# Patient Record
Sex: FEMALE | Race: WHITE
[De-identification: names, ages, dates, MRNs, and addresses within clinical notes are randomized per-mention and may not be internally consistent; named-entity substitution may affect disease eponyms.]

---

## 2017-11-08 NOTE — PATH
Surgical Pathology Report



Patient Name:  NATANAEL COE

Accession #:  [id number]

[facility]. Rec. #:  [id number]                                                        

   /Age/Gender:  1955 (Age: 61) / F

Account:  [id number]                                                          

             Location: 

Taken:  10/26/2017

Received:  10/26/2017

Reported:  2017

Physicians:  Orestes Edwards M.D.

  



Specimen(s) Received

 RENAL BIOPSY FOR SEND OUT 





Clinical History

None Provided





Intraoperative Consult Diagnosis

Kidney biopsy, gross examination: Few glomeruli present. Request additional

tissue.

BONG Keen M.D., 10/26/17









Final Diagnosis

Renal Biopsy ([facility] [id number])

(scant specimen; 1 glomerulus for IF and 2 glomeruli for EM) with:

1.          Fibroadipose tissue only for light microscopy; tissue inadequate for

light microscopy.

2.     One (1) glomerulus in frozen tissue with mesangial sclerosis and

glomerular basement membrane thickening, suggestive of diffuse diabetic

glomerulosclerosis (see comment).

3.     Tubular atrophy and interstitial fibrosis, focal, mild.



Electron microscopy addendum results:

Renal biopsy:

     Diffuse diabetic glomerulosclerosis with:

1.          Mesangial sclerosis, global, 2+.

2.     Glomerular basement membrane thickening, global 2-3+.

3.     Foot process effacement, 70%.

4.     Tubular atrophy and interstitial fibrosis, 2+.

5.     Arteriolar hyalinosis, 2+.

NOTE: Longstanding smoking may have contributed to the development of mesangial

sclerosis and glomerular basement membrane thickening.



COMMENT:

Interpretation of this biopsy is limited by the sampling of only fibroadipose

tissue for light microscopy and the need to reprocess frozen tissue for light

microscopy, leading to freezing artifact.  The single glomerulus sampled by

immunofluorescence has linear staining of glomerular and tubular basement

membranes with antisera to albumin and IgG, supporting a diagnosis of diffuse

diabetic glomerulosclerosis.  These findings provide evidence against immune

complex mediated and dysproteinemia related renal diseases.  The diagnosis of

diabetic glomerulosclerosis s supported by the finding of global mesangia l

sclerosis and glomerular basement membrane thickening in the 2 glomeruli sampled

in the toluidine blue survey sections for electron macroscopy.  Longstanding

smoking may have contributed to the development of mesangial sclerosis.  The

glomerular sampling (3 glomeruli total for IF and EM) is insufficient to

evaluate the severity of the diabetic nephropathy.  Electron microscopy is

pending and may be contributory.



See [facility] Pathologist's report [id number] for full report.







***Electronically Signed***

Easton Keen M.D.





Gross Description

Received fresh in saline, labeled "right renal biopsy," are 2 tan-red,

cylindrical portions of soft tissue averaging 1.0 cm in length and 0.1 cm in

diameter. The specimens are divided, placed into 10% buffered formalin, Rosendo

fixative and glutaraldehyde. The specimen is sent to [facility] for

further studies.

There is an additional 0.6 cm in length x 0.1 cm in diameter red, hemorrhagic,

cylindrical portion of soft tissue received in saline. The specimen is divided

and added to the previous specimens and sent to [facility].

/10/26/2017



saudi/10/26/2017

## 2017-12-18 NOTE — HP
Satellite [facility]





- Chief Complaint


Chief Complaint: right knee pain





- Past Medical History


Allergies/Adverse Reactions: 


 Allergies











Allergy/AdvReac Type Severity Reaction Status Date / Time


 


CT SCAN DYE Allergy Severe  Uncoded 12/05/17 12:37














- Current Medications


Current Medications: 


 Home Medications











 Medication  Instructions  Recorded


 


Alprazolam [Xanax] 0.25 mg PO DAILY PRN 12/05/17


 


Amlodipine Besylate 10 mg PO DAILY 12/05/17


 


Aspirin [ASA -] 81 mg PO DAILY 12/05/17


 


Bimatoprost [Lumigan] 1 drop OU DAILY 12/05/17


 


Brimonidine Tartrate/Timolol 5 5ml OU BID 12/05/17





[Combigan Eye Drops]  


 


Cholecalciferol (Vitamin D3) 2,000 unit PO DAILY 12/05/17





[Vitamin D3]  


 


Dulaglutide [Trulicity] 1.5 mg SQ WEEKLY 12/05/17


 


Escitalopram Oxalate [Lexapro -] 10 mg PO DAILY 12/05/17


 


Furosemide [Lasix] 40 mg PO BID 12/05/17


 


Insulin Glargine,Hum.rec.anlog 75 unit SQ DAILY 12/05/17





[Toujeo Solostar]  


 


Insulin Lispro [Humalog] 18 unit SQ BID 12/05/17


 


Isosorbide Mononitrate [Isosorbide 30 mg PO DAILY 12/05/17





Mononitrate ER]  


 


Losartan/Hydrochlorothiazide 1 each PO DAILY 12/05/17





[Losartan-Hctz 50-12.5 mg Tab]  


 


Nebivolol HCl [Bystolic] 10 mg PO DAILY 12/05/17


 


Rosuvastatin Calcium [Crestor] 10 mg PO DAILY 12/05/17














Satellite Physical Exam





- Physical Examination


General Appearance: Well Nourished, Well Developed, Alert & Oriented x3


ENT: Clear


Lung: Normal air movement


Heart: Regular rate & rhythm


Extremities: Other (right knee- + swelling, + ttp, decr rom, nvi xrays show 

grade 4 tricompartmental djd)


Neurological: Intact, Alert, Oriented





Satellite Impression/Plan





- Impression/Plan


Impression: right knee djd


Operative Procedure: right rj tkr


Date to be Performed: 12/19/17

## 2017-12-19 NOTE — OP
Operative Note





- Note:


Operative Date: 12/19/17 (angelita)


Pre-Operative Diagnosis: right knee djd


Operation: right rj tkr (press-fit)


Post-Operative Diagnosis: Same as Pre-op


Surgeon: Norman Soliman


Assistant: Chaparro Galloway


Anesthesiologist/CRNA: Markel Conley


Anesthesia: Spinal, Local


Specimens Removed: bone fragments


Estimated Blood Loss (mls): 100


Operative Report Dictated: Yes

## 2017-12-20 NOTE — PN
Progress Note (short form)





- Note


Progress Note: 





62F POD1 s/p R TKR under spinal anesthetic with peripheral nerve blocks for 

post operative pain control. Pt is doing well, reports no anesthetic 

complications. Pain is well controlled. Sensory and motor function intact in 

bilateral lower extremities.

## 2017-12-20 NOTE — PN
Progress Note (short form)





- Note


Progress Note: 





Ortho





Pt seen and examined s/p right rj tkr pod #2


 


 Selected Entries











  12/20/17





  05:56


 


Temperature 97.6 F


 


Pulse Rate 95 H


 


Respiratory 19





Rate 


 


Blood Pressure 141/53








 Laboratory Tests











  12/20/17





  07:30


 


WBC  15.2 H


 


Hgb  12.0


 


Hct  35.8


 


Plt Count  183











Dressing c/d/i, calf soft, nt


rom 0-50, nvi





a/p


PT


dvt ppx


pain control


d/c home tomorrow if stable

## 2017-12-20 NOTE — CONSULT
Consultation: 


REQUESTING PROVIDER:  Dr Soliman 





CONSULT REQUEST: We have been asked to medically evaluate this patient for 

hyperglycemia.





HISTORY OF PRESENT ILLNESS:  Patient is a 61y/o obese female with a past 

medical history of IDDM, COPD, CKD, osteoarthritis, HTN, diastolic congestive 

heart failure, and depression. Patient is a s/p right TKR (rj), Dr Soliman, 

spinal anesthesia, patient is post operative day 1.  patient reports feeling 

well, reports minimal pain to the right lower extremity.  








REVIEW OF SYSTEMS:


CONSTITUTIONAL: 


Absent:  fever, chills, diaphoresis, generalized weakness, malaise, loss of 

appetite, weight change


HEENT: 


Absent:  rhinorrhea, nasal congestion, throat pain, throat swelling, difficulty 

swallowing, mouth swelling, ear pain, eye pain, visual changes


CARDIOVASCULAR: 


Absent: chest pain, syncope, palpitations, irregular heart rate, lightheadedness

, peripheral edema


RESPIRATORY: 


Absent: cough, shortness of breath, dyspnea with exertion, orthopnea, wheezing, 

stridor, hemoptysis


GASTROINTESTINAL:


Absent: abdominal pain, abdominal distension, nausea, vomiting, diarrhea, 

constipation, melena, hematochezia


GENITOURINARY: 


Absent: dysuria, frequency, urgency, hesitancy, hematuria, flank pain, genital 

pain


MUSCULOSKELETAL: 


Absent: myalgia, arthralgia, joint swelling, back pain, neck pain


SKIN: 


Absent: rash, itching, pallor


HEMATOLOGIC/IMMUNOLOGIC: 


Absent: easy bleeding, easy bruising, lymphadenopathy, frequent infections


ENDOCRINE:


Absent: unexplained weight gain, unexplained weight loss, heat intolerance, 

cold intolerance


NEUROLOGIC: 


Absent: headache, focal weakness or paresthesias, dizziness, unsteady gait, 

seizure, mental status changes, bladder or bowel incontinence


PSYCHIATRIC: 


Absent: anxiety, depression, suicidal or homicidal ideation, hallucinations.





PHYSICAL EXAMINATION





 Vital Signs - 24 hr











  12/19/17 12/19/17 12/19/17





  08:29 11:31 11:35


 


Temperature 98.1 F 97.9 F 


 


Pulse Rate 70 63 64


 


Respiratory 16 16 18





Rate   


 


Blood Pressure 133/69 114/47 114/52


 


O2 Sat by Pulse  98 97





Oximetry (%)   














  12/19/17 12/19/17 12/19/17





  11:40 11:45 12:00


 


Temperature   


 


Pulse Rate 68 68 68


 


Respiratory 18 20 20





Rate   


 


Blood Pressure 123/41 123/41 113/45


 


O2 Sat by Pulse 99 98 98





Oximetry (%)   














  12/19/17 12/19/17 12/19/17





  12:15 12:30 13:05


 


Temperature   98 F


 


Pulse Rate 58 L 56 L 56 L


 


Respiratory 20 20 19





Rate   


 


Blood Pressure 106/47 126/53 122/50


 


O2 Sat by Pulse 96 96 96





Oximetry (%)   














  12/19/17 12/20/17





  22:36 05:56


 


Temperature 97.5 F L 97.6 F


 


Pulse Rate 62 95 H


 


Respiratory 19 19





Rate  


 


Blood Pressure 127/54 141/53


 


O2 Sat by Pulse 96 94 L





Oximetry (%)  














GENERAL: Awake, alert, and fully oriented, in no acute distress.


HEAD: Normal with no signs of trauma.


EYES: Pupils equal, round and reactive to light, extraocular movements intact, 

sclera anicteric, conjunctiva clear. No lid lag.


EARS, NOSE, THROAT: Ears normal, nares patent, oropharynx clear without 

exudates. Moist mucous membranes.


NECK: Normal range of motion, supple without lymphadenopathy, JVD, or masses.


LUNGS: Breath sounds equal, clear to auscultation bilaterally. No wheezes, and 

no crackles. No accessory muscle use.


HEART: Regular rate and rhythm, normal S1 and S2 without murmur, rub or gallop.


ABDOMEN: Soft, nontender, not distended, normoactive bowel sounds, no guarding, 

no rebound, no masses.  No hepatomegaly or splenomegaly. 


MUSCULOSKELETAL: Normal range of motion at all joints. No bony deformities or 

tenderness. No CVA tenderness.


UPPER EXTREMITIES: 2+ pulses, warm, well-perfused. No cyanosis. No clubbing. 

Cap refill <2 seconds. No peripheral edema.


LOWER EXTREMITIES: 2+ pulses, warm, well-perfused. No calf tenderness. No 

peripheral edema. 


RIGHT LOWER EXTREMITY: aguacel dressing intact,CDI, less than 3 second 

capillary refill, + 3 pedal pulse, SCD/SOURAV 


NEUROLOGICAL:  Cranial nerves II-XII intact. Normal speech. Normal gait.


PSYCHIATRIC: Cooperative. Good eye contact. Appropriate mood and affect.


SKIN: Warm, dry, normal turgor, no rashes or lesions noted. 











 Laboratory Results - last 24 hr











  12/19/17 12/19/17 12/19/17





  08:00 11:39 17:08


 


POC Glucometer  155  101  179














  12/19/17 12/20/17





  21:42 06:26


 


POC Glucometer  340  253








Active Medications











Generic Name Dose Route Start Last Admin





  Trade Name Freq  PRN Reason Stop Dose Admin


 


Acetaminophen  650 mg  12/19/17 12:00  12/20/17 06:33





  Tylenol -  PO  12/22/17 11:59  650 mg





  Q6H JAYCEE   Administration


 


Al Hydroxide/Mg Hydroxide  30 ml  12/19/17 11:20  





  Mylanta Oral Suspension -  PO   





  Q4H PRN   





  DYSPEPSIA   


 


Alprazolam  0.25 mg  12/19/17 11:16  





  Xanax -  PO   





  DAILY PRN   





  ANXIETY   


 


Amlodipine Besylate  10 mg  12/20/17 10:00  





  Norvasc -  PO   





  DAILY JAYCEE   


 


Aspirin  325 mg  12/20/17 08:00  





  Asa -  PO   





  DAILY@0800 JAYCEE   


 


Brimonidine Tartrate  1 drop  12/19/17 22:00  12/19/17 21:33





  Alphagan 0.2% -  OU   1 drop





  BID JAYCEE   Administration


 


Escitalopram Oxalate  10 mg  12/20/17 10:00  





  Lexapro -  PO   





  DAILY [facility]   


 


Fentanyl  50 mcg  12/19/17 11:52  





  Sublimaze Injection -  IVPUSH   





  [id number] PRN   





  PAIN   


 


Furosemide  40 mg  12/19/17 06:00  12/20/17 06:34





  Lasix -  PO   40 mg





  BIDLASIX JAYCEE   Administration


 


HCTZ/Losartan Potassium  1 tab  12/20/17 10:00  





  Hyzaar -  PO   





  DAILY [facility]   


 


Insulin Aspart  1 vial  12/19/17 16:30  12/20/17 06:35





  Novolog Vial Sliding Scale -  SQ   8 units





  ACHS JAYCEE   Administration





  Protocol   


 


Isosorbide Mononitrate  30 mg  12/20/17 10:00  





  Imdur -  PO   





  DAILY [facility]   


 


Latanoprost  1 drop  12/19/17 22:00  12/19/17 21:36





  Xalatan 0.005% Eye Drops -  OU   1 drop





  HS JAYCEE   Administration


 


Magnesium Hydroxide  30 ml  12/19/17 11:20  





  Milk Of Magnesia -  PO   





  PRN PRN   





  CONSTIPATION   


 


Multivitamins/Minerals/Vitamin C  1 tab  12/20/17 10:00  





  Tab-A-Vit -  PO   





  DAILY JAYCEE   


 


Nebivolol  10 mg  12/20/17 10:00  





  Bystolic -  PO   





  DAILY JAYCEE   


 


Non-Formulary Medication  75 unit  12/20/17 10:00  





  Insulin Glargine,Hum.Rec.Anlog [Toujeo Solostar]  SQ   





  DAILY JAYCEE   


 


Non-Formulary Medication  18 unit  12/19/17 22:00  





  Insulin Lispro Protamin/Lispro [Humalog Mix 50-50 Vial]  SQ   





  BID JAYCEE   


 


Ondansetron HCl  4 mg  12/19/17 13:18  





  Zofran Injection  IVPUSH   





  Q6H PRN   





  NAUSEA AND/OR VOMITING   


 


Oxycodone HCl  5 mg  12/19/17 11:52  12/20/17 06:33





  Roxicodone -  PO   5 mg





  Q3H PRN   Administration





  PAIN LEVEL 1-5   


 


Oxycodone HCl  10 mg  12/19/17 11:52  12/19/17 15:32





  Roxicodone -  PO   10 mg





  Q3H PRN   Administration





  PAIN LEVEL 6-10   


 


Oxycodone HCl  10 mg  12/19/17 22:00  12/19/17 21:34





  Oxycontin -  PO  12/22/17 11:53  10 mg





  BID JAYCEE   Administration


 


Pantoprazole Sodium  40 mg  12/20/17 10:00  





  Protonix -  PO   





  DAILY JAYCEE   


 


Promethazine HCl  12.5 mg  12/19/17 11:52  





  Phenergan Injection -  IVPUSH   





  Q6H PRN   





  NAUSEA-FOR RESCUE AFTER 15 MIN   


 


Rosuvastatin Calcium  10 mg  12/20/17 22:00  





  Crestor -  PO   





  HS JAYCEE   


 


Senna/Docusate Sodium  2 tablet  12/19/17 22:00  12/19/17 21:35





  Pericolace -  PO   2 tablet





  BID JAYCEE   Administration


 


Timolol Maleate  1 drop  12/19/17 22:00  12/19/17 21:35





  Timoptic 0.5%  OU   1 drop





  BID JAYCEE   Administration











ASSESSMENT/PLAN:





1) MS


s/p right TKR pod #1


- prn pain medication


-PT as per the orthopedist


- incentive spirometer


- monitor CBC





2) endo


IDDM


- start levermir (subsition for trudejo) and novolog 70/30 (subsition for 50/50)

,continue regular insulin sliding scale with fingersticks achs





3) cardiovascular


diastolic congestive heart failure


- pt is euvolemic on exam, continue lasix 


hypertension


- continue loosartan and bystolic





4) pulmonary


copd


- no acute exacerbation


- albuterol nebulizer prn 





f/e/n


- low sodium diet





ppx


- asa


- pt


- oob


- scd/sourav


- protonix 





Dispo: We will continue to follow the patient. Thank you for this consultative 

opportunity.











Visit type





- Emergency Visit


Emergency Visit: No





- New Patient


This patient is new to me today: Yes


Date on this admission: 12/20/17





- Critical Care


Critical Care patient: No

## 2017-12-21 NOTE — DS
Physical Examination


Vital Signs: 


 Vital Signs











Temperature  98.3 F   12/21/17 06:00


 


Pulse Rate  93 H  12/21/17 06:00


 


Respiratory Rate  20   12/21/17 06:00


 


Blood Pressure  143/52   12/21/17 06:00


 


O2 Sat by Pulse Oximetry (%)  93 L  12/21/17 06:00











Labs: 


 CBC, BMP





 12/21/17 07:30 











Discharge Summary


Reason For Visit: OSTEOARTHRITIS


Procedures: Principal: s/p right rj tkr


Hospital Course: 





admitted fro elective right rj tkr, uneventful post-op, stable for d/c


Condition: Good





- Instructions


Diet, Activity, Other Instructions: 


Post-op Instructions-Total Knee Replacement                                    

               





     Call the office for a follow-up  appointment in 1 week - [phone]


     Aspirin 325mg daily for 6 weeks. Pain medication was sent into your 

pharmacy.                      


     Apply  Graduated Compression Stockings (TEDs) to both lower extremities-

remove daily


      for hygiene  ONLY


     Apply Sequential Compression Device (SCDs)  to both Lower extremities 

remove for PT 


      and hygiene ONLY  


     Apply cold packs to affected area for 15 minutes every 2 hours.


     Physical Therapist will come to your home for the first 5 days. You will 

be set up with 


      outpatient PT at your first post-operative visit.


     Patient may ambulate as tolerated-encourage self care (at least every 2-3 

hours while awake) 


      with walker or cane


     Maintain Aquacel (waterproof) dressing to operative wound (will be 

removed by surgeon at 


      first office visit)


     Shower with Aquacel dressing in place-if Aquacel integrity compromised, 

remove and apply 


      dry sterile dressing and notify Orthopedist.  DO NOT SHOWER unless 

Orthopedists approves without Aquacel 


      dressing





     CONTACT THE OFFICE FOR ANY CHANGE IN YOUR CONDITION (for example-fever 


      greater than 102 degrees, excessive bleeding from operative site, 

purulent drainage, 


      severe swelling or pain)    GO TO THE EMERGENCY ROOM IF THERE IS A  

MEDICAL EMERGENCY





     Knee Precautions:  


      * Keep a rolled towel under affected heel while in bed or chair (to keep 

knee 


        in extension)


      * Keep affected leg elevated except during mealtimes


      * DO NOT PLACE PILLOW UNDER AFFECTED KNEE





  * If you have any questions, please do not hesitate to call the office - [phone].





Referrals: 


Norman Soliman MD [Staff Physician] - 


Disposition: VNS/HOME HEALTH CARE





- Home Medications


Comprehensive Discharge Medication List: 


Ambulatory Orders





Aspirin [ASA -] 81 mg PO DAILY 10/14/16 


Escitalopram Oxalate [Lexapro -] 10 mg PO DAILY 10/14/16 


Insulin Lispro Protamin/Lispro [Humalog Mix 50-50 Kwikpen] 18 unit SQ BID 10/14/

16 


Isosorbide Mononitrate [Imdur -] 30 mg PO DAILY 10/14/16 


Nebivolol HCl [Bystolic] 10 mg PO DAILY 10/14/16 


Rosuvastatin Calcium [Crestor] 10 mg PO HS 10/14/16 


Amlodipine Besylate [Norvasc -] 10 mg PO DAILY #30 tablet 10/18/16 


Furosemide [Lasix -] 40 mg PO BID 10/25/17 


Insulin Glargine,Hum.rec.anlog [Toujeo Solostar] 75 units SCJ DAILY 10/25/17 


Losartan 50Mg/Hctz 12.5MG 1 tab PO DAILY 10/25/17 


Oxycodone HCl/Acetaminophen [Percocet 5-325 mg Tablet] 1 tab PO BID 10/25/17 


Vitamin D3 - 2,000 units PO DAILY 10/25/17 


Alprazolam [Xanax] 0.25 mg PO DAILY PRN 12/05/17 


Amlodipine Besylate 10 mg PO DAILY 12/05/17 


Bimatoprost [Lumigan] 1 drop OU DAILY 12/05/17 


Brimonidine Tartrate/Timolol [Combigan 0.2%-0.5% Eye Drops] 5 5ml OU BID 12/05/ 17 


Cholecalciferol (Vitamin D3) [Vitamin D3] 2,000 unit PO DAILY 12/05/17 


Dulaglutide [Trulicity] 1.5 mg SQ WEEKLY 12/05/17 


Escitalopram Oxalate [Lexapro -] 10 mg PO DAILY 12/05/17 


Furosemide [Lasix] 40 mg PO BID 12/05/17 


Insulin Glargine,Hum.rec.anlog [Toujeo Solostar] 75 unit SQ DAILY 12/05/17 


Isosorbide Mononitrate [Isosorbide Mononitrate ER] 30 mg PO DAILY 12/05/17 


Losartan/Hydrochlorothiazide [Losartan-Hctz 50-12.5 mg Tab] 1 each PO DAILY 12/ 05/17 


Nebivolol HCl [Bystolic] 10 mg PO DAILY 12/05/17 


Rosuvastatin Calcium [Crestor] 10 mg PO DAILY 12/05/17 


Aspirin [ASA -] 325 mg PO DAILY@0800  tablet 12/19/17 


Insulin Lispro Protamin/Lispro [Humalog Mix 50-50 Vial] 18 unit SQ BID 12/19/17 


Oxycodone HCl/Acetaminophen [Percocet 5-325 mg Tablet] 1 tab PO Q6H PRN #50 

tablet MDD 6 12/19/17 


Oxycodone HCl/Acetaminophen [Percocet 5-325 mg Tablet] 1 - 2 tab PO Q6H #50 tab 

MDD 8 12/21/17

## 2017-12-21 NOTE — PN
Progress Note (short form)





- Note


Progress Note: 





Ortho





Pt seen and examined s/p right rj tkr pod #2


 


 


 Selected Entries











  12/21/17





  06:00


 


Temperature 98.3 F


 


Pulse Rate 93 H


 


Respiratory 20





Rate 


 


Blood Pressure 143/52








 Laboratory Tests











  12/21/17





  07:30


 


WBC  Pending


 


Hgb  Pending


 


Hct  Pending


 


Plt Count  Pending














Dressing c/d/i, calf soft, nt


rom 0-50, nvi





a/p


PT


dvt ppx


pain control


d/c home today


f/u in 1 week

## 2017-12-22 NOTE — PATH
Surgical Pathology Report



Patient Name:  NATANAEL COE

Accession #:  [id number]

Med. Rec. #:  [id number]                                                        

   /Age/Gender:  1955 (Age: 62) / M

Account:  [id number]                                                          

             Location: [facility] MED[facility]

Taken:  2017

Received:  2017

Reported:  2017

Physicians:  Normna Soliman M.D.

  



Specimen(s) Received

 RIGHT KNEE BONES 





Clinical History

Osteoarthritis







Final Diagnosis

B BONEs, RIGHT, TOTAL KNEE REPLACEMENT:

DEGENERATIVE JOINT DISEASE.





***Electronically Signed***

Lucia Fallon M.D.





Gross Description

Received in formalin labeled "right knee bones," is a 9.5 x 8.5 x 1.7 cm

aggregate of multiple tan, irregular portions of bone and soft tissue,

consistent with knee bones. There is a 1.7 cm in greatest dimension area of

eburnation present. The remaining articular surfaces are tan-yellow and focally

granular. The underlying trabecular bone is yellow and hard. Representative

sections are submitted in one cassette, following decalcification.





[facility]

## 2017-12-27 NOTE — SPEC
DATE OF OPERATION:  12/19/2017 

 

PREOPERATIVE DIAGNOSIS:  Degenerative joint disease, right knee.

 

POSTOPERATIVE DIAGNOSIS:  Degenerative joint disease, right knee.

 

PROCEDURE:  Right total knee replacement with robotic-assisted navigation

(MAKOplasty) with Press-Fit components. 

 

SURGICAL ATTENDING:  Norman Soliman MD

 

FIRST ASSISTANT:  PATRICIA Nam

 

ANESTHESIA:  Regional and spinal.

 

CLOSURE:  No. 1 Vicryl, fascia; 0 and 2-0 for subcutaneous; and 3-0 Monocryl

subcuticular with skin glue for skin; 4-0 undyed Vicryl for pin sites.

 

ESTIMATED BLOOD LOSS:  Less than 100 mL.

 

COMPLICATIONS:  None.

 

CONDITION:  To recovery room in stable condition.

 

DESCRIPTION OF OPERATIVE PROCEDURE:  Patient was taken to the operating room on

December 19, 2017.  Regional and spinal anesthesia was administered by the

anesthesiologist.  IV Kefzol was administered prophylactically prior to the case as

well as TXA.  The right lower extremity was prepped and draped in the usual sterile

fashion.

 

The midline 10- to 12-cm longitudinal incision was made.  Hemostasis was achieved

with Bovie cautery.  Sharp dissection was carried down to the extensor mechanism

which was _____ perform the procedure.  Medial parapatellar arthrotomy was then

performed, leaving a cuff of tissue for later closure.  The patella was inverted and

the knee was flexed up.  The fat pad was excised.  Subperiosteal dissection was done

on the anteromedial proximal tibia until the knee was able to be brought forward. 

This was facilitated by taking the ACL, PCL and medial and lateral menisci. 

Checkpoints were placed in both the femur and in the tibia.  Two parallel threaded

pins were drilled superior to the knee joint through the already made incision from

anterior to posterior just going through the anterior cortex but just engaging but

not going through the posterior cortex.  Two threaded pins were drilled through 2

small stab incisions in parallel fashion 1 handbreadth below the tibial tubercle

through the anterior cortex of the tibia and engaging but not going through the

posterior cortex.  Both sets of pins were attached to navigation arrays for the GIOVANA

system.

 

The knee was then registered with the navigation system with center of rotation of

the hip, medial and lateral malleoli and multiple sites both on the tibia and on the

femur.  Confirmation of excellent registration was confirmed by "popping the

bubbles."  At this time, the knee was thoroughly inspected to remove all osteophytes

around the knee.  The knee was then tensioned in varus/valgus at both full extension

and at 90 degrees of flexion to ascertain our gaps.  The virtual position of the

components was optimized to ensure equal gaps throughout the range of motion.  Once

this was performed, the robot was brought into the field, was registered.  The bone

was cut as per the specifications on both the tibia and on the femur.  The box cuts

were then made as well.  Excellent trial stability was obtained on the femur.  The

tibial baseplate was allowed to "find itself" and then was clipped into place. 

Confirmation of excellent external rotation of that component was confirmed by the

navigation device as well.The patella was calibered for thickness and cut at the

appropriate level.  The appropriate lollipop was used to drill 3 holes in the patella

and a trial asymmetric patellar button was applied.  The knee was taken through a

range of motion and found to have excellent stability from full extension to full

flexion with excellent tracking of the patella.  The trial components were then

removed.  The lug holes were drilled in the femur.  The cementless keel was punched

in the tibia.  The real Press-Fit components were malleted into place, first with the

tibia and then with the femur, and then the patella was crimped into place as well. 

The real polyethylene liner was then clipped into place.  Range of motion, stability

and tracking were as described earlier.

 

The knee was thoroughly irrigated with copious amounts of irrigation.  Vancomycin

powder was placed inside the joint.  The medial parapatellar arthrotomy was then

closed using No. 1 Vicryl interrupted suture.  Post closure of the arthrotomy, the

knee was taken through a range of motion and found to have no undue tension on the

repair.  The subcutaneous was then pulse antibiotic irrigated, closed with 0 and 2-0

Vicryl and 3-0 Monocryl subcuticular with skin glue for the skin.  Prior to closure,

the checkpoints were removed as were the threaded pins.  The tibial pin sites were

closed with 4-0 undyed Vicryl.  A sterile pressure Aquacel dressing was applied.  No

tourniquet was used during the case.  The total blood loss was approximately 100 mL. 

No complication.  Patient was transferred to recovery in stable condition.

 

 

ADITI [id number]

DD: 12/27/2017 13:20

DT: 12/27/2017 13:43

Job #:  [id number]

## 2018-05-03 NOTE — PDOC
History of Present Illness





- General


Chief Complaint: SIRS, Suspected/Possible


Stated Complaint: FEVER (PCP SENT)


Time Seen by Provider: 05/03/18 14:24


History Source: Patient


Exam Limitations: No Limitations





- History of Present Illness


Initial Comments: 





05/03/18 14:53





63 yo F with h/o copd, chf, htn DM CKD here with cough congestion, fever and 

fatigue x 5 days.  Seen by Dr Savage at his office 3 days, was started on 

tamiflu but patient say symptoms are not getting better.


Denies vomiting, only nausea, no abd pain. no rash. no cp. no leg swelling. 








Past History





- Past Medical History


Allergies/Adverse Reactions: 


 Allergies











Allergy/AdvReac Type Severity Reaction Status Date / Time


 


Iodinated Contrast- Oral and Allergy   Verified 05/03/18 13:32





IV Dye     











Home Medications: 


Ambulatory Orders





Nebivolol HCl [Bystolic] 10 mg PO DAILY 10/14/16 


Rosuvastatin Calcium [Crestor] 10 mg PO HS 10/14/16 


Amlodipine Besylate [Norvasc -] 10 mg PO DAILY #30 tablet 10/18/16 


Vitamin D3 - 2,000 units PO DAILY 10/25/17 


Alprazolam [Xanax] 0.25 mg PO DAILY PRN 12/05/17 


Bimatoprost [Lumigan] 1 drop OU DAILY 12/05/17 


Brimonidine Tartrate/Timolol [Combigan 0.2%-0.5% Eye Drops] 5 5ml OU BID 12/05/ 17 


Dulaglutide [Trulicity] 1.5 mg SQ WEEKLY 12/05/17 


Escitalopram Oxalate [Lexapro -] 10 mg PO DAILY 12/05/17 


Furosemide [Lasix] 40 mg PO BID 12/05/17 


Insulin Glargine,Hum.rec.anlog [Toujeo Solostar] 75 unit SQ DAILY 12/05/17 


Isosorbide Mononitrate [Isosorbide Mononitrate ER] 30 mg PO DAILY 12/05/17 


Losartan/Hydrochlorothiazide [Losartan-Hctz 50-12.5 mg Tab] 1 each PO DAILY 12/ 05/17 


Aspirin [ASA -] 325 mg PO DAILY@0800  tablet 12/19/17 


Insulin Lispro Protamin/Lispro [Humalog Mix 50-50 Vial] 18 unit SQ BID 12/19/17 


Oseltamivir Phosphate [Tamiflu] 75 mg PO DAILY 05/03/18 








Anemia: No


Asthma: No


Cancer: No


Cardiac Disorders: No


CVA: No


COPD: Yes


CHF: No


Dementia: No


Diabetes: Yes (OVER 20 YEARS)


Dialysis: No (CKD)


GI Disorders: No


 Disorders: No


HTN: Yes


Hypercholesterolemia: Yes


Liver Disease: No


Seizures: No


Thyroid Disease: No





- Surgical History


Abdominal Surgery: No


Appendectomy: No


Cardiac Surgery: No


Cholecystectomy: No


Lung Surgery: No


Neurologic Surgery: No


Orthopedic Surgery: Yes (RIGHT KNEE ARTHROSCOPY)





- Immunization History


Immunization Up to Date: Yes





- Suicide/Smoking/Psychosocial Hx


Smoking History: Current every day smoker


Have you smoked in the past 12 months: Yes


Number of Cigarettes Smoked Daily: 5


Information on smoking cessation initiated: Yes


'Breaking Loose' booklet given: 05/03/18


Hx Alcohol Use: No


Drug/Substance Use Hx: No


Substance Use Type: None


Hx Substance Use Treatment: No





**Review of Systems





- Review of Systems


Able to Perform ROS?: Yes


Is the patient limited English proficient: No


Constitutional: Yes: See HPI


HEENTM: No: Symptoms Reported


Respiratory: Yes: See HPI


Cardiac (ROS): No: Symptoms Reported


ABD/GI: No: Symptoms Reported


: No: Symptoms Reported


Musculoskeletal: No: Symptoms Reported


Integumentary: No: Symptoms Reported


All Other Systems: Reviewed and Negative





*Physical Exam





- Vital Signs


 Last Vital Signs











Temp Pulse Resp BP Pulse Ox


 


 102.1 F H  99 H  18   140/64   100 


 


 05/03/18 13:33  05/03/18 13:33  05/03/18 13:33  05/03/18 13:33  05/03/18 13:33














- Physical Exam


General Appearance: Yes: Appropriately Dressed, Obese.  No: Apparent Distress


HEENT: positive: EOMI, MI


Respiratory/Chest: positive: Normal Breath Sounds, Crackles, Wheezing (

bilaterally).  negative: Chest Tender, Respiratory Distress


Cardiovascular: positive: Regular Rhythm, Regular Rate, S1, S2


Gastrointestinal/Abdominal: positive: Normal Bowel Sounds, Flat, Soft, 

Protuberent.  negative: Tender


Extremity: positive: Normal Capillary Refill, Normal Inspection


Integumentary: positive: Normal Color, Dry, Warm.  negative: Cyanotic, 

Diaphoresis


Neurologic: positive: Fully Oriented, Alert, Normal Mood/Affect





ED Treatment Course





- LABORATORY


CBC & Chemistry Diagram: 


 05/03/18 17:36





 05/03/18 14:34





- Medications


Given in the ED: 


ED Medications














Discontinued Medications














Generic Name Dose Route Start Last Admin





  Trade Name Roberto  PRN Reason Stop Dose Admin


 


Acetaminophen  650 mg  05/03/18 13:37  05/03/18 13:38





  Tylenol -  PO  05/03/18 13:38  650 mg





  NOW ONE   Administration














Medical Decision Making





- Medical Decision Making





05/03/18 18:38


63 yo F with h/o copd, chf, htn DM CKD here with cough congestion, fever and 

fatigue x 5 days.


05/03/18 18:40


fever, tachycardia, septic workup ordered. 


elevated WBC at 15.8, hyponatrmic, prerenal status BUN/Cr = 61/1.9


 - Will treat prerenal status with fluids. 





Negative CXR however will start antibiotics based on clinic findings and 

suspicion. 





Admitted to Dr. Eloy centeno for [facility] for management and treatment of 

sepsis. 








*DC/Admit/Observation/Transfer


Diagnosis at time of Disposition: 


 Sepsis








- Discharge Dispostion


Admit: Yes





- Referrals





- Patient Instructions





- Post Discharge Activity

## 2018-05-03 NOTE — PDOC
Attending Attestation





- Resident


Resident Name: Cyrus Peguero





- ED Attending Attestation


I have performed the following: I have examined & evaluated the patient, The 

case was reviewed & discussed with the resident, I agree w/resident's findings 

& plan





- Medical Decision Making


61 yo F with h/o copd, chf, htn DM CKD here with cough congestion, fever and 

fatigue x 5 days.  was started in tamiflu 3 days ago. no vomiting, only nausea, 

no abd pain. no rash. no cp. no leg swelling. 


on exam wheezing bilaterally, crackles. right lung, heart rrr nomrg. abd soft 

nt nd. obsese. ext wwp no edema.





differential: pna, flu, copd, uti, dka, worsenin renal failure. viral uri, plan 

cxr labs ekg duoneb, ivf, antiemetics, will likley require admission. 








05/03/18 16:13








<Brianne Rodriguez - Last Filed: 05/03/18 16:13>





- HPI


HPI: 





05/03/18 14:40


The patient is a 62 year old female, with a significant past medical history of 

hypertension, diabetes, COPD(no intubations), CHF, and renal insufficiency, who 

presents to the emergency department with several days of weakness, cough, fever

, and decreased appetite for several days. The patient reports she has not been 

feeling well for approximately 5 days. She endorses Fever of 103.5, dry cough, 

decreased appetite, and chills, but denies any headache, dizziness, sore throat

, or ear pain. Patient reports visiting her PCP, Dr. Savage, who treated her 

prophylactically with Tamiflu, with minimal improvement of symptoms. She 

reports some nausea and vomiting(nonbloody/nonbilious) denies any recent 

abdominal pain, diarrhea, constipation, melena, or hematochezia.. She denies 

any dysuria, hematuria, frequency, or urgency. She denies any recent travel or 

sick contacts.





Allergies: Iodinated Contrast- Oral and IV Dye


PCP: Dr. Savage








- Physicial Exam


PE: 





05/03/18 14:40


GENERAL: Febrile. Awake, alert, and fully oriented, in no acute distress


HEAD: No signs of trauma


EYES: PERRLA, EOMI, sclera anicteric, conjunctiva clear


ENT: Auricles normal inspection, hearing grossly normal, nares patent. Moist 

mucosa


NECK: Normal ROM, supple, no lymphadenopathy, JVD, or masses


LUNGS: Ronchi bilaterally.  No wheezes, rales, or crackles.


HEART: Regular rate and rhythm, normal S1 and S2, no murmurs, rubs or gallops


ABDOMEN: Soft, nontender, normoactive bowel sounds.  No guarding, no rebound.  

No masses


EXTREMITIES: Normal range of motion, no edema.  No clubbing or cyanosis. No 

cords, erythema, or tenderness. DP/PT pulses 2+ and symmetric. 


NEUROLOGICAL: Moves all extremities.  Normal speech, normal gait


SKIN: Warm, Dry, normal turgor, no rashes or lesions noted.








- Medical Decision Making





05/03/18 14:40





Documentation prepared by Kain Christianson, acting as medical scribe for Brianne Rodriguez MD.








<Kain Christianson - Last Filed: 05/03/18 16:18>





ED Treatment Course





- LABORATORY


CBC & Chemistry Diagram: 


 05/03/18 14:34





 05/03/18 14:34





<Brianne Rodriguez - Last Filed: 05/03/18 16:13>





- LABORATORY


CBC & Chemistry Diagram: 


 05/03/18 14:34





 05/03/18 14:34





- ADDITIONAL ORDERS


Additional order review: 


 Laboratory  Results











  05/03/18 05/03/18 05/03/18





  14:55 14:34 14:34


 


PT with INR   


 


INR   


 


PTT (Actin FS)   


 


VBG pH   


 


POC VBG pCO2   


 


POC VBG pO2   


 


Mixed VBG HCO3   


 


Sodium    132 L


 


Potassium    4.3


 


Chloride    97 L


 


Carbon Dioxide    23


 


Anion Gap    12


 


BUN    61 H


 


Creatinine    1.9 H


 


Creat Clearance w eGFR    26.61


 


Random Glucose    187 H


 


Lactic Acid   1.9 


 


Calcium    8.7


 


Total Bilirubin    0.6  D


 


AST    82 H


 


ALT    66


 


Alkaline Phosphatase    134 H


 


Troponin I  Cancelled   < 0.02


 


Total Protein    6.3 L


 


Albumin    2.6 L


 


Urine Color   


 


Urine Appearance   


 


Urine pH   


 


Ur Specific Gravity   


 


Urine Protein   


 


Urine Glucose (UA)   


 


Urine Ketones   


 


Urine Blood   


 


Urine Nitrite   


 


Urine Bilirubin   


 


Urine Urobilinogen   


 


Ur Leukocyte Esterase   


 


Urine WBC (Auto)   


 


Urine RBC (Auto)   


 


Ur Epithelial Cells   


 


Urine Bacteria   


 


Hyaline Casts   


 


Urine Mucus   














  05/03/18 05/03/18 05/03/18





  14:34 14:34 14:34


 


PT with INR    11.80


 


INR    1.04


 


PTT (Actin FS)    35.0 H


 


VBG pH  7.41  


 


POC VBG pCO2  38.2  


 


POC VBG pO2  37.8  


 


Mixed VBG HCO3  23.6  


 


Sodium   


 


Potassium   


 


Chloride   


 


Carbon Dioxide   


 


Anion Gap   


 


BUN   


 


Creatinine   


 


Creat Clearance w eGFR   


 


Random Glucose   


 


Lactic Acid   


 


Calcium   


 


Total Bilirubin   


 


AST   


 


ALT   


 


Alkaline Phosphatase   


 


Troponin I   


 


Total Protein   


 


Albumin   


 


Urine Color   Yellow 


 


Urine Appearance   Cloudy 


 


Urine pH   5.0 


 


Ur Specific Gravity   1.013 


 


Urine Protein   2+ H 


 


Urine Glucose (UA)   Negative 


 


Urine Ketones   Negative 


 


Urine Blood   1+ H 


 


Urine Nitrite   Negative 


 


Urine Bilirubin   Negative 


 


Urine Urobilinogen   2.0 H 


 


Ur Leukocyte Esterase   Negative 


 


Urine WBC (Auto)   2 


 


Urine RBC (Auto)   3 


 


Ur Epithelial Cells   Few 


 


Urine Bacteria   Rare 


 


Hyaline Casts   1 


 


Urine Mucus   Rare 




















 05/03/18 14:55 Influenza Types A,B Antigen (AMY) - Final





 Nasopharyngeal Swab  - Final








 











  05/03/18





  14:34


 


RBC  3.79


 


MCV  93.6


 


MCHC  33.9


 


RDW  13.8


 


MPV  9.6


 


Neutrophils %  92.2 H D


 


Lymphocytes %  3.7 L D


 


Monocytes %  4.0


 


Eosinophils %  0.0  D


 


Basophils %  0.1














- RADIOLOGY


Radiograph Interpretation: 





05/03/18 16:02


EXAM: CXR


IMPRESSION: No acute disease. 





- Medications


Given in the ED: 


ED Medications














Discontinued Medications














Generic Name Dose Route Start Last Admin





  Trade Name Freq  PRN Reason Stop Dose Admin


 


Acetaminophen  650 mg  05/03/18 13:37  05/03/18 13:38





  Tylenol -  PO  05/03/18 13:38  650 mg





  NOW ONE   Administration














<Kain Christianson - Last Filed: 05/03/18 16:18>

## 2018-05-04 NOTE — CON.CARD
Consult


Consult Specialty:: Cardiology


Referred by:: Dr. Hartley


Reason for Consultation:: To evaluate volume status





- History of Present Illness


Chief Complaint: SOB and fever x 3 days


History of Present Illness: 





61F HTN, DM, CKD, COPD, MAGNOLIA, Chronic diastolic CHF admitted w/ 3 days fever, 

chills, YEH; CXR suspected RLL infiltrate.


Cultures taken, abx initiated.





Cardiac ROS: No CP, + YEH, no increase in mild LE edema. No palps or syncope.


Patient is known to me from prior admissions. 





- History Source


History Provided By: Patient, Medical Record





- Past Medical History


Cardio/Vascular: Yes: CAD (non-obstx CAD cath 2016 at [place]), CHF (chronic 

diastolic w mildly elevated EDP cath 2016), HTN


Pulmonary: Yes: COPD, Other (normal r. heart pressures cath [place] 2016)


Renal/: Yes: Renal Inusuff


...Pregnant: No


Psych: Yes: Anxiety


Endocrine: Yes: Diabetes Mellitus





- Alcohol/Substance Use


Hx Alcohol Use: No





- Smoking History


Smoking history: Current every day smoker


Have you smoked in the past 12 months: Yes


Aproximately how many cigarettes per day: 5





- Social History


Usual Living Arrangement: Alone


ADL: Independent


Occupation:  here at [employer]


History of Recent Travel: No





Home Medications





- Allergies


Allergies/Adverse Reactions: 


 Allergies











Allergy/AdvReac Type Severity Reaction Status Date / Time


 


Iodinated Contrast- Oral and Allergy   Verified 05/03/18 13:32





IV Dye     














- Home Medications


Home Medications: 


Ambulatory Orders





Nebivolol HCl [Bystolic] 10 mg PO DAILY 10/14/16 


Rosuvastatin Calcium [Crestor] 10 mg PO HS 10/14/16 


Amlodipine Besylate [Norvasc -] 10 mg PO DAILY #30 tablet 10/18/16 


Vitamin D3 - 2,000 units PO DAILY 10/25/17 


Alprazolam [Xanax] 0.25 mg PO DAILY PRN 12/05/17 


Bimatoprost [Lumigan] 1 drop OU DAILY 12/05/17 


Brimonidine Tartrate/Timolol [Combigan 0.2%-0.5% Eye Drops] 5 5ml OU BID 12/05/ 17 


Dulaglutide [Trulicity] 1.5 mg SQ WEEKLY 12/05/17 


Escitalopram Oxalate [Lexapro -] 10 mg PO DAILY 12/05/17 


Furosemide [Lasix] 40 mg PO BID 12/05/17 


Insulin Glargine,Hum.rec.anlog [Toujeo Solostar] 80 unit SQ DAILY 12/05/17 


Isosorbide Mononitrate [Isosorbide Mononitrate ER] 30 mg PO DAILY 12/05/17 


Losartan/Hydrochlorothiazide [Losartan-Hctz 50-12.5 mg Tab] 1 each PO DAILY 12/ 05/17 


Aspirin [ASA -] 325 mg PO DAILY@0800  tablet 12/19/17 


Insulin Lispro Protamin/Lispro [Humalog Mix 50-50 Vial] 18 unit SQ BID 12/19/17 


Aspirin [Ecotrin] 81 mg PO DAILY 05/03/18 


Oseltamivir Phosphate [Tamiflu] 75 mg PO DAILY 05/03/18 











Family Disease History





- Family Disease History


Family History: Unremarkable (not pertinent to this presentation)





Review of Systems


Findings/Remarks: 





see HPI





- Review of Systems


Constitutional: reports: Chills, Malaise


Eyes: denies: No Symptoms, Blind Spots, Blurred Vision, Double Vision, Eye Pain

, Floaters, Photophobia, Recent Change in Vision, Other


HENT: denies: No Symptoms, Difficult Swallowing, Ear Discharge, Ear Pain, 

Epistaxis, Gingival Bleeding, Hearing Loss, Mouth Swelling, Nasal Congestion, 

Ocular Prosthesis, Throat Pain, Toothache, Ringing in Ears, Other


Neck: denies: No Symptoms, Decreased ROM, Lumps, Pain on Movement, Stiffness, 

Swollen Glands, Tenderness, Other


Cardiovascular: denies: No Symptoms, Chest Pain, Edema, Palpitations, Shortness 

of Breath, Other


Respiratory: reports: Cough, SOB on Exertion


Gastrointestinal: denies: No Symptoms, Abdominal Pain, Bloating, Constipation, 

Diarrhea, Dysphagia, Indigestion, Melena, Nausea, Rectal Bleeding, Vomiting, 

Vomiting Blood, Other


Genitourinary: denies: No Symptoms, Burning, Discharge, Dysuria, Flank Pain, 

Frequency, Hematuria, Incontinence, Lesions, Menses, Pain, Testicular Mass, 

Testicular Pain, Testicular Swelling, Urgency, Vaginal Bleeding, Other


Breasts: denies: No Symptoms Reported, See HPI, Breast Implants, Discharge from 

Nipple, Lumps, Pain, Skin Changes, Other


Musculoskeletal: denies: No Symptoms, Back Pain, Crepitus, Decreased ROM, 

Extremity Pain, Joint Pain, Joint Swelling, Muscle Pain, Muscle Cramps, Muscle 

Weakness, Other


Integumentary: denies: No Symptoms, Blister, Bruising, Change in Color, Eczema, 

Erythema, Incision, Lesions, Lump, Pallor, Pruritis, Rash, Wound, Other


Neurological: denies: No Symptoms, Change in LOC, Change in Speech, Confusion, 

Dizziness, Headache, Incoordination, Numbness, Parasthesia, Pre-Existing Deficit

, Seizure, Syncope, Tremors, Unsteady Gait, Weakness, Other


Endocrine: denies: No Symptoms, Excessive Sweating, Flushing, Increased Hunger, 

Increased Thirst, Intolerance to Cold, Intolerance to Heat, Unexplained Weight 

Gain, Unexplained Weight Loss, Other


Hematology/Lymphatic: denies: No Symptoms, Easily Bruised, Excessive Bleeding, 

Swollen Glands, Other


Psychiatric: denies: No Symptoms, Altered Sleep Pattern, Anxiety, Depression, 

Hallucinations, Panic, Paranoia, Suicidal, Other





- Risk Factors


Known Risk Factors: Yes: Diabetes Mellitus, Hypercholesterolemia, Hypertension, 

Smoking


Vital Signs: 


 Vital Signs











Temperature  102.8 F H  05/04/18 13:34


 


Pulse Rate  92 H  05/04/18 13:34


 


Respiratory Rate  18   05/04/18 13:34


 


Blood Pressure  123/54   05/04/18 13:34


 


O2 Sat by Pulse Oximetry (%)  97   05/03/18 22:50











Constitutional: Yes: No Distress, Calm


Eyes: Yes: Conjunctiva Clear, EOM Intact


HENT: Yes: Normocephalic


Neck: Yes: Supple, Trachea Midline


Respiratory: Yes: Wheezes (mild anterior expiratory wheezing; no rales)


Gastrointestinal: Yes: Soft, Abdomen, Obese


Cardiovascular: Yes: Regular Rate and Rhythm


JVD: No


Carotid Bruit: No


PMI: Non-Displaced


Heart Sounds: Yes: S1, S2 (RRR, No S3)


Musculoskeletal: Yes: Other (Old R TKR incision C/DI)


Edema: Yes


Edema: LLE: Trace, RLE: Trace


Peripheral Pulses WNL: Yes


Neurological: Yes: Alert, Oriented


...Motor Strength: WNL


Psychiatric: Yes: WNL





- Other Data


Labs, Other Data: 


 CBC, BMP





 05/03/18 17:36 





 05/03/18 17:36 





 INR, PTT











INR  1.04  (0.82-1.09)   05/03/18  14:34    








 Troponin, BNP











  05/03/18 05/03/18





  14:34 14:55


 


Troponin I  < 0.02  Cancelled








 Troponin, BNP











  05/03/18 05/03/18





  14:34 14:55


 


Troponin I  < 0.02  Cancelled














NSR Poor R wave progression


Echo: Report Reviewed


Prior Cardiac Procedures: Cardiac Catheterization


Ejection Fraction %: LVEF > or = 40 %





Imaging





- Results


Chest X-ray: Image Reviewed (Suspected RLL infiltrate)


EKG: Image Reviewed





Problem List





- Problems


(1) Sepsis


Code(s): A41.9 - SEPSIS, UNSPECIFIED ORGANISM   


Qualifiers: 


   Sepsis type: sepsis due to unspecified organism   Qualified Code(s): A41.9 - 

Sepsis, unspecified organism   





(2) Pneumonia


Code(s): J18.9 - PNEUMONIA, UNSPECIFIED ORGANISM   


Qualifiers: 


   Laterality: right   Lung location: lower lobe of lung 





(3) ASHD (arteriosclerotic heart disease)


Assessment/Plan: 


Non-obstx on cath 2016, 50% focal OM 


Code(s): I25.10 - ATHSCL HEART DISEASE OF NATIVE CORONARY ARTERY W/O ANG PCTRS 

  





(4) Chronic diastolic CHF (congestive heart failure)


Code(s): I50.32 - CHRONIC DIASTOLIC (CONGESTIVE) HEART FAILURE   





(5) COPD (chronic obstructive pulmonary disease)


Code(s): J44.9 - CHRONIC OBSTRUCTIVE PULMONARY DISEASE, UNSPECIFIED   


Qualifiers: 


   COPD type: chronic bronchitis 





(6) IDDM (insulin dependent diabetes mellitus)


Code(s): E11.9 - TYPE 2 DIABETES MELLITUS WITHOUT COMPLICATIONS; Z79.4 - LONG 

TERM (CURRENT) USE OF INSULIN   





(7) Renal insufficiency


Code(s): N28.9 - DISORDER OF KIDNEY AND URETER, UNSPECIFIED   





Assessment/Plan





IMP:


1. Suspected RLL PNA w/ early sepsis


2. DM w/ CKD, probable acute on chronic ARF in setting of early sepsis


3. Chronic diastolic CHF secondary to hypertensive heart disease and DM


4. Non-obstructive CAD


5. Chronic COPD, active smoker, w/ no evidence of PHTN on RHC 2016





REC:


1. F/u cultures; abx per ID; supplimental O2; DVT prophylaxis


2. Currently appears euvolemic, possibly mildly dehydrated. Hold Lasix for now.


Will need close f/u of volume status as she receives the added daily volume (IV 

abx, IV fluids) as she is prone to diastolic CHF and volume overload.


3. Continue other home meds for BP and chronic diastolic dysfx: Imdur 30, 

Bystolic 10, Amlodipine 10 w/ hold parameters.


4. Smoking cessation counselling.








Thank you.

## 2018-05-04 NOTE — EKG
Test Reason : 

Blood Pressure : ***/*** mmHG

Vent. Rate : 093 BPM     Atrial Rate : 093 BPM

   P-R Int : 140 ms          QRS Dur : 076 ms

    QT Int : 346 ms       P-R-T Axes : 068 066 052 degrees

   QTc Int : 430 ms

 

*** POOR DATA QUALITY, INTERPRETATION MAY BE ADVERSELY AFFECTED

NORMAL SINUS RHYTHM

NORMAL ECG

WHEN COMPARED WITH ECG OF 20-NOV-2017 17:28,

NO SIGNIFICANT CHANGE WAS FOUND

Confirmed by KIKA AMBROSIO MD ([id number]) on 5/4/2018 9:42:41 AM

 

Referred By:             Confirmed By:KIKA AMBROSIO MD

## 2018-05-04 NOTE — HP
Admitting History and Physical





- Primary Care Physician


PCP: Yelitza Savage





- Admission


Chief Complaint: fever and weakness


History of Present Illness: 


patient 61 yo F with h/o copd, chf, htn DM , CKD, arthritis, obesity status 

post right knee replacement -was sent to hospital for further management--- IV 

by antibiotics/fluids


Patient was seen by me  in the office--- 3 days ago


Patient had upper respiratory symptoms---she had cough/ body aches/ fever-103


Clinically  ---viral infection--- possibly flu


I gave her Tamiflu/ instructed to drink fluids


Patient not getting better--- I advised her to go to ER--which she did


chart reviewed


Patient given IV antibiotics and fluids


Admitted to the floor


Patient seen by me on the floor today


Sitting in chair--but looks weak


Continue to have fever


Denies chest pain


cough presents


Denies urinary burning


Denies abdominal pain


Denies headache or dizziness


Overall weakness.


Blood work showed--- elevated WBCs--- signs of dehydration----elevated BUN/

creatinine.


 chest x-ray is negative








History Source: Patient


Limitations to Obtaining History: No Limitations





- Past Medical History


Cardiovascular: Yes: CHF, HTN


Pulmonary: Yes: COPD


Renal/: Yes: Renal Inusuff


...Pregnant: No


Psych: Yes: Anxiety


Endocrine: Yes: Diabetes Mellitus





- Smoking History


Smoking history: Current every day smoker


Have you smoked in the past 12 months: Yes


Aproximately how many cigarettes per day: 5





- Alcohol/Substance Use


Hx Alcohol Use: No





- Social History


ADL: Independent


Occupation:  here at [employer]


History of Recent Travel: No





Home Medications





- Allergies


Allergies/Adverse Reactions: 


 Allergies











Allergy/AdvReac Type Severity Reaction Status Date / Time


 


Iodinated Contrast- Oral and Allergy   Verified 05/03/18 13:32





IV Dye     














- Home Medications


Home Medications: 


Ambulatory Orders





Nebivolol HCl [Bystolic] 10 mg PO DAILY 10/14/16 


Rosuvastatin Calcium [Crestor] 10 mg PO HS 10/14/16 


Amlodipine Besylate [Norvasc -] 10 mg PO DAILY #30 tablet 10/18/16 


Vitamin D3 - 2,000 units PO DAILY 10/25/17 


Alprazolam [Xanax] 0.25 mg PO DAILY PRN 12/05/17 


Bimatoprost [Lumigan] 1 drop OU DAILY 12/05/17 


Brimonidine Tartrate/Timolol [Combigan 0.2%-0.5% Eye Drops] 5 5ml OU BID 12/05/ 17 


Dulaglutide [Trulicity] 1.5 mg SQ WEEKLY 12/05/17 


Escitalopram Oxalate [Lexapro -] 10 mg PO DAILY 12/05/17 


Furosemide [Lasix] 40 mg PO BID 12/05/17 


Insulin Glargine,Hum.rec.anlog [Toujeo Solostar] 80 unit SQ DAILY 12/05/17 


Isosorbide Mononitrate [Isosorbide Mononitrate ER] 30 mg PO DAILY 12/05/17 


Losartan/Hydrochlorothiazide [Losartan-Hctz 50-12.5 mg Tab] 1 each PO DAILY 12/ 05/17 


Aspirin [ASA -] 325 mg PO DAILY@0800  tablet 12/19/17 


Insulin Lispro Protamin/Lispro [Humalog Mix 50-50 Vial] 18 unit SQ BID 12/19/17 


Aspirin [Ecotrin] 81 mg PO DAILY 05/03/18 


Oseltamivir Phosphate [Tamiflu] 75 mg PO DAILY 05/03/18 











Review of Systems


Findings/Remarks: 





see [place] 





Physical Examination


Vital Signs: 


 Vital Signs











Temperature  100.3 F H  05/04/18 09:34


 


Pulse Rate  95 H  05/04/18 09:34


 


Respiratory Rate  18   05/04/18 09:34


 


Blood Pressure  131/55   05/04/18 09:34


 


O2 Sat by Pulse Oximetry (%)  97   05/03/18 22:50











Constitutional: Yes: No Distress, Other (Looks weak)


Eyes: Yes: Conjunctiva Clear


HENT: Yes: Other (mucosa dry)


Neck: Yes: Supple


Cardiovascular: Yes: Regular Rate and Rhythm


Respiratory: Yes: Diminished (but clear)


Gastrointestinal: Yes: Soft, Abdomen, Obese


Edema: No


Neurological: Yes: Alert


Labs: 


 CBC, BMP





 05/03/18 17:36 





 05/03/18 17:36 











Imaging





- Results


Chest X-ray: Report Reviewed


EKG: Report Reviewed





Problem List





- Problems


(1) Fever


Code(s): R50.9 - FEVER, UNSPECIFIED   





(2) Dehydration


Code(s): E86.0 - DEHYDRATION   





(3) COPD (chronic obstructive pulmonary disease)


Code(s): J44.9 - CHRONIC OBSTRUCTIVE PULMONARY DISEASE, UNSPECIFIED   


Qualifiers: 


   COPD type: chronic bronchitis 





(4) HTN (hypertension)


Code(s): I10 - ESSENTIAL (PRIMARY) HYPERTENSION   


Qualifiers: 


   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   





(5) IDDM (insulin dependent diabetes mellitus)


Code(s): E11.9 - TYPE 2 DIABETES MELLITUS WITHOUT COMPLICATIONS; Z79.4 - LONG 

TERM (CURRENT) USE OF INSULIN   





(6) Obesity (BMI 30-39.9)


Code(s): E66.9 - OBESITY, UNSPECIFIED   





(7) Renal insufficiency


Code(s): N28.9 - DISORDER OF KIDNEY AND URETER, UNSPECIFIED   





Assessment/Plan


continue antibiotics


ID consultation


 continue fluids


Discontinue Lasix and ACE inhibitor


Monitor CBC and renal function


 follow-up blood cultures


DVT prophylaxis


Tylenol when necessary


Will follow


Discussed with nursing staff also

## 2018-05-04 NOTE — CONS
DATE OF CONSULTATION:

 

DATE OF DICTATION:  05/04/2018

 

INFECTIOUS DISEASE CONSULTATIONS 

 

HISTORY OF PRESENT ILLNESS:  This is a 62-year-old female with a past medical history

of hypertension, diabetes, COPD, chronic kidney disease, and congestive heart failure

who is admitted through the emergency room with a 5-day history of nonproductive

cough, malaise, fever, headache, loss of appetite, and worsening shortness of breath.

 This started about 5 day ago and subsequently on her daily hospital rounds, one of

the nurses took her temperature at the nurse's station and found it to be 102.  She

was having chills at that time and was referred to Dr. Savage, who saw her and

diagnosed possible influenza, for which she was given Tamiflu.  She took

approximately 3 days of this throughout the week but had no improvement and endorsed

fever today to 103.5, which brought her back to the hospital.  She notes worsening

shortness of breath with dry cough, some headaches, and denies any myalgias, sore

throat, coryza, rash.  She has no history of recent travel or exposure to pets or

unusual hobbies.  Her HIV status is unknown.  

 

PAST MEDICAL HISTORY:  As noted above.  

 

CURRENT MEDICATIONS:  Include aspirin, Imdur, Novolog, Crestor, Norvasc, Bystolic,

Xanax.

 

ALLERGIES:  IODINATED CONTRAST, ORAL AND IV DYE.

 

SOCIAL HISTORY:  Daily smoker.  No history of substance abuse.  Works as a discharge

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  

 

FAMILY HISTORY:  Reviewed, noncontributory.  

 

REVIEW OF SYSTEMS:  

Respiratory:  Shortness of breath, nonproductive cough, no hemoptysis.  

Cardiac:  No chest pain, palpitations, syncope.  History documents CHF history.  

Gastrointestinal:  No abdominal pain, nausea, vomiting, diarrhea.  

Skin:  No rash.  

Neurologic:  Headaches.  No joint pains, no visual complaints.  

Orthopedic:  Includes Makoplasty knee, December 2017.  

 

PHYSICAL EXAMINATION:

General:  Revealed a heavy set, alert woman who appeared ill and in moderate

respiratory distress.  

Vital signs:  Temperature max was 102.8, pulse 92, blood pressure 123/54,

respirations 18.  

HEENT:  Pharynx benign.  No exudate.  

Neck:  Supple.  No adenopathy.  

Lungs:  With diminished breath sounds bilaterally.  No rales, rhonchi.  

Heart:  S1, S2.  Distant heart sounds.  No audible murmur.  

Abdomen:  Obese.  Soft.  Nontender.  No hepatosplenomegaly.  No guarding, rebound.

Extremities:  1 to 2+ edema.  

  

LABORATORY:  White count was 15.8 with hemoglobin of 12.6, platelets of 175 with 92%

polycytes.  BUN 61, creatinine 2.1, creatinine clearance 23, AST 73, ALT 64, alkaline

phosphatase 136, total protein 6.2, albumin 2.5.  Urinalysis with 2 white cells, 3

red cells.  Chest x-ray was reviewed, so poor film, no obvious infiltrate seen.  

ASSESSMENT:  

1.  Sepsis syndrome,  most likely pulmonary source.  Clinically suspect pneumonia. 

Regarding the diagnosis of influenza, her flu screen here is negative.  She has been

on Tamiflu without benefit, and this is late in the season for an influenza case. 

2.  Insulin dependent diabetes mellitus.  

3.  Hypertension.  

4.  Exacerbation of chronic obstructive pulmonary disease.  

5.  History of congestive heart failure.  

 

PLAN:  Blood and urine cultures.  Will empirically treat her with a combination of

vancomycin 1 dose along with ceftriaxone and azithromycin.  A legionella urinary

antigen will be obtained along with a CRP, an HIV routine testing.  Echocardiogram

has been ordered along with cardiology consultation.  CT imaging of the chest without

contrast ordered as a stat.  Will discontinue levofloxacin.  Will add steroids with

careful monitoring of her blood sugars.  

 

 

BLANCA SIMPSON M.D.

 

NAEEM/[id number]

DD: 05/04/2018 14:48

DT: 05/04/2018 15:38

Job #:  [id number]

## 2018-05-04 NOTE — PN
Progress Note, Physician


Chief Complaint: 





ID








SInce 5 days ago SOB dry couph fevers chills headaches. Given Tamiflu earlier 

in the week but no improvement


Still 103 abd her BP low 100/49





- Current Medication List


Current Medications: 


Active Medications





Acetaminophen (Tylenol -)  650 mg PO Q6H PRN


   PRN Reason: FEVER


   Last Admin: 05/04/18 13:27 Dose:  650 mg


Albuterol/Ipratropium (Duoneb -)  1 amp NEB Q6H PRN


   PRN Reason: SHORTNESS OF BREATH


   Last Admin: 05/03/18 16:52 Dose:  1 amp


Alprazolam (Xanax -)  0.25 mg PO Q24H PRN


   PRN Reason: ANXIETY


Amlodipine Besylate (Norvasc -)  10 mg PO DAILY [facility]


   Last Admin: 05/04/18 09:19 Dose:  10 mg


Aspirin (Asa -)  325 mg PO DAILY@0800 [facility]


   Last Admin: 05/04/18 09:33 Dose:  Not Given


Brimonidine Tartrate (Alphagan 0.2% -)  1 drop OU BID [facility]


   Last Admin: 05/04/18 09:20 Dose:  1 applic


Enoxaparin Sodium (Lovenox -)  40 mg SQ DAILY [facility]


   Last Admin: 05/04/18 12:17 Dose:  40 mg


Escitalopram Oxalate (Lexapro -)  10 mg PO DAILY [facility]


   Last Admin: 05/04/18 09:22 Dose:  10 mg


Levofloxacin (Levaquin 500 Mg Premixed Ivpb -)  500 mg in 100 mls @ 100 mls/hr 

IVPB DAILY [facility]


   PRN Reason: Protocol


   Last Admin: 05/04/18 09:18 Dose:  100 mls/hr


Potassium Chloride/Dextrose/Sod Cl (D5-1/2ns+10 Meq Kcl -)  10 meq in 1,000 mls 

@ 100 mls/hr IV ASDIR [facility]


   Last Admin: 05/04/18 12:16 Dose:  100 mls/hr


Insulin Aspart (Novolog Vial Sliding Scale -)  1 vial SQ ACHS [facility]


   PRN Reason: Protocol


   Last Admin: 05/04/18 12:26 Dose:  2 units


Isosorbide Mononitrate (Imdur -)  30 mg PO DAILY [facility]


   Last Admin: 05/04/18 09:19 Dose:  30 mg


Latanoprost (Xalatan 0.005% Eye Drops -)  1 drop OU HS [facility]


   Last Admin: 05/03/18 22:29 Dose:  1 applic


Nebivolol (Bystolic -)  10 mg PO DAILY [facility]


   Last Admin: 05/04/18 09:33 Dose:  Not Given


Rosuvastatin Calcium (Crestor -)  10 mg PO HS [facility]


   Last Admin: 05/03/18 21:20 Dose:  Not Given


Timolol Maleate (Timoptic 0.5%)  1 drop OU BID [facility]


   Last Admin: 05/04/18 09:25 Dose:  1 applic











- Objective


Vital Signs: 


 Vital Signs











Temperature  102.8 F H  05/04/18 13:34


 


Pulse Rate  92 H  05/04/18 13:34


 


Respiratory Rate  18   05/04/18 13:34


 


Blood Pressure  123/54   05/04/18 13:34


 


O2 Sat by Pulse Oximetry (%)  97   05/03/18 22:50











Constitutional: Yes: No Distress, Moderate Distress


HENT: Yes: WNL, Atraumatic.  No: Tonsillar Exudate


Neck: Yes: WNL, Supple.  No: Lymphadenopathy


Cardiovascular: Yes: WNL, Regular Rate and Rhythm, S1, S2.  No: Murmur


Respiratory: Yes: WNL, Regular, CTA Bilaterally, Diminished


Gastrointestinal: Yes: WNL, Normal Bowel Sounds, Soft.  No: Tenderness, 

Tenderness, Epigastrium


Edema: Yes


Labs: 


 CBC, BMP





 05/03/18 17:36 





 05/03/18 17:36 





 INR, PTT











INR  1.04  (0.82-1.09)   05/03/18  14:34    














Problem List





- Problems


(1) Pneumonia


Code(s): J18.9 - PNEUMONIA, UNSPECIFIED ORGANISM   





(2) Sepsis


Code(s): A41.9 - SEPSIS, UNSPECIFIED ORGANISM   





(3) COPD exacerbation


Code(s): J44.1 - CHRONIC OBSTRUCTIVE PULMONARY DISEASE W (ACUTE) EXACERBATION   





(4) IDDM (insulin dependent diabetes mellitus)


Code(s): E11.9 - TYPE 2 DIABETES MELLITUS WITHOUT COMPLICATIONS; Z79.4 - LONG 

TERM (CURRENT) USE OF INSULIN   





Assessment/Plan





Microbiology





10/14/16 13:34   Urine - Urine Clean Catch   Urine Culture - Final


                              NO GROWTH OBTAINED


05/03/18 14:55   Nasopharyngeal Swab   Influenza Types A,B Antigen (AMY) - Final


05/03/18 14:55   Nasopharyngeal Swab    - Final





 Laboratory Tests











  05/03/18 05/03/18 05/03/18





  14:34 14:34 14:34


 


WBC  15.8 H D  


 


Hgb  12.0  


 


Plt Count  175  D  


 


INR   1.04 


 


BUN   


 


Creatinine   


 


Creat Clearance w eGFR   


 


AST   


 


ALT   


 


Alkaline Phosphatase   


 


Total Protein   


 


Albumin   


 


Ur Leukocyte Esterase    Negative


 


Urine WBC (Auto)    2


 


Urine RBC (Auto)    3














  05/03/18





  17:36


 


WBC 


 


Hgb 


 


Plt Count 


 


INR 


 


BUN  61 H


 


Creatinine  2.1 H


 


Creat Clearance w eGFR  23.71


 


AST  73 H


 


ALT  64


 


Alkaline Phosphatase  136 H


 


Total Protein  6.2 L


 


Albumin  2.5 L


 


Ur Leukocyte Esterase 


 


Urine WBC (Auto) 


 


Urine RBC (Auto) 








Assessment





Sepsis suspect PNA


COPD exacerbation


IDDM


? CHF


Hypertension


Prosthetic knee





Plan


Blood cultures x 2


Vancomycin Ceftriaxone Azithromycin


LGA


ECHO


CT chest


Steroids with monitering of blood sugars


Cardiology evaluation


CRP HIV (completeness)


Odilia MCKEON

## 2018-05-05 NOTE — PN
Progress Note, Physician


History of Present Illness: 





Seated in bed


No c/o chest pain/ dyspnea/ cough/purulent sputum production


No c/o fever/ chills


Temps down


CT shows large LLL infiltrate





- Current Medication List


Current Medications: 


Active Medications





Acetaminophen (Tylenol -)  650 mg PO Q6H PRN


   PRN Reason: FEVER


   Last Admin: 05/04/18 23:35 Dose:  650 mg


Albuterol/Ipratropium (Duoneb -)  1 amp NEB Q6H PRN


   PRN Reason: SHORTNESS OF BREATH


   Last Admin: 05/03/18 16:52 Dose:  1 amp


Alprazolam (Xanax -)  0.25 mg PO Q24H PRN


   PRN Reason: ANXIETY


Amlodipine Besylate (Norvasc -)  10 mg PO DAILY [facility]


   Last Admin: 05/05/18 09:46 Dose:  10 mg


Aspirin (Asa -)  325 mg PO DAILY@0800 [facility]


   Last Admin: 05/05/18 09:45 Dose:  325 mg


Brimonidine Tartrate (Alphagan 0.2% -)  1 drop OU BID [facility]


   Last Admin: 05/05/18 09:45 Dose:  1 applic


Enoxaparin Sodium (Lovenox -)  40 mg SQ DAILY [facility]


   Last Admin: 05/05/18 09:54 Dose:  40 mg


Escitalopram Oxalate (Lexapro -)  10 mg PO DAILY [facility]


   Last Admin: 05/05/18 09:54 Dose:  10 mg


Azithromycin 500 mg/ Dextrose  250 mls @ 250 mls/hr IVPB DAILY [facility]


Ceftriaxone Sodium 2 gm/ (Dextrose)  100 mls @ 200 mls/hr IVPB DAILY [facility]


   PRN Reason: Protocol


   Last Admin: 05/05/18 09:47 Dose:  200 mls/hr


Insulin Aspart (Novolog Vial Sliding Scale -)  1 vial SQ ACHS [facility]


   PRN Reason: Protocol


   Last Admin: 05/05/18 12:43 Dose:  10 units


Isosorbide Mononitrate (Imdur -)  30 mg PO DAILY [facility]


   Last Admin: 05/05/18 09:54 Dose:  30 mg


Latanoprost (Xalatan 0.005% Eye Drops -)  1 drop OU HS [facility]


   Last Admin: 05/04/18 23:27 Dose:  1 applic


Methylprednisolone Sodium Succinate (Solu-Medrol -)  40 mg IVPUSH BID [facility]


Nebivolol (Bystolic -)  10 mg PO DAILY [facility]


   Last Admin: 05/05/18 09:45 Dose:  10 mg


Non-Formulary Medication (Insulin Glargine,Hum.Rec.Anlog [Toujeo Solostar])  80 

unit SQ DAILY [facility]


Non-Formulary Medication (Insulin Lispro Protamin/Lispro [Humalog Mix 50-50 Vial

])  18 unit SQ BID [facility]


Rosuvastatin Calcium (Crestor -)  10 mg PO HS [facility]


   Last Admin: 05/04/18 23:24 Dose:  10 mg


Timolol Maleate (Timoptic 0.5%)  1 drop OU BID [facility]


   Last Admin: 05/05/18 09:48 Dose:  1 applic











- Objective


Vital Signs: 


 Vital Signs











Temperature  97.5 F L  05/05/18 06:16


 


Pulse Rate  73   05/05/18 06:16


 


Respiratory Rate  20   05/05/18 06:16


 


Blood Pressure  121/49   05/05/18 06:16


 


O2 Sat by Pulse Oximetry (%)  93 L  05/04/18 21:00











Cardiovascular: Yes: Regular Rate and Rhythm, S1, S2


Respiratory: Yes: CTA Bilaterally, Other (decreased BS L base)


Edema: No


Labs: 


 CBC, BMP





 05/05/18 06:00 





 05/05/18 06:00 





 INR, PTT











INR  1.04  (0.82-1.09)   05/03/18  14:34    














Assessment/Plan





LLL pneumonia


R/O sepsis secondary to pneumonia


Await c/s


Continue empiric ceftriaxone/ zithromax

## 2018-05-05 NOTE — PN
Progress Note, Physician


Chief Complaint: 





clinically improved


WBC slightly decreased





- Current Medication List


Current Medications: 


Active Medications





Acetaminophen (Tylenol -)  650 mg PO Q6H PRN


   PRN Reason: FEVER


   Last Admin: 05/04/18 23:35 Dose:  650 mg


Albuterol/Ipratropium (Duoneb -)  1 amp NEB Q6H PRN


   PRN Reason: SHORTNESS OF BREATH


   Last Admin: 05/03/18 16:52 Dose:  1 amp


Alprazolam (Xanax -)  0.25 mg PO Q24H PRN


   PRN Reason: ANXIETY


Amlodipine Besylate (Norvasc -)  10 mg PO DAILY [facility]


   Last Admin: 05/04/18 09:19 Dose:  10 mg


Aspirin (Asa -)  325 mg PO DAILY@0800 [facility]


   Last Admin: 05/04/18 09:33 Dose:  Not Given


Brimonidine Tartrate (Alphagan 0.2% -)  1 drop OU BID [facility]


   Last Admin: 05/04/18 23:27 Dose:  1 applic


Enoxaparin Sodium (Lovenox -)  40 mg SQ DAILY [facility]


   Last Admin: 05/04/18 12:17 Dose:  40 mg


Escitalopram Oxalate (Lexapro -)  10 mg PO DAILY [facility]


   Last Admin: 05/04/18 09:22 Dose:  10 mg


Potassium Chloride/Dextrose/Sod Cl (D5-1/2ns+10 Meq Kcl -)  10 meq in 1,000 mls 

@ 100 mls/hr IV ASDIR [facility]


   Last Admin: 05/05/18 01:49 Dose:  100 mls/hr


Azithromycin 500 mg/ Dextrose  250 mls @ 250 mls/hr IVPB DAILY [facility]


Ceftriaxone Sodium 2 gm/ (Dextrose)  100 mls @ 200 mls/hr IVPB DAILY [facility]


   PRN Reason: Protocol


   Last Admin: 05/04/18 15:59 Dose:  200 mls/hr


Insulin Aspart (Novolog Vial Sliding Scale -)  1 vial SQ ACHS [facility]


   PRN Reason: Protocol


   Last Admin: 05/05/18 06:12 Dose:  8 units


Isosorbide Mononitrate (Imdur -)  30 mg PO DAILY [facility]


   Last Admin: 05/04/18 09:19 Dose:  30 mg


Latanoprost (Xalatan 0.005% Eye Drops -)  1 drop OU HS [facility]


   Last Admin: 05/04/18 23:27 Dose:  1 applic


Methylprednisolone Sodium Succinate (Solu-Medrol -)  40 mg IVPUSH Q8H-IV [facility]


   Last Admin: 05/05/18 01:49 Dose:  40 mg


Nebivolol (Bystolic -)  10 mg PO DAILY [facility]


   Last Admin: 05/04/18 09:33 Dose:  Not Given


Rosuvastatin Calcium (Crestor -)  10 mg PO HS [facility]


   Last Admin: 05/04/18 23:24 Dose:  10 mg


Timolol Maleate (Timoptic 0.5%)  1 drop OU BID [facility]


   Last Admin: 05/04/18 23:26 Dose:  1 applic











- Objective


Vital Signs: 


 Vital Signs











Temperature  97.5 F L  05/05/18 06:16


 


Pulse Rate  73   05/05/18 06:16


 


Respiratory Rate  20   05/05/18 06:16


 


Blood Pressure  121/49   05/05/18 06:16


 


O2 Sat by Pulse Oximetry (%)  93 L  05/04/18 21:00











Constitutional: Yes: Calm


Cardiovascular: Yes: Regular Rate and Rhythm


Respiratory: Yes: CTA Bilaterally


Gastrointestinal: Yes: Soft, Abdomen, Obese


Edema: No


Neurological: Yes: Alert, Oriented


...Motor Strength: WNL


Labs: 


 CBC, BMP





 05/05/18 06:00 





 05/05/18 06:00 





 INR, PTT











INR  1.04  (0.82-1.09)   05/03/18  14:34    














- ....Imaging


Cat Scan: Report Reviewed





Problem List





- Problems


(1) Sepsis


Code(s): A41.9 - SEPSIS, UNSPECIFIED ORGANISM   


Qualifiers: 


   Sepsis type: sepsis due to unspecified organism   Qualified Code(s): A41.9 - 

Sepsis, unspecified organism   





(2) Pneumonia


Code(s): J18.9 - PNEUMONIA, UNSPECIFIED ORGANISM   


Qualifiers: 


   Laterality: right   Lung location: lower lobe of lung 





(3) ASHD (arteriosclerotic heart disease)


Code(s): I25.10 - ATHSCL HEART DISEASE OF NATIVE CORONARY ARTERY W/O ANG PCTRS 

  





(4) Chronic diastolic CHF (congestive heart failure)


Code(s): I50.32 - CHRONIC DIASTOLIC (CONGESTIVE) HEART FAILURE   





(5) COPD (chronic obstructive pulmonary disease)


Code(s): J44.9 - CHRONIC OBSTRUCTIVE PULMONARY DISEASE, UNSPECIFIED   


Qualifiers: 


   COPD type: chronic bronchitis 





(6) IDDM (insulin dependent diabetes mellitus)


Code(s): E11.9 - TYPE 2 DIABETES MELLITUS WITHOUT COMPLICATIONS; Z79.4 - LONG 

TERM (CURRENT) USE OF INSULIN   





(7) Renal insufficiency


Code(s): N28.9 - DISORDER OF KIDNEY AND URETER, UNSPECIFIED   





Assessment/Plan








IMP:


1. Bilateral PNA with early sepsis, clinically improving. 


2. DM w/ CKD, probable acute on chronic ARF in setting of early sepsis


3. Chronic diastolic CHF secondary to hypertensive heart disease and DM


4. Non-obstructive CAD


5. Chronic COPD, active smoker, w/ no evidence of PHTN on RHC 2016





REC:


1. F/u cultures; abx per ID; supplimental O2; DVT prophylaxis


2. Remains euvolemic, possibly mildly dehydrated. Hold Lasix for now.


Will need close f/u of volume status as she receives the added daily volume (IV 

abx, IV fluids) as she is prone to diastolic CHF and volume overload.


3. Continue other home meds for BP and chronic diastolic dysfx: Imdur 30, 

Bystolic 10, Amlodipine 10 w/ hold parameters.


4. Smoking cessation counselling.

## 2018-05-05 NOTE — PN
Progress Note, Physician


Chief Complaint: 





decreased SOB


has cough 


feels tired 





- Current Medication List


Current Medications: 


Active Medications





Acetaminophen (Tylenol -)  650 mg PO Q6H PRN


   PRN Reason: FEVER


   Last Admin: 05/04/18 23:35 Dose:  650 mg


Albuterol/Ipratropium (Duoneb -)  1 amp NEB Q6H PRN


   PRN Reason: SHORTNESS OF BREATH


   Last Admin: 05/03/18 16:52 Dose:  1 amp


Alprazolam (Xanax -)  0.25 mg PO Q24H PRN


   PRN Reason: ANXIETY


Amlodipine Besylate (Norvasc -)  10 mg PO DAILY [facility]


   Last Admin: 05/05/18 09:46 Dose:  10 mg


Aspirin (Asa -)  325 mg PO DAILY@0800 [facility]


   Last Admin: 05/05/18 09:45 Dose:  325 mg


Brimonidine Tartrate (Alphagan 0.2% -)  1 drop OU BID [facility]


   Last Admin: 05/05/18 09:45 Dose:  1 applic


Enoxaparin Sodium (Lovenox -)  40 mg SQ DAILY [facility]


   Last Admin: 05/05/18 09:54 Dose:  40 mg


Escitalopram Oxalate (Lexapro -)  10 mg PO DAILY [facility]


   Last Admin: 05/05/18 09:54 Dose:  10 mg


Azithromycin 500 mg/ Dextrose  250 mls @ 250 mls/hr IVPB DAILY [facility]


   Last Admin: 05/05/18 16:14 Dose:  250 mls/hr


Ceftriaxone Sodium 2 gm/ (Dextrose)  100 mls @ 200 mls/hr IVPB DAILY [facility]


   PRN Reason: Protocol


   Last Admin: 05/05/18 09:47 Dose:  200 mls/hr


Insulin Aspart (Novolog Vial Sliding Scale -)  1 vial SQ ACHS [facility]


   PRN Reason: Protocol


   Last Admin: 05/05/18 17:35 Dose:  8 units


Isosorbide Mononitrate (Imdur -)  30 mg PO DAILY [facility]


   Last Admin: 05/05/18 09:54 Dose:  30 mg


Latanoprost (Xalatan 0.005% Eye Drops -)  1 drop OU HS [facility]


   Last Admin: 05/04/18 23:27 Dose:  1 applic


Methylprednisolone Sodium Succinate (Solu-Medrol -)  40 mg IVPUSH BID [facility]


   Last Admin: 05/05/18 12:45 Dose:  Not Given


Nebivolol (Bystolic -)  10 mg PO DAILY [facility]


   Last Admin: 05/05/18 09:45 Dose:  10 mg


Ptnt's Own Med( Insulin Glargine,Hum .Rec.Anlog [Toujeo Solostar]  80 unit SQ 

DAILY@0700 [facility]


Pt's Own Med ( Insulin Lispro Protamine/Lispro ( Humalog Mix 50/50 Vial)  18 

each SQ BID@0700,1630 [facility]


Rosuvastatin Calcium (Crestor -)  10 mg PO [facility]


   Last Admin: 05/04/18 23:24 Dose:  10 mg


Timolol Maleate (Timoptic 0.5%)  1 drop OU BID [facility]


   Last Admin: 05/05/18 09:48 Dose:  1 applic











- Objective


Vital Signs: 


 Vital Signs











Temperature  97.6 F   05/05/18 17:31


 


Pulse Rate  67   05/05/18 17:31


 


Respiratory Rate  20   05/05/18 17:31


 


Blood Pressure  115/56   05/05/18 17:31


 


O2 Sat by Pulse Oximetry (%)  93 L  05/05/18 09:00











Constitutional: Yes: No Distress


Cardiovascular: Yes: Regular Rate and Rhythm


Respiratory: Yes: Diminished, Rhonchi


Gastrointestinal: Yes: Normal Bowel Sounds, Soft, Abdomen, Obese.  No: 

Tenderness


Edema: No


Labs: 


 CBC, BMP





 05/05/18 06:00 





 05/05/18 06:00 





 INR, PTT











INR  1.04  (0.82-1.09)   05/03/18  14:34    














Problem List





- Problems


(1) ASHD (arteriosclerotic heart disease)


Code(s): I25.10 - ATHSCL HEART DISEASE OF NATIVE CORONARY ARTERY W/O ANG PCTRS 

  





(2) COPD (chronic obstructive pulmonary disease)


Code(s): J44.9 - CHRONIC OBSTRUCTIVE PULMONARY DISEASE, UNSPECIFIED   


Qualifiers: 


   COPD type: chronic bronchitis 





(3) Chronic diastolic CHF (congestive heart failure)


Code(s): I50.32 - CHRONIC DIASTOLIC (CONGESTIVE) HEART FAILURE   





(4) HTN (hypertension)


Code(s): I10 - ESSENTIAL (PRIMARY) HYPERTENSION   


Qualifiers: 


   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   





(5) Renal insufficiency


Code(s): N28.9 - DISORDER OF KIDNEY AND URETER, UNSPECIFIED   





Assessment/Plan





plan


decrease Solumedrol


continue iv antibiotics


nebs


glucose control

## 2018-05-06 NOTE — PN
Progress Note, Physician


History of Present Illness: 





OOBH in chair


c/o  dry cough    No c/o dyspnea/ chest pain


No c/o fever/ chills


Temps down  WBC improved


Urine legionella 





- Current Medication List


Current Medications: 


Active Medications





Acetaminophen (Tylenol -)  650 mg PO Q6H PRN


   PRN Reason: FEVER


   Last Admin: 05/04/18 23:35 Dose:  650 mg


Albuterol/Ipratropium (Duoneb -)  1 amp NEB Q6H PRN


   PRN Reason: SHORTNESS OF BREATH


   Last Admin: 05/06/18 11:31 Dose:  1 amp


Alprazolam (Xanax -)  0.25 mg PO Q24H PRN


   PRN Reason: ANXIETY


Amlodipine Besylate (Norvasc -)  10 mg PO DAILY [facility]


   Last Admin: 05/06/18 09:32 Dose:  10 mg


Aspirin (Asa -)  325 mg PO DAILY@0800 [facility]


   Last Admin: 05/06/18 09:31 Dose:  325 mg


Brimonidine Tartrate (Alphagan 0.2% -)  1 drop OU BID [facility]


   Last Admin: 05/06/18 09:31 Dose:  1 drop


Enoxaparin Sodium (Lovenox -)  40 mg SQ DAILY [facility]


   Last Admin: 05/06/18 09:33 Dose:  40 mg


Escitalopram Oxalate (Lexapro -)  10 mg PO DAILY [facility]


   Last Admin: 05/06/18 09:32 Dose:  10 mg


Furosemide (Lasix -)  40 mg PO BID@0600,1400 [facility]


Azithromycin 500 mg/ Dextrose  250 mls @ 250 mls/hr IVPB DAILY [facility]


   Last Admin: 05/06/18 09:29 Dose:  250 mls/hr


Ceftriaxone Sodium 2 gm/ (Dextrose)  100 mls @ 200 mls/hr IVPB DAILY [facility]


   PRN Reason: Protocol


   Last Admin: 05/06/18 09:29 Dose:  200 mls/hr


Insulin Aspart (Novolog Vial Sliding Scale -)  1 vial SQ ACHS [facility]


   PRN Reason: Protocol


   Last Admin: 05/06/18 06:29 Dose:  Not Given


Isosorbide Mononitrate (Imdur -)  30 mg PO DAILY [facility]


   Last Admin: 05/06/18 09:32 Dose:  30 mg


Latanoprost (Xalatan 0.005% Eye Drops -)  1 drop OU HS [facility]


   Last Admin: 05/05/18 21:37 Dose:  1 drop


Methylprednisolone Sodium Succinate (Solu-Medrol -)  40 mg IVPUSH Q8H-IV [facility]


Nebivolol (Bystolic -)  10 mg PO DAILY [facility]


   Last Admin: 05/06/18 09:33 Dose:  10 mg


Ptnt's Own Med( Insulin Glargine,Hum .Rec.Anlog [Toujeo Solostar]  80 unit SQ 

DAILY@0700 [facility]


   Last Admin: 05/06/18 06:25 Dose:  80 unit


Pt's Own Med ( Insulin Lispro Protamine/Lispro ( Humalog Mix 50/50 Vial)  18 

each SQ BID@0700,1630 [facility]


   Last Admin: 05/06/18 06:25 Dose:  18 each


Rosuvastatin Calcium (Crestor -)  10 mg PO HS [facility]


   Last Admin: 05/05/18 21:37 Dose:  10 mg


Timolol Maleate (Timoptic 0.5%)  1 drop OU BID [facility]


   Last Admin: 05/06/18 09:33 Dose:  1 drop











- Objective


Vital Signs: 


 Vital Signs











Temperature  97.5 F L  05/06/18 05:37


 


Pulse Rate  75   05/06/18 05:37


 


Respiratory Rate  18   05/06/18 05:37


 


Blood Pressure  116/55   05/06/18 05:37


 


O2 Sat by Pulse Oximetry (%)  92 L  05/05/18 21:00











Constitutional: Yes: No Distress


Cardiovascular: Yes: Regular Rate and Rhythm, S1, S2


Respiratory: Yes: Other (crepitations R base)


Gastrointestinal: Yes: Normal Bowel Sounds, Soft.  No: Tenderness


Labs: 


 CBC, BMP





 05/05/18 06:00 





 05/05/18 06:00 





 INR, PTT











INR  1.04  (0.82-1.09)   05/03/18  14:34    














Assessment/Plan





LLL pneumonia   + legionella AG


R/O sepsis secondary to pneumonia


Azotemia


 


Substitute levaquin 500mg qd


Continue ceftriaxone pending final c/s

## 2018-05-06 NOTE — PN
Progress Note, Physician


Chief Complaint: 





feels SOB


has cough 


feels tired 





- Current Medication List


Current Medications: 


Active Medications





Acetaminophen (Tylenol -)  650 mg PO Q6H PRN


   PRN Reason: FEVER


   Last Admin: 05/04/18 23:35 Dose:  650 mg


Albuterol/Ipratropium (Duoneb -)  1 amp NEB Q6H PRN


   PRN Reason: SHORTNESS OF BREATH


   Last Admin: 05/06/18 07:15 Dose:  1 amp


Alprazolam (Xanax -)  0.25 mg PO Q24H PRN


   PRN Reason: ANXIETY


Amlodipine Besylate (Norvasc -)  10 mg PO DAILY [facility]


   Last Admin: 05/06/18 09:32 Dose:  10 mg


Aspirin (Asa -)  325 mg PO DAILY@0800 [facility]


   Last Admin: 05/06/18 09:31 Dose:  325 mg


Brimonidine Tartrate (Alphagan 0.2% -)  1 drop OU BID [facility]


   Last Admin: 05/06/18 09:31 Dose:  1 drop


Enoxaparin Sodium (Lovenox -)  40 mg SQ DAILY [facility]


   Last Admin: 05/06/18 09:33 Dose:  40 mg


Escitalopram Oxalate (Lexapro -)  10 mg PO DAILY [facility]


   Last Admin: 05/06/18 09:32 Dose:  10 mg


Furosemide (Lasix -)  40 mg PO BID@0600,1400 [facility]


Azithromycin 500 mg/ Dextrose  250 mls @ 250 mls/hr IVPB DAILY [facility]


   Last Admin: 05/06/18 09:29 Dose:  250 mls/hr


Ceftriaxone Sodium 2 gm/ (Dextrose)  100 mls @ 200 mls/hr IVPB DAILY [facility]


   PRN Reason: Protocol


   Last Admin: 05/06/18 09:29 Dose:  200 mls/hr


Insulin Aspart (Novolog Vial Sliding Scale -)  1 vial SQ ACHS [facility]


   PRN Reason: Protocol


   Last Admin: 05/06/18 06:29 Dose:  Not Given


Isosorbide Mononitrate (Imdur -)  30 mg PO DAILY [facility]


   Last Admin: 05/06/18 09:32 Dose:  30 mg


Latanoprost (Xalatan 0.005% Eye Drops -)  1 drop OU HS [facility]


   Last Admin: 05/05/18 21:37 Dose:  1 drop


Methylprednisolone Sodium Succinate (Solu-Medrol -)  40 mg IVPUSH BID [facility]


   Last Admin: 05/06/18 09:34 Dose:  40 mg


Nebivolol (Bystolic -)  10 mg PO DAILY [facility]


   Last Admin: 05/06/18 09:33 Dose:  10 mg


Ptnt's Own Med( Insulin Glargine,Hum .Rec.Anlog [Toujeo Solostar]  80 unit SQ 

DAILY@0700 [facility]


   Last Admin: 05/06/18 06:25 Dose:  80 unit


Pt's Own Med ( Insulin Lispro Protamine/Lispro ( Humalog Mix 50/50 Vial)  18 

each SQ BID@0700,1630 [facility]


   Last Admin: 05/06/18 06:25 Dose:  18 each


Rosuvastatin Calcium (Crestor -)  10 mg PO HS [facility]


   Last Admin: 05/05/18 21:37 Dose:  10 mg


Timolol Maleate (Timoptic 0.5%)  1 drop OU BID [facility]


   Last Admin: 05/06/18 09:33 Dose:  1 drop











- Objective


Vital Signs: 


 Vital Signs











Temperature  97.5 F L  05/06/18 05:37


 


Pulse Rate  75   05/06/18 05:37


 


Respiratory Rate  18   05/06/18 05:37


 


Blood Pressure  116/55   05/06/18 05:37


 


O2 Sat by Pulse Oximetry (%)  92 L  05/05/18 21:00











Constitutional: Yes: Anxious


Cardiovascular: Yes: Regular Rate and Rhythm


Respiratory: Yes: Rhonchi


Gastrointestinal: Yes: Normal Bowel Sounds, Soft, Abdomen, Obese.  No: 

Tenderness


Edema: No


Labs: 


 CBC, BMP





 05/05/18 06:00 





 05/05/18 06:00 





 INR, PTT











INR  1.04  (0.82-1.09)   05/03/18  14:34    














Problem List





- Problems


(1) ASHD (arteriosclerotic heart disease)


Code(s): I25.10 - ATHSCL HEART DISEASE OF NATIVE CORONARY ARTERY W/O ANG PCTRS 

  





(2) COPD (chronic obstructive pulmonary disease)


Code(s): J44.9 - CHRONIC OBSTRUCTIVE PULMONARY DISEASE, UNSPECIFIED   


Qualifiers: 


   COPD type: chronic bronchitis 





(3) Chronic diastolic CHF (congestive heart failure)


Code(s): I50.32 - CHRONIC DIASTOLIC (CONGESTIVE) HEART FAILURE   





(4) HTN (hypertension)


Code(s): I10 - ESSENTIAL (PRIMARY) HYPERTENSION   


Qualifiers: 


   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   





(5) Renal insufficiency


Code(s): N28.9 - DISORDER OF KIDNEY AND URETER, UNSPECIFIED   





Assessment/Plan





plan


urine legionella positive


ID aware


Increase Solumedrol


 on iv antibiotics


nebs


O2

## 2018-05-06 NOTE — HOSP
Subjective





- Review of Symptoms


Events since last encounter: 


ox sat in low 80s


Subjective: 


pt reports increased SOB today, especially when getting up out of bed. She has 

been ambulating to the bathroom without her oxygen on. 


Pulmonary: Yes: Dyspnea, Cough





Physical Examination


Vital Signs: 


 Vital Signs











Temperature  97.5 F L  05/06/18 17:26


 


Pulse Rate  67   05/06/18 17:26


 


Respiratory Rate  20   05/06/18 17:26


 


Blood Pressure  117/50   05/06/18 17:26


 


O2 Sat by Pulse Oximetry (%)  93 L  05/06/18 09:00











Constitutional: Yes: Mild Distress


Cardiovascular: Yes: Regular Rate and Rhythm, S1, S2


Respiratory: Yes: Other (diminished bilat, scattered wheeze, cleared with cough)


Gastrointestinal: Yes: Normal Bowel Sounds, Abdomen, Obese


Labs: 


 CBC, BMP





 05/05/18 06:00 





 05/05/18 06:00 











Hospitalist Encounter


Assessment: 


Legionella pneumonia with hypoxic respiratory failure


   - cont antibiotics


   - oxygen 4L NC, maintain Ox sat <90, may need venti mask


   - duonebs changed to standing with albuterol prn


   - ABG now, BiPAP if retaining CO2

## 2018-05-07 NOTE — PN
Progress Note, Physician


History of Present Illness: 


Events noted. Now on NRB mask after desaturation on nasal cannula


OOB in chair


Slightly tachypneic    Able to speak in complete sentences


C/O dry cough   No c/o chest pain


No c/o fever/ chills


Temps down , WBC increased on steroids


 





- Current Medication List


Current Medications: 


Active Medications





Acetaminophen (Tylenol -)  650 mg PO Q6H PRN


   PRN Reason: FEVER


   Last Admin: 05/04/18 23:35 Dose:  650 mg


Albuterol Sulfate (Ventolin 0.083% Nebulizer Soln -)  1 amp NEB Q4H PRN


   PRN Reason: SHORT OF BREATH/WHEEZING


Albuterol/Ipratropium (Duoneb -)  1 amp NEB RQID [facility]


   Last Admin: 05/07/18 15:32 Dose:  1 amp


Alprazolam (Xanax -)  0.25 mg PO Q24H PRN


   PRN Reason: ANXIETY


Amlodipine Besylate (Norvasc -)  10 mg PO DAILY [facility]


   Last Admin: 05/07/18 10:10 Dose:  Not Given


Aspirin (Asa -)  325 mg PO DAILY@0800 [facility]


   Last Admin: 05/07/18 08:59 Dose:  325 mg


Brimonidine Tartrate (Alphagan 0.2% -)  1 drop OU BID [facility]


   Last Admin: 05/07/18 10:21 Dose:  1 drop


Enoxaparin Sodium (Lovenox -)  40 mg SQ DAILY [facility]


   Last Admin: 05/07/18 10:20 Dose:  40 mg


Escitalopram Oxalate (Lexapro -)  10 mg PO DAILY [facility]


   Last Admin: 05/07/18 10:20 Dose:  10 mg


Furosemide (Lasix -)  40 mg PO BID@0600,1400 [facility]


   Last Admin: 05/07/18 14:32 Dose:  40 mg


Guaifenesin (Diabetic Tussin Dm -)  10 ml PO Q6H PRN


   PRN Reason: COUGH


   Last Admin: 05/06/18 22:44 Dose:  10 ml


Levofloxacin (Levaquin 500 Mg Premixed Ivpb -)  500 mg in 100 mls @ 100 mls/hr 

IVPB DAILY [facility]


   PRN Reason: Protocol


   Last Admin: 05/07/18 10:20 Dose:  100 mls/hr


Insulin Aspart (Novolog Vial Sliding Scale -)  1 vial SQ ACHS [facility]


   PRN Reason: Protocol


   Last Admin: 05/07/18 12:02 Dose:  10 units


Isosorbide Mononitrate (Imdur -)  30 mg PO DAILY [facility]


   Last Admin: 05/07/18 10:20 Dose:  30 mg


Latanoprost (Xalatan 0.005% Eye Drops -)  1 drop OU [facility]


   Last Admin: 05/06/18 22:01 Dose:  Not Given


Methylprednisolone Sodium Succinate (Solu-Medrol -)  40 mg IVPUSH Q8H-IV [facility]


   Last Admin: 05/07/18 10:20 Dose:  40 mg


Nebivolol (Bystolic -)  10 mg PO DAILY [facility]


   Last Admin: 05/07/18 10:10 Dose:  Not Given


Ptnt's Own Med( Insulin Glargine,Hum .Rec.Anlog [Toujeo Solostar]  80 unit SQ 

DAILY@0700 [facility]


   Last Admin: 05/07/18 06:23 Dose:  80 unit


Pt's Own Med ( Insulin Lispro Protamine/Lispro ( Humalog Mix 50/50 Vial)  18 

each SQ BID@0700,1630 [facility]


   Last Admin: 05/07/18 06:23 Dose:  18 each


Pantoprazole Sodium (Protonix -)  20 mg PO DAILY [facility]


   Last Admin: 05/07/18 14:32 Dose:  20 mg


Rosuvastatin Calcium (Crestor -)  10 mg PO [facility]


   Last Admin: 05/06/18 22:00 Dose:  Not Given


Timolol Maleate (Timoptic 0.5%)  1 drop OU BID [facility]


   Last Admin: 05/07/18 10:21 Dose:  1 drop











- Objective


Vital Signs: 


 Vital Signs











Temperature  97.8 F   05/07/18 13:47


 


Pulse Rate  72   05/07/18 13:47


 


Respiratory Rate  18   05/07/18 13:47


 


Blood Pressure  122/56   05/07/18 13:47


 


O2 Sat by Pulse Oximetry (%)  97   05/07/18 09:00











Cardiovascular: Yes: Regular Rate and Rhythm, S1, S2


Respiratory: Yes: Other (decreased BS L base)


Gastrointestinal: Yes: Normal Bowel Sounds, Soft, Abdomen, Obese.  No: 

Tenderness


Edema: Yes


Labs: 


 CBC, BMP





 05/07/18 07:50 





 05/07/18 07:50 





 INR, PTT











INR  1.04  (0.82-1.09)   05/03/18  14:34    














Assessment/Plan





LLL pneumonia   + legionella AG


R/O sepsis secondary to pneumonia


Azotemia  improved


 


Continue  levaquin    Increase to 750mg in light of improved renal function


Add rifampin bid


Steroids, bronchodilators

## 2018-05-07 NOTE — PN
Progress Note (short form)





- Note


Progress Note: 





PULMONARY





CONSULTATION DICTATED 5/7/18





IMP ACUTE HYPOXEMIC RESPIRATORY FAILURE


     BILATERAL PNEUMONIA


     LEGIONELLA PNEUMONIA


     COPD


     CHF


     CKD


     DM


     ARTHRITIS


     TOBACCO ABUSE





PLAN IV ABX AS PER ID


       INHALED BRONCHODILATORS


       IVF


       STEROIDS


       O2


       MONITOR LYTES,RENAL FUNCTION


      





DR REED





Problem List





- Problems


(1) Acute hypoxemic respiratory failure


Code(s): J96.01 - ACUTE RESPIRATORY FAILURE WITH HYPOXIA   





(2) ASHD (arteriosclerotic heart disease)


Code(s): I25.10 - ATHSCL HEART DISEASE OF NATIVE CORONARY ARTERY W/O ANG PCTRS 

  





(3) COPD (chronic obstructive pulmonary disease)


Code(s): J44.9 - CHRONIC OBSTRUCTIVE PULMONARY DISEASE, UNSPECIFIED   


Qualifiers: 


   COPD type: chronic bronchitis 





(4) Chronic diastolic CHF (congestive heart failure)


Code(s): I50.32 - CHRONIC DIASTOLIC (CONGESTIVE) HEART FAILURE   





(5) Dehydration


Code(s): E86.0 - DEHYDRATION   





(6) Fever


Code(s): R50.9 - FEVER, UNSPECIFIED   





(7) Pneumonia


Code(s): J18.9 - PNEUMONIA, UNSPECIFIED ORGANISM   


Qualifiers: 


   Laterality: right   Lung location: lower lobe of lung 





(8) COPD exacerbation


Code(s): J44.1 - CHRONIC OBSTRUCTIVE PULMONARY DISEASE W (ACUTE) EXACERBATION   





(9) HTN (hypertension)


Code(s): I10 - ESSENTIAL (PRIMARY) HYPERTENSION   


Qualifiers: 


   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   





(10) CKD (chronic kidney disease) stage 3, GFR 30-59 ml/min


Code(s): N18.3 - CHRONIC KIDNEY DISEASE, STAGE 3 (MODERATE)   





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


Code(s): A48.1 - LEGIONNAIRES' DISEASE

## 2018-05-07 NOTE — PN
Progress Note, Physician


Chief Complaint: 





+ Legionella PNA





- Current Medication List


Current Medications: 


Active Medications





Acetaminophen (Tylenol -)  650 mg PO Q6H PRN


   PRN Reason: FEVER


   Last Admin: 05/04/18 23:35 Dose:  650 mg


Albuterol Sulfate (Ventolin 0.083% Nebulizer Soln -)  1 amp NEB Q4H PRN


   PRN Reason: SHORT OF BREATH/WHEEZING


Albuterol/Ipratropium (Duoneb -)  1 amp [facility]B [facility]


   Last Admin: 05/07/18 07:20 Dose:  1 amp


Alprazolam (Xanax -)  0.25 mg PO Q24H PRN


   PRN Reason: ANXIETY


Amlodipine Besylate (Norvasc -)  10 mg PO DAILY [facility]


   Last Admin: 05/06/18 09:32 Dose:  10 mg


Aspirin (Asa -)  325 mg PO DAILY@0800 [facility]


   Last Admin: 05/07/18 08:59 Dose:  325 mg


Brimonidine Tartrate (Alphagan 0.2% -)  1 drop OU BID [facility]


   Last Admin: 05/06/18 21:59 Dose:  Not Given


Enoxaparin Sodium (Lovenox -)  40 mg SQ DAILY [facility]


   Last Admin: 05/06/18 09:33 Dose:  40 mg


Escitalopram Oxalate (Lexapro -)  10 mg PO DAILY [facility]


   Last Admin: 05/06/18 09:32 Dose:  10 mg


Furosemide (Lasix -)  40 mg PO BID@0600,1400 [facility]


   Last Admin: 05/07/18 06:22 Dose:  40 mg


Guaifenesin (Diabetic Tussin Dm -)  10 ml PO Q6H PRN


   PRN Reason: COUGH


   Last Admin: 05/06/18 22:44 Dose:  10 ml


Levofloxacin (Levaquin 500 Mg Premixed Ivpb -)  500 mg in 100 mls @ 100 mls/hr 

IVPB DAILY [facility]


   PRN Reason: Protocol


   Last Admin: 05/06/18 15:27 Dose:  100 mls/hr


Insulin Aspart (Novolog Vial Sliding Scale -)  1 vial SQ ACHS [facility]


   PRN Reason: Protocol


   Last Admin: 05/07/18 06:27 Dose:  8 units


Isosorbide Mononitrate (Imdur -)  30 mg PO DAILY [facility]


   Last Admin: 05/06/18 09:32 Dose:  30 mg


Latanoprost (Xalatan 0.005% Eye Drops -)  1 drop OU [facility]


   Last Admin: 05/06/18 22:01 Dose:  Not Given


Methylprednisolone Sodium Succinate (Solu-Medrol -)  40 mg IVPUSH Q8H-IV [facility]


   Last Admin: 05/07/18 01:21 Dose:  40 mg


Nebivolol (Bystolic -)  10 mg PO DAILY [facility]


   Last Admin: 05/06/18 09:33 Dose:  10 mg


Ptnt's Own Med( Insulin Glargine,Hum .Rec.Anlog [Toujeo Solostar]  80 unit SQ 

DAILY@0700 [facility]


   Last Admin: 05/07/18 06:23 Dose:  80 unit


Pt's Own Med ( Insulin Lispro Protamine/Lispro ( Humalog Mix 50/50 Vial)  18 

each SQ BID@0700,1630 [facility]


   Last Admin: 05/07/18 06:23 Dose:  18 each


Rosuvastatin Calcium (Crestor -)  10 mg PO [facility]


   Last Admin: 05/06/18 22:00 Dose:  Not Given


Timolol Maleate (Timoptic 0.5%)  1 drop OU BID [facility]


   Last Admin: 05/06/18 22:01 Dose:  Not Given











- Objective


Vital Signs: 


 Vital Signs











Temperature  97.7 F   05/07/18 05:00


 


Pulse Rate  75   05/07/18 05:00


 


Respiratory Rate  20   05/07/18 05:00


 


Blood Pressure  107/86   05/07/18 05:00


 


O2 Sat by Pulse Oximetry (%)  96   05/07/18 02:10











Constitutional: Yes: No Distress


Cardiovascular: Yes: Regular Rate and Rhythm


Respiratory: Yes: Rhonchi


Gastrointestinal: Yes: Soft, Abdomen, Obese


Edema: Yes


Edema: LLE: 1+, RLE: 1+


Neurological: Yes: Alert, Oriented


...Motor Strength: WNL


Labs: 


 CBC, BMP





 05/07/18 07:50 





 05/07/18 07:50 





 INR, PTT











INR  1.04  (0.82-1.09)   05/03/18  14:34    














Problem List





- Problems


(1) Sepsis


Code(s): A41.9 - SEPSIS, UNSPECIFIED ORGANISM   


Qualifiers: 


   Sepsis type: sepsis due to unspecified organism   Qualified Code(s): A41.9 - 

Sepsis, unspecified organism   





(2) Pneumonia


Code(s): J18.9 - PNEUMONIA, UNSPECIFIED ORGANISM   


Qualifiers: 


   Laterality: right   Lung location: lower lobe of lung 





(3) ASHD (arteriosclerotic heart disease)


Code(s): I25.10 - ATHSCL HEART DISEASE OF NATIVE CORONARY ARTERY W/O ANG PCTRS 

  





(4) Chronic diastolic CHF (congestive heart failure)


Code(s): I50.32 - CHRONIC DIASTOLIC (CONGESTIVE) HEART FAILURE   





(5) COPD (chronic obstructive pulmonary disease)


Code(s): J44.9 - CHRONIC OBSTRUCTIVE PULMONARY DISEASE, UNSPECIFIED   


Qualifiers: 


   COPD type: chronic bronchitis 





(6) IDDM (insulin dependent diabetes mellitus)


Code(s): E11.9 - TYPE 2 DIABETES MELLITUS WITHOUT COMPLICATIONS; Z79.4 - LONG 

TERM (CURRENT) USE OF INSULIN   





(7) Renal insufficiency


Code(s): N28.9 - DISORDER OF KIDNEY AND URETER, UNSPECIFIED   





Assessment/Plan





IMP:


1. Bilateral  Legionella PNA with early sepsis


2. DM w/ CKD, probable acute on chronic ARF in setting of early sepsis


3. Chronic diastolic CHF secondary to hypertensive heart disease and DM


4. Non-obstructive CAD


5. Chronic COPD, active smoker, w/ no evidence of PHTN on RHC 2016





REC:


1. F/u cultures; abx per ID; supplimental O2; DVT prophylaxis


2. Resume PO Lasix. 


3. Continue other home meds for BP and chronic diastolic dysfx: Imdur 30, 

Bystolic 10, Amlodipine 10 w/ hold parameters.


4. Smoking cessation counselling.

## 2018-05-07 NOTE — CONS
PULMONARY CONSULTATION

 

DATE OF CONSULTATION:  05/07/2018

 

REFERRING PHYSICIAN:  Yelitza Savage MD

 

The patient is a 62-year-old white female with a past medical history of CHF;

hypertension; chronic kidney disease, stage 3; diabetes; COPD; arthritis; a 
history

of recent knee replacement in December; a longstanding history of tobacco use,

approximately 1/2 pack per day for many years, currently still smoking, 
admitted to

[facility] with a complaint of 3-day history of fevers, 
chills,

cough, and generalized body aches.  Patient states that 3 days prior to 
admission

started developing URI symptoms associated with fevers, cough, and sputum 
production.

 At the time, she was placed on Tamiflu for possible influenza.  Symptoms 
continued

to worsen.  At which time, she presented to the emergency room.  In the ER, she 
was

noted to have pneumonia.  She underwent a CT scan of the chest which revealed 
bilateral pneumonia, large left basilar consolidation.  She was started on broad
-spectrum

antibiotics as per Infectious Disease.  Also, Legionella urinary   Antigen 
returned

positive for legionella.  Patient, last evening, developed increasing shortness 
of

breath with O2 desaturation.  She had a blood gas performed which revealed 
hypoxemia

on 4 L nasal O2 with a pO2 of 58.  She was subsequently placed on a 100%

nonrebreather.  Patient denies any chest pains, palpitations.  She does have a 
cough

productive of yellowish sputum.  Denies any fever.  Denies any hemoptysis at 
this

time.  She denies any chest pains.

 

PAST MEDICAL HISTORY:  Again includes chronic kidney disease, diabetes, COPD, 
CHF,

hypertension, and arthritis.

 

REVIEW OF SYSTEMS:  Positive dyspnea.  Positive cough.  No chest pain.  No

palpitations.  No abdominal pain.  No lower extremity edema.

 

CURRENT MEDICATIONS:  Include Solu-Medrol 40 mg q.8, Alphagan eye drops, Tylenol
,

Levaquin, Lexapro, Diabetic Tussin, Xanax, albuterol, DuoNeb, Bystolic, Timoptic
,

Norvasc, Crestor, NovoLog, Lasix, Imdur, aspirin, Xalatan, pantoprazole.

 

PHYSICAL EXAMINATION:

General:  The patient is a well-developed, well-nourished female, awake, alert,

mildly dyspneic but in no acute distress.

Vital Signs:  She is currently afebrile.  Blood pressure is 122/56.  
Respiratory rate

is 18.  O2 saturation is 97% on 100% rebreather.

HEENT:  Normocephalic, atraumatic.

Neck:  Supple.

Heart:  Regular.  S1, S2.

Chest:  A few scattered bilateral wheezes.

Abdomen:  Soft.  Bowel sounds are positive.

Extremities:  No cyanosis or edema.

 

LABORATORY DATA:  Blood gas shows pH of 7.41, pCO2 of 32, a pO2 of 58, a 
bicarbonate

of 20, and a saturation of 90; that is on 4 L.  WBC is 22.9, hemoglobin 12,

hematocrit 35.0, with a platelet count of 253,000.  INR is 1.04.  Chemistries:  
BUN

77, creatinine 1.4.

 

Chest CT:  Dense consolidation of the left lower lobe, trace pleural effusion.  
There

are small patchy infiltrates in the medial and lateral segments of the right 
middle

lobe and the anterior and posterior segments of the right upper lobe.

 

IMPRESSION:

1.  Acute hypoxemic respiratory failure secondary to extensive pneumonia, 
legionella.

2.  Chronic obstructive pulmonary disease.

3.  Diabetes mellitus.

4.  Chronic kidney disease.

5.  Congestive heart failure.

6.  Arthritis.

7.  Tobacco abuse.

 

PLAN:  IV antibiotics,steroids inhaled bronchodilators, supplemental O2.  
Monitor blood

gases, pulse oximetry.  Obtain followup chest x-ray. NIPPV  or intubate if 
patient

develops increasing respiratory distress.  We will also transfer him to a 
monitored

setting.

 

 

RICHARD REED M.D.

 

[id number]

DD: 05/07/2018 14:58

DT: 05/07/2018 19:06

Job #:  [id number]

MTDD

## 2018-05-07 NOTE — PN
Progress Note (short form)





- Note


Progress Note: 


patient seen and examined


All follow-ups and consults noted


On Ventimask


Alert and awake


No distress but weak


Mild cough present


 afebrile


 Vital Signs











Temp  96.8 F L  05/07/18 09:00


 


Pulse  75   05/07/18 09:00


 


Resp  20   05/07/18 09:00


 


BP  109/45   05/07/18 09:00


 


Pulse Ox  96   05/07/18 02:10








 Intake & Output











 05/06/18 05/06/18 05/07/18





 11:59 23:59 11:59


 


Intake Total 250 620 50


 


Balance 250 620 50


 


Intake:   


 


  IVPB  450 


 


  Oral 250 170 50


 


Other:   


 


  Voiding Method Toilet Bedside Commode Bedside Commode


 


  Bowel Movement No No 








Active Medications





Acetaminophen (Tylenol -)  650 mg PO Q6H PRN


   PRN Reason: FEVER


   Last Admin: 05/04/18 23:35 Dose:  650 mg


Albuterol Sulfate (Ventolin 0.083% Nebulizer Soln -)  1 amp NEB Q4H PRN


   PRN Reason: SHORT OF BREATH/WHEEZING


Albuterol/Ipratropium (Duoneb -)  1 amp NEB RQID [facility]


   Last Admin: 05/07/18 07:20 Dose:  1 amp


Alprazolam (Xanax -)  0.25 mg PO Q24H PRN


   PRN Reason: ANXIETY


Amlodipine Besylate (Norvasc -)  10 mg PO DAILY [facility]


   Last Admin: 05/07/18 10:10 Dose:  Not Given


Aspirin (Asa -)  325 mg PO DAILY@0800 [facility]


   Last Admin: 05/07/18 08:59 Dose:  325 mg


Brimonidine Tartrate (Alphagan 0.2% -)  1 drop OU BID [facility]


   Last Admin: 05/07/18 10:21 Dose:  1 drop


Enoxaparin Sodium (Lovenox -)  40 mg SQ DAILY [facility]


   Last Admin: 05/07/18 10:20 Dose:  40 mg


Escitalopram Oxalate (Lexapro -)  10 mg PO DAILY [facility]


   Last Admin: 05/07/18 10:20 Dose:  10 mg


Furosemide (Lasix -)  40 mg PO BID@0600,1400 [facility]


   Last Admin: 05/07/18 06:22 Dose:  40 mg


Guaifenesin (Diabetic Tussin Dm -)  10 ml PO Q6H PRN


   PRN Reason: COUGH


   Last Admin: 05/06/18 22:44 Dose:  10 ml


Levofloxacin (Levaquin 500 Mg Premixed Ivpb -)  500 mg in 100 mls @ 100 mls/hr 

IVPB DAILY [facility]


   PRN Reason: Protocol


   Last Admin: 05/07/18 10:20 Dose:  100 mls/hr


Insulin Aspart (Novolog Vial Sliding Scale -)  1 vial SQ ACHS [facility]


   PRN Reason: Protocol


   Last Admin: 05/07/18 06:27 Dose:  8 units


Isosorbide Mononitrate (Imdur -)  30 mg PO DAILY [facility]


   Last Admin: 05/07/18 10:20 Dose:  30 mg


Latanoprost (Xalatan 0.005% Eye Drops -)  1 drop OU [facility]


   Last Admin: 05/06/18 22:01 Dose:  Not Given


Methylprednisolone Sodium Succinate (Solu-Medrol -)  40 mg IVPUSH Q8H-IV [facility]


   Last Admin: 05/07/18 10:20 Dose:  40 mg


Nebivolol (Bystolic -)  10 mg PO DAILY [facility]


   Last Admin: 05/07/18 10:10 Dose:  Not Given


Ptnt's Own Med( Insulin Glargine,Hum .Rec.Anlog [Toujeo Solostar]  80 unit SQ 

DAILY@0700 [facility]


   Last Admin: 05/07/18 06:23 Dose:  80 unit


Pt's Own Med ( Insulin Lispro Protamine/Lispro ( Humalog Mix 50/50 Vial)  18 

each SQ BID@0700,1630 [facility]


   Last Admin: 05/07/18 06:23 Dose:  18 each


Rosuvastatin Calcium (Crestor -)  10 mg PO [facility]


   Last Admin: 05/06/18 22:00 Dose:  Not Given


Timolol Maleate (Timoptic 0.5%)  1 drop OU BID [facility]


   Last Admin: 05/07/18 10:21 Dose:  1 drop





 CBC, BMP





 05/07/18 07:50 





 05/07/18 07:50 





 Microbiology











 05/03/18 14:34 Blood Culture - Preliminary





 Blood - Peripheral Venous    NO GROWTH OBTAINED AFTER 72 HOURS, INCUBATION TO 

CONTINUE





    FOR 2 DAYS.


 


 05/03/18 14:34 Blood Culture - Preliminary





 Blood - Peripheral Venous    NO GROWTH OBTAINED AFTER 72 HOURS, INCUBATION TO 

CONTINUE





    FOR 2 DAYS.








Physical Examination





Constitutional: Yes: No Distress, weak


Eyes: Yes: Conjunctiva Clear


HENT: Yes: Other (mucosa moist)


Neck: Yes: Supple, no JVD


Cardiovascular: Yes: Regular Rate and Rhythm


Respiratory: Yes: Diminished/ rhonchi


Gastrointestinal: Yes: Soft, Abdomen, Obese


Edema: No


Neurological: Yes: Alert





Assessment and plan


Pneumonia


 Legionella positive


COPD exac


Uncontrolled diabetes--- due to steroids


acute renal insufficiency--- resolving





Continue present care


Antibiotics


Steroids


Monitor blood sugar


pulmonary consult----discussed with Dr. Bartholomew--- will see patient today


Will follow.





Problem List





- Problems


(1) Fever


Code(s): R50.9 - FEVER, UNSPECIFIED   





(2) Dehydration


Code(s): E86.0 - DEHYDRATION   





(3) COPD (chronic obstructive pulmonary disease)


Code(s): J44.9 - CHRONIC OBSTRUCTIVE PULMONARY DISEASE, UNSPECIFIED   


Qualifiers: 


   COPD type: chronic bronchitis 





(4) HTN (hypertension)


Code(s): I10 - ESSENTIAL (PRIMARY) HYPERTENSION   


Qualifiers: 


   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   





(5) IDDM (insulin dependent diabetes mellitus)


Code(s): E11.9 - TYPE 2 DIABETES MELLITUS WITHOUT COMPLICATIONS; Z79.4 - LONG 

TERM (CURRENT) USE OF INSULIN   





(6) Obesity (BMI 30-39.9)


Code(s): E66.9 - OBESITY, UNSPECIFIED   





(7) Renal insufficiency


Code(s): N28.9 - DISORDER OF KIDNEY AND URETER, UNSPECIFIED

## 2018-05-08 NOTE — PN
Progress Note, Physician


History of Present Illness: 





pulmonary





alert,feeling better,less dyspneic,less congested,remains hypoxic on ra sat77%





- Current Medication List


Current Medications: 


Active Medications





Acetaminophen (Tylenol -)  650 mg PO Q6H PRN


   PRN Reason: FEVER


   Last Admin: 05/04/18 23:35 Dose:  650 mg


Albuterol Sulfate (Ventolin 0.083% Nebulizer Soln -)  1 amp NEB Q4H PRN


   PRN Reason: SHORT OF BREATH/WHEEZING


Albuterol/Ipratropium (Duoneb -)  1 amp NEB RQID Count includes the [facility]


   Last Admin: 05/08/18 11:23 Dose:  1 amp


Alprazolam (Xanax -)  0.25 mg PO Q24H PRN


   PRN Reason: ANXIETY


Amlodipine Besylate (Norvasc -)  10 mg PO DAILY Count includes the [facility]


   Last Admin: 05/08/18 10:57 Dose:  Not Given


Aspirin (Asa -)  81 mg PO DAILY Count includes the [facility]


   Last Admin: 05/08/18 09:16 Dose:  Not Given


Brimonidine Tartrate (Alphagan 0.2% -)  1 drop OU BID Count includes the [facility]


   Last Admin: 05/08/18 09:16 Dose:  1 drop


Enoxaparin Sodium (Lovenox -)  40 mg SQ DAILY Count includes the [facility]


   Last Admin: 05/08/18 09:15 Dose:  40 mg


Escitalopram Oxalate (Lexapro -)  10 mg PO DAILY Count includes the [facility]


   Last Admin: 05/08/18 09:15 Dose:  10 mg


Furosemide (Lasix -)  40 mg PO BID@0600,1400 Count includes the [facility]


   Last Admin: 05/08/18 06:28 Dose:  40 mg


Guaifenesin (Diabetic Tussin Dm -)  10 ml PO Q6H PRN


   PRN Reason: COUGH


   Last Admin: 05/08/18 09:17 Dose:  10 ml


Levofloxacin (Levaquin 750 Mg Premixed Ivpb -)  750 mg in 150 mls @ 150 mls/hr 

IVPB DAILY Count includes the [facility]


   PRN Reason: Protocol


   Last Admin: 05/08/18 09:15 Dose:  150 mls/hr


Insulin Aspart (Novolog Vial Sliding Scale -)  1 vial SQ ACHS Count includes the [facility]


   PRN Reason: Protocol


   Last Admin: 05/08/18 06:31 Dose:  6 units


Isosorbide Mononitrate (Imdur -)  30 mg PO DAILY Count includes the [facility]


   Last Admin: 05/08/18 09:15 Dose:  30 mg


Latanoprost (Xalatan 0.005% Eye Drops -)  1 drop OU [facility]


   Last Admin: 05/07/18 22:17 Dose:  1 drop


Magnesium Hydroxide (Milk Of Magnesia -)  30 ml PO Q8H PRN


   PRN Reason: INDIGESTION


Methylprednisolone Sodium Succinate (Solu-Medrol -)  40 mg IVPUSH Q6H Count includes the [facility]


Nebivolol (Bystolic -)  10 mg PO DAILY Count includes the [facility]


   Last Admin: 05/08/18 10:56 Dose:  Not Given


Ptnt's Own Med( Insulin Glargine,Hum .Rec.Anlog [Toujeo Solostar]  80 unit SQ 

DAILY@0700 Count includes the [facility]


   Last Admin: 05/08/18 06:32 Dose:  80 unit


Pt's Own Med ( Insulin Lispro Protamine/Lispro ( Humalog Mix 50/50 Vial)  18 

each SQ BID@0700,1630 Count includes the [facility]


   Last Admin: 05/08/18 06:32 Dose:  18 each


Pantoprazole Sodium (Protonix -)  20 mg PO DAILY Count includes the [facility]


   Last Admin: 05/08/18 09:15 Dose:  20 mg


Rifampin (Rifadin -)  300 mg PO BID@0600,1800 Count includes the [facility]


   Last Admin: 05/08/18 06:27 Dose:  300 mg


Rosuvastatin Calcium (Crestor -)  10 mg PO [facility]


   Last Admin: 05/07/18 22:17 Dose:  10 mg


Timolol Maleate (Timoptic 0.5%)  1 drop OU BID Count includes the [facility]


   Last Admin: 05/08/18 09:17 Dose:  1 drop











- Objective


Vital Signs: 


 Vital Signs











Temperature  98.0 F   05/08/18 10:00


 


Pulse Rate  76   05/08/18 10:00


 


Respiratory Rate  20   05/08/18 10:00


 


Blood Pressure  106/53   05/08/18 10:00


 


O2 Sat by Pulse Oximetry (%)  98   05/08/18 09:00











Constitutional: Yes: Well Nourished, Calm


Eyes: Yes: WNL


HENT: Yes: Nasal Congestion


Neck: Yes: WNL


Cardiovascular: Yes: Regular Rate and Rhythm, S1, S2


Respiratory: Yes: Rales, Rhonchi (bilateral crackles and rhonchi)


Gastrointestinal: Yes: Normal Bowel Sounds, Soft


Extremities: Yes: WNL


Edema: No


Labs: 


 CBC, BMP





 05/07/18 07:50 





 05/07/18 07:50 





 INR, PTT











INR  1.04  (0.82-1.09)   05/03/18  14:34    














Problem List





- Problems


(1) Acute hypoxemic respiratory failure


Code(s): J96.01 - ACUTE RESPIRATORY FAILURE WITH HYPOXIA   





(2) ASHD (arteriosclerotic heart disease)


Code(s): I25.10 - ATHSCL HEART DISEASE OF NATIVE CORONARY ARTERY W/O ANG PCTRS 

  





(3) COPD (chronic obstructive pulmonary disease)


Code(s): J44.9 - CHRONIC OBSTRUCTIVE PULMONARY DISEASE, UNSPECIFIED   


Qualifiers: 


   COPD type: chronic bronchitis 





(4) Chronic diastolic CHF (congestive heart failure)


Code(s): I50.32 - CHRONIC DIASTOLIC (CONGESTIVE) HEART FAILURE   





(5) Dehydration


Code(s): E86.0 - DEHYDRATION   





(6) Fever


Code(s): R50.9 - FEVER, UNSPECIFIED   





(7) Pneumonia


Code(s): J18.9 - PNEUMONIA, UNSPECIFIED ORGANISM   


Qualifiers: 


   Laterality: right   Lung location: lower lobe of lung 





(8) COPD exacerbation


Code(s): J44.1 - CHRONIC OBSTRUCTIVE PULMONARY DISEASE W (ACUTE) EXACERBATION   





(9) HTN (hypertension)


Code(s): I10 - ESSENTIAL (PRIMARY) HYPERTENSION   


Qualifiers: 


   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   





(10) CKD (chronic kidney disease) stage 3, GFR 30-59 ml/min


Code(s): N18.3 - CHRONIC KIDNEY DISEASE, STAGE 3 (MODERATE)   





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


Code(s): A48.1 - LEGIONNAIRES' DISEASE   





Assessment/Plan





IMP ACUTE HYPOXEMIC RESPIRATORY FAILURE


     BILATERAL PNEUMONIA


     LEGIONELLA PNEUMONIA


     COPD


     CHF


     CKD


     DM


     ARTHRITIS


     TOBACCO ABUSE





PLAN IV ABX AS PER ID


       INHALED BRONCHODILATORS


       IVF


       STEROIDS


       O2


       MONITOR LYTES,RENAL FUNCTION


      





DR REED





 Problem List 





- Problems


(1) Acute hypoxemic respiratory failure


Code(s): J96.01 - ACUTE RESPIRATORY FAILURE WITH HYPOXIA   





(2) ASHD (arteriosclerotic heart disease)


Code(s): I25.10 - ATHSCL HEART DISEASE OF NATIVE CORONARY ARTERY W/O ANG PCTRS 

  





(3) COPD (chronic obstructive pulmonary disease)


Code(s): J44.9 - CHRONIC OBSTRUCTIVE PULMONARY DISEASE, UNSPECIFIED   


Qualifiers: 


   COPD type: chronic bronchitis 





(4) Chronic diastolic CHF (congestive heart failure)


Code(s): I50.32 - CHRONIC DIASTOLIC (CONGESTIVE) HEART FAILURE   





(5) Dehydration


Code(s): E86.0 - DEHYDRATION   





(6) Fever


Code(s): R50.9 - FEVER, UNSPECIFIED   





(7) Pneumonia


Code(s): J18.9 - PNEUMONIA, UNSPECIFIED ORGANISM   


Qualifiers: 


   Laterality: right   Lung location: lower lobe of lung 





(8) COPD exacerbation


Code(s): J44.1 - CHRONIC OBSTRUCTIVE PULMONARY DISEASE W (ACUTE) EXACERBATION   





(9) HTN (hypertension)


Code(s): I10 - ESSENTIAL (PRIMARY) HYPERTENSION   


Qualifiers: 


   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   





(10) CKD (chronic kidney disease) stage 3, GFR 30-59 ml/min


Code(s): N18.3 - CHRONIC KIDNEY DISEASE, STAGE 3 (MODERATE)   





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


Code(s): A48.1 - LEGIONNAIRES' DISEASE

## 2018-05-08 NOTE — PN
Progress Note, Physician


Chief Complaint: 





feels SOB


has cough 


feels tired


on venti -- sat 90 's 





- Current Medication List


Current Medications: 


Active Medications





Acetaminophen (Tylenol -)  650 mg PO Q6H PRN


   PRN Reason: FEVER


   Last Admin: 05/04/18 23:35 Dose:  650 mg


Albuterol Sulfate (Ventolin 0.083% Nebulizer Soln -)  1 amp NEB Q4H PRN


   PRN Reason: SHORT OF BREATH/WHEEZING


Albuterol/Ipratropium (Duoneb -)  1 amp NEB RQID Counts include 234 beds at the [facility]


   Last Admin: 05/08/18 08:35 Dose:  1 amp


Alprazolam (Xanax -)  0.25 mg PO Q24H PRN


   PRN Reason: ANXIETY


Amlodipine Besylate (Norvasc -)  10 mg PO DAILY Counts include 234 beds at the [facility]


   Last Admin: 05/08/18 10:57 Dose:  Not Given


Aspirin (Asa -)  81 mg PO DAILY Counts include 234 beds at the [facility]


   Last Admin: 05/08/18 09:16 Dose:  Not Given


Brimonidine Tartrate (Alphagan 0.2% -)  1 drop OU BID Counts include 234 beds at the [facility]


   Last Admin: 05/08/18 09:16 Dose:  1 drop


Enoxaparin Sodium (Lovenox -)  40 mg SQ DAILY Counts include 234 beds at the [facility]


   Last Admin: 05/08/18 09:15 Dose:  40 mg


Escitalopram Oxalate (Lexapro -)  10 mg PO DAILY Counts include 234 beds at the [facility]


   Last Admin: 05/08/18 09:15 Dose:  10 mg


Furosemide (Lasix -)  40 mg PO BID@0600,1400 Counts include 234 beds at the [facility]


   Last Admin: 05/08/18 06:28 Dose:  40 mg


Guaifenesin (Diabetic Tussin Dm -)  10 ml PO Q6H PRN


   PRN Reason: COUGH


   Last Admin: 05/08/18 09:17 Dose:  10 ml


Levofloxacin (Levaquin 750 Mg Premixed Ivpb -)  750 mg in 150 mls @ 150 mls/hr 

IVPB DAILY Counts include 234 beds at the [facility]


   PRN Reason: Protocol


   Last Admin: 05/08/18 09:15 Dose:  150 mls/hr


Insulin Aspart (Novolog Vial Sliding Scale -)  1 vial SQ ACHS Counts include 234 beds at the [facility]


   PRN Reason: Protocol


   Last Admin: 05/08/18 06:31 Dose:  6 units


Isosorbide Mononitrate (Imdur -)  30 mg PO DAILY Counts include 234 beds at the [facility]


   Last Admin: 05/08/18 09:15 Dose:  30 mg


Latanoprost (Xalatan 0.005% Eye Drops -)  1 drop OU HS Counts include 234 beds at the [facility]


   Last Admin: 05/07/18 22:17 Dose:  1 drop


Methylprednisolone Sodium Succinate (Solu-Medrol -)  40 mg IVPUSH Q8H-IV Counts include 234 beds at the [facility]


   Last Admin: 05/08/18 09:14 Dose:  40 mg


Nebivolol (Bystolic -)  10 mg PO DAILY Counts include 234 beds at the [facility]


   Last Admin: 05/08/18 10:56 Dose:  Not Given


Ptnt's Own Med( Insulin Glargine,Hum .Rec.Anlog [Toujeo Solostar]  80 unit SQ 

DAILY@0700 Counts include 234 beds at the [facility]


   Last Admin: 05/08/18 06:32 Dose:  80 unit


Pt's Own Med ( Insulin Lispro Protamine/Lispro ( Humalog Mix 50/50 Vial)  18 

each SQ BID@0700,1630 Counts include 234 beds at the [facility]


   Last Admin: 05/08/18 06:32 Dose:  18 each


Pantoprazole Sodium (Protonix -)  20 mg PO DAILY Counts include 234 beds at the [facility]


   Last Admin: 05/08/18 09:15 Dose:  20 mg


Rifampin (Rifadin -)  300 mg PO BID@0600,1800 Counts include 234 beds at the [facility]


   Last Admin: 05/08/18 06:27 Dose:  300 mg


Rosuvastatin Calcium (Crestor -)  10 mg PO [facility]


   Last Admin: 05/07/18 22:17 Dose:  10 mg


Timolol Maleate (Timoptic 0.5%)  1 drop OU BID Counts include 234 beds at the [facility]


   Last Admin: 05/08/18 09:17 Dose:  1 drop











- Objective


Vital Signs: 


 Vital Signs











Temperature  98.0 F   05/08/18 10:00


 


Pulse Rate  76   05/08/18 10:00


 


Respiratory Rate  20   05/08/18 10:00


 


Blood Pressure  106/53   05/08/18 10:00


 


O2 Sat by Pulse Oximetry (%)  98   05/08/18 09:00











Constitutional: Yes: No Distress


Cardiovascular: Yes: Regular Rate and Rhythm


Respiratory: Yes: Diminished, Rhonchi


Gastrointestinal: Yes: Normal Bowel Sounds, Soft, Abdomen, Obese.  No: 

Tenderness


Edema: No


Labs: 


 CBC, BMP





 05/07/18 07:50 





 05/07/18 07:50 





 INR, PTT











INR  1.04  (0.82-1.09)   05/03/18  14:34    














Problem List





- Problems


(1) ASHD (arteriosclerotic heart disease)


Code(s): I25.10 - ATHSCL HEART DISEASE OF NATIVE CORONARY ARTERY W/O ANG PCTRS 

  





(2) COPD (chronic obstructive pulmonary disease)


Code(s): J44.9 - CHRONIC OBSTRUCTIVE PULMONARY DISEASE, UNSPECIFIED   


Qualifiers: 


   COPD type: chronic bronchitis 





(3) Chronic diastolic CHF (congestive heart failure)


Code(s): I50.32 - CHRONIC DIASTOLIC (CONGESTIVE) HEART FAILURE   





(4) HTN (hypertension)


Code(s): I10 - ESSENTIAL (PRIMARY) HYPERTENSION   


Qualifiers: 


   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   





(5) Renal insufficiency


Code(s): N28.9 - DISORDER OF KIDNEY AND URETER, UNSPECIFIED   





Assessment/Plan





plan


urine legionella positive


Increase Solumedrol-- d/w pulmonary


 on iv antibiotics


nebs


O2 


afebrile


WBC elevated due to steroids

## 2018-05-08 NOTE — PN
Progress Note, Physician


Chief Complaint: 





TELE: NSR


Txd to tele for hypoxia





- Current Medication List


Current Medications: 


Active Medications





Acetaminophen (Tylenol -)  650 mg PO Q6H PRN


   PRN Reason: FEVER


   Last Admin: 05/04/18 23:35 Dose:  650 mg


Albuterol Sulfate (Ventolin 0.083% Nebulizer Soln -)  1 amp NEB Q4H PRN


   PRN Reason: SHORT OF BREATH/WHEEZING


Albuterol/Ipratropium (Duoneb -)  1 amp NEB RQID [facility]


   Last Admin: 05/07/18 21:43 Dose:  1 amp


Alprazolam (Xanax -)  0.25 mg PO Q24H PRN


   PRN Reason: ANXIETY


Amlodipine Besylate (Norvasc -)  10 mg PO DAILY [facility]


   Last Admin: 05/07/18 10:10 Dose:  Not Given


Aspirin (Asa -)  325 mg PO DAILY@0800 [facility]


   Last Admin: 05/08/18 08:20 Dose:  325 mg


Brimonidine Tartrate (Alphagan 0.2% -)  1 drop OU BID [facility]


   Last Admin: 05/07/18 22:21 Dose:  1 drop


Enoxaparin Sodium (Lovenox -)  40 mg SQ DAILY [facility]


   Last Admin: 05/07/18 10:20 Dose:  40 mg


Escitalopram Oxalate (Lexapro -)  10 mg PO DAILY [facility]


   Last Admin: 05/07/18 10:20 Dose:  10 mg


Furosemide (Lasix -)  40 mg PO BID@0600,1400 [facility]


   Last Admin: 05/08/18 06:28 Dose:  40 mg


Guaifenesin (Diabetic Tussin Dm -)  10 ml PO Q6H PRN


   PRN Reason: COUGH


   Last Admin: 05/06/18 22:44 Dose:  10 ml


Levofloxacin (Levaquin 750 Mg Premixed Ivpb -)  750 mg in 150 mls @ 150 mls/hr 

IVPB DAILY [facility]


   PRN Reason: Protocol


Insulin Aspart (Novolog Vial Sliding Scale -)  1 vial SQ ACHS [facility]


   PRN Reason: Protocol


   Last Admin: 05/08/18 06:31 Dose:  6 units


Isosorbide Mononitrate (Imdur -)  30 mg PO DAILY [facility]


   Last Admin: 05/07/18 10:20 Dose:  30 mg


Latanoprost (Xalatan 0.005% Eye Drops -)  1 drop OU HS [facility]


   Last Admin: 05/07/18 22:17 Dose:  1 drop


Methylprednisolone Sodium Succinate (Solu-Medrol -)  40 mg IVPUSH Q8H-IV [facility]


   Last Admin: 05/08/18 01:46 Dose:  40 mg


Nebivolol (Bystolic -)  10 mg PO DAILY [facility]


   Last Admin: 05/07/18 10:10 Dose:  Not Given


Ptnt's Own Med( Insulin Glargine,Hum .Rec.Anlog [Toujeo Solostar]  80 unit SQ 

DAILY@0700 [facility]


   Last Admin: 05/08/18 06:32 Dose:  80 unit


Pt's Own Med ( Insulin Lispro Protamine/Lispro ( Humalog Mix 50/50 Vial)  18 

each SQ BID@0700,1630 [facility]


   Last Admin: 05/08/18 06:32 Dose:  18 each


Pantoprazole Sodium (Protonix -)  20 mg PO DAILY [facility]


   Last Admin: 05/07/18 14:32 Dose:  20 mg


Rifampin (Rifadin -)  300 mg PO BID@0600,1800 [facility]


   Last Admin: 05/08/18 06:27 Dose:  300 mg


Rosuvastatin Calcium (Crestor -)  10 mg PO [facility]


   Last Admin: 05/07/18 22:17 Dose:  10 mg


Timolol Maleate (Timoptic 0.5%)  1 drop OU BID [facility]


   Last Admin: 05/07/18 22:18 Dose:  1 drop











- Objective


Vital Signs: 


 Vital Signs











Temperature  97 F L  05/08/18 06:24


 


Pulse Rate  86   05/08/18 06:24


 


Respiratory Rate  20   05/08/18 06:24


 


Blood Pressure  134/69   05/08/18 06:24


 


O2 Sat by Pulse Oximetry (%)  100   05/07/18 21:00











Constitutional: Yes: Calm


Cardiovascular: Yes: Regular Rate and Rhythm


Respiratory: Yes: Rales (left base)


Gastrointestinal: Yes: Soft, Abdomen, Obese


Edema: Yes


Edema: LLE: 1+, RLE: 1+


Neurological: Yes: Alert


...Motor Strength: WNL


Labs: 


 CBC, BMP





 05/07/18 07:50 





 05/07/18 07:50 





 INR, PTT











INR  1.04  (0.82-1.09)   05/03/18  14:34    














- ....Imaging


EKG: Image Reviewed





Problem List





- Problems


(1) Sepsis


Code(s): A41.9 - SEPSIS, UNSPECIFIED ORGANISM   


Qualifiers: 


   Sepsis type: sepsis due to unspecified organism   Qualified Code(s): A41.9 - 

Sepsis, unspecified organism   





(2) Pneumonia


Code(s): J18.9 - PNEUMONIA, UNSPECIFIED ORGANISM   


Qualifiers: 


   Laterality: right   Lung location: lower lobe of lung 





(3) ASHD (arteriosclerotic heart disease)


Code(s): I25.10 - ATHSCL HEART DISEASE OF NATIVE CORONARY ARTERY W/O ANG PCTRS 

  





(4) Chronic diastolic CHF (congestive heart failure)


Code(s): I50.32 - CHRONIC DIASTOLIC (CONGESTIVE) HEART FAILURE   





(5) COPD (chronic obstructive pulmonary disease)


Code(s): J44.9 - CHRONIC OBSTRUCTIVE PULMONARY DISEASE, UNSPECIFIED   


Qualifiers: 


   COPD type: chronic bronchitis 





(6) IDDM (insulin dependent diabetes mellitus)


Code(s): E11.9 - TYPE 2 DIABETES MELLITUS WITHOUT COMPLICATIONS; Z79.4 - LONG 

TERM (CURRENT) USE OF INSULIN   





(7) Renal insufficiency


Code(s): N28.9 - DISORDER OF KIDNEY AND URETER, UNSPECIFIED   





Assessment/Plan








IMP:


1. Bilateral  Legionella PNA with early sepsis


2. DM w/ CKD, probable acute on chronic ARF in setting of early sepsis


3. Chronic diastolic CHF secondary to hypertensive heart disease and DM


4. Non-obstructive CAD


5. Chronic COPD, active smoker, w/ no evidence of PHTN on [facility] 2016





REC:


1. F/u cultures; abx per ID; supplimental O2; DVT prophylaxis


2. PO Lasix. 


3. Continue other home meds for BP and chronic diastolic dysfx: Imdur 30, 

Bystolic 10, Amlodipine 10 w/ hold parameters.


4. Smoking cessation counselling.


5. Decrease ASA to 81mg

## 2018-05-08 NOTE — PN
Progress Note, Physician


History of Present Illness: 





OOB in chair.  Less dyspneic on ventimask.


Reports occasional dry cough


No c/o chest pain


No c/o fever/ chills


Temps down , WBC increased on steroids


 





- Current Medication List


Current Medications: 


Active Medications





Acetaminophen (Tylenol -)  650 mg PO Q6H PRN


   PRN Reason: FEVER


   Last Admin: 05/04/18 23:35 Dose:  650 mg


Albuterol Sulfate (Ventolin 0.083% Nebulizer Soln -)  1 amp NEB Q4H PRN


   PRN Reason: SHORT OF BREATH/WHEEZING


Albuterol/Ipratropium (Duoneb -)  1 amp NEB RQID [facility]


   Last Admin: 05/08/18 08:35 Dose:  1 amp


Alprazolam (Xanax -)  0.25 mg PO Q24H PRN


   PRN Reason: ANXIETY


Amlodipine Besylate (Norvasc -)  10 mg PO DAILY [facility]


   Last Admin: 05/08/18 10:57 Dose:  Not Given


Aspirin (Asa -)  81 mg PO DAILY [facility]


   Last Admin: 05/08/18 09:16 Dose:  Not Given


Brimonidine Tartrate (Alphagan 0.2% -)  1 drop OU BID [facility]


   Last Admin: 05/08/18 09:16 Dose:  1 drop


Enoxaparin Sodium (Lovenox -)  40 mg SQ DAILY [facility]


   Last Admin: 05/08/18 09:15 Dose:  40 mg


Escitalopram Oxalate (Lexapro -)  10 mg PO DAILY [facility]


   Last Admin: 05/08/18 09:15 Dose:  10 mg


Furosemide (Lasix -)  40 mg PO BID@0600,1400 [facility]


   Last Admin: 05/08/18 06:28 Dose:  40 mg


Guaifenesin (Diabetic Tussin Dm -)  10 ml PO Q6H PRN


   PRN Reason: COUGH


   Last Admin: 05/08/18 09:17 Dose:  10 ml


Levofloxacin (Levaquin 750 Mg Premixed Ivpb -)  750 mg in 150 mls @ 150 mls/hr 

IVPB DAILY [facility]


   PRN Reason: Protocol


   Last Admin: 05/08/18 09:15 Dose:  150 mls/hr


Insulin Aspart (Novolog Vial Sliding Scale -)  1 vial SQ ACHS [facility]


   PRN Reason: Protocol


   Last Admin: 05/08/18 06:31 Dose:  6 units


Isosorbide Mononitrate (Imdur -)  30 mg PO DAILY [facility]


   Last Admin: 05/08/18 09:15 Dose:  30 mg


Latanoprost (Xalatan 0.005% Eye Drops -)  1 drop OU [facility]


   Last Admin: 05/07/18 22:17 Dose:  1 drop


Methylprednisolone Sodium Succinate (Solu-Medrol -)  40 mg IVPUSH Q8H-IV [facility]


   Last Admin: 05/08/18 09:14 Dose:  40 mg


Nebivolol (Bystolic -)  10 mg PO DAILY [facility]


   Last Admin: 05/08/18 10:56 Dose:  Not Given


Ptnt's Own Med( Insulin Glargine,Hum .Rec.Anlog [Toujeo Solostar]  80 unit SQ 

DAILY@0700 [facility]


   Last Admin: 05/08/18 06:32 Dose:  80 unit


Pt's Own Med ( Insulin Lispro Protamine/Lispro ( Humalog Mix 50/50 Vial)  18 

each SQ BID@0700,1630 [facility]


   Last Admin: 05/08/18 06:32 Dose:  18 each


Pantoprazole Sodium (Protonix -)  20 mg PO DAILY [facility]


   Last Admin: 05/08/18 09:15 Dose:  20 mg


Rifampin (Rifadin -)  300 mg PO BID@0600,1800 [facility]


   Last Admin: 05/08/18 06:27 Dose:  300 mg


Rosuvastatin Calcium (Crestor -)  10 mg PO [facility]


   Last Admin: 05/07/18 22:17 Dose:  10 mg


Timolol Maleate (Timoptic 0.5%)  1 drop OU BID [facility]


   Last Admin: 05/08/18 09:17 Dose:  1 drop











- Objective


Vital Signs: 


 Vital Signs











Temperature  98.0 F   05/08/18 10:00


 


Pulse Rate  76   05/08/18 10:00


 


Respiratory Rate  20   05/08/18 10:00


 


Blood Pressure  106/53   05/08/18 10:00


 


O2 Sat by Pulse Oximetry (%)  98   05/08/18 09:00











Constitutional: Yes: No Distress


Cardiovascular: Yes: Regular Rate and Rhythm, S1, S2


Respiratory: Yes: CTA Bilaterally, Other (decreased BS L base)


Gastrointestinal: Yes: Normal Bowel Sounds, Soft.  No: Tenderness


Labs: 


 CBC, BMP





 05/07/18 07:50 





 05/07/18 07:50 





 INR, PTT











INR  1.04  (0.82-1.09)   05/03/18  14:34    














Assessment/Plan





LLL pneumonia   + legionella AG


R/O sepsis secondary to pneumonia


Azotemia  improved


 


Continue  levaquin 750mg  / rifampin bid


Steroids, bronchodilators

## 2018-05-09 NOTE — PN
Progress Note, Physician


History of Present Illness: 





PULMONARY





ALERT,OOB-CHAIR,LESS CONGESTED,STILL DYSPNEIC,ON NASAL CANNULA 5L,HYPOXIC ON RA





- Current Medication List


Current Medications: 


Active Medications





Acetaminophen (Tylenol -)  650 mg PO Q6H PRN


   PRN Reason: FEVER


   Last Admin: 05/04/18 23:35 Dose:  650 mg


Albuterol Sulfate (Ventolin 0.083% Nebulizer Soln -)  1 amp NEB Q4H PRN


   PRN Reason: SHORT OF BREATH/WHEEZING


Albuterol/Ipratropium (Duoneb -)  1 amp NEB RQID [facility]


   Last Admin: 05/09/18 12:15 Dose:  1 amp


Alprazolam (Xanax -)  0.25 mg PO Q24H PRN


   PRN Reason: ANXIETY


   Last Admin: 05/08/18 22:12 Dose:  0.25 mg


Amlodipine Besylate (Norvasc -)  10 mg PO DAILY [facility]


   Last Admin: 05/09/18 09:26 Dose:  10 mg


Aspirin (Asa -)  81 mg PO DAILY [facility]


   Last Admin: 05/09/18 09:25 Dose:  81 mg


Brimonidine Tartrate (Alphagan 0.2% -)  1 drop OU BID [facility]


   Last Admin: 05/09/18 09:25 Dose:  1 drop


Enoxaparin Sodium (Lovenox -)  40 mg SQ DAILY [facility]


   Last Admin: 05/09/18 09:26 Dose:  40 mg


Escitalopram Oxalate (Lexapro -)  10 mg PO DAILY [facility]


   Last Admin: 05/09/18 09:26 Dose:  10 mg


Furosemide (Lasix -)  40 mg PO BID@0600,1400 [facility]


   Last Admin: 05/09/18 06:38 Dose:  40 mg


Guaifenesin (Diabetic Tussin Dm -)  10 ml PO Q6H PRN


   PRN Reason: COUGH


   Last Admin: 05/08/18 09:17 Dose:  10 ml


Levofloxacin (Levaquin 750 Mg Premixed Ivpb -)  750 mg in 150 mls @ 150 mls/hr 

IVPB DAILY [facility]


   PRN Reason: Protocol


   Last Admin: 05/09/18 09:26 Dose:  150 mls/hr


Insulin Aspart (Novolog Vial Sliding Scale -)  1 vial SQ ACHS [facility]


   PRN Reason: Protocol


   Last Admin: 05/09/18 11:42 Dose:  8 units


Isosorbide Mononitrate (Imdur -)  30 mg PO DAILY [facility]


   Last Admin: 05/09/18 09:26 Dose:  30 mg


Latanoprost (Xalatan 0.005% Eye Drops -)  1 drop OU [facility]


   Last Admin: 05/08/18 21:29 Dose:  1 drop


Magnesium Hydroxide (Milk Of Magnesia -)  30 ml PO Q8H PRN


   PRN Reason: INDIGESTION


   Last Admin: 05/08/18 14:17 Dose:  30 ml


Methylprednisolone Sodium Succinate (Solu-Medrol -)  40 mg IVPUSH Q6H-IV [facility]


   Last Admin: 05/09/18 09:24 Dose:  40 mg


Nebivolol (Bystolic -)  10 mg PO DAILY [facility]


   Last Admin: 05/09/18 09:26 Dose:  10 mg


Ptnt's Own Med( Insulin Glargine,Hum .Rec.Anlog [Toujeo Solostar]  80 unit SQ 

DAILY@0700 [facility]


   Last Admin: 05/09/18 07:00 Dose:  80 unit


Pt's Own Med ( Insulin Lispro Protamine/Lispro ( Humalog Mix 50/50 Vial)  18 

each SQ BID@0700,1630 [facility]


   Last Admin: 05/09/18 07:00 Dose:  18 each


Pantoprazole Sodium (Protonix -)  20 mg PO DAILY [facility]


   Last Admin: 05/09/18 09:26 Dose:  20 mg


Rifampin (Rifadin -)  300 mg PO BID@0600,1800 [facility]


   Last Admin: 05/09/18 06:38 Dose:  300 mg


Rosuvastatin Calcium (Crestor -)  10 mg PO [facility]


   Last Admin: 05/08/18 21:29 Dose:  10 mg


Timolol Maleate (Timoptic 0.5%)  1 drop OU BID [facility]


   Last Admin: 05/09/18 09:27 Dose:  1 drop











- Objective


Vital Signs: 


 Vital Signs











Temperature  96.5 F L  05/09/18 09:21


 


Pulse Rate  76   05/09/18 09:21


 


Respiratory Rate  20   05/09/18 09:21


 


Blood Pressure  115/70   05/09/18 09:21


 


O2 Sat by Pulse Oximetry (%)  96   05/08/18 21:00











Constitutional: Yes: Well Nourished, Calm, Other (MILDY DYSPNEIC)


HENT: Yes: WNL


Neck: Yes: WNL


Cardiovascular: Yes: Regular Rate and Rhythm, S1, S2


Respiratory: Yes: Rhonchi (SCATTERED TYRESE RHONCHI)


Gastrointestinal: Yes: Normal Bowel Sounds, Soft


Extremities: Yes: WNL


Edema: No


Labs: 


 CBC, BMP





 05/09/18 06:20 





 05/09/18 06:20 





 INR, PTT











INR  1.04  (0.82-1.09)   05/03/18  14:34    














Problem List





- Problems


(1) Acute hypoxemic respiratory failure


Code(s): J96.01 - ACUTE RESPIRATORY FAILURE WITH HYPOXIA   





(2) ASHD (arteriosclerotic heart disease)


Code(s): I25.10 - ATHSCL HEART DISEASE OF NATIVE CORONARY ARTERY W/O ANG PCTRS 

  





(3) COPD (chronic obstructive pulmonary disease)


Code(s): J44.9 - CHRONIC OBSTRUCTIVE PULMONARY DISEASE, UNSPECIFIED   


Qualifiers: 


   COPD type: chronic bronchitis 





(4) Chronic diastolic CHF (congestive heart failure)


Code(s): I50.32 - CHRONIC DIASTOLIC (CONGESTIVE) HEART FAILURE   





(5) Dehydration


Code(s): E86.0 - DEHYDRATION   





(6) Fever


Code(s): R50.9 - FEVER, UNSPECIFIED   





(7) Pneumonia


Code(s): J18.9 - PNEUMONIA, UNSPECIFIED ORGANISM   


Qualifiers: 


   Laterality: right   Lung location: lower lobe of lung 





(8) COPD exacerbation


Code(s): J44.1 - CHRONIC OBSTRUCTIVE PULMONARY DISEASE W (ACUTE) EXACERBATION   





(9) HTN (hypertension)


Code(s): I10 - ESSENTIAL (PRIMARY) HYPERTENSION   


Qualifiers: 


   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   





(10) CKD (chronic kidney disease) stage 3, GFR 30-59 ml/min


Code(s): N18.3 - CHRONIC KIDNEY DISEASE, STAGE 3 (MODERATE)   





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


Code(s): A48.1 - LEGIONNAIRES' DISEASE   





Assessment/Plan





IMP ACUTE HYPOXEMIC RESPIRATORY FAILURE


     BILATERAL PNEUMONIA


     LEGIONELLA PNEUMONIA


     COPD


     CHF


     CKD


     DM


     ARTHRITIS


     TOBACCO ABUSE





PLAN IV ABX


       INHALED BRONCHODILATORS


       IVF


       STEROIDS


       O2


       MONITOR LYTES,RENAL FUNCTION


      





DR REED





 Problem List 





- Problems


(1) Acute hypoxemic respiratory failure


Code(s): J96.01 - ACUTE RESPIRATORY FAILURE WITH HYPOXIA   





(2) ASHD (arteriosclerotic heart disease)


Code(s): I25.10 - ATHSCL HEART DISEASE OF NATIVE CORONARY ARTERY W/O ANG PCTRS 

  





(3) COPD (chronic obstructive pulmonary disease)


Code(s): J44.9 - CHRONIC OBSTRUCTIVE PULMONARY DISEASE, UNSPECIFIED   


Qualifiers: 


   COPD type: chronic bronchitis 





(4) Chronic diastolic CHF (congestive heart failure)


Code(s): I50.32 - CHRONIC DIASTOLIC (CONGESTIVE) HEART FAILURE   





(5) Dehydration


Code(s): E86.0 - DEHYDRATION   





(6) Fever


Code(s): R50.9 - FEVER, UNSPECIFIED   





(7) Pneumonia


Code(s): J18.9 - PNEUMONIA, UNSPECIFIED ORGANISM   


Qualifiers: 


   Laterality: right   Lung location: lower lobe of lung 





(8) COPD exacerbation


Code(s): J44.1 - CHRONIC OBSTRUCTIVE PULMONARY DISEASE W (ACUTE) EXACERBATION   





(9) HTN (hypertension)


Code(s): I10 - ESSENTIAL (PRIMARY) HYPERTENSION   


Qualifiers: 


   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   





(10) CKD (chronic kidney disease) stage 3, GFR 30-59 ml/min


Code(s): N18.3 - CHRONIC KIDNEY DISEASE, STAGE 3 (MODERATE)   





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


Code(s): A48.1 - LEGIONNAIRES' DISEASE

## 2018-05-09 NOTE — PN
Progress Note, Physician


History of Present Illness: 





OOB in chair.  


Breathing non-labored on 4L NC


Reports  dry cough


No c/o chest pain


No c/o fever/ chills


Temps down , WBC increased on steroids


 





- Current Medication List


Current Medications: 


Active Medications





Acetaminophen (Tylenol -)  650 mg PO Q6H PRN


   PRN Reason: FEVER


   Last Admin: 05/04/18 23:35 Dose:  650 mg


Albuterol Sulfate (Ventolin 0.083% Nebulizer Soln -)  1 amp NEB Q4H PRN


   PRN Reason: SHORT OF BREATH/WHEEZING


Albuterol/Ipratropium (Duoneb -)  1 amp NEB RQID [facility]


   Last Admin: 05/09/18 12:15 Dose:  1 amp


Alprazolam (Xanax -)  0.25 mg PO Q24H PRN


   PRN Reason: ANXIETY


   Last Admin: 05/08/18 22:12 Dose:  0.25 mg


Amlodipine Besylate (Norvasc -)  10 mg PO DAILY [facility]


   Last Admin: 05/09/18 09:26 Dose:  10 mg


Aspirin (Asa -)  81 mg PO DAILY [facility]


   Last Admin: 05/09/18 09:25 Dose:  81 mg


Brimonidine Tartrate (Alphagan 0.2% -)  1 drop OU BID [facility]


   Last Admin: 05/09/18 09:25 Dose:  1 drop


Enoxaparin Sodium (Lovenox -)  40 mg SQ DAILY [facility]


   Last Admin: 05/09/18 09:26 Dose:  40 mg


Escitalopram Oxalate (Lexapro -)  10 mg PO DAILY [facility]


   Last Admin: 05/09/18 09:26 Dose:  10 mg


Furosemide (Lasix -)  40 mg PO BID@0600,1400 [facility]


   Last Admin: 05/09/18 06:38 Dose:  40 mg


Guaifenesin (Diabetic Tussin Dm -)  10 ml PO Q6H PRN


   PRN Reason: COUGH


   Last Admin: 05/08/18 09:17 Dose:  10 ml


Levofloxacin (Levaquin 750 Mg Premixed Ivpb -)  750 mg in 150 mls @ 150 mls/hr 

IVPB DAILY [facility]


   PRN Reason: Protocol


   Last Admin: 05/09/18 09:26 Dose:  150 mls/hr


Insulin Aspart (Novolog Vial Sliding Scale -)  1 vial SQ ACHS [facility]


   PRN Reason: Protocol


   Last Admin: 05/09/18 11:42 Dose:  8 units


Isosorbide Mononitrate (Imdur -)  30 mg PO DAILY [facility]


   Last Admin: 05/09/18 09:26 Dose:  30 mg


Latanoprost (Xalatan 0.005% Eye Drops -)  1 drop OU [facility]


   Last Admin: 05/08/18 21:29 Dose:  1 drop


Magnesium Hydroxide (Milk Of Magnesia -)  30 ml PO Q8H PRN


   PRN Reason: INDIGESTION


   Last Admin: 05/08/18 14:17 Dose:  30 ml


Methylprednisolone Sodium Succinate (Solu-Medrol -)  40 mg IVPUSH Q6H-IV [facility]


   Stop: 05/09/18 21:00


   Last Admin: 05/09/18 09:24 Dose:  40 mg


Methylprednisolone Sodium Succinate (Solu-Medrol -)  40 mg IVPUSH Q8H-IV [facility]


Nebivolol (Bystolic -)  10 mg PO DAILY [facility]


   Last Admin: 05/09/18 09:26 Dose:  10 mg


Ptnt's Own Med( Insulin Glargine,Hum .Rec.Anlog [Toujeo Solostar]  80 unit SQ 

DAILY@0700 [facility]


   Last Admin: 05/09/18 07:00 Dose:  80 unit


Pt's Own Med ( Insulin Lispro Protamine/Lispro ( Humalog Mix 50/50 Vial)  18 

each SQ BID@0700,1630 [facility]


   Last Admin: 05/09/18 07:00 Dose:  18 each


Pantoprazole Sodium (Protonix -)  20 mg PO DAILY [facility]


   Last Admin: 05/09/18 09:26 Dose:  20 mg


Rifampin (Rifadin -)  300 mg PO BID@0600,1800 [facility]


   Last Admin: 05/09/18 06:38 Dose:  300 mg


Rosuvastatin Calcium (Crestor -)  10 mg PO [facility]


   Last Admin: 05/08/18 21:29 Dose:  10 mg


Timolol Maleate (Timoptic 0.5%)  1 drop OU BID [facility]


   Last Admin: 05/09/18 09:27 Dose:  1 drop











- Objective


Vital Signs: 


 Vital Signs











Temperature  96.5 F L  05/09/18 09:21


 


Pulse Rate  76   05/09/18 09:21


 


Respiratory Rate  20   05/09/18 09:21


 


Blood Pressure  115/70   05/09/18 09:21


 


O2 Sat by Pulse Oximetry (%)  96   05/08/18 21:00











Constitutional: Yes: No Distress


Cardiovascular: Yes: Regular Rate and Rhythm, S1, S2


Respiratory: Yes: Diminished


Gastrointestinal: Yes: Normal Bowel Sounds, Soft.  No: Tenderness


Edema: Yes


Labs: 


 CBC, BMP





 05/09/18 06:20 





 05/09/18 06:20 





 INR, PTT











INR  1.04  (0.82-1.09)   05/03/18  14:34    














Assessment/Plan





LLL pneumonia   + legionella AG


R/O sepsis secondary to pneumonia


Azotemia  improved


 


Continue  levaquin 750mg  / rifampin bid


Steroids, bronchodilators

## 2018-05-09 NOTE — PN
Progress Note, Physician


Chief Complaint: 





decreased cough


still SOB 


sitting up in chair 





- Current Medication List


Current Medications: 


Active Medications





Acetaminophen (Tylenol -)  650 mg PO Q6H PRN


   PRN Reason: FEVER


   Last Admin: 05/04/18 23:35 Dose:  650 mg


Albuterol Sulfate (Ventolin 0.083% Nebulizer Soln -)  1 amp NEB Q4H PRN


   PRN Reason: SHORT OF BREATH/WHEEZING


Albuterol/Ipratropium (Duoneb -)  1 amp NEB RQID [facility]


   Last Admin: 05/09/18 08:58 Dose:  1 amp


Alprazolam (Xanax -)  0.25 mg PO Q24H PRN


   PRN Reason: ANXIETY


   Last Admin: 05/08/18 22:12 Dose:  0.25 mg


Amlodipine Besylate (Norvasc -)  10 mg PO DAILY [facility]


   Last Admin: 05/08/18 10:57 Dose:  Not Given


Aspirin (Asa -)  81 mg PO DAILY [facility]


   Last Admin: 05/08/18 09:16 Dose:  Not Given


Brimonidine Tartrate (Alphagan 0.2% -)  1 drop OU BID [facility]


   Last Admin: 05/08/18 21:31 Dose:  1 drop


Enoxaparin Sodium (Lovenox -)  40 mg SQ DAILY [facility]


   Last Admin: 05/08/18 09:15 Dose:  40 mg


Escitalopram Oxalate (Lexapro -)  10 mg PO DAILY [facility]


   Last Admin: 05/08/18 09:15 Dose:  10 mg


Furosemide (Lasix -)  40 mg PO BID@0600,1400 [facility]


   Last Admin: 05/09/18 06:38 Dose:  40 mg


Guaifenesin (Diabetic Tussin Dm -)  10 ml PO Q6H PRN


   PRN Reason: COUGH


   Last Admin: 05/08/18 09:17 Dose:  10 ml


Levofloxacin (Levaquin 750 Mg Premixed Ivpb -)  750 mg in 150 mls @ 150 mls/hr 

IVPB DAILY [facility]


   PRN Reason: Protocol


   Last Admin: 05/08/18 09:15 Dose:  150 mls/hr


Insulin Aspart (Novolog Vial Sliding Scale -)  1 vial SQ ACHS [facility]


   PRN Reason: Protocol


   Last Admin: 05/09/18 06:38 Dose:  6 units


Isosorbide Mononitrate (Imdur -)  30 mg PO DAILY [facility]


   Last Admin: 05/08/18 09:15 Dose:  30 mg


Latanoprost (Xalatan 0.005% Eye Drops -)  1 drop OU [facility]


   Last Admin: 05/08/18 21:29 Dose:  1 drop


Magnesium Hydroxide (Milk Of Magnesia -)  30 ml PO Q8H PRN


   PRN Reason: INDIGESTION


   Last Admin: 05/08/18 14:17 Dose:  30 ml


Methylprednisolone Sodium Succinate (Solu-Medrol -)  40 mg IVPUSH Q6H-IV [facility]


   Last Admin: 05/09/18 02:03 Dose:  40 mg


Nebivolol (Bystolic -)  10 mg PO DAILY [facility]


   Last Admin: 05/08/18 10:56 Dose:  Not Given


Ptnt's Own Med( Insulin Glargine,Hum .Rec.Anlog [Toujeo Solostar]  80 unit SQ 

DAILY@0700 [facility]


   Last Admin: 05/08/18 06:32 Dose:  80 unit


Pt's Own Med ( Insulin Lispro Protamine/Lispro ( Humalog Mix 50/50 Vial)  18 

each SQ BID@0700,1630 [facility]


   Last Admin: 05/08/18 17:13 Dose:  18 each


Pantoprazole Sodium (Protonix -)  20 mg PO DAILY [facility]


   Last Admin: 05/08/18 09:15 Dose:  20 mg


Rifampin (Rifadin -)  300 mg PO BID@0600,1800 [facility]


   Last Admin: 05/09/18 06:38 Dose:  300 mg


Rosuvastatin Calcium (Crestor -)  10 mg PO [facility]


   Last Admin: 05/08/18 21:29 Dose:  10 mg


Timolol Maleate (Timoptic 0.5%)  1 drop OU BID [facility]


   Last Admin: 05/08/18 21:29 Dose:  1 drop











- Objective


Vital Signs: 


 Vital Signs











Temperature  96.5 F L  05/09/18 09:21


 


Pulse Rate  76   05/09/18 09:21


 


Respiratory Rate  20   05/09/18 09:21


 


Blood Pressure  115/70   05/09/18 09:21


 


O2 Sat by Pulse Oximetry (%)  96   05/08/18 21:00











Constitutional: Yes: Calm


Cardiovascular: Yes: Regular Rate and Rhythm


Respiratory: Yes: Diminished, Rhonchi


Gastrointestinal: Yes: Normal Bowel Sounds, Soft, Abdomen, Obese.  No: 

Tenderness


Edema: No


Labs: 


 CBC, BMP





 05/09/18 06:20 





 INR, PTT











INR  1.04  (0.82-1.09)   05/03/18  14:34    














Problem List





- Problems


(1) ASHD (arteriosclerotic heart disease)


Code(s): I25.10 - ATHSCL HEART DISEASE OF NATIVE CORONARY ARTERY W/O ANG PCTRS 

  





(2) COPD (chronic obstructive pulmonary disease)


Code(s): J44.9 - CHRONIC OBSTRUCTIVE PULMONARY DISEASE, UNSPECIFIED   


Qualifiers: 


   COPD type: chronic bronchitis 





(3) Chronic diastolic CHF (congestive heart failure)


Code(s): I50.32 - CHRONIC DIASTOLIC (CONGESTIVE) HEART FAILURE   





(4) HTN (hypertension)


Code(s): I10 - ESSENTIAL (PRIMARY) HYPERTENSION   


Qualifiers: 


   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   





(5) Renal insufficiency


Code(s): N28.9 - DISORDER OF KIDNEY AND URETER, UNSPECIFIED   





Assessment/Plan





plan


urine legionella positive


continue current dose Solumedrol


 on iv antibiotics


nebs


O2 


afebrile


WBC elevated due to steroids

## 2018-05-09 NOTE — PN
Progress Note, Physician


Chief Complaint: 





still coughing, SOB


Hypoxia intermittently








TELE:NSR





- Current Medication List


Current Medications: 


Active Medications





Acetaminophen (Tylenol -)  650 mg PO Q6H PRN


   PRN Reason: FEVER


   Last Admin: 05/04/18 23:35 Dose:  650 mg


Albuterol Sulfate (Ventolin 0.083% Nebulizer Soln -)  1 amp NEB Q4H PRN


   PRN Reason: SHORT OF BREATH/WHEEZING


Albuterol/Ipratropium (Duoneb -)  1 amp NEB RQID [facility]


   Last Admin: 05/09/18 08:58 Dose:  1 amp


Alprazolam (Xanax -)  0.25 mg PO Q24H PRN


   PRN Reason: ANXIETY


   Last Admin: 05/08/18 22:12 Dose:  0.25 mg


Amlodipine Besylate (Norvasc -)  10 mg PO DAILY [facility]


   Last Admin: 05/08/18 10:57 Dose:  Not Given


Aspirin (Asa -)  81 mg PO DAILY [facility]


   Last Admin: 05/08/18 09:16 Dose:  Not Given


Brimonidine Tartrate (Alphagan 0.2% -)  1 drop OU BID [facility]


   Last Admin: 05/08/18 21:31 Dose:  1 drop


Enoxaparin Sodium (Lovenox -)  40 mg SQ DAILY [facility]


   Last Admin: 05/08/18 09:15 Dose:  40 mg


Escitalopram Oxalate (Lexapro -)  10 mg PO DAILY [facility]


   Last Admin: 05/08/18 09:15 Dose:  10 mg


Furosemide (Lasix -)  40 mg PO BID@0600,1400 [facility]


   Last Admin: 05/09/18 06:38 Dose:  40 mg


Guaifenesin (Diabetic Tussin Dm -)  10 ml PO Q6H PRN


   PRN Reason: COUGH


   Last Admin: 05/08/18 09:17 Dose:  10 ml


Levofloxacin (Levaquin 750 Mg Premixed Ivpb -)  750 mg in 150 mls @ 150 mls/hr 

IVPB DAILY [facility]


   PRN Reason: Protocol


   Last Admin: 05/08/18 09:15 Dose:  150 mls/hr


Insulin Aspart (Novolog Vial Sliding Scale -)  1 vial SQ ACHS [facility]


   PRN Reason: Protocol


   Last Admin: 05/09/18 06:38 Dose:  6 units


Isosorbide Mononitrate (Imdur -)  30 mg PO DAILY [facility]


   Last Admin: 05/08/18 09:15 Dose:  30 mg


Latanoprost (Xalatan 0.005% Eye Drops -)  1 drop OU [facility]


   Last Admin: 05/08/18 21:29 Dose:  1 drop


Magnesium Hydroxide (Milk Of Magnesia -)  30 ml PO Q8H PRN


   PRN Reason: INDIGESTION


   Last Admin: 05/08/18 14:17 Dose:  30 ml


Methylprednisolone Sodium Succinate (Solu-Medrol -)  40 mg IVPUSH Q6H-IV [facility]


   Last Admin: 05/09/18 02:03 Dose:  40 mg


Nebivolol (Bystolic -)  10 mg PO DAILY [facility]


   Last Admin: 05/08/18 10:56 Dose:  Not Given


Ptnt's Own Med( Insulin Glargine,Hum .Rec.Anlog [Toujeo Solostar]  80 unit SQ 

DAILY@0700 [facility]


   Last Admin: 05/08/18 06:32 Dose:  80 unit


Pt's Own Med ( Insulin Lispro Protamine/Lispro ( Humalog Mix 50/50 Vial)  18 

each SQ BID@0700,1630 [facility]


   Last Admin: 05/08/18 17:13 Dose:  18 each


Pantoprazole Sodium (Protonix -)  20 mg PO DAILY [facility]


   Last Admin: 05/08/18 09:15 Dose:  20 mg


Rifampin (Rifadin -)  300 mg PO BID@0600,1800 [facility]


   Last Admin: 05/09/18 06:38 Dose:  300 mg


Rosuvastatin Calcium (Crestor -)  10 mg PO [facility]


   Last Admin: 05/08/18 21:29 Dose:  10 mg


Timolol Maleate (Timoptic 0.5%)  1 drop OU BID [facility]


   Last Admin: 05/08/18 21:29 Dose:  1 drop











- Objective


Vital Signs: 


 Vital Signs











Temperature  97.1 F L  05/09/18 06:57


 


Pulse Rate  75   05/09/18 06:57


 


Respiratory Rate  20   05/09/18 06:57


 


Blood Pressure  131/58   05/09/18 06:57


 


O2 Sat by Pulse Oximetry (%)  96   05/08/18 21:00











Constitutional: Yes: Calm


Cardiovascular: Yes: Regular Rate and Rhythm


Respiratory: Yes: Rhonchi


Gastrointestinal: Yes: Soft, Abdomen, Obese


Edema: Yes


Edema: LLE: 1+, RLE: 1+


Neurological: Yes: Alert


...Motor Strength: WNL


Labs: 


 CBC, BMP





 05/09/18 06:20 





 INR, PTT











INR  1.04  (0.82-1.09)   05/03/18  14:34    














- ....Imaging


EKG: Image Reviewed





Problem List





- Problems


(1) Sepsis


Code(s): A41.9 - SEPSIS, UNSPECIFIED ORGANISM   


Qualifiers: 


   Sepsis type: sepsis due to unspecified organism   Qualified Code(s): A41.9 - 

Sepsis, unspecified organism   





(2) Pneumonia


Code(s): J18.9 - PNEUMONIA, UNSPECIFIED ORGANISM   


Qualifiers: 


   Laterality: right   Lung location: lower lobe of lung 





(3) ASHD (arteriosclerotic heart disease)


Code(s): I25.10 - ATHSCL HEART DISEASE OF NATIVE CORONARY ARTERY W/O ANG PCTRS 

  





(4) Chronic diastolic CHF (congestive heart failure)


Code(s): I50.32 - CHRONIC DIASTOLIC (CONGESTIVE) HEART FAILURE   





(5) COPD (chronic obstructive pulmonary disease)


Code(s): J44.9 - CHRONIC OBSTRUCTIVE PULMONARY DISEASE, UNSPECIFIED   


Qualifiers: 


   COPD type: chronic bronchitis 





(6) IDDM (insulin dependent diabetes mellitus)


Code(s): E11.9 - TYPE 2 DIABETES MELLITUS WITHOUT COMPLICATIONS; Z79.4 - LONG 

TERM (CURRENT) USE OF INSULIN   





(7) Renal insufficiency


Code(s): N28.9 - DISORDER OF KIDNEY AND URETER, UNSPECIFIED   





Assessment/Plan





IMP:


1. Bilateral  [place] PNA with early sepsis


2. DM w/ CKD, probable acute on chronic ARF in setting of early sepsis


3. Chronic diastolic CHF secondary to hypertensive heart disease and DM


4. Non-obstructive CAD


5. Chronic COPD, active smoker, w/ no evidence of PHTN on [facility] 2016





REC:


1. F/u cultures; abx per ID; supplimental O2; DVT prophylaxis


2. PO Lasix. 


3. Continue other home meds for BP and chronic diastolic dysfx: Imdur 30, 

Bystolic 10, Amlodipine 10 w/ hold parameters.


4. Smoking cessation counselling.


5. Decrease ASA to 81mg


6. Leukocytosis is secondary to steroids

## 2018-05-10 NOTE — PN
Progress Note, Physician


Chief Complaint: 





productive cough


still SOB 


sitting up in chair 


unable to lie down due to SOB





- Current Medication List


Current Medications: 


Active Medications





Acetaminophen (Tylenol -)  650 mg PO Q6H PRN


   PRN Reason: FEVER


   Last Admin: 05/04/18 23:35 Dose:  650 mg


Albuterol Sulfate (Ventolin 0.083% Nebulizer Soln -)  1 amp NEB Q4H PRN


   PRN Reason: SHORT OF BREATH/WHEEZING


   Last Admin: 05/10/18 04:11 Dose:  1 amp


Albuterol/Ipratropium (Duoneb -)  1 amp NEB RQID [facility]


   Last Admin: 05/10/18 07:30 Dose:  1 amp


Alprazolam (Xanax -)  0.25 mg PO Q24H PRN


   PRN Reason: ANXIETY


   Last Admin: 05/08/18 22:12 Dose:  0.25 mg


Amlodipine Besylate (Norvasc -)  10 mg PO DAILY [facility]


   Last Admin: 05/10/18 11:02 Dose:  10 mg


Aspirin (Asa -)  81 mg PO DAILY [facility]


   Last Admin: 05/10/18 11:02 Dose:  81 mg


Brimonidine Tartrate (Alphagan 0.2% -)  1 drop OU BID [facility]


   Last Admin: 05/10/18 11:02 Dose:  1 drop


Enoxaparin Sodium (Lovenox -)  40 mg SQ DAILY [facility]


   Last Admin: 05/10/18 11:01 Dose:  40 mg


Escitalopram Oxalate (Lexapro -)  10 mg PO DAILY [facility]


   Last Admin: 05/10/18 11:02 Dose:  10 mg


Furosemide (Lasix -)  40 mg PO BID@0600,1400 [facility]


   Last Admin: 05/10/18 06:43 Dose:  40 mg


Guaifenesin (Diabetic Tussin Dm -)  10 ml PO Q6H PRN


   PRN Reason: COUGH


   Last Admin: 05/08/18 09:17 Dose:  10 ml


Levofloxacin (Levaquin 750 Mg Premixed Ivpb -)  750 mg in 150 mls @ 150 mls/hr 

IVPB DAILY [facility]


   PRN Reason: Protocol


   Last Admin: 05/10/18 11:01 Dose:  150 mls/hr


Insulin Aspart (Novolog Vial Sliding Scale -)  1 vial SQ ACHS [facility]


   PRN Reason: Protocol


   Last Admin: 05/10/18 06:44 Dose:  Not Given


Isosorbide Mononitrate (Imdur -)  30 mg PO DAILY [facility]


   Last Admin: 05/10/18 11:02 Dose:  30 mg


Latanoprost (Xalatan 0.005% Eye Drops -)  1 drop OU [facility]


   Last Admin: 05/09/18 22:05 Dose:  1 drop


Magnesium Hydroxide (Milk Of Magnesia -)  30 ml PO Q8H PRN


   PRN Reason: INDIGESTION


   Last Admin: 05/08/18 14:17 Dose:  30 ml


Methylprednisolone Sodium Succinate (Solu-Medrol -)  40 mg IVPUSH Q8H-IV [facility]


   Last Admin: 05/10/18 11:02 Dose:  40 mg


Nebivolol (Bystolic -)  10 mg PO DAILY [facility]


   Last Admin: 05/10/18 11:02 Dose:  10 mg


Ptnt's Own Med( Insulin Glargine,Hum .Rec.Anlog [Toujeo Solostar]  80 unit SQ 

DAILY@0700 [facility]


   Last Admin: 05/10/18 06:43 Dose:  80 unit


Pt's Own Med ( Insulin Lispro Protamine/Lispro ( Humalog Mix 50/50 Vial)  18 

each SQ BID@0700,1630 [facility]


   Last Admin: 05/10/18 06:43 Dose:  18 each


Pantoprazole Sodium (Protonix -)  20 mg PO DAILY [facility]


   Last Admin: 05/10/18 11:02 Dose:  20 mg


Rifampin (Rifadin -)  300 mg PO BID@0600,1800 [facility]


   Last Admin: 05/10/18 06:43 Dose:  300 mg


Rosuvastatin Calcium (Crestor -)  10 mg PO [facility]


   Last Admin: 05/09/18 22:04 Dose:  10 mg


Timolol Maleate (Timoptic 0.5%)  1 drop OU BID [facility]


   Last Admin: 05/10/18 11:02 Dose:  1 drop











- Objective


Vital Signs: 


 Vital Signs











Temperature  97.7 F   05/10/18 06:00


 


Pulse Rate  71   05/10/18 06:00


 


Respiratory Rate  22   05/10/18 06:00


 


Blood Pressure  128/60   05/10/18 06:00


 


O2 Sat by Pulse Oximetry (%)  96   05/09/18 21:00











Constitutional: Yes: Mild Distress


Cardiovascular: Yes: Regular Rate and Rhythm


Respiratory: Yes: Diminished, Rhonchi


Gastrointestinal: Yes: Normal Bowel Sounds, Soft, Abdomen, Obese.  No: 

Tenderness


Edema: Yes


Edema: LLE: Trace, RLE: Trace


Labs: 


 CBC, BMP





 05/10/18 07:10 





 05/09/18 06:20 





 INR, PTT











INR  1.04  (0.82-1.09)   05/03/18  14:34    














Problem List





- Problems


(1) ASHD (arteriosclerotic heart disease)


Code(s): I25.10 - ATHSCL HEART DISEASE OF NATIVE CORONARY ARTERY W/O ANG PCTRS 

  





(2) COPD (chronic obstructive pulmonary disease)


Code(s): J44.9 - CHRONIC OBSTRUCTIVE PULMONARY DISEASE, UNSPECIFIED   


Qualifiers: 


   COPD type: chronic bronchitis 





(3) Chronic diastolic CHF (congestive heart failure)


Code(s): I50.32 - CHRONIC DIASTOLIC (CONGESTIVE) HEART FAILURE   





(4) HTN (hypertension)


Code(s): I10 - ESSENTIAL (PRIMARY) HYPERTENSION   


Qualifiers: 


   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   





(5) Renal insufficiency


Code(s): N28.9 - DISORDER OF KIDNEY AND URETER, UNSPECIFIED   





Assessment/Plan





plan


urine legionella positive


continue current dose Solumedrol


 on iv antibiotics


nebs


O2 


afebrile


WBC elevated due to steroids

## 2018-05-10 NOTE — PN
Progress Note (short form)





- Note


Progress Note: 





PULMONARY





Still dyspneic with minimal exertion. Saturating low 90s on 4L nasal cannula. 

No fevers or chills.





 Last Vital Signs











Temp Pulse Resp BP Pulse Ox


 


 97.7 F   71   22   128/60   96 


 


 05/10/18 06:00  05/10/18 06:00  05/10/18 06:00  05/10/18 06:00  05/09/18 21:00








Gen:  mildly tachypneic with speaking


Heart: RRR


Lung: bilateral rhonchi, wheezes


Abd: soft, nontender


Ext: no edema





 CBC, BMP





 05/10/18 07:10 





 05/09/18 06:20 





Active Medications





Acetaminophen (Tylenol -)  650 mg PO Q6H PRN


   PRN Reason: FEVER


   Last Admin: 05/04/18 23:35 Dose:  650 mg


Albuterol Sulfate (Ventolin 0.083% Nebulizer Soln -)  1 amp NEB Q4H PRN


   PRN Reason: SHORT OF BREATH/WHEEZING


   Last Admin: 05/10/18 04:11 Dose:  1 amp


Albuterol/Ipratropium (Duoneb -)  1 amp NEB RQID [facility]


   Last Admin: 05/10/18 07:30 Dose:  1 amp


Alprazolam (Xanax -)  0.25 mg PO Q24H PRN


   PRN Reason: ANXIETY


   Last Admin: 05/08/18 22:12 Dose:  0.25 mg


Amlodipine Besylate (Norvasc -)  10 mg PO DAILY [facility]


   Last Admin: 05/10/18 11:02 Dose:  10 mg


Aspirin (Asa -)  81 mg PO DAILY [facility]


   Last Admin: 05/10/18 11:02 Dose:  81 mg


Brimonidine Tartrate (Alphagan 0.2% -)  1 drop OU BID [facility]


   Last Admin: 05/09/18 22:05 Dose:  1 drop


Enoxaparin Sodium (Lovenox -)  40 mg SQ DAILY [facility]


   Last Admin: 05/10/18 11:01 Dose:  40 mg


Escitalopram Oxalate (Lexapro -)  10 mg PO DAILY [facility]


   Last Admin: 05/10/18 11:02 Dose:  10 mg


Furosemide (Lasix -)  40 mg PO BID@0600,1400 [facility]


   Last Admin: 05/10/18 06:43 Dose:  40 mg


Guaifenesin (Diabetic Tussin Dm -)  10 ml PO Q6H PRN


   PRN Reason: COUGH


   Last Admin: 05/08/18 09:17 Dose:  10 ml


Levofloxacin (Levaquin 750 Mg Premixed Ivpb -)  750 mg in 150 mls @ 150 mls/hr 

IVPB DAILY [facility]


   PRN Reason: Protocol


   Last Admin: 05/10/18 11:01 Dose:  150 mls/hr


Insulin Aspart (Novolog Vial Sliding Scale -)  1 vial SQ ACHS [facility]


   PRN Reason: Protocol


   Last Admin: 05/10/18 11:52 Dose:  4 units


Isosorbide Mononitrate (Imdur -)  30 mg PO DAILY [facility]


   Last Admin: 05/10/18 11:02 Dose:  30 mg


Latanoprost (Xalatan 0.005% Eye Drops -)  1 drop OU [facility]


   Last Admin: 05/09/18 22:05 Dose:  1 drop


Magnesium Hydroxide (Milk Of Magnesia -)  30 ml PO Q8H PRN


   PRN Reason: INDIGESTION


   Last Admin: 05/08/18 14:17 Dose:  30 ml


Methylprednisolone Sodium Succinate (Solu-Medrol -)  40 mg IVPUSH Q8H-IV [facility]


   Last Admin: 05/10/18 11:02 Dose:  40 mg


Nebivolol (Bystolic -)  10 mg PO DAILY [facility]


   Last Admin: 05/10/18 11:02 Dose:  10 mg


Ptnt's Own Med( Insulin Glargine,Hum .Rec.Anlog [Toujeo Solostar]  80 unit SQ 

DAILY@0700 [facility]


   Last Admin: 05/10/18 06:43 Dose:  80 unit


Pt's Own Med ( Insulin Lispro Protamine/Lispro ( Humalog Mix 50/50 Vial)  18 

each SQ BID@0700,1630 [facility]


   Last Admin: 05/10/18 06:43 Dose:  18 each


Pantoprazole Sodium (Protonix -)  20 mg PO DAILY [facility]


   Last Admin: 05/10/18 11:02 Dose:  20 mg


Rifampin (Rifadin -)  300 mg PO BID@0600,1800 [facility]


   Last Admin: 05/10/18 06:43 Dose:  300 mg


Rosuvastatin Calcium (Crestor -)  10 mg PO HS [facility]


   Last Admin: 05/09/18 22:04 Dose:  10 mg


Timolol Maleate (Timoptic 0.5%)  1 drop OU BID [facility]


   Last Admin: 05/09/18 22:05 Dose:  1 drop





A/P


Acute Hypoxic Respiratory Failure


Pneumonia


+Legionella


Acute COPD Exacerbation


LV Diastolic Dysfunction


DM


Acute on CKD


Smoker


      


-  continue antibiotics


-  continue medrol at current dose


-  inhaled bronchodilators standing and PRN


-  O2 to keep SpO2 >90%


-  smoking cessation


-  DVT prophylaxis

## 2018-05-10 NOTE — PN
Progress Note, Physician


Chief Complaint: 





no CP, still coughing





TELE: NSR





- Current Medication List


Current Medications: 


Active Medications





Acetaminophen (Tylenol -)  650 mg PO Q6H PRN


   PRN Reason: FEVER


   Last Admin: 05/04/18 23:35 Dose:  650 mg


Albuterol Sulfate (Ventolin 0.083% Nebulizer Soln -)  1 amp NEB Q4H PRN


   PRN Reason: SHORT OF BREATH/WHEEZING


   Last Admin: 05/10/18 04:11 Dose:  1 amp


Albuterol/Ipratropium (Duoneb -)  1 amp NEB RQID [facility]


   Last Admin: 05/09/18 20:32 Dose:  1 amp


Alprazolam (Xanax -)  0.25 mg PO Q24H PRN


   PRN Reason: ANXIETY


   Last Admin: 05/08/18 22:12 Dose:  0.25 mg


Amlodipine Besylate (Norvasc -)  10 mg PO DAILY [facility]


   Last Admin: 05/09/18 09:26 Dose:  10 mg


Aspirin (Asa -)  81 mg PO DAILY [facility]


   Last Admin: 05/09/18 09:25 Dose:  81 mg


Brimonidine Tartrate (Alphagan 0.2% -)  1 drop OU BID [facility]


   Last Admin: 05/09/18 22:05 Dose:  1 drop


Enoxaparin Sodium (Lovenox -)  40 mg SQ DAILY [facility]


   Last Admin: 05/09/18 09:26 Dose:  40 mg


Escitalopram Oxalate (Lexapro -)  10 mg PO DAILY [facility]


   Last Admin: 05/09/18 09:26 Dose:  10 mg


Furosemide (Lasix -)  40 mg PO BID@0600,1400 [facility]


   Last Admin: 05/10/18 06:43 Dose:  40 mg


Guaifenesin (Diabetic Tussin Dm -)  10 ml PO Q6H PRN


   PRN Reason: COUGH


   Last Admin: 05/08/18 09:17 Dose:  10 ml


Levofloxacin (Levaquin 750 Mg Premixed Ivpb -)  750 mg in 150 mls @ 150 mls/hr 

IVPB DAILY [facility]


   PRN Reason: Protocol


   Last Admin: 05/09/18 09:26 Dose:  150 mls/hr


Insulin Aspart (Novolog Vial Sliding Scale -)  1 vial SQ ACHS [facility]


   PRN Reason: Protocol


   Last Admin: 05/10/18 06:44 Dose:  Not Given


Isosorbide Mononitrate (Imdur -)  30 mg PO DAILY [facility]


   Last Admin: 05/09/18 09:26 Dose:  30 mg


Latanoprost (Xalatan 0.005% Eye Drops -)  1 drop OU [facility]


   Last Admin: 05/09/18 22:05 Dose:  1 drop


Magnesium Hydroxide (Milk Of Magnesia -)  30 ml PO Q8H PRN


   PRN Reason: INDIGESTION


   Last Admin: 05/08/18 14:17 Dose:  30 ml


Methylprednisolone Sodium Succinate (Solu-Medrol -)  40 mg IVPUSH Q8H-IV [facility]


   Last Admin: 05/10/18 01:31 Dose:  40 mg


Nebivolol (Bystolic -)  10 mg PO DAILY [facility]


   Last Admin: 05/09/18 09:26 Dose:  10 mg


Ptnt's Own Med( Insulin Glargine,Hum .Rec.Anlog [Toujeo Solostar]  80 unit SQ 

DAILY@0700 [facility]


   Last Admin: 05/10/18 06:43 Dose:  80 unit


Pt's Own Med ( Insulin Lispro Protamine/Lispro ( Humalog Mix 50/50 Vial)  18 

each SQ BID@0700,1630 [facility]


   Last Admin: 05/10/18 06:43 Dose:  18 each


Pantoprazole Sodium (Protonix -)  20 mg PO DAILY [facility]


   Last Admin: 05/09/18 09:26 Dose:  20 mg


Rifampin (Rifadin -)  300 mg PO BID@0600,1800 [facility]


   Last Admin: 05/10/18 06:43 Dose:  300 mg


Rosuvastatin Calcium (Crestor -)  10 mg PO [facility]


   Last Admin: 05/09/18 22:04 Dose:  10 mg


Timolol Maleate (Timoptic 0.5%)  1 drop OU BID [facility]


   Last Admin: 05/09/18 22:05 Dose:  1 drop











- Objective


Vital Signs: 


 Vital Signs











Temperature  97.7 F   05/10/18 06:00


 


Pulse Rate  71   05/10/18 06:00


 


Respiratory Rate  22   05/10/18 06:00


 


Blood Pressure  128/60   05/10/18 06:00


 


O2 Sat by Pulse Oximetry (%)  96   05/09/18 21:00











Constitutional: Yes: No Distress


Cardiovascular: Yes: Regular Rate and Rhythm


Respiratory: Yes: Rhonchi


Gastrointestinal: Yes: Soft, Abdomen, Obese


Edema: Yes


Edema: LLE: 1+, RLE: 1+


Neurological: Yes: Alert, Oriented


Labs: 


 CBC, BMP





 05/10/18 07:10 





 05/09/18 06:20 





 INR, PTT











INR  1.04  (0.82-1.09)   05/03/18  14:34    








Microbiology





05/03/18 14:34   Blood - Peripheral Venous   Blood Culture - Preliminary


                              NO GROWTH OBTAINED AFTER 24 HOURS, INCUBATION TO 

CONTINUE


                              FOR 4 DAYS.


05/03/18 14:34   Blood - Peripheral Venous   Blood Culture - Preliminary


                              NO GROWTH OBTAINED AFTER 24 HOURS, INCUBATION TO 

CONTINUE


                              FOR 4 DAYS.





 Laboratory Tests











  05/03/18 05/03/18 05/05/18





  17:36 17:36 06:00


 


WBC  14.3 H   13.7 H


 


Hgb  11.8   10.9


 


Hct  34.2  


 


Plt Count  170   178


 


Neutrophils % (Manual)  89.0 H  


 


ABG pH   


 


ABG pCO2 at Pt Temp   


 


ABG pO2 at Pt Temp   


 


Oxygen Flow Rate   


 


Sodium   132 L 


 


Potassium   4.1 


 


BUN   61 H 


 


Creatinine   2.1 H 


 


AST   73 H 


 


ALT   64 














  05/05/18 05/06/18 05/07/18





  06:00 21:56 07:50


 


WBC    22.9 H D


 


Hgb   


 


Hct    35.0


 


Plt Count    253  D


 


Neutrophils % (Manual)   


 


ABG pH   7.41 


 


ABG pCO2 at Pt Temp   32.6 L 


 


ABG pO2 at Pt Temp   58.4 L 


 


Oxygen Flow Rate   4 


 


Sodium  131 L  


 


Potassium  4.3  


 


BUN   


 


Creatinine  1.9 H  


 


AST   


 


ALT   














  05/07/18 05/09/18 05/09/18





  07:50 06:20 06:20


 


WBC   23.5 H 


 


Hgb   11.1 


 


Hct   


 


Plt Count   301 


 


Neutrophils % (Manual)   


 


ABG pH   


 


ABG pCO2 at Pt Temp   


 


ABG pO2 at Pt Temp   


 


Oxygen Flow Rate   


 


Sodium  131 L   Pending


 


Potassium    Pending


 


BUN  77 H  


 


Creatinine  1.7 H   Pending


 


AST   


 


ALT   














  05/10/18





  07:10


 


WBC  23.1 H


 


Hgb  10.7


 


Hct 


 


Plt Count  314


 


Neutrophils % (Manual) 


 


ABG pH 


 


ABG pCO2 at Pt Temp 


 


ABG pO2 at Pt Temp 


 


Oxygen Flow Rate 


 


Sodium 


 


Potassium 


 


BUN 


 


Creatinine 


 


AST 


 


ALT 














- ....Imaging


EKG: Image Reviewed





Problem List





- Problems


(1) Sepsis


Code(s): A41.9 - SEPSIS, UNSPECIFIED ORGANISM   


Qualifiers: 


   Sepsis type: sepsis due to unspecified organism   Qualified Code(s): A41.9 - 

Sepsis, unspecified organism   





(2) Pneumonia


Code(s): J18.9 - PNEUMONIA, UNSPECIFIED ORGANISM   


Qualifiers: 


   Laterality: right   Lung location: lower lobe of lung 





(3) ASHD (arteriosclerotic heart disease)


Code(s): I25.10 - ATHSCL HEART DISEASE OF NATIVE CORONARY ARTERY W/O ANG PCTRS 

  





(4) Chronic diastolic CHF (congestive heart failure)


Code(s): I50.32 - CHRONIC DIASTOLIC (CONGESTIVE) HEART FAILURE   





(5) COPD (chronic obstructive pulmonary disease)


Code(s): J44.9 - CHRONIC OBSTRUCTIVE PULMONARY DISEASE, UNSPECIFIED   


Qualifiers: 


   COPD type: chronic bronchitis 





(6) IDDM (insulin dependent diabetes mellitus)


Code(s): E11.9 - TYPE 2 DIABETES MELLITUS WITHOUT COMPLICATIONS; Z79.4 - LONG 

TERM (CURRENT) USE OF INSULIN   





(7) Renal insufficiency


Code(s): N28.9 - DISORDER OF KIDNEY AND URETER, UNSPECIFIED   





Assessment/Plan





IMP:


1. Bilateral  Legionella PNA with early sepsis


2. DM w/ CKD, probable acute on chronic ARF in setting of early sepsis


3. Chronic diastolic CHF secondary to hypertensive heart disease and DM


4. Non-obstructive CAD


5. Chronic COPD, active smoker, w/ no evidence of PHTN on RHC 2016





REC:


1. F/u cultures; abx per ID; supplimental O2; DVT prophylaxis


2. PO Lasix. 


3. Continue other home meds for BP and chronic diastolic dysfx: Imdur 30, 

Bystolic 10, Amlodipine 10 w/ hold parameters.


4. Smoking cessation counselling.


5. Decrease ASA to 81mg


6. Leukocytosis is secondary to steroids





Can d/c tele from CV point of view; asked RN to confer w/ pulmonary.

## 2018-05-11 NOTE — PN
Progress Note, Physician


Chief Complaint: 





no cp or sob


tele : nsr, artifact





- Current Medication List


Current Medications: 


Active Medications





Acetaminophen (Tylenol -)  650 mg PO Q6H PRN


   PRN Reason: FEVER


   Last Admin: 05/04/18 23:35 Dose:  650 mg


Albuterol Sulfate (Ventolin 0.083% Nebulizer Soln -)  1 amp NEB Q4H PRN


   PRN Reason: SHORT OF BREATH/WHEEZING


   Last Admin: 05/10/18 04:11 Dose:  1 amp


Albuterol/Ipratropium (Duoneb -)  1 amp NEB RQID [facility]


   Last Admin: 05/10/18 21:02 Dose:  1 amp


Alprazolam (Xanax -)  0.25 mg PO Q24H PRN


   PRN Reason: ANXIETY


   Last Admin: 05/08/18 22:12 Dose:  0.25 mg


Amlodipine Besylate (Norvasc -)  10 mg PO DAILY [facility]


   Last Admin: 05/10/18 11:02 Dose:  10 mg


Aspirin (Asa -)  81 mg PO DAILY [facility]


   Last Admin: 05/10/18 11:02 Dose:  81 mg


Brimonidine Tartrate (Alphagan 0.2% -)  1 drop OU BID [facility]


   Last Admin: 05/10/18 21:56 Dose:  1 drop


Enoxaparin Sodium (Lovenox -)  40 mg SQ DAILY [facility]


   Last Admin: 05/10/18 11:01 Dose:  40 mg


Escitalopram Oxalate (Lexapro -)  10 mg PO DAILY [facility]


   Last Admin: 05/10/18 11:02 Dose:  10 mg


Furosemide (Lasix -)  40 mg PO BID@0600,1400 [facility]


   Last Admin: 05/11/18 06:41 Dose:  40 mg


Guaifenesin (Diabetic Tussin Dm -)  10 ml PO Q6H PRN


   PRN Reason: COUGH


   Last Admin: 05/08/18 09:17 Dose:  10 ml


Levofloxacin (Levaquin 750 Mg Premixed Ivpb -)  750 mg in 150 mls @ 150 mls/hr 

IVPB DAILY [facility]


   PRN Reason: Protocol


   Last Admin: 05/10/18 11:01 Dose:  150 mls/hr


Insulin Aspart (Novolog Vial Sliding Scale -)  1 vial SQ ACHS [facility]


   PRN Reason: Protocol


   Last Admin: 05/11/18 06:47 Dose:  4 units


Isosorbide Mononitrate (Imdur -)  30 mg PO DAILY [facility]


   Last Admin: 05/10/18 11:02 Dose:  30 mg


Latanoprost (Xalatan 0.005% Eye Drops -)  1 drop OU [facility]


   Last Admin: 05/10/18 21:56 Dose:  1 drop


Magnesium Hydroxide (Milk Of Magnesia -)  30 ml PO Q8H PRN


   PRN Reason: INDIGESTION


   Last Admin: 05/08/18 14:17 Dose:  30 ml


Methylprednisolone Sodium Succinate (Solu-Medrol -)  40 mg IVPUSH Q8H-IV [facility]


   Last Admin: 05/11/18 01:00 Dose:  40 mg


Nebivolol (Bystolic -)  10 mg PO DAILY [facility]


   Last Admin: 05/10/18 11:02 Dose:  10 mg


Ptnt's Own Med( Insulin Glargine,Hum .Rec.Anlog [Toujeo Solostar]  80 unit SQ 

DAILY@0700 [facility]


   Last Admin: 05/11/18 06:41 Dose:  80 unit


Pt's Own Med ( Insulin Lispro Protamine/Lispro ( Humalog Mix 50/50 Vial)  18 

each SQ BID@0700,1630 [facility]


   Last Admin: 05/11/18 06:41 Dose:  18 each


Pantoprazole Sodium (Protonix -)  20 mg PO DAILY [facility]


   Last Admin: 05/10/18 11:02 Dose:  20 mg


Rifampin (Rifadin -)  300 mg PO BID@0600,1800 [facility]


   Last Admin: 05/11/18 06:41 Dose:  300 mg


Rosuvastatin Calcium (Crestor -)  10 mg PO [facility]


   Last Admin: 05/10/18 21:55 Dose:  10 mg


Timolol Maleate (Timoptic 0.5%)  1 drop OU BID [facility]


   Last Admin: 05/10/18 21:55 Dose:  1 drop











- Objective


Vital Signs: 


 Vital Signs











Temperature  97.0 F L  05/11/18 06:00


 


Pulse Rate  66   05/11/18 06:00


 


Respiratory Rate  18   05/11/18 06:00


 


Blood Pressure  139/59   05/11/18 06:00


 


O2 Sat by Pulse Oximetry (%)  100   05/10/18 21:00











Constitutional: Yes: Calm


Cardiovascular: Yes: Regular Rate and Rhythm


Respiratory: Yes: Other (decreased breath sounds at bases)


Gastrointestinal: Yes: Soft, Abdomen, Obese


Edema: Yes


Edema: LLE: 1+, RLE: 1+


Neurological: Yes: Alert, Oriented


...Motor Strength: WNL


Labs: 


 INR, PTT











INR  1.04  (0.82-1.09)   05/03/18  14:34    








 Laboratory Tests











  05/11/18 05/11/18





  08:10 08:10


 


WBC  Pending 


 


Hgb  Pending 


 


Plt Count  Pending 


 


Sodium   Pending


 


Potassium   Pending


 


Chloride   Pending


 


Creatinine   Pending














- ....Imaging


EKG: Image Reviewed





Problem List





- Problems


(1) Sepsis


Code(s): A41.9 - SEPSIS, UNSPECIFIED ORGANISM   


Qualifiers: 


   Sepsis type: sepsis due to unspecified organism   Qualified Code(s): A41.9 - 

Sepsis, unspecified organism   





(2) Pneumonia


Code(s): J18.9 - PNEUMONIA, UNSPECIFIED ORGANISM   


Qualifiers: 


   Laterality: right   Lung location: lower lobe of lung 





(3) ASHD (arteriosclerotic heart disease)


Code(s): I25.10 - ATHSCL HEART DISEASE OF NATIVE CORONARY ARTERY W/O ANG PCTRS 

  





(4) Chronic diastolic CHF (congestive heart failure)


Code(s): I50.32 - CHRONIC DIASTOLIC (CONGESTIVE) HEART FAILURE   





(5) COPD (chronic obstructive pulmonary disease)


Code(s): J44.9 - CHRONIC OBSTRUCTIVE PULMONARY DISEASE, UNSPECIFIED   


Qualifiers: 


   COPD type: chronic bronchitis 





(6) IDDM (insulin dependent diabetes mellitus)


Code(s): E11.9 - TYPE 2 DIABETES MELLITUS WITHOUT COMPLICATIONS; Z79.4 - LONG 

TERM (CURRENT) USE OF INSULIN   





(7) Renal insufficiency


Code(s): N28.9 - DISORDER OF KIDNEY AND URETER, UNSPECIFIED   





Assessment/Plan





IMP:


1. Bilateral  [place] PNA with early sepsis


2. DM w/ CKD, probable acute on chronic ARF in setting of early sepsis


3. Chronic diastolic CHF secondary to hypertensive heart disease and DM


4. Non-obstructive CAD


5. Chronic COPD, active smoker, w/ no evidence of PHTN on [facility] 2016





REC:


1. F/u cultures; abx per ID; supplimental O2; DVT prophylaxis


2. PO Lasix. 


3. Continue other home meds for BP and chronic diastolic dysfx: Imdur 30, 

Bystolic 10, Amlodipine 10 w/ hold parameters.


4. Smoking cessation counselling.


5. Decrease ASA to 81mg


6. Leukocytosis is secondary to steroids, tapering as per pulmonary

## 2018-05-11 NOTE — PN
Progress Note (short form)





- Note


Progress Note: 


Pt seen/ examined.


Chart reviewed


sitting in chair


weak.


all f/u noted


denies cp. 


breathing stable


looks dry


 Vital Signs











Temp  97.0 F L  05/11/18 06:00


 


Pulse  66   05/11/18 06:00


 


Resp  18   05/11/18 06:00


 


BP  139/59   05/11/18 06:00


 


Pulse Ox  100   05/10/18 21:00








 Intake & Output











 05/10/18 05/10/18 05/11/18





 11:59 23:59 11:59


 


Intake Total 520 900 


 


Balance 520 900 


 


Intake:   


 


  IV 20  


 


    sl 20  


 


  IVPB  150 


 


  Oral 500 750 


 


Other:   


 


  Voiding Method Toilet Toilet 


 


  # Unmeasured Voids   


 


    Void 1 1 1


 


  Bowel Movement smol  


 


  # Bowel Movements 1  








Active Medications





Acetaminophen (Tylenol -)  650 mg PO Q6H PRN


   PRN Reason: FEVER


   Last Admin: 05/04/18 23:35 Dose:  650 mg


Albuterol Sulfate (Ventolin 0.083% Nebulizer Soln -)  1 amp NEB Q4H PRN


   PRN Reason: SHORT OF BREATH/WHEEZING


   Last Admin: 05/10/18 04:11 Dose:  1 amp


Albuterol/Ipratropium (Duoneb -)  1 amp NEB RQID [facility]


   Last Admin: 05/11/18 11:12 Dose:  1 amp


Alprazolam (Xanax -)  0.25 mg PO Q24H PRN


   PRN Reason: ANXIETY


   Last Admin: 05/08/18 22:12 Dose:  0.25 mg


Amlodipine Besylate (Norvasc -)  10 mg PO DAILY [facility]


   Last Admin: 05/11/18 09:20 Dose:  10 mg


Aspirin (Asa -)  81 mg PO DAILY [facility]


   Last Admin: 05/11/18 09:20 Dose:  81 mg


Brimonidine Tartrate (Alphagan 0.2% -)  1 drop OU BID [facility]


   Last Admin: 05/11/18 09:23 Dose:  1 drop


Enoxaparin Sodium (Lovenox -)  40 mg SQ DAILY [facility]


   Last Admin: 05/11/18 09:20 Dose:  40 mg


Escitalopram Oxalate (Lexapro -)  10 mg PO DAILY [facility]


   Last Admin: 05/11/18 09:20 Dose:  10 mg


Furosemide (Lasix -)  40 mg PO BID@0600,1400 [facility]


   Last Admin: 05/11/18 06:41 Dose:  40 mg


Guaifenesin (Diabetic Tussin Dm -)  10 ml PO Q6H PRN


   PRN Reason: COUGH


   Last Admin: 05/08/18 09:17 Dose:  10 ml


Levofloxacin (Levaquin 750 Mg Premixed Ivpb -)  750 mg in 150 mls @ 150 mls/hr 

IVPB DAILY [facility]


   PRN Reason: Protocol


   Last Admin: 05/11/18 09:19 Dose:  150 mls/hr


Insulin Aspart (Novolog Vial Sliding Scale -)  1 vial SQ ACHS [facility]


   PRN Reason: Protocol


   Last Admin: 05/11/18 11:01 Dose:  4 units


Isosorbide Mononitrate (Imdur -)  30 mg PO DAILY [facility]


   Last Admin: 05/11/18 09:20 Dose:  30 mg


Latanoprost (Xalatan 0.005% Eye Drops -)  1 drop OU HS [facility]


   Last Admin: 05/10/18 21:56 Dose:  1 drop


Magnesium Hydroxide (Milk Of Magnesia -)  30 ml PO Q8H PRN


   PRN Reason: INDIGESTION


   Last Admin: 05/08/18 14:17 Dose:  30 ml


Methylprednisolone Sodium Succinate (Solu-Medrol -)  40 mg IVPUSH Q8H-IV [facility]


   Last Admin: 05/11/18 09:20 Dose:  40 mg


Nebivolol (Bystolic -)  10 mg PO DAILY [facility]


   Last Admin: 05/11/18 09:20 Dose:  10 mg


Ptnt's Own Med( Insulin Glargine,Hum .Rec.Anlog [Toujeo Solostar]  80 unit SQ 

DAILY@0700 [facility]


   Last Admin: 05/11/18 06:41 Dose:  80 unit


Pt's Own Med ( Insulin Lispro Protamine/Lispro ( Humalog Mix 50/50 Vial)  18 

each SQ BID@0700,1630 [facility]


   Last Admin: 05/11/18 06:41 Dose:  18 each


Pantoprazole Sodium (Protonix -)  20 mg PO DAILY [facility]


   Last Admin: 05/11/18 09:20 Dose:  20 mg


Rifampin (Rifadin -)  300 mg PO BID@0600,1800 [facility]


   Last Admin: 05/11/18 06:41 Dose:  300 mg


Rosuvastatin Calcium (Crestor -)  10 mg PO HS [facility]


   Last Admin: 05/10/18 21:55 Dose:  10 mg


Timolol Maleate (Timoptic 0.5%)  1 drop OU BID [facility]


   Last Admin: 05/11/18 09:22 Dose:  1 drop





 CBC, BMP





 05/11/18 08:10 





 05/11/18 08:10 





 Microbiology











 05/08/18 19:00 Urine Culture - Final





 Urine - Urine Clean Catch    NO GROWTH OBTAINED











Physical 


Constitutional: Yes: No Distress but weak. mucosa dry


Cardiovascular: Yes: Regular Rate and Rhythm


Respiratory: Yes: Diminished at bases 


Gastrointestinal: Yes: Normal Bowel Sounds, Soft, Abdomen, Obese.  No: 

Tenderness


Edema: LLE: Trace, RLE: Trace








Assessment/Plan





Slowly improving


urine legionella positive


continue current dose Solumedrol


 on iv antibiotics


nebs


O2 


afebrile


WBC elevated due to steroids


decrease lasix to once daily oand observe


f/u labs 


physical therapy


monitor bgm.


will follow














Problem List





- Problems


(1) Fever


Code(s): R50.9 - FEVER, UNSPECIFIED   





(2) Dehydration


Code(s): E86.0 - DEHYDRATION   





(3) COPD (chronic obstructive pulmonary disease)


Code(s): J44.9 - CHRONIC OBSTRUCTIVE PULMONARY DISEASE, UNSPECIFIED   


Qualifiers: 


   COPD type: chronic bronchitis 





(4) HTN (hypertension)


Code(s): I10 - ESSENTIAL (PRIMARY) HYPERTENSION   


Qualifiers: 


   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   





(5) IDDM (insulin dependent diabetes mellitus)


Code(s): E11.9 - TYPE 2 DIABETES MELLITUS WITHOUT COMPLICATIONS; Z79.4 - LONG 

TERM (CURRENT) USE OF INSULIN   





(6) Obesity (BMI 30-39.9)


Code(s): E66.9 - OBESITY, UNSPECIFIED   





(7) Renal insufficiency


Code(s): N28.9 - DISORDER OF KIDNEY AND URETER, UNSPECIFIED

## 2018-05-11 NOTE — PN
Progress Note, Physician


History of Present Illness: 





OOB in chair.  


Breathing non-labored at rest but


  becomes dyspneic with minimal exertion


  + dry cough


No c/o chest pain


No c/o fever/ chills


Temps down , WBC increased on steroids


 





- Current Medication List


Current Medications: 


Active Medications





Acetaminophen (Tylenol -)  650 mg PO Q6H PRN


   PRN Reason: FEVER


   Last Admin: 05/04/18 23:35 Dose:  650 mg


Albuterol Sulfate (Ventolin 0.083% Nebulizer Soln -)  1 amp NEB Q4H PRN


   PRN Reason: SHORT OF BREATH/WHEEZING


   Last Admin: 05/10/18 04:11 Dose:  1 amp


Albuterol/Ipratropium (Duoneb -)  1 amp NEB RQID [facility]


   Last Admin: 05/11/18 15:50 Dose:  1 amp


Alprazolam (Xanax -)  0.25 mg PO Q24H PRN


   PRN Reason: ANXIETY


   Last Admin: 05/08/18 22:12 Dose:  0.25 mg


Amlodipine Besylate (Norvasc -)  10 mg PO DAILY [facility]


   Last Admin: 05/11/18 09:20 Dose:  10 mg


Aspirin (Asa -)  81 mg PO DAILY [facility]


   Last Admin: 05/11/18 09:20 Dose:  81 mg


Brimonidine Tartrate (Alphagan 0.2% -)  1 drop OU BID [facility]


   Last Admin: 05/11/18 09:23 Dose:  1 drop


Escitalopram Oxalate (Lexapro -)  10 mg PO DAILY [facility]


   Last Admin: 05/11/18 09:20 Dose:  10 mg


Furosemide (Lasix -)  40 mg PO DAILY [facility]


Guaifenesin (Diabetic Tussin Dm -)  10 ml PO Q6H PRN


   PRN Reason: COUGH


   Last Admin: 05/08/18 09:17 Dose:  10 ml


Levofloxacin (Levaquin 750 Mg Premixed Ivpb -)  750 mg in 150 mls @ 150 mls/hr 

IVPB DAILY [facility]


   PRN Reason: Protocol


   Last Admin: 05/11/18 09:19 Dose:  150 mls/hr


Insulin Aspart (Novolog Vial Sliding Scale -)  1 vial SQ ACHS [facility]


   PRN Reason: Protocol


   Last Admin: 05/11/18 16:36 Dose:  4 units


Isosorbide Mononitrate (Imdur -)  30 mg PO DAILY [facility]


   Last Admin: 05/11/18 09:20 Dose:  30 mg


Latanoprost (Xalatan 0.005% Eye Drops -)  1 drop OU HS [facility]


   Last Admin: 05/10/18 21:56 Dose:  1 drop


Magnesium Hydroxide (Milk Of Magnesia -)  30 ml PO Q8H PRN


   PRN Reason: INDIGESTION


   Last Admin: 05/08/18 14:17 Dose:  30 ml


Methylprednisolone Sodium Succinate (Solu-Medrol -)  40 mg IVPUSH Q8H-IV [facility]


   Last Admin: 05/11/18 09:20 Dose:  40 mg


Nebivolol (Bystolic -)  10 mg PO DAILY [facility]


   Last Admin: 05/11/18 09:20 Dose:  10 mg


Ptnt's Own Med( Insulin Glargine,Hum .Rec.Anlog [Toujeo Solostar]  80 unit SQ 

DAILY@0700 [facility]


   Last Admin: 05/11/18 06:41 Dose:  80 unit


Pt's Own Med ( Insulin Lispro Protamine/Lispro ( Humalog Mix 50/50 Vial)  18 

each SQ BID@0700,1630 [facility]


   Last Admin: 05/11/18 16:35 Dose:  18 each


Pantoprazole Sodium (Protonix -)  20 mg PO DAILY [facility]


   Last Admin: 05/11/18 09:20 Dose:  20 mg


Rifampin (Rifadin -)  300 mg PO BID@0600,1800 [facility]


   Last Admin: 05/11/18 06:41 Dose:  300 mg


Rosuvastatin Calcium (Crestor -)  10 mg PO [facility]


   Last Admin: 05/10/18 21:55 Dose:  10 mg


Timolol Maleate (Timoptic 0.5%)  1 drop OU BID [facility]


   Last Admin: 05/11/18 09:22 Dose:  1 drop











- Objective


Vital Signs: 


 Vital Signs











Temperature  98.4 F   05/11/18 14:00


 


Pulse Rate  63   05/11/18 14:00


 


Respiratory Rate  18   05/11/18 14:00


 


Blood Pressure  128/51   05/11/18 14:00


 


O2 Sat by Pulse Oximetry (%)  99   05/11/18 09:00











Constitutional: Yes: No Distress


Cardiovascular: Yes: Regular Rate and Rhythm, S1, S2


Respiratory: Yes: Diminished


Gastrointestinal: Yes: Normal Bowel Sounds, Soft.  No: Tenderness


Edema: Yes


Labs: 


 CBC, BMP





 05/11/18 08:10 





 05/11/18 08:10 





 INR, PTT











INR  1.04  (0.82-1.09)   05/03/18  14:34    














Assessment/Plan





LLL pneumonia   + legionella AG


R/O sepsis secondary to pneumonia


Azotemia  improved


 


Continue  levaquin 750mg  / rifampin bid


Steroids, bronchodilators

## 2018-05-11 NOTE — PN
Progress Note, Physician


History of Present Illness: 





pulmonary





alert,feeling  better,oob-chair,-resp distress ,on nasal cannula





- Current Medication List


Current Medications: 


Active Medications





Acetaminophen (Tylenol -)  650 mg PO Q6H PRN


   PRN Reason: FEVER


   Last Admin: 05/04/18 23:35 Dose:  650 mg


Albuterol Sulfate (Ventolin 0.083% Nebulizer Soln -)  1 amp NEB Q4H PRN


   PRN Reason: SHORT OF BREATH/WHEEZING


   Last Admin: 05/10/18 04:11 Dose:  1 amp


Albuterol/Ipratropium (Duoneb -)  1 amp NEB RQID [facility]


   Last Admin: 05/11/18 11:12 Dose:  1 amp


Alprazolam (Xanax -)  0.25 mg PO Q24H PRN


   PRN Reason: ANXIETY


   Last Admin: 05/08/18 22:12 Dose:  0.25 mg


Amlodipine Besylate (Norvasc -)  10 mg PO DAILY [facility]


   Last Admin: 05/11/18 09:20 Dose:  10 mg


Aspirin (Asa -)  81 mg PO DAILY [facility]


   Last Admin: 05/11/18 09:20 Dose:  81 mg


Brimonidine Tartrate (Alphagan 0.2% -)  1 drop OU BID [facility]


   Last Admin: 05/11/18 09:23 Dose:  1 drop


Escitalopram Oxalate (Lexapro -)  10 mg PO DAILY [facility]


   Last Admin: 05/11/18 09:20 Dose:  10 mg


Furosemide (Lasix -)  40 mg PO DAILY [facility]


Guaifenesin (Diabetic Tussin Dm -)  10 ml PO Q6H PRN


   PRN Reason: COUGH


   Last Admin: 05/08/18 09:17 Dose:  10 ml


Levofloxacin (Levaquin 750 Mg Premixed Ivpb -)  750 mg in 150 mls @ 150 mls/hr 

IVPB DAILY [facility]


   PRN Reason: Protocol


   Last Admin: 05/11/18 09:19 Dose:  150 mls/hr


Insulin Aspart (Novolog Vial Sliding Scale -)  1 vial SQ ACHS [facility]


   PRN Reason: Protocol


   Last Admin: 05/11/18 11:01 Dose:  4 units


Isosorbide Mononitrate (Imdur -)  30 mg PO DAILY [facility]


   Last Admin: 05/11/18 09:20 Dose:  30 mg


Latanoprost (Xalatan 0.005% Eye Drops -)  1 drop OU HS [facility]


   Last Admin: 05/10/18 21:56 Dose:  1 drop


Magnesium Hydroxide (Milk Of Magnesia -)  30 ml PO Q8H PRN


   PRN Reason: INDIGESTION


   Last Admin: 05/08/18 14:17 Dose:  30 ml


Methylprednisolone Sodium Succinate (Solu-Medrol -)  40 mg IVPUSH Q8H-IV [facility]


   Last Admin: 05/11/18 09:20 Dose:  40 mg


Nebivolol (Bystolic -)  10 mg PO DAILY [facility]


   Last Admin: 05/11/18 09:20 Dose:  10 mg


Ptnt's Own Med( Insulin Glargine,Hum .Rec.Anlog [Toujeo Solostar]  80 unit SQ 

DAILY@0700 [facility]


   Last Admin: 05/11/18 06:41 Dose:  80 unit


Pt's Own Med ( Insulin Lispro Protamine/Lispro ( Humalog Mix 50/50 Vial)  18 

each SQ BID@0700,1630 [facility]


   Last Admin: 05/11/18 06:41 Dose:  18 each


Pantoprazole Sodium (Protonix -)  20 mg PO DAILY [facility]


   Last Admin: 05/11/18 09:20 Dose:  20 mg


Rifampin (Rifadin -)  300 mg PO BID@0600,1800 [facility]


   Last Admin: 05/11/18 06:41 Dose:  300 mg


Rosuvastatin Calcium (Crestor -)  10 mg PO [facility]


   Last Admin: 05/10/18 21:55 Dose:  10 mg


Timolol Maleate (Timoptic 0.5%)  1 drop OU BID [facility]


   Last Admin: 05/11/18 09:22 Dose:  1 drop











- Objective


Vital Signs: 


 Vital Signs











Temperature  97.5 F L  05/11/18 10:00


 


Pulse Rate  67   05/11/18 10:00


 


Respiratory Rate  18   05/11/18 10:00


 


Blood Pressure  126/56   05/11/18 10:00


 


O2 Sat by Pulse Oximetry (%)  99   05/11/18 09:00











Constitutional: Yes: Well Nourished, Calm


Eyes: Yes: WNL


HENT: Yes: WNL


Neck: Yes: WNL


Cardiovascular: Yes: Regular Rate and Rhythm, S1, S2


Respiratory: Yes: Rhonchi (scattered rhonchi)


Gastrointestinal: Yes: Normal Bowel Sounds, Soft


Extremities: Yes: WNL


Edema: No


Labs: 


 CBC, BMP





 05/11/18 08:10 





 05/11/18 08:10 





 INR, PTT











INR  1.04  (0.82-1.09)   05/03/18  14:34    














Problem List





- Problems


(1) Acute hypoxemic respiratory failure


Code(s): J96.01 - ACUTE RESPIRATORY FAILURE WITH HYPOXIA   





(2) ASHD (arteriosclerotic heart disease)


Code(s): I25.10 - ATHSCL HEART DISEASE OF NATIVE CORONARY ARTERY W/O ANG PCTRS 

  





(3) COPD (chronic obstructive pulmonary disease)


Code(s): J44.9 - CHRONIC OBSTRUCTIVE PULMONARY DISEASE, UNSPECIFIED   


Qualifiers: 


   COPD type: chronic bronchitis 





(4) Chronic diastolic CHF (congestive heart failure)


Code(s): I50.32 - CHRONIC DIASTOLIC (CONGESTIVE) HEART FAILURE   





(5) Dehydration


Code(s): E86.0 - DEHYDRATION   





(6) Fever


Code(s): R50.9 - FEVER, UNSPECIFIED   





(7) Pneumonia


Code(s): J18.9 - PNEUMONIA, UNSPECIFIED ORGANISM   


Qualifiers: 


   Laterality: right   Lung location: lower lobe of lung 





(8) COPD exacerbation


Code(s): J44.1 - CHRONIC OBSTRUCTIVE PULMONARY DISEASE W (ACUTE) EXACERBATION   





(9) HTN (hypertension)


Code(s): I10 - ESSENTIAL (PRIMARY) HYPERTENSION   


Qualifiers: 


   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   





(10) CKD (chronic kidney disease) stage 3, GFR 30-59 ml/min


Code(s): N18.3 - CHRONIC KIDNEY DISEASE, STAGE 3 (MODERATE)   





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


Code(s): A48.1 - LEGIONNAIRES' DISEASE   





Assessment/Plan





IMP ACUTE HYPOXEMIC RESPIRATORY FAILURE IMPROVING


     BILATERAL PNEUMONIA


     LEGIONELLA PNEUMONIA


     COPD


     CHF


     CKD


     DM


     ARTHRITIS


     TOBACCO ABUSE





PLAN IV ABX


       INHALED BRONCHODILATORS


       IVF


       STEROID TAPER


       O2


       MONITOR LYTES,RENAL FUNCTION


      





DR REED





 Problem List 





- Problems


(1) Acute hypoxemic respiratory failure


Code(s): J96.01 - ACUTE RESPIRATORY FAILURE WITH HYPOXIA   





(2) ASHD (arteriosclerotic heart disease)


Code(s): I25.10 - ATHSCL HEART DISEASE OF NATIVE CORONARY ARTERY W/O ANG PCTRS 

  





(3) COPD (chronic obstructive pulmonary disease)


Code(s): J44.9 - CHRONIC OBSTRUCTIVE PULMONARY DISEASE, UNSPECIFIED   


Qualifiers: 


   COPD type: chronic bronchitis 





(4) Chronic diastolic CHF (congestive heart failure)


Code(s): I50.32 - CHRONIC DIASTOLIC (CONGESTIVE) HEART FAILURE   





(5) Dehydration


Code(s): E86.0 - DEHYDRATION   





(6) Fever


Code(s): R50.9 - FEVER, UNSPECIFIED   





(7) Pneumonia


Code(s): J18.9 - PNEUMONIA, UNSPECIFIED ORGANISM   


Qualifiers: 


   Laterality: right   Lung location: lower lobe of lung 





(8) COPD exacerbation


Code(s): J44.1 - CHRONIC OBSTRUCTIVE PULMONARY DISEASE W (ACUTE) EXACERBATION   





(9) HTN (hypertension)


Code(s): I10 - ESSENTIAL (PRIMARY) HYPERTENSION   


Qualifiers: 


   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   





(10) CKD (chronic kidney disease) stage 3, GFR 30-59 ml/min


Code(s): N18.3 - CHRONIC KIDNEY DISEASE, STAGE 3 (MODERATE)   





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


Code(s): A48.1 - LEGIONNAIRES' DISEASE

## 2018-05-12 NOTE — PN
Progress Note, Physician





- Current Medication List


Current Medications: 


Active Medications





Acetaminophen (Tylenol -)  650 mg PO Q6H PRN


   PRN Reason: FEVER


   Last Admin: 05/04/18 23:35 Dose:  650 mg


Alprazolam (Xanax -)  0.25 mg PO Q24H PRN


   PRN Reason: ANXIETY


   Last Admin: 05/08/18 22:12 Dose:  0.25 mg


Amlodipine Besylate (Norvasc -)  10 mg PO DAILY [facility]


   Last Admin: 05/11/18 09:20 Dose:  10 mg


Aspirin (Asa -)  81 mg PO DAILY [facility]


   Last Admin: 05/11/18 09:20 Dose:  81 mg


Brimonidine Tartrate (Alphagan 0.2% -)  1 drop OU BID [facility]


   Last Admin: 05/11/18 22:11 Dose:  1 drop


Escitalopram Oxalate (Lexapro -)  10 mg PO DAILY [facility]


   Last Admin: 05/11/18 09:20 Dose:  10 mg


Furosemide (Lasix -)  40 mg PO DAILY [facility]


Guaifenesin (Diabetic Tussin Dm -)  10 ml PO Q6H PRN


   PRN Reason: COUGH


   Last Admin: 05/08/18 09:17 Dose:  10 ml


Levofloxacin (Levaquin 750 Mg Premixed Ivpb -)  750 mg in 150 mls @ 150 mls/hr 

IVPB DAILY [facility]


   PRN Reason: Protocol


   Last Admin: 05/11/18 09:19 Dose:  150 mls/hr


Insulin Aspart (Novolog Vial Sliding Scale -)  1 vial SQ ACHS [facility]


   PRN Reason: Protocol


   Last Admin: 05/12/18 06:28 Dose:  Not Given


Isosorbide Mononitrate (Imdur -)  30 mg PO DAILY [facility]


   Last Admin: 05/11/18 09:20 Dose:  30 mg


Latanoprost (Xalatan 0.005% Eye Drops -)  1 drop OU HS [facility]


   Last Admin: 05/11/18 22:11 Dose:  1 drop


Magnesium Hydroxide (Milk Of Magnesia -)  30 ml PO Q8H PRN


   PRN Reason: INDIGESTION


   Last Admin: 05/08/18 14:17 Dose:  30 ml


Methylprednisolone Sodium Succinate (Solu-Medrol -)  40 mg IVPUSH Q8H-IV [facility]


   Last Admin: 05/12/18 02:28 Dose:  40 mg


Nebivolol (Bystolic -)  10 mg PO DAILY [facility]


   Last Admin: 05/11/18 09:20 Dose:  10 mg


Ptnt's Own Med( Insulin Glargine,Hum .Rec.Anlog [Toujeo Solostar]  80 unit SQ 

DAILY@0700 [facility]


   Last Admin: 05/12/18 06:28 Dose:  80 unit


Pt's Own Med ( Insulin Lispro Protamine/Lispro ( Humalog Mix 50/50 Vial)  18 

each SQ BID@0700,1630 [facility]


   Last Admin: 05/12/18 06:28 Dose:  18 each


Pantoprazole Sodium (Protonix -)  20 mg PO DAILY [facility]


   Last Admin: 05/11/18 09:20 Dose:  20 mg


Rifampin (Rifadin -)  300 mg PO BID@0600,1800 [facility]


   Last Admin: 05/12/18 06:27 Dose:  300 mg


Rosuvastatin Calcium (Crestor -)  10 mg PO HS [facility]


   Last Admin: 05/11/18 22:10 Dose:  10 mg


Timolol Maleate (Timoptic 0.5%)  1 drop OU BID [facility]


   Last Admin: 05/11/18 22:11 Dose:  1 drop











- Objective


Vital Signs: 


 Vital Signs











Temperature  98.3 F   05/12/18 06:00


 


Pulse Rate  67   05/12/18 06:00


 


Respiratory Rate  20   05/12/18 06:00


 


Blood Pressure  122/64   05/12/18 06:00


 


O2 Sat by Pulse Oximetry (%)  97   05/11/18 21:00











Eyes: Yes: WNL, Conjunctiva Clear, EOM Intact


HENT: Yes: WNL, Atraumatic, Normocephalic


Neck: Yes: WNL, Supple, Trachea Midline


Cardiovascular: Yes: WNL, Regular Rate and Rhythm


Respiratory: Yes: Diminished, Wheezes


Gastrointestinal: Yes: WNL, Normal Bowel Sounds


Genitourinary: Yes: WNL


Musculoskeletal: Yes: WNL


Extremities: Yes: WNL


Edema: Yes


Integumentary: Yes: WNL


Neurological: Yes: WNL, Alert, Oriented


...Motor Strength: WNL


Psychiatric: Yes: WNL


Labs: 


 CBC, BMP





 05/12/18 07:51 





 INR, PTT











INR  1.04  (0.82-1.09)   05/03/18  14:34    














Assessment/Plan





IMP:


1. Bilateral  Legionella PNA with early sepsis


2. DM w/ CKD, probable acute on chronic ARF in setting of early sepsis


3. Chronic diastolic CHF secondary to hypertensive heart disease and DM


4. Non-obstructive CAD


5. Chronic COPD, active smoker, w/ no evidence of PHTN on R[facility] 2016


Morbid obesity





REC:


1. F/u cultures; abx per ID; supplimental O2; DVT prophylaxis


2. PO Lasix. 


3. Continue other home meds for BP and chronic diastolic dysfx: Imdur 30, 

Bystolic 10, Amlodipine 10 w/ hold parameters.


4. Smoking cessation counselling.


5. Decrease ASA to 81mg


6. Leukocytosis is secondary to steroids, tapering as per pulmonary








coverage for dr. Espinoza

## 2018-05-12 NOTE — PN
Progress Note (short form)





- Note


Progress Note: 





doing well


finally expectorating green sputum


no fevers


no cough


no N/V


still sob when she walks to the bathroom





 Vital Signs











 Period  Temp  Pulse  Resp  BP Sys/Thrasher  Pulse Ox


 


 Last 24 Hr  97.7 F-98.4 F  63-72  18-22  100-142/51-73  97








cor-rrr


lungs clear, decreased bs at bases


abd soft,nt


ext no edema





 CBC, BMP





 05/12/18 07:51 





 05/12/18 07:51 





 Microbiology





05/08/18 19:00   Urine - Urine Clean Catch   Urine Culture - Final


                            NO GROWTH OBTAINED


05/03/18 14:34   Blood - Peripheral Venous   Blood Culture - Final


                            NO GROWTH AFTER 5 DAYS INCUBATION


05/03/18 14:34   Blood - Peripheral Venous   Blood Culture - Final


                            NO GROWTH AFTER 5 DAYS INCUBATION


05/07/18 07:50   Serum   Legionella Serology - Preliminary


05/05/18 00:55   Urine For Antigen Detection   Legionella Antigen - Final


05/05/18 00:55   Urine For Antigen Detection   Streptococcus pneumoniae Antigen 

(M - Final


05/03/18 14:34   Urine - Urine Clean Catch   Urine Culture - Final


                            Contaminated: Please Repeat


05/03/18 14:55   Nasopharyngeal Swab   Influenza Types A,B Antigen (AMY) - Final


05/03/18 14:55   Nasopharyngeal Swab    - Final





a/p


legionella pneumonia


hypoxia


leukocytosis- probably multifactorial


continue levaquin/rifampin


renal function improving

## 2018-05-12 NOTE — PN
Progress Note (short form)





- Note


Progress Note: 


Pt seen/ examined.


sitting in chair


looks better


son at bedside


all f/u noted


denies cp. 


breathing better





 Vital Signs











Temp  97.8 F   05/12/18 09:38


 


Pulse  70   05/12/18 09:38


 


Resp  20   05/12/18 09:38


 


BP  131/51   05/12/18 09:38


 


Pulse Ox  97   05/11/18 21:00








 Intake & Output











 05/11/18 05/12/18 05/12/18





 23:59 11:59 23:59


 


Intake Total 150 11 


 


Balance 150 11 


 


Intake:   


 


  IV 0 11 


 


    sl 0 11 


 


  IVPB 150  


 


Other:   


 


  Voiding Method Toilet Toilet 


 


  # Unmeasured Voids   


 


    Void 3  


 


  Bowel Movement No  











Active Medications





Acetaminophen (Tylenol -)  650 mg PO Q6H PRN


   PRN Reason: FEVER


   Last Admin: 05/04/18 23:35 Dose:  650 mg


Alprazolam (Xanax -)  0.25 mg PO Q24H PRN


   PRN Reason: ANXIETY


   Last Admin: 05/08/18 22:12 Dose:  0.25 mg


Amlodipine Besylate (Norvasc -)  10 mg PO DAILY [facility]


   Last Admin: 05/12/18 09:19 Dose:  10 mg


Arformoterol Tartrate (Brovana (Restricted To Pulmonology/Resp) -)  1 amp NEB 

RBID [facility]


Aspirin (Asa -)  81 mg PO DAILY [facility]


   Last Admin: 05/12/18 09:19 Dose:  81 mg


Brimonidine Tartrate (Alphagan 0.2% -)  1 drop OU BID [facility]


   Last Admin: 05/12/18 09:19 Dose:  1 drop


Escitalopram Oxalate (Lexapro -)  10 mg PO DAILY [facility]


   Last Admin: 05/12/18 09:20 Dose:  10 mg


Furosemide (Lasix -)  40 mg PO DAILY [facility]


   Last Admin: 05/12/18 09:20 Dose:  40 mg


Guaifenesin (Diabetic Tussin Dm -)  10 ml PO Q6H PRN


   PRN Reason: COUGH


   Last Admin: 05/08/18 09:17 Dose:  10 ml


Levofloxacin (Levaquin 750 Mg Premixed Ivpb -)  750 mg in 150 mls @ 150 mls/hr 

IVPB DAILY [facility]


   PRN Reason: Protocol


   Last Admin: 05/12/18 09:20 Dose:  150 mls/hr


Insulin Aspart (Novolog Vial Sliding Scale -)  1 vial SQ ACHS [facility]


   PRN Reason: Protocol


   Last Admin: 05/12/18 12:04 Dose:  6 units


Isosorbide Mononitrate (Imdur -)  30 mg PO DAILY [facility]


   Last Admin: 05/12/18 09:20 Dose:  30 mg


Latanoprost (Xalatan 0.005% Eye Drops -)  1 drop OU [facility]


   Last Admin: 05/11/18 22:11 Dose:  1 drop


Magnesium Hydroxide (Milk Of Magnesia -)  30 ml PO Q8H PRN


   PRN Reason: INDIGESTION


   Last Admin: 05/08/18 14:17 Dose:  30 ml


Methylprednisolone Sodium Succinate (Solu-Medrol -)  30 mg IVPUSH BID [facility]


Nebivolol (Bystolic -)  10 mg PO DAILY [facility]


   Last Admin: 05/12/18 09:20 Dose:  10 mg


Ptnt's Own Med( Insulin Glargine,Hum .Rec.Anlog [Toujeo Solostar]  80 unit SQ 

DAILY@0700 [facility]


   Last Admin: 05/12/18 06:28 Dose:  80 unit


Pt's Own Med ( Insulin Lispro Protamine/Lispro ( Humalog Mix 50/50 Vial)  18 

each SQ BID@0700,1630 [facility]


   Last Admin: 05/12/18 06:28 Dose:  18 each


Pantoprazole Sodium (Protonix -)  20 mg PO DAILY [facility]


   Last Admin: 05/12/18 09:20 Dose:  20 mg


Rifampin (Rifadin -)  300 mg PO BID@0600,1800 [facility]


   Last Admin: 05/12/18 06:27 Dose:  300 mg


Rosuvastatin Calcium (Crestor -)  10 mg PO [facility]


   Last Admin: 05/11/18 22:10 Dose:  10 mg


Timolol Maleate (Timoptic 0.5%)  1 drop OU BID [facility]


   Last Admin: 05/12/18 09:18 Dose:  1 drop








 


 CBC, BMP





 05/12/18 07:51 





 05/12/18 07:51 





cxr-- left atelectasis / infiltrate





Physical 


Constitutional: Yes: No Distress -- better


Cardiovascular: Yes: Regular Rate and Rhythm


Respiratory: Yes: Diminished at bases .


Gastrointestinal: Yes: Normal Bowel Sounds, Soft, Abdomen, Obese.  No: 

Tenderness


Edema: LLE: Trace, RLE: Trace








Assessment/Plan


better today


Slowly improving


urine legionella positive


continue  Solumedrol


 iv antibiotics


nebs


O2 


afebrile


bun/ cr better


physical therapy


monitor bgm.


will follow


discussed with pts son


will follow

















Problem List





- Problems


(1) Fever


Code(s): R50.9 - FEVER, UNSPECIFIED   





(2) Dehydration


Code(s): E86.0 - DEHYDRATION   





(3) COPD (chronic obstructive pulmonary disease)


Code(s): J44.9 - CHRONIC OBSTRUCTIVE PULMONARY DISEASE, UNSPECIFIED   


Qualifiers: 


   COPD type: chronic bronchitis 





(4) HTN (hypertension)


Code(s): I10 - ESSENTIAL (PRIMARY) HYPERTENSION   


Qualifiers: 


   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   





(5) IDDM (insulin dependent diabetes mellitus)


Code(s): E11.9 - TYPE 2 DIABETES MELLITUS WITHOUT COMPLICATIONS; Z79.4 - LONG 

TERM (CURRENT) USE OF INSULIN   





(6) Obesity (BMI 30-39.9)


Code(s): E66.9 - OBESITY, UNSPECIFIED   





(7) Renal insufficiency


Code(s): N28.9 - DISORDER OF KIDNEY AND URETER, UNSPECIFIED

## 2018-05-13 NOTE — PN
Progress Note (short form)





- Note


Progress Note: 


Better


sitting in chair


decreased sob


afebrile





 Vital Signs











Temp  98.3 F   05/13/18 09:57


 


Pulse  72   05/13/18 09:57


 


Resp  20   05/13/18 09:57


 


BP  144/53   05/13/18 09:57


 


Pulse Ox  96   05/12/18 20:29








 Intake & Output











 05/12/18 05/13/18 05/13/18





 23:59 11:59 23:59


 


Intake Total 300 380 


 


Balance 300 380 


 


Intake:   


 


  Oral 300 380 


 


Other:   


 


  Voiding Method Toilet Toilet 


 


  # Unmeasured Voids   


 


    Void 1 1 








Active Medications





Acetaminophen (Tylenol -)  650 mg PO Q6H PRN


   PRN Reason: FEVER


   Last Admin: 05/04/18 23:35 Dose:  650 mg


Albuterol Sulfate (Ventolin 0.083% Nebulizer Soln -)  1 amp NEB Q4H PRN


   PRN Reason: SHORT OF BREATH/WHEEZING


   Last Admin: 05/12/18 17:58 Dose:  1 amp


Alprazolam (Xanax -)  0.25 mg PO Q24H PRN


   PRN Reason: ANXIETY


   Last Admin: 05/12/18 21:12 Dose:  0.25 mg


Amlodipine Besylate (Norvasc -)  10 mg PO DAILY [facility]


   Last Admin: 05/13/18 09:35 Dose:  10 mg


Arformoterol Tartrate (Brovana (Restricted To Pulmonology/Resp) -)  1 amp NEB 

RBID [facility]


   Last Admin: 05/13/18 07:45 Dose:  1 amp


Aspirin (Asa -)  81 mg PO DAILY [facility]


   Last Admin: 05/13/18 09:35 Dose:  81 mg


Brimonidine Tartrate (Alphagan 0.2% -)  1 drop OU BID [facility]


   Last Admin: 05/13/18 09:35 Dose:  1 drop


Escitalopram Oxalate (Lexapro -)  10 mg PO DAILY [facility]


   Last Admin: 05/13/18 09:35 Dose:  10 mg


Furosemide (Lasix -)  40 mg PO DAILY [facility]


   Last Admin: 05/13/18 09:36 Dose:  40 mg


Guaifenesin (Diabetic Tussin Dm -)  10 ml PO Q6H PRN


   PRN Reason: COUGH


   Last Admin: 05/08/18 09:17 Dose:  10 ml


Levofloxacin (Levaquin 750 Mg Premixed Ivpb -)  750 mg in 150 mls @ 150 mls/hr 

IVPB DAILY [facility]


   PRN Reason: Protocol


   Last Admin: 05/13/18 09:34 Dose:  150 mls/hr


Insulin Aspart (Novolog Vial Sliding Scale -)  1 vial SQ ACHS [facility]


   PRN Reason: Protocol


   Last Admin: 05/13/18 11:38 Dose:  4 units


Isosorbide Mononitrate (Imdur -)  30 mg PO DAILY [facility]


   Last Admin: 05/13/18 09:35 Dose:  30 mg


Latanoprost (Xalatan 0.005% Eye Drops -)  1 drop OU [facility]


   Last Admin: 05/12/18 21:22 Dose:  1 drop


Magnesium Hydroxide (Milk Of Magnesia -)  30 ml PO Q8H PRN


   PRN Reason: INDIGESTION


   Last Admin: 05/08/18 14:17 Dose:  30 ml


Methylprednisolone Sodium Succinate (Solu-Medrol -)  30 mg IVPUSH BID [facility]


   Last Admin: 05/13/18 09:34 Dose:  30 mg


Nebivolol (Bystolic -)  10 mg PO DAILY [facility]


   Last Admin: 05/13/18 09:35 Dose:  10 mg


Ptnt's Own Med( Insulin Glargine,Hum .Rec.Anlog [Toujeo Solostar]  80 unit SQ 

DAILY@0700 [facility]


   Last Admin: 05/13/18 06:40 Dose:  Not Given


Pt's Own Med ( Insulin Lispro Protamine/Lispro ( Humalog Mix 50/50 Vial)  18 

each SQ BID@0700,1630 [facility]


   Last Admin: 05/13/18 06:40 Dose:  Not Given


Nystatin (Nystatin Oral Suspension -)  500,000 units PO QID [facility]


   Last Admin: 05/13/18 10:01 Dose:  500,000 units


Pantoprazole Sodium (Protonix -)  20 mg PO DAILY [facility]


   Last Admin: 05/13/18 09:35 Dose:  20 mg


Rifampin (Rifadin -)  300 mg PO BID@0600,1800 [facility]


   Last Admin: 05/13/18 06:38 Dose:  300 mg


Rosuvastatin Calcium (Crestor -)  10 mg PO HS [facility]


   Last Admin: 05/12/18 21:12 Dose:  10 mg


Timolol Maleate (Timoptic 0.5%)  1 drop OU BID [facility]


   Last Admin: 05/13/18 09:36 Dose:  1 drop





 CBC, BMP





 05/12/18 07:51 





 05/12/18 07:51 





Physical 


Constitutional: Yes: No Distress -- better


Cardiovascular: Yes: Regular Rate and Rhythm


Respiratory: Yes: Diminished at bases .


Gastrointestinal: Yes: Normal Bowel Sounds, Soft, Abdomen, Obese.  No: 

Tenderness


Edema: LLE: Trace, RLE: Trace








Assessment/Plan


better 


Slowly improving


urine legionella positive


continue  Solumedrol-- taper


 iv antibiotics


nebs


O2 


afebrile


physical therapy


monitor bgm.


will follow


discussed with pts daughter 


Also discussed with Dr. felipe


Pt admits - continue to smoke !! Did not quit-- about pack /day


Should benefit from str


Smoking cessation counselling


will follow





Problem List





- Problems


(1) Fever


Code(s): R50.9 - FEVER, UNSPECIFIED   





(2) Dehydration


Code(s): E86.0 - DEHYDRATION   





(3) COPD (chronic obstructive pulmonary disease)


Code(s): J44.9 - CHRONIC OBSTRUCTIVE PULMONARY DISEASE, UNSPECIFIED   


Qualifiers: 


   COPD type: chronic bronchitis 





(4) HTN (hypertension)


Code(s): I10 - ESSENTIAL (PRIMARY) HYPERTENSION   


Qualifiers: 


   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   





(5) IDDM (insulin dependent diabetes mellitus)


Code(s): E11.9 - TYPE 2 DIABETES MELLITUS WITHOUT COMPLICATIONS; Z79.4 - LONG 

TERM (CURRENT) USE OF INSULIN   





(6) Obesity (BMI 30-39.9)


Code(s): E66.9 - OBESITY, UNSPECIFIED   





(7) Renal insufficiency


Code(s): N28.9 - DISORDER OF KIDNEY AND URETER, UNSPECIFIED

## 2018-05-13 NOTE — PN
Progress Note, Physician





- Current Medication List


Current Medications: 


Active Medications





Acetaminophen (Tylenol -)  650 mg PO Q6H PRN


   PRN Reason: FEVER


   Last Admin: 05/04/18 23:35 Dose:  650 mg


Albuterol Sulfate (Ventolin 0.083% Nebulizer Soln -)  1 amp NEB Q4H PRN


   PRN Reason: SHORT OF BREATH/WHEEZING


   Last Admin: 05/12/18 17:58 Dose:  1 amp


Alprazolam (Xanax -)  0.25 mg PO Q24H PRN


   PRN Reason: ANXIETY


   Last Admin: 05/12/18 21:12 Dose:  0.25 mg


Amlodipine Besylate (Norvasc -)  10 mg PO DAILY [facility]


   Last Admin: 05/12/18 09:19 Dose:  10 mg


Arformoterol Tartrate (Brovana (Restricted To Pulmonology/Resp) -)  1 amp NEB 

RBID [facility]


   Last Admin: 05/12/18 20:32 Dose:  1 amp


Aspirin (Asa -)  81 mg PO DAILY [facility]


   Last Admin: 05/12/18 09:19 Dose:  81 mg


Brimonidine Tartrate (Alphagan 0.2% -)  1 drop OU BID [facility]


   Last Admin: 05/12/18 21:22 Dose:  1 drop


Escitalopram Oxalate (Lexapro -)  10 mg PO DAILY [facility]


   Last Admin: 05/12/18 09:20 Dose:  10 mg


Furosemide (Lasix -)  40 mg PO DAILY [facility]


   Last Admin: 05/12/18 09:20 Dose:  40 mg


Guaifenesin (Diabetic Tussin Dm -)  10 ml PO Q6H PRN


   PRN Reason: COUGH


   Last Admin: 05/08/18 09:17 Dose:  10 ml


Levofloxacin (Levaquin 750 Mg Premixed Ivpb -)  750 mg in 150 mls @ 150 mls/hr 

IVPB DAILY [facility]


   PRN Reason: Protocol


   Last Admin: 05/12/18 09:20 Dose:  150 mls/hr


Insulin Aspart (Novolog Vial Sliding Scale -)  1 vial SQ ACHS [facility]


   PRN Reason: Protocol


   Last Admin: 05/13/18 06:40 Dose:  Not Given


Isosorbide Mononitrate (Imdur -)  30 mg PO DAILY [facility]


   Last Admin: 05/12/18 09:20 Dose:  30 mg


Latanoprost (Xalatan 0.005% Eye Drops -)  1 drop OU HS [facility]


   Last Admin: 05/12/18 21:22 Dose:  1 drop


Magnesium Hydroxide (Milk Of Magnesia -)  30 ml PO Q8H PRN


   PRN Reason: INDIGESTION


   Last Admin: 05/08/18 14:17 Dose:  30 ml


Methylprednisolone Sodium Succinate (Solu-Medrol -)  30 mg IVPUSH BID [facility]


   Last Admin: 05/12/18 21:17 Dose:  30 mg


Nebivolol (Bystolic -)  10 mg PO DAILY [facility]


   Last Admin: 05/12/18 09:20 Dose:  10 mg


Ptnt's Own Med( Insulin Glargine,Hum .Rec.Anlog [Toujeo Solostar]  80 unit SQ 

DAILY@0700 [facility]


   Last Admin: 05/13/18 06:40 Dose:  Not Given


Pt's Own Med ( Insulin Lispro Protamine/Lispro ( Humalog Mix 50/50 Vial)  18 

each SQ BID@0700,1630 [facility]


   Last Admin: 05/13/18 06:40 Dose:  Not Given


Pantoprazole Sodium (Protonix -)  20 mg PO DAILY [facility]


   Last Admin: 05/12/18 09:20 Dose:  20 mg


Rifampin (Rifadin -)  300 mg PO BID@0600,1800 [facility]


   Last Admin: 05/13/18 06:38 Dose:  300 mg


Rosuvastatin Calcium (Crestor -)  10 mg PO [facility]


   Last Admin: 05/12/18 21:12 Dose:  10 mg


Timolol Maleate (Timoptic 0.5%)  1 drop OU BID [facility]


   Last Admin: 05/12/18 21:23 Dose:  1 drop











- Objective


Vital Signs: 


 Vital Signs











Temperature  98.0 F   05/13/18 06:00


 


Pulse Rate  65   05/13/18 06:00


 


Respiratory Rate  20   05/13/18 06:00


 


Blood Pressure  143/58   05/13/18 06:00


 


O2 Sat by Pulse Oximetry (%)  96   05/12/18 20:29











Eyes: Yes: WNL, Conjunctiva Clear, EOM Intact


HENT: Yes: WNL, Atraumatic, Normocephalic


Neck: Yes: WNL, Supple, Trachea Midline


Cardiovascular: Yes: WNL, Regular Rate and Rhythm


Respiratory: Yes: Diminished


Gastrointestinal: Yes: WNL, Normal Bowel Sounds


Genitourinary: Yes: WNL


Musculoskeletal: Yes: WNL


Extremities: Yes: WNL


Edema: No


Integumentary: Yes: WNL


Neurological: Yes: WNL, Alert, Oriented


...Motor Strength: WNL


Psychiatric: Yes: WNL


Labs: 


 CBC, BMP





 05/12/18 07:51 





 05/12/18 07:51 





 INR, PTT











INR  1.04  (0.82-1.09)   05/03/18  14:34    














Assessment/Plan





IMP:


1. Bilateral  Legionella PNA with early sepsis


2. DM w/ CKD, probable acute on chronic ARF in setting of early sepsis


3. Chronic diastolic CHF secondary to hypertensive heart disease and DM


4. Non-obstructive CAD


5. Chronic COPD, active smoker, w/ no evidence of PHTN on [facility] 2016


Morbid obesity





REC:


1. F/u cultures; abx per ID; supplimental O2; DVT prophylaxis


2. PO Lasix. 


3. Continue other home meds for BP and chronic diastolic dysfx: Imdur 30, 

Bystolic 10, Amlodipine 10 w/ hold parameters.


4. Smoking cessation counselling.


5. Decrease ASA to 81mg


6. Leukocytosis is secondary to steroids, tapering as per pulmonary








coverage for dr. Espinoza

## 2018-05-13 NOTE — PN
Progress Note (short form)





- Note


Progress Note: 


OOB to chair.  Breathing feels better today. 


Cough with less sputum. 


No Hemoptysis. 





 Intake & Output











 05/10/18 05/11/18 05/12/18 05/13/18





 23:59 23:59 23:59 23:59


 


Intake Total 1420 150 311 380


 


Balance 1420 150 311 380








 Last Vital Signs











Temp Pulse Resp BP Pulse Ox


 


 98.3 F   72   20   144/53   96 


 


 05/13/18 09:57  05/13/18 09:57  05/13/18 09:57  05/13/18 09:57  05/12/18 20:29








Active Medications





Acetaminophen (Tylenol -)  650 mg PO Q6H PRN


   PRN Reason: FEVER


   Last Admin: 05/04/18 23:35 Dose:  650 mg


Albuterol Sulfate (Ventolin 0.083% Nebulizer Soln -)  1 amp NEB Q4H PRN


   PRN Reason: SHORT OF BREATH/WHEEZING


   Last Admin: 05/12/18 17:58 Dose:  1 amp


Alprazolam (Xanax -)  0.25 mg PO Q24H PRN


   PRN Reason: ANXIETY


   Last Admin: 05/12/18 21:12 Dose:  0.25 mg


Amlodipine Besylate (Norvasc -)  10 mg PO DAILY [facility]


   Last Admin: 05/13/18 09:35 Dose:  10 mg


Arformoterol Tartrate (Brovana (Restricted To Pulmonology/Resp) -)  1 amp NEB 

RBID [facility]


   Last Admin: 05/13/18 07:45 Dose:  1 amp


Aspirin (Asa -)  81 mg PO DAILY [facility]


   Last Admin: 05/13/18 09:35 Dose:  81 mg


Brimonidine Tartrate (Alphagan 0.2% -)  1 drop OU BID [facility]


   Last Admin: 05/13/18 09:35 Dose:  1 drop


Escitalopram Oxalate (Lexapro -)  10 mg PO DAILY [facility]


   Last Admin: 05/13/18 09:35 Dose:  10 mg


Furosemide (Lasix -)  40 mg PO DAILY [facility]


   Last Admin: 05/13/18 09:36 Dose:  40 mg


Guaifenesin (Diabetic Tussin Dm -)  10 ml PO Q6H PRN


   PRN Reason: COUGH


   Last Admin: 05/08/18 09:17 Dose:  10 ml


Levofloxacin (Levaquin 750 Mg Premixed Ivpb -)  750 mg in 150 mls @ 150 mls/hr 

IVPB DAILY [facility]


   PRN Reason: Protocol


   Last Admin: 05/13/18 09:34 Dose:  150 mls/hr


Insulin Aspart (Novolog Vial Sliding Scale -)  1 vial SQ ACHS [facility]


   PRN Reason: Protocol


   Last Admin: 05/13/18 11:38 Dose:  4 units


Isosorbide Mononitrate (Imdur -)  30 mg PO DAILY [facility]


   Last Admin: 05/13/18 09:35 Dose:  30 mg


Latanoprost (Xalatan 0.005% Eye Drops -)  1 drop OU [facility]


   Last Admin: 05/12/18 21:22 Dose:  1 drop


Magnesium Hydroxide (Milk Of Magnesia -)  30 ml PO Q8H PRN


   PRN Reason: INDIGESTION


   Last Admin: 05/08/18 14:17 Dose:  30 ml


Methylprednisolone Sodium Succinate (Solu-Medrol -)  30 mg IVPUSH BID [facility]


   Last Admin: 05/13/18 09:34 Dose:  30 mg


Nebivolol (Bystolic -)  10 mg PO DAILY [facility]


   Last Admin: 05/13/18 09:35 Dose:  10 mg


Ptnt's Own Med( Insulin Glargine,Hum .Rec.Anlog [Toujeo Solostar]  80 unit SQ 

DAILY@0700 [facility]


   Last Admin: 05/13/18 06:40 Dose:  Not Given


Pt's Own Med ( Insulin Lispro Protamine/Lispro ( Humalog Mix 50/50 Vial)  18 

each SQ BID@0700,1630 [facility]


   Last Admin: 05/13/18 06:40 Dose:  Not Given


Nystatin (Nystatin Oral Suspension -)  500,000 units PO QID [facility]


   Last Admin: 05/13/18 10:01 Dose:  500,000 units


Pantoprazole Sodium (Protonix -)  20 mg PO DAILY [facility]


   Last Admin: 05/13/18 09:35 Dose:  20 mg


Rifampin (Rifadin -)  300 mg PO BID@0600,1800 [facility]


   Last Admin: 05/13/18 06:38 Dose:  300 mg


Rosuvastatin Calcium (Crestor -)  10 mg PO HS [facility]


   Last Admin: 05/12/18 21:12 Dose:  10 mg


Timolol Maleate (Timoptic 0.5%)  1 drop OU BID [facility]


   Last Admin: 05/13/18 09:36 Dose:  1 drop








 


Constitutional: Yes: NAD 


Eyes: Yes: WNL


HENT: Yes: WNL


Neck: Yes: WNL


Cardiovascular: Yes: Regular Rate and Rhythm, S1, S2


Respiratory: Yes: Few Rhonchi: left > right, No wheeze 


Gastrointestinal: Yes: Normal Bowel Sounds, Soft


Extremities: Yes: WNL


Edema: No


Labs: 


 


 Laboratory Results - last 24 hr











  05/12/18 05/12/18 05/12/18





  07:51 17:01 21:14


 


Total Counted  99  


 


Neutrophils % (Manual)  81.8  


 


Band Neutrophils %  0.0  


 


Lymphocytes % (Manual)  8.1  


 


Monocytes % (Manual)  4  


 


Eosinophils % (Manual)  2.0  D  


 


Basophils % (Manual)  0.0  


 


Myelocytes % (Man)  1  D  


 


Promyelocytes % (Man)  0  


 


Blast Cells % (Manual)  0  


 


Nucleated RBC %  0  


 


Metamyelocytes  1  D  


 


Platelet Estimate  Normal  


 


POC Glucometer   197  190














  05/13/18 05/13/18





  06:36 11:37


 


Total Counted  


 


Neutrophils % (Manual)  


 


Band Neutrophils %  


 


Lymphocytes % (Manual)  


 


Monocytes % (Manual)  


 


Eosinophils % (Manual)  


 


Basophils % (Manual)  


 


Myelocytes % (Man)  


 


Promyelocytes % (Man)  


 


Blast Cells % (Manual)  


 


Nucleated RBC %  


 


Metamyelocytes  


 


Platelet Estimate  


 


POC Glucometer  80  210














Problem List





- Problems


(1) Acute hypoxemic respiratory failure


Code(s): J96.01 - ACUTE RESPIRATORY FAILURE WITH HYPOXIA   





(2) ASHD (arteriosclerotic heart disease)


Code(s): I25.10 - ATHSCL HEART DISEASE OF NATIVE CORONARY ARTERY W/O ANG PCTRS 

  





(3) COPD (chronic obstructive pulmonary disease)


Code(s): J44.9 - CHRONIC OBSTRUCTIVE PULMONARY DISEASE, UNSPECIFIED   


Qualifiers: 


   COPD type: chronic bronchitis 





(4) Chronic diastolic CHF (congestive heart failure)


Code(s): I50.32 - CHRONIC DIASTOLIC (CONGESTIVE) HEART FAILURE   





(5) Dehydration


Code(s): E86.0 - DEHYDRATION   





(6) Fever


Code(s): R50.9 - FEVER, UNSPECIFIED   





(7) Pneumonia


Code(s): J18.9 - PNEUMONIA, UNSPECIFIED ORGANISM   


Qualifiers: 


   Laterality: right   Lung location: lower lobe of lung 





(8) COPD exacerbation


Code(s): J44.1 - CHRONIC OBSTRUCTIVE PULMONARY DISEASE W (ACUTE) EXACERBATION   





(9) HTN (hypertension)


Code(s): I10 - ESSENTIAL (PRIMARY) HYPERTENSION   


Qualifiers: 


   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   





(10) CKD (chronic kidney disease) stage 3, GFR 30-59 ml/min


Code(s): N18.3 - CHRONIC KIDNEY DISEASE, STAGE 3 (MODERATE)   





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


Code(s): A48.1 - LEGIONNAIRES' DISEASE   





Assessment/Plan


IMP ACUTE HYPOXEMIC RESPIRATORY FAILURE IMPROVING


     BILATERAL PNEUMONIA


     LEGIONELLA PNEUMONIA


     COPD


     CHF


     CKD


     DM


     ARTHRITIS


     TOBACCO ABUSE





PLAN ABX


       INHALED BRONCHODILATORS


       IVF


       STEROIDS BEING TAPERED 


       O2 AS NEEDED 


       MONITOR LYTES,RENAL FUNCTION


       NO SMOKING 


      


DR DE GUZMAN

## 2018-05-13 NOTE — PN
Progress Note (short form)





- Note


Progress Note: 





doing well


finally expectorating green sputum


ambulated in the [place]


less sob





 Vital Signs











 Period  Temp  Pulse  Resp  BP Sys/Thrasher  Pulse Ox


 


 Last 24 Hr  97.9 F-98.8 F  65-76  15-20  143-157/53-62  96








 CBC, BMP





 05/12/18 07:51 





 05/12/18 07:51 











a/p


legionella pneumonia


hypoxia


leukocytosis- probably multifactorial


continue levaquin/rifampin-day #9 


renal function improving


repeat labs in am


clinically improving

## 2018-05-14 NOTE — PN
Progress Note, Physician


History of Present Illness: 





pulmonary





alert,feeling better,dyspnea improving,+ cough





- Current Medication List


Current Medications: 


Active Medications





Acetaminophen (Tylenol -)  650 mg PO Q6H PRN


   PRN Reason: FEVER


   Last Admin: 05/04/18 23:35 Dose:  650 mg


Albuterol Sulfate (Ventolin 0.083% Nebulizer Soln -)  1 amp NEB Q4H PRN


   PRN Reason: SHORT OF BREATH/WHEEZING


   Last Admin: 05/14/18 04:51 Dose:  1 amp


Alprazolam (Xanax -)  0.25 mg PO Q24H PRN


   PRN Reason: ANXIETY


   Last Admin: 05/13/18 21:35 Dose:  0.25 mg


Amlodipine Besylate (Norvasc -)  10 mg PO DAILY [facility]


   Last Admin: 05/14/18 09:36 Dose:  10 mg


Arformoterol Tartrate (Brovana (Restricted To Pulmonology/Resp) -)  1 amp NEB 

RBID [facility]


   Last Admin: 05/14/18 08:09 Dose:  1 amp


Aspirin (Asa -)  81 mg PO DAILY [facility]


   Last Admin: 05/14/18 09:36 Dose:  81 mg


Brimonidine Tartrate (Alphagan 0.2% -)  1 drop OU BID [facility]


   Last Admin: 05/14/18 09:29 Dose:  1 drop


Escitalopram Oxalate (Lexapro -)  10 mg PO DAILY [facility]


   Last Admin: 05/14/18 09:36 Dose:  10 mg


Fluconazole (Diflucan -)  100 mg PO DAILY [facility]


Furosemide (Lasix -)  40 mg PO DAILY [facility]


   Last Admin: 05/14/18 09:36 Dose:  40 mg


Guaifenesin (Diabetic Tussin Dm -)  10 ml PO Q6H PRN


   PRN Reason: COUGH


   Last Admin: 05/08/18 09:17 Dose:  10 ml


Insulin Aspart (Novolog Vial Sliding Scale -)  1 vial SQ ACHS [facility]


   PRN Reason: Protocol


   Last Admin: 05/14/18 06:35 Dose:  Not Given


Isosorbide Mononitrate (Imdur -)  30 mg PO DAILY [facility]


   Last Admin: 05/14/18 09:36 Dose:  30 mg


Latanoprost (Xalatan 0.005% Eye Drops -)  1 drop OU HS [facility]


   Last Admin: 05/13/18 21:49 Dose:  1 drop


Levofloxacin (Levaquin -)  500 mg PO DAILY [facility]


   Last Admin: 05/14/18 09:36 Dose:  500 mg


Magnesium Hydroxide (Milk Of Magnesia -)  30 ml PO Q8H PRN


   PRN Reason: INDIGESTION


   Last Admin: 05/08/18 14:17 Dose:  30 ml


Nebivolol (Bystolic -)  10 mg PO DAILY [facility]


   Last Admin: 05/14/18 09:35 Dose:  10 mg


Ptnt's Own Med( Insulin Glargine,Hum .Rec.Anlog [Toujeo Solostar]  80 unit SQ 

DAILY@0700 [facility]


   Last Admin: 05/14/18 06:35 Dose:  Not Given


Pt's Own Med ( Insulin Lispro Protamine/Lispro ( Humalog Mix 50/50 Vial)  18 

each SQ BID@0700,1630 [facility]


   Last Admin: 05/14/18 06:35 Dose:  Not Given


Nystatin (Nystatin Oral Suspension -)  500,000 units PO QID [facility]


   Last Admin: 05/14/18 09:35 Dose:  500,000 units


Pantoprazole Sodium (Protonix -)  20 mg PO DAILY [facility]


   Last Admin: 05/14/18 09:36 Dose:  20 mg


Prednisone (Deltasone -)  20 mg PO DAILY [facility]


   Last Admin: 05/14/18 09:36 Dose:  20 mg


Rifampin (Rifadin -)  300 mg PO BID@0600,1800 [facility]


   Last Admin: 05/14/18 05:58 Dose:  300 mg


Rosuvastatin Calcium (Crestor -)  10 mg PO HS [facility]


   Last Admin: 05/13/18 21:35 Dose:  10 mg


Timolol Maleate (Timoptic 0.5%)  1 drop OU BID [facility]


   Last Admin: 05/14/18 09:37 Dose:  1 drop











- Objective


Vital Signs: 


 Vital Signs











Temperature  98.1 F   05/14/18 09:40


 


Pulse Rate  78   05/14/18 09:40


 


Respiratory Rate  19   05/14/18 09:40


 


Blood Pressure  132/47   05/14/18 09:40


 


O2 Sat by Pulse Oximetry (%)  96   05/13/18 21:00











Constitutional: Yes: Well Nourished, Calm


Eyes: Yes: WNL


HENT: Yes: WNL


Neck: Yes: WNL


Cardiovascular: Yes: Regular Rate and Rhythm, S1, S2


Respiratory: Yes: Rhonchi (scattered bryan rhonchi)


Gastrointestinal: Yes: Normal Bowel Sounds, Soft


Extremities: Yes: WNL


Edema: No


Labs: 


 CBC, BMP





 05/14/18 06:29 





 05/14/18 06:29 





 INR, PTT











INR  1.04  (0.82-1.09)   05/03/18  14:34    














Problem List





- Problems


(1) Acute hypoxemic respiratory failure


Code(s): J96.01 - ACUTE RESPIRATORY FAILURE WITH HYPOXIA   





(2) ASHD (arteriosclerotic heart disease)


Code(s): I25.10 - ATHSCL HEART DISEASE OF NATIVE CORONARY ARTERY W/O ANG PCTRS 

  





(3) COPD (chronic obstructive pulmonary disease)


Code(s): J44.9 - CHRONIC OBSTRUCTIVE PULMONARY DISEASE, UNSPECIFIED   


Qualifiers: 


   COPD type: chronic bronchitis 





(4) Chronic diastolic CHF (congestive heart failure)


Code(s): I50.32 - CHRONIC DIASTOLIC (CONGESTIVE) HEART FAILURE   





(5) Dehydration


Code(s): E86.0 - DEHYDRATION   





(6) Fever


Code(s): R50.9 - FEVER, UNSPECIFIED   





(7) Pneumonia


Code(s): J18.9 - PNEUMONIA, UNSPECIFIED ORGANISM   


Qualifiers: 


   Laterality: right   Lung location: lower lobe of lung 





(8) COPD exacerbation


Code(s): J44.1 - CHRONIC OBSTRUCTIVE PULMONARY DISEASE W (ACUTE) EXACERBATION   





(9) HTN (hypertension)


Code(s): I10 - ESSENTIAL (PRIMARY) HYPERTENSION   


Qualifiers: 


   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   





(10) CKD (chronic kidney disease) stage 3, GFR 30-59 ml/min


Code(s): N18.3 - CHRONIC KIDNEY DISEASE, STAGE 3 (MODERATE)   





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


Code(s): A48.1 - LEGIONNAIRES' DISEASE   





Assessment/Plan





IMP ACUTE HYPOXEMIC RESPIRATORY FAILURE IMPROVING


     BILATERAL PNEUMONIA


     LEGIONELLA PNEUMONIA


     COPD


     CHF


     CKD


     DM


     ARTHRITIS


     TOBACCO ABUSE


 





       ABX AS PER ID


       INHALED BRONCHODILATORS


       IVF


       PREDNISONE


       O2


       MONITOR LYTES,RENAL FUNCTION


      





DR REED





 Problem List 





- Problems


(1) Acute hypoxemic respiratory failure


Code(s): J96.01 - ACUTE RESPIRATORY FAILURE WITH HYPOXIA   





(2) ASHD (arteriosclerotic heart disease)


Code(s): I25.10 - ATHSCL HEART DISEASE OF NATIVE CORONARY ARTERY W/O ANG PCTRS 

  





(3) COPD (chronic obstructive pulmonary disease)


Code(s): J44.9 - CHRONIC OBSTRUCTIVE PULMONARY DISEASE, UNSPECIFIED   


Qualifiers: 


   COPD type: chronic bronchitis 





(4) Chronic diastolic CHF (congestive heart failure)


Code(s): I50.32 - CHRONIC DIASTOLIC (CONGESTIVE) HEART FAILURE   





(5) Dehydration


Code(s): E86.0 - DEHYDRATION   





(6) Fever


Code(s): R50.9 - FEVER, UNSPECIFIED   





(7) Pneumonia


Code(s): J18.9 - PNEUMONIA, UNSPECIFIED ORGANISM   


Qualifiers: 


   Laterality: right   Lung location: lower lobe of lung 





(8) COPD exacerbation


Code(s): J44.1 - CHRONIC OBSTRUCTIVE PULMONARY DISEASE W (ACUTE) EXACERBATION   





(9) HTN (hypertension)


Code(s): I10 - ESSENTIAL (PRIMARY) HYPERTENSION   


Qualifiers: 


   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   





(10) CKD (chronic kidney disease) stage 3, GFR 30-59 ml/min


Code(s): N18.3 - CHRONIC KIDNEY DISEASE, STAGE 3 (MODERATE)   





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


Code(s): A48.1 - LEGIONNAIRES' DISEASE

## 2018-05-14 NOTE — PN
Progress Note, Physician


Chief Complaint: 





ID











Dramatic improvement since I last saw her on admission with sepsis and PNA








Levofloxacin and Rifampin day 10





- Current Medication List


Current Medications: 


Active Medications





Acetaminophen (Tylenol -)  650 mg PO Q6H PRN


   PRN Reason: FEVER


   Last Admin: 05/04/18 23:35 Dose:  650 mg


Albuterol Sulfate (Ventolin 0.083% Nebulizer Soln -)  1 amp NEB Q4H PRN


   PRN Reason: SHORT OF BREATH/WHEEZING


   Last Admin: 05/14/18 04:51 Dose:  1 amp


Alprazolam (Xanax -)  0.25 mg PO Q24H PRN


   PRN Reason: ANXIETY


   Last Admin: 05/13/18 21:35 Dose:  0.25 mg


Amlodipine Besylate (Norvasc -)  10 mg PO DAILY [facility]


   Last Admin: 05/13/18 09:35 Dose:  10 mg


Arformoterol Tartrate (Brovana (Restricted To Pulmonology/Resp) -)  1 amp NEB 

RBID [facility]


   Last Admin: 05/14/18 08:09 Dose:  1 amp


Aspirin (Asa -)  81 mg PO DAILY [facility]


   Last Admin: 05/13/18 09:35 Dose:  81 mg


Brimonidine Tartrate (Alphagan 0.2% -)  1 drop OU BID [facility]


   Last Admin: 05/13/18 21:48 Dose:  1 drop


Escitalopram Oxalate (Lexapro -)  10 mg PO DAILY [facility]


   Last Admin: 05/13/18 09:35 Dose:  10 mg


Furosemide (Lasix -)  40 mg PO DAILY [facility]


   Last Admin: 05/13/18 09:36 Dose:  40 mg


Guaifenesin (Diabetic Tussin Dm -)  10 ml PO Q6H PRN


   PRN Reason: COUGH


   Last Admin: 05/08/18 09:17 Dose:  10 ml


Levofloxacin (Levaquin 750 Mg Premixed Ivpb -)  750 mg in 150 mls @ 150 mls/hr 

IVPB DAILY [facility]


   PRN Reason: Protocol


   Last Admin: 05/13/18 09:34 Dose:  150 mls/hr


Insulin Aspart (Novolog Vial Sliding Scale -)  1 vial SQ ACHS [facility]


   PRN Reason: Protocol


   Last Admin: 05/14/18 06:35 Dose:  Not Given


Isosorbide Mononitrate (Imdur -)  30 mg PO DAILY [facility]


   Last Admin: 05/13/18 09:35 Dose:  30 mg


Latanoprost (Xalatan 0.005% Eye Drops -)  1 drop OU [facility]


   Last Admin: 05/13/18 21:49 Dose:  1 drop


Magnesium Hydroxide (Milk Of Magnesia -)  30 ml PO Q8H PRN


   PRN Reason: INDIGESTION


   Last Admin: 05/08/18 14:17 Dose:  30 ml


Methylprednisolone Sodium Succinate (Solu-Medrol -)  30 mg IVPUSH BID [facility]


   Last Admin: 05/13/18 21:38 Dose:  30 mg


Nebivolol (Bystolic -)  10 mg PO DAILY [facility]


   Last Admin: 05/13/18 09:35 Dose:  10 mg


Ptnt's Own Med( Insulin Glargine,Hum .Rec.Anlog [Toujeo Solostar]  80 unit SQ 

DAILY@0700 [facility]


   Last Admin: 05/14/18 06:35 Dose:  Not Given


Pt's Own Med ( Insulin Lispro Protamine/Lispro ( Humalog Mix 50/50 Vial)  18 

each SQ BID@0700,1630 [facility]


   Last Admin: 05/14/18 06:35 Dose:  Not Given


Nystatin (Nystatin Oral Suspension -)  500,000 units PO QID [facility]


   Last Admin: 05/13/18 21:38 Dose:  500,000 units


Pantoprazole Sodium (Protonix -)  20 mg PO DAILY [facility]


   Last Admin: 05/13/18 09:35 Dose:  20 mg


Rifampin (Rifadin -)  300 mg PO BID@0600,1800 [facility]


   Last Admin: 05/14/18 05:58 Dose:  300 mg


Rosuvastatin Calcium (Crestor -)  10 mg PO [facility]


   Last Admin: 05/13/18 21:35 Dose:  10 mg


Timolol Maleate (Timoptic 0.5%)  1 drop OU BID [facility]


   Last Admin: 05/13/18 21:49 Dose:  1 drop











- Objective


Vital Signs: 


 Vital Signs











Temperature  98.7 F   05/14/18 06:00


 


Pulse Rate  73   05/14/18 06:00


 


Respiratory Rate  20   05/14/18 06:00


 


Blood Pressure  148/69   05/14/18 06:00


 


O2 Sat by Pulse Oximetry (%)  96   05/13/18 21:00











Constitutional: Yes: No Distress


HENT: Yes: WNL, Atraumatic


Neck: Yes: WNL, Supple


Cardiovascular: Yes: WNL, S1, S2


Respiratory: Yes: WNL, Regular, CTA Bilaterally.  No: Rales, Rhonchi


Gastrointestinal: Yes: WNL, Normal Bowel Sounds, Soft.  No: Tenderness, 

Tenderness, Epigastrium


Extremities: No: Cold, Cool, Cyanosis


Edema: No


Labs: 


 CBC, BMP





 05/14/18 06:29 





 05/14/18 06:29 





 INR, PTT











INR  1.04  (0.82-1.09)   05/03/18  14:34    














Problem List





- Problems


(1) Pneumonia


Code(s): J18.9 - PNEUMONIA, UNSPECIFIED ORGANISM   


Qualifiers: 


   Laterality: right   Lung location: lower lobe of lung 





(2) Sepsis


Code(s): A41.9 - SEPSIS, UNSPECIFIED ORGANISM   


Qualifiers: 


   Sepsis type: sepsis due to unspecified organism   Qualified Code(s): A41.9 - 

Sepsis, unspecified organism   





(3) COPD exacerbation


Code(s): J44.1 - CHRONIC OBSTRUCTIVE PULMONARY DISEASE W (ACUTE) EXACERBATION   





(4) IDDM (insulin dependent diabetes mellitus)


Code(s): E11.9 - TYPE 2 DIABETES MELLITUS WITHOUT COMPLICATIONS; Z79.4 - LONG 

TERM (CURRENT) USE OF INSULIN   





(5) Legionella infection


Code(s): A48.1 - LEGIONNAIRES' DISEASE   





Assessment/Plan





Microbiology





05/08/18 19:00   Urine - Urine Clean Catch   Urine Culture - Final


                              NO GROWTH OBTAINED


05/05/18 00:55   Urine For Antigen Detection   Legionella Antigen - Final


05/05/18 00:55   Urine For Antigen Detection   Streptococcus pneumoniae Antigen 

(M - Final


05/03/18 14:34   Blood - Peripheral Venous   Blood Culture - Final


                              NO GROWTH AFTER 5 DAYS INCUBATION


05/03/18 14:34   Blood - Peripheral Venous   Blood Culture - Final


                              NO GROWTH AFTER 5 DAYS INCUBATION


05/07/18 07:50   Serum   Legionella Serology - Preliminary





 Laboratory Tests











  05/04/18 05/12/18 05/14/18





  14:45 07:51 06:29


 


WBC   19.7 H 


 


Hgb   10.7 


 


Hct   31.8 L 


 


Plt Count   317 


 


BUN    68 H


 


Creatinine    1.4 H


 


Creat Clearance w eGFR    37.86


 


HIV 1&2 Antibody Screen  Negative  


 


HIV P24 Antigen  Negative  








Assessment





Sepsis syndrome resolved now secondary PNA





Legionellosis





CKD





Diabetes





Thrush secondary steroids





Plan


Change to oral Levofloxacin 500 daily x 3 days more


Rifampin x 3 days


Taper steroids


Add diflucan 7 days





Odilia MCKEON

## 2018-05-14 NOTE — PN
Progress Note (short form)





- Note


Progress Note: 


Continue to improve


sitting in chair


coughing greenish expectorant


afebrile


all f/u noted





 Vital Signs











Temp  98.1 F   05/14/18 09:40


 


Pulse  78   05/14/18 09:40


 


Resp  19   05/14/18 09:40


 


BP  132/47   05/14/18 09:40


 


Pulse Ox  96   05/14/18 09:00








 Intake & Output











 05/13/18 05/14/18 05/14/18





 23:59 11:59 23:59


 


Intake Total 660 210 


 


Balance 660 210 


 


Intake:   


 


  IV 20 10 


 


    sl 20 10 


 


  IVPB 150  


 


  Oral 490 200 


 


Other:   


 


  Voiding Method Toilet Toilet 


 


  # Unmeasured Voids   


 


    Void  3 


 


  Bowel Movement Yes  








Active Medications





Acetaminophen (Tylenol -)  650 mg PO Q6H PRN


   PRN Reason: FEVER


   Last Admin: 05/04/18 23:35 Dose:  650 mg


Albuterol Sulfate (Ventolin 0.083% Nebulizer Soln -)  1 amp NEB Q4H PRN


   PRN Reason: SHORT OF BREATH/WHEEZING


   Last Admin: 05/14/18 04:51 Dose:  1 amp


Alprazolam (Xanax -)  0.25 mg PO Q24H PRN


   PRN Reason: ANXIETY


   Last Admin: 05/13/18 21:35 Dose:  0.25 mg


Amlodipine Besylate (Norvasc -)  10 mg PO DAILY [facility]


   Last Admin: 05/14/18 09:36 Dose:  10 mg


Arformoterol Tartrate (Brovana (Restricted To Pulmonology/Resp) -)  1 amp NEB 

RBID [facility]


   Last Admin: 05/14/18 08:09 Dose:  1 amp


Aspirin (Asa -)  81 mg PO DAILY [facility]


   Last Admin: 05/14/18 09:36 Dose:  81 mg


Brimonidine Tartrate (Alphagan 0.2% -)  1 drop OU BID [facility]


   Last Admin: 05/14/18 09:29 Dose:  1 drop


Escitalopram Oxalate (Lexapro -)  10 mg PO DAILY [facility]


   Last Admin: 05/14/18 09:36 Dose:  10 mg


Fluconazole (Diflucan -)  100 mg PO DAILY [facility]


Furosemide (Lasix -)  40 mg PO DAILY [facility]


   Last Admin: 05/14/18 09:36 Dose:  40 mg


Guaifenesin (Diabetic Tussin Dm -)  10 ml PO Q6H PRN


   PRN Reason: COUGH


   Last Admin: 05/08/18 09:17 Dose:  10 ml


Insulin Aspart (Novolog Vial Sliding Scale -)  1 vial SQ ACHS [facility]


   PRN Reason: Protocol


   Last Admin: 05/14/18 12:36 Dose:  6 units


Isosorbide Mononitrate (Imdur -)  30 mg PO DAILY [facility]


   Last Admin: 05/14/18 09:36 Dose:  30 mg


Latanoprost (Xalatan 0.005% Eye Drops -)  1 drop OU [facility]


   Last Admin: 05/13/18 21:49 Dose:  1 drop


Levofloxacin (Levaquin -)  500 mg PO DAILY [facility]


   Last Admin: 05/14/18 09:36 Dose:  500 mg


Magnesium Hydroxide (Milk Of Magnesia -)  30 ml PO Q8H PRN


   PRN Reason: INDIGESTION


   Last Admin: 05/08/18 14:17 Dose:  30 ml


Nebivolol (Bystolic -)  10 mg PO DAILY [facility]


   Last Admin: 05/14/18 09:35 Dose:  10 mg


Ptnt's Own Med( Insulin Glargine,Hum .Rec.Anlog [Toujeo Solostar]  80 unit SQ 

DAILY@0700 [facility]


   Last Admin: 05/14/18 06:35 Dose:  Not Given


Pt's Own Med ( Insulin Lispro Protamine/Lispro ( Humalog Mix 50/50 Vial)  18 

each SQ BID@0700,1630 [facility]


   Last Admin: 05/14/18 06:35 Dose:  Not Given


Nystatin (Nystatin Oral Suspension -)  500,000 units PO QID [facility]


   Last Admin: 05/14/18 09:35 Dose:  500,000 units


Pantoprazole Sodium (Protonix -)  20 mg PO DAILY [facility]


   Last Admin: 05/14/18 09:36 Dose:  20 mg


Prednisone (Deltasone -)  20 mg PO DAILY [facility]


   Last Admin: 05/14/18 09:36 Dose:  20 mg


Rifampin (Rifadin -)  300 mg PO BID@0600,1800 [facility]


   Last Admin: 05/14/18 05:58 Dose:  300 mg


Rosuvastatin Calcium (Crestor -)  10 mg PO HS [facility]


   Last Admin: 05/13/18 21:35 Dose:  10 mg


Timolol Maleate (Timoptic 0.5%)  1 drop OU BID [facility]


   Last Admin: 05/14/18 09:37 Dose:  1 drop





 CBC, BMP





 05/14/18 06:29 





 05/14/18 06:29 





Physical 


Constitutional: Yes: No Distress -- better


Cardiovascular: Yes: Regular Rate and Rhythm


Respiratory: Yes: Diminished at bases .


Gastrointestinal: Yes: Normal Bowel Sounds, Soft, Abdomen, Obese.  No: 

Tenderness


Edema: LLE: Trace, RLE: Trace








Assessment/Plan


better 


Slowly improving


urine legionella positive


on prednisone


 iv antibiotics-- chenged to po


nebs


O2 


afebrile


physical therapy


monitor bgm.


check pulse ox on room air and after exercise


Smoking cessation counselling


discharge planning


Likely tomorrow -- str


will follow








Problem List





- Problems


(1) Fever


Code(s): R50.9 - FEVER, UNSPECIFIED   





(2) Dehydration


Code(s): E86.0 - DEHYDRATION   





(3) COPD (chronic obstructive pulmonary disease)


Code(s): J44.9 - CHRONIC OBSTRUCTIVE PULMONARY DISEASE, UNSPECIFIED   


Qualifiers: 


   COPD type: chronic bronchitis 





(4) HTN (hypertension)


Code(s): I10 - ESSENTIAL (PRIMARY) HYPERTENSION   


Qualifiers: 


   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   





(5) IDDM (insulin dependent diabetes mellitus)


Code(s): E11.9 - TYPE 2 DIABETES MELLITUS WITHOUT COMPLICATIONS; Z79.4 - LONG 

TERM (CURRENT) USE OF INSULIN   





(6) Obesity (BMI 30-39.9)


Code(s): E66.9 - OBESITY, UNSPECIFIED   





(7) Renal insufficiency


Code(s): N28.9 - DISORDER OF KIDNEY AND URETER, UNSPECIFIED

## 2018-05-15 NOTE — PN
Progress Note, Physician


History of Present Illness: 





PULMONARY





ALERT,OOB-CHAIR,-SOB. O2 SAT 94% ON RA





- Current Medication List


Current Medications: 


Active Medications





Acetaminophen (Tylenol -)  650 mg PO Q6H PRN


   PRN Reason: FEVER


   Last Admin: 05/04/18 23:35 Dose:  650 mg


Albuterol Sulfate (Ventolin 0.083% Nebulizer Soln -)  1 amp NEB Q4H PRN


   PRN Reason: SHORT OF BREATH/WHEEZING


   Last Admin: 05/15/18 07:34 Dose:  1 amp


Alprazolam (Xanax -)  0.25 mg PO Q24H PRN


   PRN Reason: ANXIETY


   Last Admin: 05/13/18 21:35 Dose:  0.25 mg


Amlodipine Besylate (Norvasc -)  10 mg PO DAILY [facility]


   Last Admin: 05/15/18 09:58 Dose:  10 mg


Arformoterol Tartrate (Brovana (Restricted To Pulmonology/Resp) -)  1 amp NEB 

RBID [facility]


   Last Admin: 05/14/18 20:20 Dose:  1 amp


Aspirin (Asa -)  81 mg PO DAILY [facility]


   Last Admin: 05/15/18 09:58 Dose:  81 mg


Brimonidine Tartrate (Alphagan 0.2% -)  1 drop OU BID [facility]


   Last Admin: 05/15/18 09:58 Dose:  1 drop


Escitalopram Oxalate (Lexapro -)  10 mg PO DAILY [facility]


   Last Admin: 05/15/18 09:58 Dose:  10 mg


Fluconazole (Diflucan -)  100 mg PO DAILY [facility]


   Last Admin: 05/15/18 09:58 Dose:  100 mg


Furosemide (Lasix -)  40 mg PO DAILY [facility]


   Last Admin: 05/15/18 09:58 Dose:  40 mg


Guaifenesin (Diabetic Tussin Dm -)  10 ml PO Q6H PRN


   PRN Reason: COUGH


   Last Admin: 05/08/18 09:17 Dose:  10 ml


Insulin Aspart (Novolog Vial Sliding Scale -)  1 vial SQ ACHS [facility]


   PRN Reason: Protocol


   Last Admin: 05/15/18 06:00 Dose:  Not Given


Isosorbide Mononitrate (Imdur -)  30 mg PO DAILY [facility]


   Last Admin: 05/15/18 09:58 Dose:  30 mg


Latanoprost (Xalatan 0.005% Eye Drops -)  1 drop OU HS [facility]


   Last Admin: 05/14/18 21:39 Dose:  1 drop


Levofloxacin (Levaquin -)  500 mg PO DAILY [facility]


   Last Admin: 05/15/18 09:58 Dose:  500 mg


Magnesium Hydroxide (Milk Of Magnesia -)  30 ml PO Q8H PRN


   PRN Reason: INDIGESTION


   Last Admin: 05/08/18 14:17 Dose:  30 ml


Nebivolol (Bystolic -)  10 mg PO DAILY [facility]


   Last Admin: 05/15/18 09:58 Dose:  10 mg


Ptnt's Own Med( Insulin Glargine,Hum .Rec.Anlog [Toujeo Solostar]  80 unit SQ 

DAILY@0700 [facility]


   Last Admin: 05/15/18 06:00 Dose:  Not Given


Pt's Own Med ( Insulin Lispro Protamine/Lispro ( Humalog Mix 50/50 Vial)  18 

each SQ BID@0700,1630 [facility]


   Last Admin: 05/15/18 06:00 Dose:  Not Given


Nystatin (Nystatin Oral Suspension -)  500,000 units PO QID [facility]


   Last Admin: 05/15/18 09:58 Dose:  500,000 units


Pantoprazole Sodium (Protonix -)  20 mg PO DAILY [facility]


   Last Admin: 05/15/18 09:58 Dose:  20 mg


Prednisone (Deltasone -)  20 mg PO DAILY [facility]


   Last Admin: 05/15/18 09:58 Dose:  20 mg


Rifampin (Rifadin -)  300 mg PO BID@0600,1800 [facility]


   Last Admin: 05/15/18 05:36 Dose:  300 mg


Rosuvastatin Calcium (Crestor -)  10 mg PO [facility]


   Last Admin: 05/14/18 21:36 Dose:  10 mg


Timolol Maleate (Timoptic 0.5%)  1 drop OU BID [facility]


   Last Admin: 05/15/18 09:58 Dose:  1 drop











- Objective


Vital Signs: 


 Vital Signs











Temperature  98.6 F   05/15/18 06:00


 


Pulse Rate  73   05/15/18 06:00


 


Respiratory Rate  20   05/15/18 06:00


 


Blood Pressure  101/58   05/15/18 06:00


 


O2 Sat by Pulse Oximetry (%)  94 L  05/14/18 21:00











Constitutional: Yes: Well Nourished, Calm


Eyes: Yes: WNL


HENT: Yes: WNL


Neck: Yes: WNL


Cardiovascular: Yes: Regular Rate and Rhythm, S1, S2


Respiratory: Yes: Diminished


Gastrointestinal: Yes: Normal Bowel Sounds, Soft


Extremities: Yes: WNL


Edema: No


Labs: 


 CBC, BMP








Problem List





- Problems


(1) Acute hypoxemic respiratory failure


Code(s): J96.01 - ACUTE RESPIRATORY FAILURE WITH HYPOXIA   





(2) ASHD (arteriosclerotic heart disease)


Code(s): I25.10 - ATHSCL HEART DISEASE OF NATIVE CORONARY ARTERY W/O ANG PCTRS 

  





(3) COPD (chronic obstructive pulmonary disease)


Code(s): J44.9 - CHRONIC OBSTRUCTIVE PULMONARY DISEASE, UNSPECIFIED   


Qualifiers: 


   COPD type: chronic bronchitis 





(4) Chronic diastolic CHF (congestive heart failure)


Code(s): I50.32 - CHRONIC DIASTOLIC (CONGESTIVE) HEART FAILURE   





(5) Dehydration


Code(s): E86.0 - DEHYDRATION   





(6) Fever


Code(s): R50.9 - FEVER, UNSPECIFIED   





(7) Pneumonia


Code(s): J18.9 - PNEUMONIA, UNSPECIFIED ORGANISM   


Qualifiers: 


   Laterality: right   Lung location: lower lobe of lung 





(8) COPD exacerbation


Code(s): J44.1 - CHRONIC OBSTRUCTIVE PULMONARY DISEASE W (ACUTE) EXACERBATION   





(9) HTN (hypertension)


Code(s): I10 - ESSENTIAL (PRIMARY) HYPERTENSION   


Qualifiers: 


   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   





(10) CKD (chronic kidney disease) stage 3, GFR 30-59 ml/min


Code(s): N18.3 - CHRONIC KIDNEY DISEASE, STAGE 3 (MODERATE)   





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


Code(s): A48.1 - LEGIONNAIRES' DISEASE   





Assessment/Plan





IMP ACUTE HYPOXEMIC RESPIRATORY FAILURE IMPROVED


     BILATERAL PNEUMONIA CLINICALLY IMPROVED


     LEGIONELLA PNEUMONIA IMPROVED


     COPD


     CHF


     CKD


     DM


     ARTHRITIS


     TOBACCO ABUSE


 





       ABX AS PER ID


       INHALED BRONCHODILATORS


       IVF


       PREDNISONE TAPER


       O2


        F/U CHEST CT 6-8WKS AS OUTPATIENT


       PFTS OUTPATIENT


      





DR REED





 Problem List 





- Problems


(1) Acute hypoxemic respiratory failure


Code(s): J96.01 - ACUTE RESPIRATORY FAILURE WITH HYPOXIA   





(2) ASHD (arteriosclerotic heart disease)


Code(s): I25.10 - ATHSCL HEART DISEASE OF NATIVE CORONARY ARTERY W/O ANG PCTRS 

  





(3) COPD (chronic obstructive pulmonary disease)


Code(s): J44.9 - CHRONIC OBSTRUCTIVE PULMONARY DISEASE, UNSPECIFIED   


Qualifiers: 


   COPD type: chronic bronchitis 





(4) Chronic diastolic CHF (congestive heart failure)


Code(s): I50.32 - CHRONIC DIASTOLIC (CONGESTIVE) HEART FAILURE   





(5) Dehydration


Code(s): E86.0 - DEHYDRATION   





(6) Fever


Code(s): R50.9 - FEVER, UNSPECIFIED   





(7) Pneumonia


Code(s): J18.9 - PNEUMONIA, UNSPECIFIED ORGANISM   


Qualifiers: 


   Laterality: right   Lung location: lower lobe of lung 





(8) COPD exacerbation


Code(s): J44.1 - CHRONIC OBSTRUCTIVE PULMONARY DISEASE W (ACUTE) EXACERBATION   





(9) HTN (hypertension)


Code(s): I10 - ESSENTIAL (PRIMARY) HYPERTENSION   


Qualifiers: 


   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   





(10) CKD (chronic kidney disease) stage 3, GFR 30-59 ml/min


Code(s): N18.3 - CHRONIC KIDNEY DISEASE, STAGE 3 (MODERATE)   





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


Code(s): A48.1 - LEGIONNAIRES' DISEASE

## 2018-05-15 NOTE — DS
Physical Examination


Vital Signs: 


 Vital Signs











Temperature  98.6 F   05/15/18 06:00


 


Pulse Rate  73   05/15/18 06:00


 


Respiratory Rate  20   05/15/18 06:00


 


Blood Pressure  101/58   05/15/18 06:00


 


O2 Sat by Pulse Oximetry (%)  94 L  05/14/18 21:00











Constitutional: Yes: No Distress, Calm


Cardiovascular: Yes: Regular Rate and Rhythm


Respiratory: Yes: Diminished.  No: Wheezes


Gastrointestinal: Yes: Normal Bowel Sounds, Soft, Abdomen, Obese.  No: 

Tenderness


Edema: No


Labs: 


 CBC, BMP





 05/14/18 06:29 





 05/14/18 06:29 











Discharge Summary


Reason For Visit: SEPSIS


Current Active Problems





ASHD (arteriosclerotic heart disease) (Acute)


Acute hypoxemic respiratory failure (Acute)


CKD (chronic kidney disease) stage 3, GFR 30-59 ml/min (Acute)


COPD (chronic obstructive pulmonary disease) (Acute)


Chronic diastolic CHF (congestive heart failure) (Acute)


Dehydration (Acute)


Fever (Acute)


Legionella infection (Acute)


Legionella pneumonia (Acute)


Pneumonia (Acute)


Sepsis (Acute)








Hospital Course: 


Admission history 


- Admission


Chief Complaint: fever and weakness


History of Present Illness: 


patient 63 yo F with h/o copd, chf, htn DM , CKD, arthritis, obesity status 

post right knee replacement -was sent to hospital for further management--- IV 

by antibiotics/fluids


Patient was seen by me  in the office--- 3 days ago


Patient had upper respiratory symptoms---she had cough/ body aches/ fever-103


Clinically  ---viral infection--- possibly flu


I gave her Tamiflu/ instructed to drink fluids


Patient not getting better--- I advised her to go to ER--which she did


chart reviewed


Patient given IV antibiotics and fluids


Admitted to the floor


Patient seen by me on the floor today


Sitting in chair--but looks weak


Continue to have fever


Denies chest pain


cough presents


Denies urinary burning


Denies abdominal pain


Denies headache or dizziness


Overall weakness.


Blood work showed--- elevated WBCs--- signs of dehydration----elevated BUN/

creatinine.


 chest x-ray is negative








hOSPITALIZATION COURSE


Seen by ID, Cardiology, Pulmonary. Diagnosed to have Legionella pneumonia , 

sepsis, acute hypoxic respiratory distress. On antibiotics, solumedrol, nebs


Had acute on CKD-- now better , at baseline- creatinine 1.4. She improved with 

above treatment, incentive spirometry. She will be discharged on tapering dose 

of Prednisone, Rifampin BID x 2 days and Levaquin x 2 days. Diflucan x 6 days 

and Nystatin swish and swallow as she has thrush due to steroids. She will be 

using her own medication for diabetes. It is important that she has good 

diabetic control. She will need pulomonary and physical rehab. After she is 

discharged from rehab, she will need to follow up with Dr Savage/Dr Lyons- PCP 

and Pulmonary - DR Bartholomew/Dr Tucker/Dr fontanez


Stable for Discharge 


Condition: Improved





- Instructions


Referrals: 


Yelitza Savage MD [Primary Care Provider] - 


Disposition: SKILLED NURSING FACILITY





- Home Medications


Comprehensive Discharge Medication List: 


Ambulatory Orders





Nebivolol HCl [Bystolic] 10 mg PO DAILY 10/14/16 


Rosuvastatin Calcium [Crestor] 10 mg PO HS 10/14/16 


Amlodipine Besylate [Norvasc -] 10 mg PO DAILY #30 tablet 10/18/16 


Vitamin D3 - 2,000 units PO DAILY 10/25/17 


Alprazolam [Xanax] 0.25 mg PO DAILY PRN 12/05/17 


Bimatoprost [Lumigan] 1 drop OU DAILY 12/05/17 


Brimonidine Tartrate/Timolol [Combigan 0.2%-0.5% Eye Drops] 5 5ml OU BID 12/05/ 17 


Dulaglutide [Trulicity] 1.5 mg SQ WEEKLY 12/05/17 


Escitalopram Oxalate [Lexapro -] 10 mg PO DAILY 12/05/17 


Insulin Glargine,Hum.rec.anlog [Toujeo Solostar] 80 unit SQ DAILY 12/05/17 


Isosorbide Mononitrate [Isosorbide Mononitrate ER] 30 mg PO DAILY 12/05/17 


Insulin Lispro Protamin/Lispro [Humalog Mix 50-50 Vial] 18 unit SQ BID 12/19/17 


Aspirin [Ecotrin] 81 mg PO DAILY 05/03/18 


Arformoterol Tartrate [Brovana -] 1 amp NEB RBID #30 amp 05/15/18 


Fluconazole [Diflucan -] 100 mg PO DAILY #6 tablet 05/15/18 


Furosemide [Lasix -] 40 mg PO DAILY #30 tablet 05/15/18 


Guaifenesin AC [Robitussin AC] 10 ml PO Q4H #10 ud MDD 4 05/15/18 


Guaifenesin/D-Methorphan Hb [Diabetic Tussin Dm -] 10 ml PO Q6H PRN #30 ml 05/15

/18 


Insulin Sliding Scale [Novolog Vial Sliding Scale -] 1 vial SQ ACHS #14 units 05

/15/18 


Magnesium Hydrox 2400MG/30Ml [Milk of Magnesia -] 30 ml PO Q8H PRN #14 cup 05/15

/18 


Nystatin Oral Suspension - [Nystatin Oral Susp 360713 Units/5 ML -] 500,000 

units PO QID #14 cup 05/15/18 


Pantoprazole Sodium [Protonix -] 20 mg PO DAILY #30 tablet.ec 05/15/18 


Prednisone See Taper PO DAILY #30 tab.ds.pk 05/15/18 


Rifampin [Rifadin -] 300 mg PO BID@0600,1800 #4 capsule 05/15/18 


levoFLOXacin [Levaquin -] 500 mg PO DAILY #2 tablet 05/15/18

## 2018-05-15 NOTE — PN
Progress Note, Physician


Chief Complaint: 





feels much improved





- Current Medication List


Current Medications: 


Active Medications





Acetaminophen (Tylenol -)  650 mg PO Q6H PRN


   PRN Reason: FEVER


   Last Admin: 05/04/18 23:35 Dose:  650 mg


Albuterol Sulfate (Ventolin 0.083% Nebulizer Soln -)  1 amp NEB Q4H PRN


   PRN Reason: SHORT OF BREATH/WHEEZING


   Last Admin: 05/15/18 07:34 Dose:  1 amp


Alprazolam (Xanax -)  0.25 mg PO Q24H PRN


   PRN Reason: ANXIETY


   Last Admin: 05/13/18 21:35 Dose:  0.25 mg


Amlodipine Besylate (Norvasc -)  10 mg PO DAILY [facility]


   Last Admin: 05/14/18 09:36 Dose:  10 mg


Arformoterol Tartrate (Brovana (Restricted To Pulmonology/Resp) -)  1 amp NEB 

RBID [facility]


   Last Admin: 05/14/18 20:20 Dose:  1 amp


Aspirin (Asa -)  81 mg PO DAILY [facility]


   Last Admin: 05/14/18 09:36 Dose:  81 mg


Brimonidine Tartrate (Alphagan 0.2% -)  1 drop OU BID [facility]


   Last Admin: 05/14/18 21:38 Dose:  1 drop


Escitalopram Oxalate (Lexapro -)  10 mg PO DAILY [facility]


   Last Admin: 05/14/18 09:36 Dose:  10 mg


Fluconazole (Diflucan -)  100 mg PO DAILY [facility]


Furosemide (Lasix -)  40 mg PO DAILY [facility]


   Last Admin: 05/14/18 09:36 Dose:  40 mg


Guaifenesin (Diabetic Tussin Dm -)  10 ml PO Q6H PRN


   PRN Reason: COUGH


   Last Admin: 05/08/18 09:17 Dose:  10 ml


Insulin Aspart (Novolog Vial Sliding Scale -)  1 vial SQ ACHS [facility]


   PRN Reason: Protocol


   Last Admin: 05/15/18 06:00 Dose:  Not Given


Isosorbide Mononitrate (Imdur -)  30 mg PO DAILY [facility]


   Last Admin: 05/14/18 09:36 Dose:  30 mg


Latanoprost (Xalatan 0.005% Eye Drops -)  1 drop OU HS [facility]


   Last Admin: 05/14/18 21:39 Dose:  1 drop


Levofloxacin (Levaquin -)  500 mg PO DAILY [facility]


   Last Admin: 05/14/18 09:36 Dose:  500 mg


Magnesium Hydroxide (Milk Of Magnesia -)  30 ml PO Q8H PRN


   PRN Reason: INDIGESTION


   Last Admin: 05/08/18 14:17 Dose:  30 ml


Nebivolol (Bystolic -)  10 mg PO DAILY [facility]


   Last Admin: 05/14/18 09:35 Dose:  10 mg


Ptnt's Own Med( Insulin Glargine,Hum .Rec.Anlog [Toujeo Solostar]  80 unit SQ 

DAILY@0700 [facility]


   Last Admin: 05/15/18 06:00 Dose:  Not Given


Pt's Own Med ( Insulin Lispro Protamine/Lispro ( Humalog Mix 50/50 Vial)  18 

each SQ BID@0700,1630 [facility]


   Last Admin: 05/15/18 06:00 Dose:  Not Given


Nystatin (Nystatin Oral Suspension -)  500,000 units PO QID [facility]


   Last Admin: 05/14/18 21:36 Dose:  500,000 units


Pantoprazole Sodium (Protonix -)  20 mg PO DAILY [facility]


   Last Admin: 05/14/18 09:36 Dose:  20 mg


Prednisone (Deltasone -)  20 mg PO DAILY [facility]


   Last Admin: 05/14/18 09:36 Dose:  20 mg


Rifampin (Rifadin -)  300 mg PO BID@0600,1800 [facility]


   Last Admin: 05/15/18 05:36 Dose:  300 mg


Rosuvastatin Calcium (Crestor -)  10 mg PO HS [facility]


   Last Admin: 05/14/18 21:36 Dose:  10 mg


Timolol Maleate (Timoptic 0.5%)  1 drop OU BID [facility]


   Last Admin: 05/14/18 21:38 Dose:  1 drop











- Objective


Vital Signs: 


 Vital Signs











Temperature  98.6 F   05/15/18 06:00


 


Pulse Rate  73   05/15/18 06:00


 


Respiratory Rate  20   05/15/18 06:00


 


Blood Pressure  101/58   05/15/18 06:00


 


O2 Sat by Pulse Oximetry (%)  94 L  05/14/18 21:00











Constitutional: Yes: Calm


Cardiovascular: Yes: Regular Rate and Rhythm


Respiratory: Yes: CTA Bilaterally


Gastrointestinal: Yes: Soft, Abdomen, Obese


Edema: Yes


Edema: LLE: Trace, RLE: Trace


Neurological: Yes: Alert, Oriented


Labs: 


 CBC, BMP





 05/14/18 06:29 





 05/14/18 06:29 





 INR, PTT











INR  1.04  (0.82-1.09)   05/03/18  14:34    














- ....Imaging


EKG: Image Reviewed (rate controlled AF, artifact)





Problem List





- Problems


(1) Sepsis


Code(s): A41.9 - SEPSIS, UNSPECIFIED ORGANISM   


Qualifiers: 


   Sepsis type: sepsis due to unspecified organism   Qualified Code(s): A41.9 - 

Sepsis, unspecified organism   





(2) Pneumonia


Code(s): J18.9 - PNEUMONIA, UNSPECIFIED ORGANISM   


Qualifiers: 


   Laterality: right   Lung location: lower lobe of lung 





(3) ASHD (arteriosclerotic heart disease)


Code(s): I25.10 - ATHSCL HEART DISEASE OF NATIVE CORONARY ARTERY W/O ANG PCTRS 

  





(4) Chronic diastolic CHF (congestive heart failure)


Code(s): I50.32 - CHRONIC DIASTOLIC (CONGESTIVE) HEART FAILURE   





(5) COPD (chronic obstructive pulmonary disease)


Code(s): J44.9 - CHRONIC OBSTRUCTIVE PULMONARY DISEASE, UNSPECIFIED   


Qualifiers: 


   COPD type: chronic bronchitis 





(6) IDDM (insulin dependent diabetes mellitus)


Code(s): E11.9 - TYPE 2 DIABETES MELLITUS WITHOUT COMPLICATIONS; Z79.4 - LONG 

TERM (CURRENT) USE OF INSULIN   





(7) Renal insufficiency


Code(s): N28.9 - DISORDER OF KIDNEY AND URETER, UNSPECIFIED   





Assessment/Plan





IMP:


1. Bilateral  Legionella PNA with early sepsis- resolved


2. DM w/ CKD, probable acute on chronic ARF in setting of early sepsis


3. Chronic diastolic CHF secondary to hypertensive heart disease and DM


4. Non-obstructive CAD


5. Chronic COPD, active smoker, w/ no evidence of PHTN on [facility] 2016








REC:


1. F/u cultures; abx per ID; supplimental O2; DVT prophylaxis


2. PO Lasix. 


3. Continue other home meds for BP and chronic diastolic dysfx: Imdur 30, 

Bystolic 10, Amlodipine 10 w/ hold parameters.


4. Smoking cessation counselling.


5. Decrease ASA to 81mg


6. Leukocytosis is secondary to steroids, tapering as per pulmonary

## 2018-05-15 NOTE — PN
Progress Note, Physician


History of Present Illness: 





OOB in chair.  


No c/o chest pain/ dyspnea/ cough


Breathing non-labored at rest  


No c/o fever/ chills


Temps down , WBC  improved


Azotemia improved





- Current Medication List


Current Medications: 


Active Medications





Acetaminophen (Tylenol -)  650 mg PO Q6H PRN


   PRN Reason: FEVER


   Last Admin: 05/04/18 23:35 Dose:  650 mg


Albuterol Sulfate (Ventolin 0.083% Nebulizer Soln -)  1 amp NEB Q4H PRN


   PRN Reason: SHORT OF BREATH/WHEEZING


   Last Admin: 05/15/18 07:34 Dose:  1 amp


Alprazolam (Xanax -)  0.25 mg PO Q24H PRN


   PRN Reason: ANXIETY


   Last Admin: 05/13/18 21:35 Dose:  0.25 mg


Amlodipine Besylate (Norvasc -)  10 mg PO DAILY [facility]


   Last Admin: 05/15/18 09:58 Dose:  10 mg


Arformoterol Tartrate (Brovana (Restricted To Pulmonology/Resp) -)  1 amp NEB 

RBID [facility]


   Last Admin: 05/14/18 20:20 Dose:  1 amp


Aspirin (Asa -)  81 mg PO DAILY [facility]


   Last Admin: 05/15/18 09:58 Dose:  81 mg


Brimonidine Tartrate (Alphagan 0.2% -)  1 drop OU BID [facility]


   Last Admin: 05/15/18 09:58 Dose:  1 drop


Escitalopram Oxalate (Lexapro -)  10 mg PO DAILY [facility]


   Last Admin: 05/15/18 09:58 Dose:  10 mg


Fluconazole (Diflucan -)  100 mg PO DAILY [facility]


   Last Admin: 05/15/18 09:58 Dose:  100 mg


Furosemide (Lasix -)  40 mg PO DAILY [facility]


   Last Admin: 05/15/18 09:58 Dose:  40 mg


Guaifenesin (Diabetic Tussin Dm -)  10 ml PO Q6H PRN


   PRN Reason: COUGH


   Last Admin: 05/08/18 09:17 Dose:  10 ml


Insulin Aspart (Novolog Vial Sliding Scale -)  1 vial SQ ACHS [facility]


   PRN Reason: Protocol


   Last Admin: 05/15/18 11:57 Dose:  8 units


Isosorbide Mononitrate (Imdur -)  30 mg PO DAILY [facility]


   Last Admin: 05/15/18 09:58 Dose:  30 mg


Latanoprost (Xalatan 0.005% Eye Drops -)  1 drop OU [facility]


   Last Admin: 05/14/18 21:39 Dose:  1 drop


Levofloxacin (Levaquin -)  500 mg PO DAILY [facility]


   Last Admin: 05/15/18 09:58 Dose:  500 mg


Magnesium Hydroxide (Milk Of Magnesia -)  30 ml PO Q8H PRN


   PRN Reason: INDIGESTION


   Last Admin: 05/08/18 14:17 Dose:  30 ml


Nebivolol (Bystolic -)  10 mg PO DAILY [facility]


   Last Admin: 05/15/18 09:58 Dose:  10 mg


Ptnt's Own Med( Insulin Glargine,Hum .Rec.Anlog [Toujeo Solostar]  80 unit SQ 

DAILY@0700 [facility]


   Last Admin: 05/15/18 06:00 Dose:  Not Given


Pt's Own Med ( Insulin Lispro Protamine/Lispro ( Humalog Mix 50/50 Vial)  18 

each SQ BID@0700,1630 [facility]


   Last Admin: 05/15/18 06:00 Dose:  Not Given


Nystatin (Nystatin Oral Suspension -)  500,000 units PO QID [facility]


   Last Admin: 05/15/18 13:21 Dose:  500,000 units


Pantoprazole Sodium (Protonix -)  20 mg PO DAILY [facility]


   Last Admin: 05/15/18 09:58 Dose:  20 mg


Prednisone (Deltasone -)  20 mg PO DAILY [facility]


   Last Admin: 05/15/18 09:58 Dose:  20 mg


Rifampin (Rifadin -)  300 mg PO BID@0600,1800 [facility]


   Last Admin: 05/15/18 05:36 Dose:  300 mg


Rosuvastatin Calcium (Crestor -)  10 mg PO [facility]


   Last Admin: 05/14/18 21:36 Dose:  10 mg


Timolol Maleate (Timoptic 0.5%)  1 drop OU BID [facility]


   Last Admin: 05/15/18 09:58 Dose:  1 drop











- Objective


Vital Signs: 


 Vital Signs











Temperature  98.4 F   05/15/18 10:00


 


Pulse Rate  74   05/15/18 10:00


 


Respiratory Rate  20   05/15/18 10:00


 


Blood Pressure  134/51   05/15/18 10:00


 


O2 Sat by Pulse Oximetry (%)  92 L  05/15/18 10:00











Constitutional: Yes: No Distress


Cardiovascular: Yes: Regular Rate and Rhythm, S1, S2


Respiratory: Yes: Diminished


Gastrointestinal: Yes: Normal Bowel Sounds, Soft.  No: Tenderness


Labs: 


 CBC, BMP





 05/14/18 06:29 





 05/14/18 06:29 





 INR, PTT











INR  1.04  (0.82-1.09)   05/03/18  14:34    














Assessment/Plan





LLL pneumonia   + legionella AG


R/O sepsis secondary to pneumonia


Azotemia  improved


 


Continue  levaquin 750mg  / rifampin bid  additional 48h


Steroids, bronchodilators

## 2019-11-18 NOTE — HP
Admitting History and Physical





- Admission


Chief Complaint: Right hand swelling and pain crps.


History of Present Illness: 





Pt states approximatly 4 months ago pt had a "[place] bite on the right wrist 

and her hand began to swell.  She has allodyniua and skin attrophic changes. 





- Past Medical History


Cardiovascular: Yes: CHF, HTN


Pulmonary: Yes: COPD


Renal/: Yes: Renal Inusuff


Psych: Yes: Anxiety


Endocrine: Yes: Diabetes Mellitus





- Smoking History


Smoking history: Current every day smoker


Have you smoked in the past 12 months: Yes


Aproximately how many cigarettes per day: 10





- Alcohol/Substance Use


Hx Alcohol Use: No





- Social History


ADL: Independent


Occupation:  here at [employer]


History of Recent Travel: No





Home Medications





- Allergies


Allergies/Adverse Reactions: 


 Allergies











Allergy/AdvReac Type Severity Reaction Status Date / Time


 


No Known Allergies Allergy   Verified 11/15/19 19:19














- Home Medications


Home Medications: 


Ambulatory Orders





Rosuvastatin Calcium [Crestor] 10 mg PO HS 10/14/16 


Amlodipine Besylate [Norvasc -] 10 mg PO DAILY #30 tablet 10/18/16 


Vitamin D3 - 2,000 units PO DAILY 10/25/17 


Bimatoprost [Lumigan] 1 drop OU DAILY 12/05/17 


Brimonidine Tartrate/Timolol [Combigan 0.2%-0.5% Eye Drops] 5 5ml OU BID 12/05/ 17 


Dulaglutide [Trulicity] 1.5 mg SQ WEEKLY 12/05/17 


Insulin Glargine,Hum.rec.anlog [Toujeo Solostar] 60 unit SQ DAILY 12/05/17 


Aspirin [Ecotrin] 81 mg PO DAILY 05/03/18 


Furosemide [Lasix -] 40 mg PO DAILY #30 tablet 05/15/18 


Ascorbic Acid [Vitamin C] 500 mg PO DAILY 11/15/19 


Duloxetine HCl [Cymbalta -] 30 mg PO DAILY 11/15/19 


Formoterol Fumarate [Perforomist] 20 mcg IH BID 11/15/19 


Gabapentin [Neurontin -] 100 mg PO DAILY 11/15/19 


Multivitamin [Multiple Vitamins] 1 each PO DAILY 11/15/19 


Nebivolol HCl [Bystolic] 10 mg PO DAILY 11/15/19 


Olmesartan/Hydrochlorothiazide [Olmesartan-Hctz 20-12.5 mg Tab] 1 each PO DAILY 

11/15/19 


Tramadol HCl/Acetaminophen [Tramadol-Acetaminophn 37.5-325] 1 each PO PRN 11/15/

19 


Umeclidinium Bromide [Incruse Ellipta] 62.5 mcg IH DAILY 11/15/19 











Physical Examination


Vital Signs: 


 Vital Signs











Temperature  98.3 F   11/18/19 13:42


 


Pulse Rate  77   11/18/19 13:42


 


Respiratory Rate  20   11/18/19 13:42


 


Blood Pressure  144/65   11/18/19 13:42


 


O2 Sat by Pulse Oximetry (%)  98   11/18/19 13:42

## 2020-07-17 NOTE — HP
Admitting History and Physical





- Admission


Chief Complaint: Axial Low back Pain


History of Present Illness: 





The patient complains of axial low back pain. 


History Source: Patient





- Past Medical History


Cardiovascular: Yes: CHF, HTN


Pulmonary: Yes: COPD


Renal/: Yes: Renal Inusuff


...Pregnant: No


Psych: Yes: Anxiety


Endocrine: Yes: Diabetes Mellitus





- Advance Directives


Advance Directives: Yes: Health Care Proxy





- Smoking History


Smoking history: Current every day smoker


Have you smoked in the past 12 months: Yes


Aproximately how many cigarettes per day: 10





- Alcohol/Substance Use


Hx Alcohol Use: No





- Social History


ADL: Independent


Occupation:  here at [employer]


History of Recent Travel: No





Home Medications





- Allergies


Allergies/Adverse Reactions: 


                                    Allergies











Allergy/AdvReac Type Severity Reaction Status Date / Time


 


No Known Drug Allergies Allergy   Verified 07/17/20 07:35


 


IV CONTRAST AdvReac   Uncoded 07/17/20 06:58














- Home Medications


Home Medications: 


Ambulatory Orders





Rosuvastatin Calcium [Crestor] 10 mg PO HS 10/14/16 


Amlodipine Besylate [Norvasc -] 10 mg PO DAILY #30 tablet 10/18/16 


Vitamin D3 - 2,000 units PO DAILY 10/25/17 


Bimatoprost [Lumigan] 1 drop OU DAILY 12/05/17 


Brimonidine Tartrate/Timolol [Combigan 0.2%-0.5% Eye Drops] 5 5ml OU BID 

12/05/17 


Dulaglutide [Trulicity] 1.5 mg SQ WEEKLY 12/05/17 


Insulin Glargine,Hum.rec.anlog [Toujeo Solostar] 30 unit SQ DAILY 12/05/17 


Aspirin [Ecotrin] 81 mg PO DAILY 05/03/18 


Furosemide [Lasix -] 40 mg PO DAILY #30 tablet 05/15/18 


Duloxetine HCl [Cymbalta -] 30 mg PO DAILY 11/15/19 


Formoterol Fumarate [Perforomist] 20 mcg IH BID 11/15/19 


Multivitamin [Multiple Vitamins] 1 each PO DAILY 11/15/19 


Nebivolol HCl [Bystolic] 10 mg PO DAILY 11/15/19 


Olmesartan/Hydrochlorothiazide [Olmesartan-Hctz 20-12.5 mg Tab] 1 each PO DAILY 

11/15/19 


Umeclidinium Bromide [Incruse Ellipta] 62.5 mcg IH DAILY 11/15/19 


Alprazolam [Xanax] 0.5 mg PO PRN 07/16/20 


Canagliflozin [Invokana] 100 mg PO DAILY 07/16/20 


Duloxetine HCl [Cymbalta] 60 mg PO DAILY 07/16/20 


Insulin Glargine,Hum.rec.anlog [Basaglar Kwikpen U-100] 60 unit SQ HS 07/16/20 











Physical Examination


Vital Signs: 


                                   Vital Signs











Temperature  98.0 F   07/17/20 06:57


 


Pulse Rate  68   07/17/20 06:57


 


Respiratory Rate  20 07/17/20 06:57


 


Blood Pressure  132/71   07/17/20 06:57


 


O2 Sat by Pulse Oximetry (%)  99   07/17/20 06:58











Constitutional: Yes: Well Nourished, No Distress


Eyes: Yes: Conjunctiva Clear, EOM Intact


HENT: Yes: Atraumatic, Normocephalic


Neck: Yes: Trachea Midline


Cardiovascular: Yes: Regular Rate and Rhythm


Respiratory: Yes: Regular


Musculoskeletal: Yes: Back Pain





Assessment/Plan





The patient pain is secondary to lumbar spondylosis. 





1. I will perform diagnostic lumbar medial branch blocks at L3 L4 L5 bilateral.

## 2020-07-27 NOTE — PROC
Procedure Note


Procedure: 





Pre procedure Diagnosis: Lumbar spondylosis





Post Procedure Diagnosis: same





Anasthesia: Local





Procedure Performed: Bilateral L3-L4-L5 medial branch blocks





Procedure: After the risks and benefits were explained, informed consent was 

obtained. The patient was then taken to the procedure room and positioned prone 

on the procedure table. Time out was performed. 





The region overlying the appropriate vertebral bodies was identified using 

fluoroscopy. The skin was prepped and draped in the usual sterile fashion. The 

skin and soft tissues were anesthetized using 1% lidocaine. Using fluoroscopic 

guidance, 22 gauge 3.5 inch spinal needles were then introduced to the juncture 

of the superior articular processes and the transverse processes of the RIGHT 

L3, L4, and L5 medial branches are located. Omnipaque 180 confirmed appropriate 

needle placement. There was no epidural or vascular flow observed. .75% b

upivacaine was drawn into a syringe. 0.5cc of this solution was then injected at

each level. 





The same procedure was repeated on the LEFT side at the same levels. 





The patient tolerated the procedure well and there were no complications. The 

patient was taken to the post procedure recovery area in good condition. Vital 

signs remained stable before, and after the procedure. The patient was given 

oral follow-up instructions.The patient was givena follow up appointment with me

in the near future.  





Joseph Cifuentes D.O.

## 2020-09-02 NOTE — HP
Admitting History and Physical





- Primary Care Physician


PCP: Yeiltza Savage





- Admission


Chief Complaint: SOB, Cough


History of Present Illness: 





This is a 63 y/o female with a PMHx OF diastolic CHF, CKD, COPD (no home O2), 

HTN, Obesity. Who present to the ED progressive SOB and cough x 3 days. Patient 

reports she believes this is an exacerbation of her COPD, her PCP prescribed 

prednisone pack and azithromycin yesterday (she is now s/p 50mg prednisone x1 

yesterday). She reports that her home nebulizer has been helping each day but 

today provided no relief. She reports having a productive cough- clear sputum. 

She unilaterally increased her Lasix from 40 to 80mg and has seen a reduction in

her LE edema over the past 2 days without any improvement in her breathing. 

Patient denies fever, chills, dizziness, HA, CP, palpitations, AP, N/V/D, 

constipation, dysuria. Patient denies recent sick contacts, except being around 

her grandchildren. Denies recent travel





History Source: Patient


Limitations to Obtaining History: No Limitations





- Past Medical History


Cardiovascular: Yes: CHF, HTN


Pulmonary: Yes: COPD


Renal/: Yes: Renal Inusuff


...Pregnant: No


Psych: Yes: Anxiety


Endocrine: Yes: Diabetes Mellitus





- Smoking History


Smoking history: Never smoked


Have you smoked in the past 12 months: Yes


Aproximately how many cigarettes per day: 10





- Alcohol/Substance Use


Hx Alcohol Use: No





- Social History


ADL: Independent


Occupation:  here at [employer]


History of Recent Travel: No





Home Medications





- Allergies


Allergies/Adverse Reactions: 


                                    Allergies











Allergy/AdvReac Type Severity Reaction Status Date / Time


 


No Known Drug Allergies Allergy   Verified 09/02/20 15:46


 


IV CONTRAST AdvReac   Uncoded 09/02/20 15:46














- Home Medications


Home Medications: 


Ambulatory Orders





Rosuvastatin Calcium [Crestor] 10 mg PO HS 10/14/16 


Amlodipine Besylate [Norvasc -] 10 mg PO DAILY #30 tablet 10/18/16 


Bimatoprost [Lumigan] 1 drop OU DAILY 12/05/17 


Brimonidine Tartrate/Timolol [Combigan 0.2%-0.5% Eye Drops] 5 5ml OU BID 

12/05/17 


Aspirin [Ecotrin] 81 mg PO DAILY 05/03/18 


Furosemide [Lasix -] 40 mg PO DAILY #30 tablet 05/15/18 


Multivitamin [Multiple Vitamins] 1 each PO DAILY 11/15/19 


Nebivolol HCl [Bystolic] 10 mg PO DAILY 11/15/19 


Olmesartan/Hydrochlorothiazide [Olmesartan-Hctz 20-12.5 mg Tab] 1 each PO DAILY 

11/15/19 


Umeclidinium Bromide [Incruse Ellipta] 62.5 mcg IH DAILY 11/15/19 


Duloxetine HCl [Cymbalta] 60 mg PO DAILY 07/16/20 


Insulin Glargine,Hum.rec.anlog [Toujeo Solostar] 30 unit SQ AM 09/02/20 


Insulin Glargine,Hum.rec.anlog [Toujeo Solostar] 60 unit SQ HS 09/02/20 











Family Medical History


Family History: Unremarkable





Review of Systems





- Review of Systems


Constitutional: reports: No Symptoms


Eyes: reports: No Symptoms


HENT: reports: No Symptoms


Neck: reports: No Symptoms


Cardiovascular: reports: Edema, Shortness of Breath


Respiratory: reports: Cough, SOB, SOB on Exertion


Gastrointestinal: reports: No Symptoms


Genitourinary: reports: No Symptoms


Breasts: reports: No Symptoms Reported


Musculoskeletal: reports: No Symptoms


Integumentary: reports: No Symptoms


Neurological: reports: No Symptoms


Endocrine: reports: No Symptoms


Hematology/Lymphatic: reports: No Symptoms


Psychiatric: reports: No Symptoms





Physical Examination


Vital Signs: 


                                   Vital Signs











Temperature  98.2 F   09/02/20 18:29


 


Pulse Rate  70   09/02/20 18:29


 


Respiratory Rate  20   09/02/20 18:29


 


Blood Pressure  173/74 H  09/02/20 18:29


 


O2 Sat by Pulse Oximetry (%)  98   09/02/20 18:40











Constitutional: Yes: Well Nourished, No Distress, Calm


Eyes: Yes: WNL, Conjunctiva Clear, EOM Intact, PERRL


HENT: Yes: WNL, Atraumatic, Normocephalic


Neck: Yes: WNL, Supple, Trachea Midline


Cardiovascular: Yes: Regular Rate and Rhythm, S1, S2


Respiratory: Yes: Cough, Diminished (base), On Nasal O2, Rhonchi, SOB on 

Exertion, Wheezes


Gastrointestinal: Yes: Normal Bowel Sounds, Soft, Abdomen, Obese


...Rectal Exam: Yes: Deferred


Renal/: Yes: WNL


Breast(s): Yes: WNL


Musculoskeletal: Yes: WNL


Extremities: Yes: Erythema


Edema: Yes


Edema: LLE: 2+, RLE: 2+


Peripheral Pulses WNL: Yes


Integumentary: Yes: Erythema, Venous Stasis Changes


Neurological: Yes: WNL, Alert, Oriented, Cran Nerves II-XII Intact


...Motor Strength: WNL


Psychiatric: Yes: WNL, Alert, Oriented


Labs: 


                                    CBC, BMP





                                 09/02/20 16:36 





                                 09/02/20 18:26 





                         Laboratory Results - last 24 hr











  09/02/20 09/02/20 09/02/20





  16:36 16:36 16:36


 


WBC  15.3 H  


 


RBC  4.05  


 


Hgb  12.7  


 


Hct  39.1  


 


MCV  96.4 H  


 


MCH  31.3  


 


MCHC  32.5  


 


RDW  15.0  


 


Plt Count  294  


 


MPV  10.8  


 


Absolute Neuts (auto)  11.7 H  


 


Neutrophils %  76.5  


 


Lymphocytes %  10.8  


 


Monocytes %  12.1 H  


 


Eosinophils %  0.2  D  


 


Basophils %  0.4  


 


Nucleated RBC %  0  


 


PT with INR   11.10 


 


INR   0.94 


 


Sodium    137


 


Potassium    6.2 H*


 


Chloride    110 H


 


Carbon Dioxide    19 L


 


Anion Gap    8


 


BUN    54.4 H


 


Creatinine    1.6 H


 


Est GFR (CKD-EPI)AfAm    38.52


 


Est GFR (CKD-EPI)NonAf    33.23


 


Random Glucose    223 H


 


Calcium    9.3


 


Magnesium    2.9 H


 


Total Bilirubin    0.4


 


AST    84 H


 


ALT    58


 


Alkaline Phosphatase    306 H


 


Creatine Kinase    128


 


Troponin I    < 0.02


 


B-Natriuretic Peptide    649.0 H


 


Total Protein    7.2


 


Albumin    2.9 L














  09/02/20 09/02/20





  18:26 23:50


 


WBC  


 


RBC  


 


Hgb  


 


Hct  


 


MCV  


 


MCH  


 


MCHC  


 


RDW  


 


Plt Count  


 


MPV  


 


Absolute Neuts (auto)  


 


Neutrophils %  


 


Lymphocytes %  


 


Monocytes %  


 


Eosinophils %  


 


Basophils %  


 


Nucleated RBC %  


 


PT with INR  


 


INR  


 


Sodium  139  137


 


Potassium  5.8 H  5.9 H


 


Chloride  111 H  109 H


 


Carbon Dioxide  20 L  21


 


Anion Gap  8  7 L


 


BUN  54.9 H  54.8 H


 


Creatinine  1.6 H  1.7 H


 


Est GFR (CKD-EPI)AfAm  38.52  35.79


 


Est GFR (CKD-EPI)NonAf  33.23  30.88


 


Random Glucose  202 H  287 H


 


Calcium  9.3  9.5


 


Magnesium  


 


Total Bilirubin  


 


AST  


 


ALT  


 


Alkaline Phosphatase  


 


Creatine Kinase  


 


Troponin I  


 


B-Natriuretic Peptide  


 


Total Protein  


 


Albumin  








                                 Intake & Output











 08/31/20 09/01/20 09/02/20 09/03/20





 23:59 23:59 23:59 23:59


 


Weight   112.945 kg 








                               Current Medications











Generic Name Dose Route Start Last Admin





  Trade Name Freq  PRN Reason Stop Dose Admin


 


Albuterol Sulfate  2 puff  09/03/20 00:47  09/03/20 01:10





  Ventolin Hfa Inhaler -  IH   2 puff





  Q4H PRN   Administration





  SHORT OF BREATH/WHEEZING  


 


Amlodipine Besylate  10 mg  09/03/20 10:00 





  Norvasc -  PO  





  DAILY [facility]  


 


Aspirin  81 mg  09/03/20 10:00 





  Ecotrin -  PO  





  DAILY [facility]  


 


Brimonidine Tartrate  1 drop  09/03/20 10:00 





  Alphagan 0.2% -  OU  





  BID [facility]  


 


Duloxetine HCl  60 mg  09/03/20 10:00 





  Cymbalta -  PO  





  DAILY [facility]  


 


Insulin Aspart  1 vial  09/03/20 07:00 





  Novolog Vial Sliding Scale -  SQ  





  [facility]  





  Protocol  


 


Latanoprost  1 drop  09/03/20 22:00 





  Xalatan 0.005% Eye Drops -  OU  





  [facility]  


 


Levofloxacin  500 mg  09/03/20 06:00  09/03/20 06:19





  Levaquin -  PO  09/10/20 05:59  500 mg





  DAILY@0600 [facility]   Administration


 


Methylprednisolone Sodium Succinate  40 mg  09/03/20 02:00  09/03/20 06:42





  Solu-Medrol -  IVPUSH   Not Given





  Q8H-IV [facility]  


 


Multivitamins/Minerals/Vitamin C  1 tab  09/03/20 10:00 





  Tab-A-Vit -  PO  





  DAILY [facility]  


 


Nebivolol  10 mg  09/03/20 10:00 





  Bystolic -  PO  





  DAILY [facility]  


 


Rosuvastatin Calcium  10 mg  09/03/20 22:00 





  Crestor -  PO  





  HS [facility]  


 


Timolol Maleate  1 drop  09/03/20 10:00 





  Timoptic 0.5%  OU  





  BID [facility]  


 


Tiotropium Bromide  2 puff  09/03/20 10:00 





  Spiriva Respimat  IH  





  DAILY [facility]  














Imaging





- Results


Chest X-ray: Report Reviewed, Image Reviewed


Cat Scan: Report Reviewed, Image Reviewed


EKG: Image Reviewed





Problem List





- Problems


(1) COPD exacerbation


Assessment/Plan: 


Likely secondary to Pneumonia


Chest CT report, image reviewed- RLL consolidation/atelectasis with air 

bronchogram and a large pleural effusion suggestive of pneumonia. there are also

airspace opacities and air bronchiogram in the NIRANJAN suggestive of pneumonic i

nfiltrates. cannot r/o COVID 19 infection.


Chest Xray report, image reviewed- sizeable right effusion with right base 

atelectasis or infiltrate, large heart. The left lung is clear. There are 

degenerative changes with prominent soft tissues


Albuterol neb given in ED


Albuterol MDI, for now until COVID PCR resulted, then resume nebs


Appreciate Pulmonology consult


O2 maintain spo2> 90%


Monitor CBC, CMP


Continue current meds


Code(s): J44.1 - CHRONIC OBSTRUCTIVE PULMONARY DISEASE W (ACUTE) EXACERBATION   





(2) Pneumonia


Assessment/Plan: 


Will treat for CAP


Failed Outpatient Therapy


CURB65 2, mod risk


Chest CT- RLL consolidation/atelectasis with air bronchogram and a large pleural

effusion suggestive of pneumonia. there are also airspace opacities and air 

bronchiogram in the NIRANJAN suggestive of pneumonic infiltrates. cannot r/o COVID 19

infection.


Chest Xray- sizeable right effusion with right base atelectasis or infiltrate, 

large heart. The left lung is clear. There are degenerative changes with 

prominent soft tissues


Blood Cultures-pending


Ceftriaxone given in ED


Will broaden coverage with Levofloxacin 


Appreciate ID consult- will defer to primary team


Monitor CBC


Monitor vitals


O2


Code(s): J18.9 - PNEUMONIA, UNSPECIFIED ORGANISM   


Qualifiers: 


   Laterality: right   Lung location: lower lobe of lung 





(3) Pleural effusion


Assessment/Plan: 


CT Chest reviewed


Appreciate Pulm consult


Continue Lasix


O2


Albuterol MDI


Monitor vitals


Code(s): J90 - PLEURAL EFFUSION, NOT ELSEWHERE CLASSIFIED   





(4) Acute hypoxemic respiratory failure


Assessment/Plan: 


Likely due to Pneumonia vs COPD Flare


Chest Xray showed sizeable right pleural effusion


Chest CT- RLL consolidation with a large right pleural effusion suggestive of 

pneumonia


O2


Appreciate Pulm consult


Code(s): J96.01 - ACUTE RESPIRATORY FAILURE WITH HYPOXIA   





(5) Chronic diastolic CHF (congestive heart failure)


Assessment/Plan: 





Chest Xray reviewed


EKG reviewed


Appreciate Cardiology consult- defer to PMD


Continue Lasix


Strict INOs


Daily weight


Elevate extremities


Monitor CMP


Code(s): I50.32 - CHRONIC DIASTOLIC (CONGESTIVE) HEART FAILURE   





(6) CKD (chronic kidney disease) stage 3, GFR 30-59 ml/min


Assessment/Plan: 


at baseline


Monitor CMP


Consider Nephrology consult


Avoid nephrotoxic drugs


Monitor vitals


Code(s): N18.3 - CHRONIC KIDNEY DISEASE, STAGE 3 (MODERATE)   





(7) ASHD (arteriosclerotic heart disease)


Assessment/Plan: 


EKG reviewed


Continue current meds


Code(s): I25.10 - ATHSCL HEART DISEASE OF NATIVE CORONARY ARTERY W/O ANG PCTRS  







(8) HTN (hypertension)


Assessment/Plan: 


sub optimal


Monitor BP


Continue ARB, BB, CCB


Will hold HCTZ 2/2 Hyperkalemia


Monitor renal function


Code(s): I10 - ESSENTIAL (PRIMARY) HYPERTENSION   


Qualifiers: 


   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   





(9) IDDM (insulin dependent diabetes mellitus)


Assessment/Plan: 


stable


BGMs


ISS


Monitor CMP


Code(s): E11.9 - TYPE 2 DIABETES MELLITUS WITHOUT COMPLICATIONS; Z79.4 - LONG 

TERM (CURRENT) USE OF INSULIN   





(10) Obesity (BMI 30-39.9)


Assessment/Plan: 


Counseled on weight reduction and maintaining healthier lifestyle choices


Code(s): E66.9 - OBESITY, UNSPECIFIED   





(11) Encounter for screening laboratory testing for COVID-19 virus


Assessment/Plan: 


Low Risk


COVID PCR-pending


Isolation Precautions


Code(s): Z11.59 - ENCOUNTER FOR SCREENING FOR OTHER VIRAL DISEASES   





Assessment/Plan





This is a 63 y/o female with a PMHx OF diastolic CHF, CKD, COPD (no home O2), 

HTN, Obesity. Admitted for COPD Exacerbation, Pneumonia, Pleural Effusion, 

Hyperkalemia for further evaluation of their emergent condition,





Plan:


See Problem List





FEN


Fluid Restriction 1L


Replete lytes prn


Low Na Diet





DVT ppx


OOB


SCDs


Heparin SQ





Dispo: Requires Inpatient Care





Visit type





- Medication Review


Med list reviewed for High Risk Meds patients 65 and older: Yes





- Emergency Visit


Emergency Visit: Yes


ED Registration Date: 09/02/20


Care time: The patient presented to the Emergency Department on the above date 

and was hospitalized for further evaluation of their emergent condition.





- New Patient


This patient is new to me today: Yes


Date on this admission: 09/02/20





- Critical Care


Critical Care patient: No Influenza, Dermoid Cyst Influenza, Rule Out Dermoid Cyst Influenza, Rule Out Dermoid Cyst/ UTI

## 2020-09-02 NOTE — PDOC
History of Present Illness





- General


Chief Complaint: Shortness of Breath


Stated Complaint: SOB


Time Seen by Provider: 09/02/20 15:08


History Source: Patient


Exam Limitations: No Limitations





- History of Present Illness


Initial Comments: 





09/02/20 15:08


PCP: Yelitza Savage





HPI: 65yo F pmh CHF diastolic, CKD, COPD (no home O2), HTN, obesity presenting 

with 3 days of progressively worsening shortness of breath and cough. Patient 

reports she believes this is an exacerbation of her COPD. Her PCP prescribed 

prednisone pack and azithromycin yesterday (she is now s/p 50mg prednisone x1 

yesterday). She reports that her home nebulizer has been helping each day but 

today provided no relief. Her cough is productive of only clear sputum and she 

denies any fevers, chills, vomiting, chest pain, wheezing. She unilaterally 

increased her Lasix from 40 to 80mg and has seen a reduction in her LE edema 

over the past 2 days without any improvement in her breathing. 





All: NKDA


Meds: Per chart


PMH: As above


PSH: Per chart


SHx: Works as a nurse











Past History





- Travel History


Traveled outside of the country in the last 30 days: No


Close contact w/someone who was outside of country & ill: No





- Medical History


Allergies/Adverse Reactions: 


                                    Allergies











Allergy/AdvReac Type Severity Reaction Status Date / Time


 


No Known Drug Allergies Allergy   Verified 09/02/20 15:46


 


IV CONTRAST AdvReac   Uncoded 09/02/20 15:46











Home Medications: 


Ambulatory Orders





Rosuvastatin Calcium [Crestor] 10 mg PO HS 10/14/16 


Amlodipine Besylate [Norvasc -] 10 mg PO DAILY #30 tablet 10/18/16 


Bimatoprost [Lumigan] 1 drop OU DAILY 12/05/17 


Brimonidine Tartrate/Timolol [Combigan 0.2%-0.5% Eye Drops] 5 5ml OU BID 

12/05/17 


Aspirin [Ecotrin] 81 mg PO DAILY 05/03/18 


Furosemide [Lasix -] 40 mg PO DAILY #30 tablet 05/15/18 


Multivitamin [Multiple Vitamins] 1 each PO DAILY 11/15/19 


Nebivolol HCl [Bystolic] 10 mg PO DAILY 11/15/19 


Olmesartan/Hydrochlorothiazide [Olmesartan-Hctz 20-12.5 mg Tab] 1 each PO DAILY 

11/15/19 


Umeclidinium Bromide [Incruse Ellipta] 62.5 mcg IH DAILY 11/15/19 


Duloxetine HCl [Cymbalta] 60 mg PO DAILY 07/16/20 


Insulin Glargine,Hum.rec.anlog [Toujeo Solostar] 30 unit SQ AM 09/02/20 


Insulin Glargine,Hum.rec.anlog [Toujeo Solostar] 60 unit SQ HS 09/02/20 








Anemia: No


Asthma: No


Cancer: No


Cardiac Disorders: No


CVA: No


COPD: Yes


CHF: No


Dementia: No


Diabetes: Yes (OVER 20 YEARS)


Dialysis: No (CKD)


GI Disorders: No


 Disorders: No


HTN: Yes


Hypercholesterolemia: Yes


Liver Disease: No


Seizures: No


Thyroid Disease: No





- Surgical History


Abdominal Surgery: No


Appendectomy: No


Cardiac Surgery: No


Cholecystectomy: No


Lung Surgery: No


Neurologic Surgery: No


Orthopedic Surgery: Yes (RIGHT KNEE ARTHROSCOPY, RT TKR)





- Reproductive History


Is Patient Pregnant Now?: No





- Immunization History


Immunization Up to Date: Yes





- Psycho-Social/Smoking History


Smoking History: Never smoked


Have you smoked in the past 12 months: Yes


Number of Cigarettes Smoked Daily: 10


Cigars Per Day: 0


Information on smoking cessation initiated: No


'Breaking Loose' booklet given: 07/17/20





- Substance Abuse Hx (Audit-C & DAST Scrn)


How often the patient has a drink containing alcohol: Never


Score: In Men: 4 or > Positive; In Women: 3 or > Positive: 0


Screen Result (Pos requires Nsg. Audit-10AR): Negative





**Review of Systems





- Review of Systems


Able to Perform ROS?: Yes


Is the patient limited English proficient: Yes


Constitutional: No: Chills, Fever, Weakness


HEENTM: No: Recent change in vision, Double Vision, Nose Congestion, Throat Pain


Respiratory: Yes: Cough, Shortness of Breath, Productive cough (clear sputum).  

No: Orthopnea, SOB with Exertion, SOB at Rest, Wheezing


Cardiac (ROS): Yes: Edema.  No: Chest Pain, Irregular Heart Rate, 

Lightheadedness, Palpitations, Syncope, Chest Tightness


ABD/GI: No: Constipated, Diarrhea, Nausea, Vomiting


: No: Burning, Dysuria, Frequency


Musculoskeletal: No: Muscle Pain, Muscle Weakness, Neck Pain


Integumentary: No: Bruising, Lesions, Lumps


Neurological: No: Headache, Numbness, Tingling, Weakness


Psychiatric: No: Anxiety, Stressors, Change in Appetite


Endocrine: No: Increased Urine, Change in Weight


Hematologic/Lymphatic: No: Anemia, Blood Clots, Easy Bleeding


All Other Systems: Reviewed and Negative





*Physical Exam





- Vital Signs


                                Last Vital Signs











Temp Pulse Resp BP Pulse Ox


 


 98.2 F   80   22 H  127/73   91 L


 


 09/02/20 14:58  09/02/20 14:58  09/02/20 14:58  09/02/20 14:58  09/02/20 14:58














- Physical Exam





09/02/20 15:08


Vitals reviewed, notable for saturation 91%, tachypnea to 22


GEN: Well appearing, appears stated age, NAD, comfortable sitting upright. 

AAOx3.


HEENT: NCAT, EOMI, PERRL. Sclera anicteric, noninjected. No facial asymmetry. 

Moist mucous membranes. Normal voice. Trachea midline. 


CV: RRR, S1/S2, no murmurs / rubs / gallops appreciated.


LUNG: CTABL with ?diminished in RLL, normal work of breathing with mild 

tachypnea and saturation low-90s. No wheezes, rales, rhonchi. No cough during my

evaluation. Speaking full sentences. 


GI: Soft, NTND, +BS, no guarding, no rebound. No masses.


EXTREMITIES: 2+ distal pulses. No clubbing / cyanosis. 2+ edema bilaterally. No 

gross deformity in any extremity.


SKIN: Warm, dry, no rashes appreciated, non-jaundiced.


PSYCH: Normal mood and affect. Cooperative and appropriate. 


NEURO: CN grossly intact. Moving all extremities well. Normal strength and 

sensation grossly. 








ED Treatment Course





- LABORATORY


CBC & Chemistry Diagram: 


                                 09/02/20 16:36





                                 09/02/20 18:26





Medical Decision Making





- Medical Decision Making





09/02/20 15:09


65yo F pmh CHF diastolic, CKD, COPD (no home O2), HTN, obesity presenting with 3

days of progressively worsening shortness of breath and cough. 





- CBC, CMP, Cardiac Profile, BNP


- CXR, EKG


- Saline lock insert


- Supplemental O2 as needed


- POCUS echo / lungs





09/02/20 19:13


Patient endorsed to Dr. Butt who will f/u CT, admit for COPD/pleural effusions





Discharge





- Discharge Information


Problems reviewed: Yes


Clinical Impression/Diagnosis: 


 COPD exacerbation





COPD (chronic obstructive pulmonary disease)


Qualifiers:


 COPD type: unspecified COPD Qualified Code(s): J44.9 - Chronic obstructive 

pulmonary disease, unspecified





Condition: Guarded





- Admission


Yes





- Follow up/Referral


Referrals: 


Yelitza Savage MD [Primary Care Provider] - 





- Patient Discharge Instructions





- Post Discharge Activity

## 2020-09-02 NOTE — EKG
Test Reason : 

Blood Pressure : ***/*** mmHG

Vent. Rate : 064 BPM     Atrial Rate : 064 BPM

   P-R Int : 178 ms          QRS Dur : 078 ms

    QT Int : 414 ms       P-R-T Axes : 077 016 049 degrees

   QTc Int : 427 ms

 

NORMAL SINUS RHYTHM

LOW VOLTAGE QRS

CANNOT RULE OUT ANTERIOR INFARCT , AGE UNDETERMINED

ABNORMAL ECG

WHEN COMPARED WITH ECG OF 03-MAY-2018 15:15,

T WAVE AMPLITUDE HAS INCREASED IN ANTERIOR LEADS

Confirmed by MD RON, FERNANDA ([id number]) on 9/2/2020 4:06:24 PM

 

Referred By:             Confirmed By:FERNANDA VELASQUEZ MD

## 2020-09-02 NOTE — PDOC
Attending Attestation





- Resident


Resident Name: Florentino Dsozua





- ED Attending Attestation


I have performed the following: I have examined & evaluated the patient, The 

case was reviewed & discussed with the resident, I agree w/resident's findings &

plan, Exceptions are as noted





- HPI


HPI: 





09/02/20 17:34


64 F with h/o CHF, CKD, COPD, HTN, obesity, presenting with 3 days of worsening 

SOB and cough. Pt states it feels like a COPD exacerbation. Has been taking 

prednisone and azithromycin prescribed by her PCP. Using home nebulizer as well 

but with no relief. Pt denies F/C. Denies CP. 





- Physicial Exam


PE: 





09/02/20 17:35


See resident exam





- Medical Decision Making





09/02/20 17:36


64 F with SOB. Possible COPD flare. Diminished lung sounds on R.


- Labs


- CXR





Discharge





- Discharge Information


Problems reviewed: Yes


Clinical Impression/Diagnosis: 


 COPD exacerbation, Pleural effusion, Shortness of breath on exertion





COPD (chronic obstructive pulmonary disease)


Qualifiers:


 COPD type: unspecified COPD Qualified Code(s): J44.9 - Chronic obstructive 

pulmonary disease, unspecified





Condition: Guarded





- Follow up/Referral





- Patient Discharge Instructions





- Post Discharge Activity

## 2020-09-02 NOTE — PDOC
*Physical Exam





- Vital Signs


                                Last Vital Signs











Temp Pulse Resp BP Pulse Ox


 


 98.2 F   70   20   173/74 H  98 


 


 09/02/20 18:29  09/02/20 18:29  09/02/20 18:29  09/02/20 18:29  09/02/20 18:40














- Physical Exam





09/02/20 20:15


PCP: Yelitza Savage





HPI: 63yo F pmh CHF diastolic, CKD, COPD (no home O2), HTN, obesity presenting 

with 3 days of progressively worsening shortness of breath and cough. Patient 

reports she believes this is an exacerbation of her COPD. Her PCP prescribed 

prednisone pack and azithromycin yesterday (she is now s/p 50mg prednisone x1 

yesterday). She reports that her home nebulizer has been helping each day but 

today provided no relief. Her cough is productive of only clear sputum and she 

denies any fevers, chills, vomiting, chest pain, wheezing. She unilaterally 

increased her Lasix from 40 to 80mg and has seen a reduction in her LE edema 

over the past 2 days without any improvement in her breathing. 





All: NKDA


Meds: Per chart


PMH: As above


PSH: Per chart


SHx: Works as a nurse





Vitals reviewed, notable for saturation 91%, tachypnea to 22


GEN: Well appearing, appears stated age, NAD, comfortable sitting upright. 

AAOx3.


HEENT: NCAT, EOMI, PERRL. Sclera anicteric, noninjected. No facial asymmetry. 

Moist mucous membranes. Normal voice. Trachea midline. 


CV: RRR, S1/S2, no murmurs / rubs / gallops appreciated.


LUNG: CTABL with ?diminished in RLL, normal work of breathing with mild 

tachypnea and saturation low-90s. No wheezes, rales, rhonchi. No cough during my

evaluation. Speaking full sentences. 


GI: Soft, NTND, +BS, no guarding, no rebound. No masses.


EXTREMITIES: 2+ distal pulses. No clubbing / cyanosis. 2+ edema bilaterally. No 

gross deformity in any extremity.


SKIN: Warm, dry, no rashes appreciated, non-jaundiced.


PSYCH: Normal mood and affect. Cooperative and appropriate. 


NEURO: CN grossly intact. Moving all extremities well. Normal strength and 

sensation grossly. 








Sign out was given by day team. 





ED Treatment Course





- LABORATORY


CBC & Chemistry Diagram: 


                                 09/03/20 07:35





                                 09/03/20 07:35





- ADDITIONAL ORDERS


Additional order review: 


                               Laboratory  Results











  09/02/20 09/02/20 09/02/20





  18:26 16:36 16:36


 


PT with INR    11.10


 


INR    0.94


 


Sodium  139  137 


 


Potassium  5.8 H  6.2 H* 


 


Chloride  111 H  110 H 


 


Carbon Dioxide  20 L  19 L 


 


Anion Gap  8  8 


 


BUN  54.9 H  54.4 H 


 


Creatinine  1.6 H  1.6 H 


 


Est GFR (CKD-EPI)AfAm  38.52  38.52 


 


Est GFR (CKD-EPI)NonAf  33.23  33.23 


 


Random Glucose  202 H  223 H 


 


Calcium  9.3  9.3 


 


Magnesium   2.9 H 


 


Total Bilirubin   0.4 


 


AST   84 H 


 


ALT   58 


 


Alkaline Phosphatase   306 H 


 


Creatine Kinase   128 


 


Troponin I   < 0.02 


 


B-Natriuretic Peptide   649.0 H 


 


Total Protein   7.2 


 


Albumin   2.9 L 








                                        











  09/02/20





  16:36


 


RBC  4.05


 


MCV  96.4 H


 


MCHC  32.5


 


RDW  15.0


 


MPV  10.8


 


Neutrophils %  76.5


 


Lymphocytes %  10.8


 


Monocytes %  12.1 H


 


Eosinophils %  0.2  D


 


Basophils %  0.4














- Medications


Given in the ED: 


ED Medications














Discontinued Medications














Generic Name Dose Route Start Last Admin





  Trade Name Roberto  PRN Reason Stop Dose Admin


 


Albuterol/Ipratropium  2 amp  09/02/20 16:48  09/02/20 17:00





  Duoneb -  NEB  09/02/20 16:49  2 amp





  ONCE ONE   Administration


 


Ceftriaxone Sodium 1,000 mg/  50 mls @ 100 mls/hr  09/02/20 16:48  09/02/20 

17:00





  Dextrose  IVPB  09/02/20 17:17  Not Given





  ONCE ONE  


 


Methylprednisolone Sodium Succinate  125 mg  09/02/20 16:48  09/02/20 17:00





  Solu-Medrol -  IVPB  09/02/20 16:49  125 mg





  ONCE ONE   Administration














Discharge





- Discharge Information


Problems reviewed: Yes


Clinical Impression/Diagnosis: 


 COPD exacerbation, Pleural effusion, Shortness of breath on exertion





COPD (chronic obstructive pulmonary disease)


Qualifiers:


 COPD type: unspecified COPD Qualified Code(s): J44.9 - Chronic obstructive 

pulmonary disease, unspecified





Condition: Guarded





- Follow up/Referral





- Patient Discharge Instructions





- Post Discharge Activity

## 2020-09-03 NOTE — EKG
Test Reason : 

Blood Pressure : ***/*** mmHG

Vent. Rate : 064 BPM     Atrial Rate : 064 BPM

   P-R Int : 178 ms          QRS Dur : 078 ms

    QT Int : 414 ms       P-R-T Axes : 077 016 049 degrees

   QTc Int : 427 ms

 

NORMAL SINUS RHYTHM

LOW VOLTAGE QRS

CANNOT RULE OUT ANTERIOR INFARCT , AGE UNDETERMINED

ABNORMAL ECG

WHEN COMPARED WITH ECG OF 03-MAY-2018 15:15,

T WAVE AMPLITUDE HAS INCREASED IN ANTERIOR LEADS

Confirmed by KOSTAS MCKEON, LESLY ([id number]) on 9/3/2020 3:27:12 PM

 

Referred By:             Confirmed By:LESLY KENYON MD

## 2020-09-03 NOTE — PN
Progress Note (short form)





- Note


Progress Note: 





ID consult dictated





SOB with worsening lower extremity edema for last 5 days


no fevers or chills 





large right pleural effusion


suspect leukocytosis due to prednisone as outpt





suspect this is all CHF


agree with thoracentesis


f/u pleural fluid chemistries and cell count


blood cultures and urinary antiens


on po levaquin- can d/c








Problem List





- Problems


(1) SOB (shortness of breath)


Code(s): R06.02 - SHORTNESS OF BREATH   





(2) Pleural effusion


Code(s): J90 - PLEURAL EFFUSION, NOT ELSEWHERE CLASSIFIED   





(3) Acute on chronic diastolic CHF (congestive heart failure)


Code(s): I50.33 - ACUTE ON CHRONIC DIASTOLIC (CONGESTIVE) HEART FAILURE

## 2020-09-03 NOTE — CON.PULM
Consult


Consult Specialty:: PULMONARY


Referred by:: Dr Lyons


Reason for Consultation:: COPD





- History of Present Illness


Chief Complaint: shortness of breath


History of Present Illness: 





65yo female with h/o HTN, COPD, LV diastolic dysfunction, CKD who was admitted 

with worsening shortness of breath x 3 days. Reports a nonproductive cough 

without wheezing. No fevers, chills or sweats. Reports increased leg swelling 

and the dyspnea has been waking her up. No chest discomfort aside from coughing.

Imaging showing large right effusion.








- History Source


History Provided By: Patient


Limitations to Obtaining History: No Limitations





- Past Medical History


Cardio/Vascular: Yes: CHF, HTN


Pulmonary: Yes: COPD


Renal/: Yes: Renal Inusuff


...Pregnant: No


Psych: Yes: Anxiety


Endocrine: Yes: Diabetes Mellitus





- Alcohol/Substance Use


Hx Alcohol Use: No





- Smoking History


Smoking history: Never smoked


Have you smoked in the past 12 months: Yes


Aproximately how many cigarettes per day: 10





- Social History


Usual Living Arrangement: Alone


ADL: Independent


Occupation:  here at [employer]


History of Recent Travel: No





Home Medications





- Allergies


Allergies/Adverse Reactions: 


                                    Allergies











Allergy/AdvReac Type Severity Reaction Status Date / Time


 


IV CONTRAST AdvReac   Uncoded 09/02/20 15:46














- Home Medications


Home Medications: 


Ambulatory Orders





Rosuvastatin Calcium [Crestor] 10 mg PO HS 10/14/16 


Amlodipine Besylate [Norvasc -] 10 mg PO DAILY #30 tablet 10/18/16 


Bimatoprost [Lumigan] 1 drop OU DAILY 12/05/17 


Brimonidine Tartrate/Timolol [Combigan 0.2%-0.5% Eye Drops] 5 5ml OU BID 

12/05/17 


Aspirin [Ecotrin] 81 mg PO DAILY 05/03/18 


Furosemide [Lasix -] 40 mg PO DAILY #30 tablet 05/15/18 


Multivitamin [Multiple Vitamins] 1 each PO DAILY 11/15/19 


Nebivolol HCl [Bystolic] 10 mg PO DAILY 11/15/19 


Olmesartan/Hydrochlorothiazide [Olmesartan-Hctz 20-12.5 mg Tab] 1 each PO DAILY 

11/15/19 


Umeclidinium Bromide [Incruse Ellipta] 62.5 mcg IH DAILY 11/15/19 


Duloxetine HCl [Cymbalta] 60 mg PO DAILY 07/16/20 


Insulin Glargine,Hum.rec.anlog [Toujeo Solostar] 30 unit SQ AM 09/02/20 


Insulin Glargine,Hum.rec.anlog [Toujeo Solostar] 60 unit SQ HS 09/02/20 











Review of Systems





- Review of Systems


Constitutional: denies: Chills, Fever


Eyes: denies: Recent Change in Vision


HENT: denies: Throat Pain


Neck: denies: Stiffness, Tenderness


Cardiovascular: reports: Edema, Shortness of Breath.  denies: Chest Pain, 

Palpitations


Respiratory: reports: Cough, Orthopnea, PND, SOB on Exertion.  denies: 

Hemoptysis


Gastrointestinal: denies: Abdominal Pain, Nausea, Vomiting


Genitourinary: denies: Dysuria, Hematuria


Neurological: denies: Dizziness, Headache


Endocrine: denies: Unexplained Weight Loss





Physical Exam


Vital Sings: 


                                   Vital Signs











Temperature  97.6 F   09/03/20 13:14


 


Pulse Rate  60   09/03/20 13:14


 


Respiratory Rate  22 H  09/03/20 13:14


 


Blood Pressure  143/64   09/03/20 13:14


 


O2 Sat by Pulse Oximetry (%)  99   09/03/20 13:14











Constitutional: Yes: Calm


Eyes: Yes: Conjunctiva Clear, EOM Intact


HENT: Yes: Atraumatic, Normocephalic


Neck: Yes: Supple, Trachea Midline


Cardiovascular: Yes: Regular Rate and Rhythm


Respiratory: Yes: Diminished (decreased breath sounds right)


...Clubbing: No


Gastrointestinal: Yes: Normal Bowel Sounds, Soft.  No: Tenderness


Edema: Yes


Neurological: Yes: Alert, Oriented


Labs: 


                                    CBC, BMP





                                 09/03/20 07:35 





                                 09/03/20 07:35 











Imaging





- Results


Chest X-ray: Report Reviewed, Image Reviewed


Cat Scan: Report Reviewed, Image Reviewed (large right effusion, patcy NIRANJAN 

infiltrate)





Assessment/Plan





Acute on Chronic Diastolic Heart Failure


Right Pleural Effusion


COPD


CKD


HTN





-  IV lasix


-  monitor urine output, creatinine


-  daily weights


-  O2 to keep SpO2 >90%


-  will perform diagnostic/therapeutic right thoracentesis


-  inhaled bronchodilators as needed


-  DVT prophylaxis





Thank you for this consult


Easton Crook MD

## 2020-09-03 NOTE — CONS
INFECTIOUS DISEASE CONSULTATION

 

DATE OF CONSULTATION:  

 

DATE OF DICTATION:  09/03/2020 

 

HISTORY:  A 64-year-old woman, past medical history of heart failure, COPD who

presented to the ER with a 5- to 6-day history of worsening lower extremity edema and

shortness of breath.  She was seen 2 days ago at her PMD's office and treated with

prednisone and Zithromax which she started yesterday.  She took 50 mg of prednisone

and started her Zithromax.  She doubled up her Lasix at home which has improved her

lower extremity edema.  She reports no changes in her diet and that she is much more

comfortable sitting up.  She has a cough with some white sputum.  She denies fevers,

chills, chest pain, abdominal pain, dysuria or diarrhea.  She has no sick contacts. 

There has been no recent travel.

 

PAST MEDICAL HISTORY:  Notable for hypertension, CHF, COPD, renal insufficiency.  She

has a history of obesity, anxiety and diabetes.  She has a history of legionella

pneumonia as well in 2018.  

 

PAST SURGICAL HISTORY:  She has had recent cataract surgery this year, this summer,

and she has had a right total knee replacement as well in 2017.  

 

ALLERGIES:  She is allergic to IV CONTRAST DYE.

 

MEDICATIONS AT HOME:  She was recently started on the prednisone and the Z-Jeevan.  Her

home medications include Ellipta, Crestor, olmesartan, hydrochlorothiazide, Bystolic,

vitamins, insulin, Lasix, Cymbalta, Combigan eye drops, Lumigan eye drops, Ecotrin

and Norvasc. 

 

SOCIAL HISTORY:  No history of cigarette, alcohol or substance use.  She is a retired

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

year.

 

REVIEW OF SYSTEMS:  Notable for 30-pound weight loss which has been deliberate.  She

denies any recent antibiotics.  She has clear sputum production.  She denies any

fevers or chills.  

 

PHYSICAL EXAMINATION:  

General:  She is a well-appearing woman.

Vital Signs:  Temp of 97.6, pulse of 60, blood pressure 143/64.  She is saturating

99% on 4 L.  

HEENT:  She is normocephalic.  Her eyes are anicteric.  

Neck:  Supple.

Lungs:  Diminished breath sounds about half way up the right side.  Left lung is

clear.

Abdomen:  Soft, nontender.

Extremities:  Pitting edema 2+.

 

Her CAT scan shows right lower lobe consolidation atelectasis with air bronchograms

and a large right pleural effusion.  She has some airspace disease on the left lung

as well.  

 

Her labs are notable for a white count of 15.3 on admission, today 13.7; hemoglobin

13.1.  Platelets are 304.  BUN is 58 and creatinine 1.7.  She has an alkaline

phosphatase of 335.  She had a COVID-19 serology that is negative.  As stated before,

she has a history of legionella pneumonia in 2018.  

 

In summary, this is a 64-year-old woman admitted with a unilateral right lower lobe

effusion who was admitted with worsening shortness of breath.  I suspect that she has

a right pleural effusion on the basis of her heart failure.  Nothing at this point to

really suggest she has an infection.  I suspect her leukocytosis is due to steroids. 

Could send cultures.  She received a dose of Levaquin this morning.  She is scheduled

for thoracentesis this afternoon.  Would send a legionella urinary antigen as well

and monitor off antibiotics at this time.  She is going to be treated with diuretics

and be seen by Cardiology as well.

 

 

IRWIN CAREY M.D.

 

[id number]

DD: 09/03/2020 15:20

DT: 09/03/2020 15:42

Job #:  [id number]

## 2020-09-03 NOTE — PROC
Procedure Note


Procedure: 





THORACENTESIS NOTE





After discussing the risks and benefits of the procedure including bleeding, 

infection, pneumothorax, informed consent was obtained. Area of pleural fluid 

identified with ultransound and area was prepped and draped in sterile fashion. 

Skin and soft tissue was infiltrated with 1% lidocaine. A 10Fr pigtail catheter 

was placed into the right pleural space using the trochar method. The pleural 

fluid was clear, straw colored. A total of 1500mL serous fluid drained. The 

catheter was then removed and procedure terminated. No immediate complications. 

CXR ordered.





Easton Crook MD

## 2020-09-03 NOTE — PN
Progress Note (short form)





- Note


Progress Note: 





coughing-- clear sputrum


denies chest pain 


SOB+


Leg edema better


was on prednisone , zpack and increased dose of lasix at home


                               Vital Signs - 24 hr











  09/02/20 09/02/20 09/02/20





  14:58 15:42 15:43


 


Temperature 98.2 F  


 


Pulse Rate 80  


 


Pulse Rate [   





Right Apical]   


 


Respiratory 22 H  





Rate   


 


Blood Pressure 127/73  


 


Blood Pressure   





[Left Arm]   


 


O2 Sat by Pulse 91 L 95 95





Oximetry (%)   














  09/02/20 09/02/20 09/02/20





  18:29 18:40 22:30


 


Temperature 98.2 F  98.4 F


 


Pulse Rate   


 


Pulse Rate [ 70  72





Right Apical]   


 


Respiratory 20  20





Rate   


 


Blood Pressure   


 


Blood Pressure 173/74 H  183/64 H





[Left Arm]   


 


O2 Sat by Pulse 93 L 98 97





Oximetry (%)   














  09/03/20 09/03/20 09/03/20





  01:40 05:40 07:25


 


Temperature 98.4 F 98.4 F 97.8 F


 


Pulse Rate   


 


Pulse Rate [ 85 80 68





Right Apical]   


 


Respiratory 17 16 26 H





Rate   


 


Blood Pressure   


 


Blood Pressure 179/83 H 177/77 H 163/72





[Left Arm]   


 


O2 Sat by Pulse 98 98 98





Oximetry (%)   














  09/03/20 09/03/20





  11:04 13:14


 


Temperature 97.9 F 97.6 F


 


Pulse Rate  


 


Pulse Rate [ 70 60





Right Apical]  


 


Respiratory  22 H





Rate  


 


Blood Pressure  


 


Blood Pressure 146/58 L 143/64





[Left Arm]  


 


O2 Sat by Pulse 99 99





Oximetry (%)  








                               Current Medications











Generic Name Dose Route Start Last Admin





  Trade Name Freq  PRN Reason Stop Dose Admin


 


Albuterol Sulfate  2 puff  09/03/20 00:47  09/03/20 01:10





  Ventolin Hfa Inhaler -  IH   2 puff





  Q4H PRN   Administration





  SHORT OF BREATH/WHEEZING  


 


Albuterol/Ipratropium  1 amp  09/03/20 13:39 





  Duoneb -  NEB  





  Q4H PRN  





  SHORTNESS OF BREATH  


 


Amlodipine Besylate  10 mg  09/03/20 10:00  09/03/20 09:56





  Norvasc -  PO   10 mg





  DAILY JAYCEE   Administration


 


Aspirin  81 mg  09/03/20 10:00  09/03/20 09:56





  Ecotrin -  PO   81 mg





  DAILY JAYCEE   Administration


 


Brimonidine Tartrate  1 drop  09/03/20 10:00  09/03/20 10:35





  Alphagan 0.2% -  OU   1 drop





  BID JAYCEE   Administration


 


Duloxetine HCl  60 mg  09/03/20 10:00  09/03/20 09:56





  Cymbalta -  PO   60 mg





  DAILY JAYCEE   Administration


 


Furosemide  40 mg  09/03/20 10:00  09/03/20 09:47





  Lasix Injection -  IVPUSH   Not Given





  DAILY JAYCEE  


 


Heparin Sodium (Porcine)  5,000 unit  09/03/20 10:00  09/03/20 09:56





  Heparin -  SQ   5,000 unit





  BID JAYCEE   Administration


 


Insulin Aspart  1 vial  09/03/20 07:00  09/03/20 12:45





  Novolog Vial Sliding Scale -  SQ   6 unit





  [facility] JAYCEE   Administration





  Protocol  


 


Latanoprost  1 drop  09/03/20 22:00 





  Xalatan 0.005% Eye Drops -  OU  





  HS JAYCEE  


 


Levofloxacin  500 mg  09/03/20 06:00  09/03/20 06:19





  Levaquin -  PO  09/10/20 05:59  500 mg





  DAILY@0600 JAYCEE   Administration


 


Methylprednisolone Sodium Succinate  40 mg  09/03/20 02:00  09/03/20 09:48





  Solu-Medrol -  IVPUSH   Not Given





  Q8H-IV JAYCEE  


 


Multivitamins/Minerals/Vitamin C  1 tab  09/03/20 10:00  09/03/20 09:56





  Tab-A-Vit -  PO   1 tab





  DAILY JAYCEE   Administration


 


Nebivolol  10 mg  09/03/20 10:00  09/03/20 09:56





  Bystolic -  PO   10 mg





  DAILY JAYCEE   Administration


 


Pantoprazole Sodium  40 mg  09/04/20 10:00 





  Protonix -  PO  





  DAILY [facility]  


 


Rosuvastatin Calcium  10 mg  09/03/20 22:00 





  Crestor -  PO  





  HS JAYCEE  


 


Timolol Maleate  1 drop  09/03/20 10:00  09/03/20 10:39





  Timoptic 0.5%  OU   1 drop





  BID JAYCEE   Administration


 


Tiotropium Bromide  2 puff  09/03/20 10:00  09/03/20 10:38





  Spiriva Respimat  IH   2 puff





  DAILY JAYCEE   Administration








                         Laboratory Results - last 24 hr











  09/02/20 09/02/20 09/02/20





  16:36 16:36 16:36


 


WBC  15.3 H  


 


RBC  4.05  


 


Hgb  12.7  


 


Hct  39.1  


 


MCV  96.4 H  


 


MCH  31.3  


 


MCHC  32.5  


 


RDW  15.0  


 


Plt Count  294  


 


MPV  10.8  


 


Absolute Neuts (auto)  11.7 H  


 


Neutrophils %  76.5  


 


Lymphocytes %  10.8  


 


Monocytes %  12.1 H  


 


Eosinophils %  0.2  D  


 


Basophils %  0.4  


 


Nucleated RBC %  0  


 


PT with INR   11.10 


 


INR   0.94 


 


Sodium    137


 


Potassium    6.2 H*


 


Chloride    110 H


 


Carbon Dioxide    19 L


 


Anion Gap    8


 


BUN    54.4 H


 


Creatinine    1.6 H


 


Est GFR (CKD-EPI)AfAm    38.52


 


Est GFR (CKD-EPI)NonAf    33.23


 


POC Glucometer   


 


Random Glucose    223 H


 


Calcium    9.3


 


Phosphorus   


 


Magnesium    2.9 H


 


Total Bilirubin    0.4


 


AST    84 H


 


ALT    58


 


Alkaline Phosphatase    306 H


 


Creatine Kinase    128


 


Troponin I    < 0.02


 


B-Natriuretic Peptide    649.0 H


 


Total Protein    7.2


 


Albumin    2.9 L


 


TSH   


 


Urine Color   


 


Urine Appearance   


 


Urine pH   


 


Ur Specific Gravity   


 


Urine Protein   


 


Urine Glucose (UA)   


 


Urine Ketones   


 


Urine Blood   


 


Urine Nitrite   


 


Urine Bilirubin   


 


Urine Urobilinogen   


 


Ur Leukocyte Esterase   


 


Urine WBC (Auto)   


 


Urine RBC (Auto)   


 


Urine Casts (Auto)   


 


U Epithel Cells (Auto)   


 


Urine Bacteria (Auto)   


 


COVID-19 ([facility])   














  09/02/20 09/02/20 09/02/20





  18:26 18:26 23:50


 


WBC   


 


RBC   


 


Hgb   


 


Hct   


 


MCV   


 


MCH   


 


MCHC   


 


RDW   


 


Plt Count   


 


MPV   


 


Absolute Neuts (auto)   


 


Neutrophils %   


 


Lymphocytes %   


 


Monocytes %   


 


Eosinophils %   


 


Basophils %   


 


Nucleated RBC %   


 


PT with INR   


 


INR   


 


Sodium   139  137


 


Potassium   5.8 H  5.9 H


 


Chloride   111 H  109 H


 


Carbon Dioxide   20 L  21


 


Anion Gap   8  7 L


 


BUN   54.9 H  54.8 H


 


Creatinine   1.6 H  1.7 H


 


Est GFR (CKD-EPI)AfAm   38.52  35.79


 


Est GFR (CKD-EPI)NonAf   33.23  30.88


 


POC Glucometer   


 


Random Glucose   202 H  287 H


 


Calcium   9.3  9.5


 


Phosphorus   


 


Magnesium   


 


Total Bilirubin   


 


AST   


 


ALT   


 


Alkaline Phosphatase   


 


Creatine Kinase   


 


Troponin I    < 0.02


 


B-Natriuretic Peptide   


 


Total Protein   


 


Albumin   


 


TSH   


 


Urine Color   


 


Urine Appearance   


 


Urine pH   


 


Ur Specific Gravity   


 


Urine Protein   


 


Urine Glucose (UA)   


 


Urine Ketones   


 


Urine Blood   


 


Urine Nitrite   


 


Urine Bilirubin   


 


Urine Urobilinogen   


 


Ur Leukocyte Esterase   


 


Urine WBC (Auto)   


 


Urine RBC (Auto)   


 


Urine Casts (Auto)   


 


U Epithel Cells (Auto)   


 


Urine Bacteria (Auto)   


 


COVID-19 ([facility])  Not detected  














  09/03/20 09/03/20 09/03/20





  06:57 07:16 07:35


 


WBC    13.7 H


 


RBC    4.14


 


Hgb    13.1


 


Hct    40.3


 


MCV    97.3 H


 


MCH    31.6


 


MCHC    32.4


 


RDW    14.8


 


Plt Count    304


 


MPV    10.5


 


Absolute Neuts (auto)    11.9 H


 


Neutrophils %    86.6 H


 


Lymphocytes %    7.7 L D


 


Monocytes %    5.5


 


Eosinophils %    0.0  D


 


Basophils %    0.2


 


Nucleated RBC %    0


 


PT with INR   


 


INR   


 


Sodium   


 


Potassium   


 


Chloride   


 


Carbon Dioxide   


 


Anion Gap   


 


BUN   


 


Creatinine   


 


Est GFR (CKD-EPI)AfAm   


 


Est GFR (CKD-EPI)NonAf   


 


POC Glucometer   288 


 


Random Glucose   


 


Calcium   


 


Phosphorus   


 


Magnesium   


 


Total Bilirubin   


 


AST   


 


ALT   


 


Alkaline Phosphatase   


 


Creatine Kinase   


 


Troponin I   


 


B-Natriuretic Peptide   


 


Total Protein   


 


Albumin   


 


TSH   


 


Urine Color  Yellow  


 


Urine Appearance  Clear  


 


Urine pH  5.0  


 


Ur Specific Gravity  1.015  


 


Urine Protein  4+ H  


 


Urine Glucose (UA)  Trace  


 


Urine Ketones  Negative  


 


Urine Blood  Trace  


 


Urine Nitrite  Negative  


 


Urine Bilirubin  Negative  


 


Urine Urobilinogen  0.2  


 


Ur Leukocyte Esterase  Negative  


 


Urine WBC (Auto)  8  


 


Urine RBC (Auto)  22  


 


Urine Casts (Auto)  6  


 


U Epithel Cells (Auto)  19  


 


Urine Bacteria (Auto)  34  


 


COVID-19 ([facility])   














  09/03/20 09/03/20





  07:35 11:49


 


WBC  


 


RBC  


 


Hgb  


 


Hct  


 


MCV  


 


MCH  


 


MCHC  


 


RDW  


 


Plt Count  


 


MPV  


 


Absolute Neuts (auto)  


 


Neutrophils %  


 


Lymphocytes %  


 


Monocytes %  


 


Eosinophils %  


 


Basophils %  


 


Nucleated RBC %  


 


PT with INR  


 


INR  


 


Sodium  137 


 


Potassium  5.5 H 


 


Chloride  109 H 


 


Carbon Dioxide  18 L 


 


Anion Gap  10 


 


BUN  58.5 H 


 


Creatinine  1.7 H 


 


Est GFR (CKD-EPI)AfAm  35.79 


 


Est GFR (CKD-EPI)NonAf  30.88 


 


POC Glucometer   275


 


Random Glucose  297 H 


 


Calcium  9.7 


 


Phosphorus  5.5 H 


 


Magnesium  2.8 H 


 


Total Bilirubin  0.4 


 


AST  47 H 


 


ALT  58 


 


Alkaline Phosphatase  335 H 


 


Creatine Kinase  


 


Troponin I  < 0.02 


 


B-Natriuretic Peptide  


 


Total Protein  7.6 


 


Albumin  3.1 L 


 


TSH  1.02 


 


Urine Color  


 


Urine Appearance  


 


Urine pH  


 


Ur Specific Gravity  


 


Urine Protein  


 


Urine Glucose (UA)  


 


Urine Ketones  


 


Urine Blood  


 


Urine Nitrite  


 


Urine Bilirubin  


 


Urine Urobilinogen  


 


Ur Leukocyte Esterase  


 


Urine WBC (Auto)  


 


Urine RBC (Auto)  


 


Urine Casts (Auto)  


 


U Epithel Cells (Auto)  


 


Urine Bacteria (Auto)  


 


COVID-19 ([facility])  








S1 s2 RRR


Lungs decreased


Ronchi scattered


Abd- soft, obese


NT


Edema++








PLAN


CT chest reviewed


COVID negative


 check urine antigens, sputum cultures


 ID eval 


Nebs


IV antibiotics, solumedrol and iv lasix


IR eval for iv access


Pulmonary and Cardiology eval








Problem List





- Problems


(1) COPD exacerbation


Code(s): J44.1 - CHRONIC OBSTRUCTIVE PULMONARY DISEASE W (ACUTE) EXACERBATION   





(2) ASHD (arteriosclerotic heart disease)


Code(s): I25.10 - ATHSCL HEART DISEASE OF NATIVE CORONARY ARTERY W/O ANG PCTRS  







(3) Acute hypoxemic respiratory failure


Code(s): J96.01 - ACUTE RESPIRATORY FAILURE WITH HYPOXIA   





(4) Acute on chronic diastolic CHF (congestive heart failure)


Code(s): I50.33 - ACUTE ON CHRONIC DIASTOLIC (CONGESTIVE) HEART FAILURE   





(5) CKD (chronic kidney disease) stage 3, GFR 30-59 ml/min


Code(s): N18.3 - CHRONIC KIDNEY DISEASE, STAGE 3 (MODERATE)   





(6) HTN (hypertension)


Code(s): I10 - ESSENTIAL (PRIMARY) HYPERTENSION   


Qualifiers: 


   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   





(7) IDDM (insulin dependent diabetes mellitus)


Code(s): E11.9 - TYPE 2 DIABETES MELLITUS WITHOUT COMPLICATIONS; Z79.4 - LONG 

TERM (CURRENT) USE OF INSULIN   





(8) Pneumonia


Code(s): J18.9 - PNEUMONIA, UNSPECIFIED ORGANISM   


Qualifiers: 


   Laterality: right   Lung location: lower lobe of lung

## 2020-09-04 NOTE — PN
Progress Note (short form)





- Note


Progress Note: 





s/p 1.5 lliters paracentesis yesterday


much improved today


less leg edema





gram stain of pleural fluid no wbc





                                   Vital Signs











 Period  Temp  Pulse  Resp  BP Sys/Thrasher  Pulse Ox


 


 Last 24 Hr  97.6 F-98.5 F  61-76  17-20  144-159/46-91  








cor-rrr


lungs decreased bs at bases


abd soft,nt


ext less edema





                                    CBC, BMP





                                 09/04/20 09:02 





                                 09/04/20 09:02 





                                  Microbiology





09/03/20 15:15   Pleural Fluid   Gram Stain - Final


09/04/20 03:15   Urine For Antigen Detection   Legionella Antigen - Final


09/04/20 03:15   Urine For Antigen Detection   Streptococcus pneumoniae Antigen 

(M - Final


09/03/20 15:15   Pleural Fluid   KOH Preparation - Preliminary


09/03/20 15:15   Pleural Fluid   Fungal Culture - Preliminary


09/03/20 15:15   Pleural Fluid   AFB Smear Concentration - Preliminary


09/03/20 15:15   Pleural Fluid   Mycobacterial Culture - Preliminary


09/03/20 06:57   Urine - Urine Clean Catch   Urine Culture - Final


                            NO GROWTH OBTAINED








a/p


CHF


large right pleural effusion-s/p thoracentesis-f/waqar pleural fluid chemistries


suspect leukocytosis due to prednisone as outpt


continue diuresis


clinically improving


incentive spirometry





d/w cardiology

















Problem List





- Problems


(1) Leukocytosis


Code(s): D72.829 - ELEVATED WHITE BLOOD CELL COUNT, UNSPECIFIED   





(2) SOB (shortness of breath)


Code(s): R06.02 - SHORTNESS OF BREATH   





(3) Pleural effusion


Code(s): J90 - PLEURAL EFFUSION, NOT ELSEWHERE CLASSIFIED   





(4) Acute on chronic diastolic CHF (congestive heart failure)


Code(s): I50.33 - ACUTE ON CHRONIC DIASTOLIC (CONGESTIVE) HEART FAILURE

## 2020-09-04 NOTE — PN
Progress Note, Physician


History of Present Illness: 


pt seen/ examined


sitting in chair


feels much better


s/p Paracentesis -- 1.5 Litre !!











- Current Medication List


Current Medications: 


Active Medications





Albuterol Sulfate (Ventolin Hfa Inhaler -)  2 puff IH Q4H PRN


   PRN Reason: SHORT OF BREATH/WHEEZING


   Last Admin: 09/04/20 08:15 Dose:  2 puff


   Documented by: 


Albuterol/Ipratropium (Duoneb -)  1 amp NEB Q4H PRN


   PRN Reason: SHORTNESS OF BREATH


Amlodipine Besylate (Norvasc -)  10 mg PO DAILY [facility]


   Last Admin: 09/04/20 11:03 Dose:  10 mg


   Documented by: 


Aspirin (Ecotrin -)  81 mg PO DAILY [facility]


   Last Admin: 09/04/20 11:03 Dose:  81 mg


   Documented by: 


Brimonidine Tartrate (Alphagan 0.2% -)  1 drop OU BID [facility]


   Last Admin: 09/03/20 22:59 Dose:  1 drop


   Documented by: 


Duloxetine HCl (Cymbalta -)  60 mg PO DAILY [facility]


   Last Admin: 09/04/20 11:04 Dose:  60 mg


   Documented by: 


Furosemide (Lasix Injection -)  40 mg IVPUSH DAILY [facility]


   Last Admin: 09/04/20 11:04 Dose:  40 mg


   Documented by: 


Heparin Sodium (Porcine) (Heparin -)  5,000 unit SQ BID [facility]


   Last Admin: 09/04/20 11:04 Dose:  5,000 unit


   Documented by: 


Insulin Aspart (Novolog Vial Sliding Scale -)  [facility] vial SQ [facility]; Protocol


   Last Admin: 09/04/20 11:22 Dose:  Not Given


   Documented by: 


Insulin Detemir (Levemir Vial)  60 units SQ HS [facility]


   Last Admin: 09/03/20 23:19 Dose:  Not Given


   Documented by: 


Insulin Detemir (Levemir Vial)  30 units SQ AM [facility]


   Last Admin: 09/04/20 06:23 Dose:  Not Given


   Documented by: 


Latanoprost (Xalatan 0.005% Eye Drops -)  1 drop OU HS [facility]


   Last Admin: 09/03/20 23:02 Dose:  Not Given


   Documented by: 


Multivitamins/Minerals/Vitamin C (Tab-A-Vit -)  1 tab PO DAILY [facility]


   Last Admin: 09/04/20 11:03 Dose:  1 tab


   Documented by: 


Nebivolol (Bystolic -)  10 mg PO DAILY [facility]


   Last Admin: 09/04/20 11:04 Dose:  10 mg


   Documented by: 


Pantoprazole Sodium (Protonix -)  40 mg PO DAILY [facility]


   Last Admin: 09/04/20 11:03 Dose:  40 mg


   Documented by: 


Rosuvastatin Calcium (Crestor -)  10 mg PO HS [facility]


   Last Admin: 09/03/20 23:04 Dose:  10 mg


   Documented by: 


Timolol Maleate (Timoptic 0.5%)  1 drop OU BID [facility]


   Last Admin: 09/04/20 11:21 Dose:  1 drop


   Documented by: 


Tiotropium Bromide (Spiriva Respimat)  2 puff IH DAILY [facility]


   Last Admin: 09/04/20 11:21 Dose:  2 puff


   Documented by: 











- Objective


Vital Signs: 


                                   Vital Signs











Temperature  97.8 F   09/04/20 09:13


 


Pulse Rate  76   09/04/20 09:13


 


Respiratory Rate  20   09/04/20 09:13


 


Blood Pressure  159/75   09/04/20 09:13


 


O2 Sat by Pulse Oximetry (%)  99   09/04/20 09:13











Constitutional: Yes: No Distress, Anxious


Neck: Yes: Supple


Respiratory: Yes: Diminished


Gastrointestinal: Yes: Soft, Abdomen, Obese


Edema: Yes


Neurological: Yes: Alert


Psychiatric: Yes: Alert


Labs: 


                                    CBC, BMP





                                 09/04/20 09:02 





                                 09/04/20 09:02 





                                    INR, PTT











INR  0.94  (0.83-1.09)   09/02/20  16:36    














Problem List





- Problems


(1) COPD (chronic obstructive pulmonary disease)


Code(s): J44.9 - CHRONIC OBSTRUCTIVE PULMONARY DISEASE, UNSPECIFIED   


Qualifiers: 


   COPD type: unspecified COPD   Qualified Code(s): J44.9 - Chronic obstructive 

pulmonary disease, unspecified   





(2) Encounter for screening laboratory testing for COVID-19 virus


Code(s): Z11.59 - ENCOUNTER FOR SCREENING FOR OTHER VIRAL DISEASES   





(3) Pleural effusion


Code(s): J90 - PLEURAL EFFUSION, NOT ELSEWHERE CLASSIFIED   





(4) Shortness of breath on exertion


Code(s): R06.02 - SHORTNESS OF BREATH   





(5) ASHD (arteriosclerotic heart disease)


Code(s): I25.10 - ATHSCL HEART DISEASE OF NATIVE CORONARY ARTERY W/O ANG PCTRS  







(6) Acute hypoxemic respiratory failure


Code(s): J96.01 - ACUTE RESPIRATORY FAILURE WITH HYPOXIA   





(7) Acute on chronic diastolic CHF (congestive heart failure)


Code(s): I50.33 - ACUTE ON CHRONIC DIASTOLIC (CONGESTIVE) HEART FAILURE   





(8) CKD (chronic kidney disease) stage 3, GFR 30-59 ml/min


Code(s): N18.3 - CHRONIC KIDNEY DISEASE, STAGE 3 (MODERATE)   





(9) HTN (hypertension)


Code(s): I10 - ESSENTIAL (PRIMARY) HYPERTENSION   


Qualifiers: 


   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   





(10) IDDM (insulin dependent diabetes mellitus)


Code(s): E11.9 - TYPE 2 DIABETES MELLITUS WITHOUT COMPLICATIONS; Z79.4 - LONG 

TERM (CURRENT) USE OF INSULIN   





(11) Obesity (BMI 30-39.9)


Code(s): E66.9 - OBESITY, UNSPECIFIED   





Assessment/Plan


discussed with pt/ Rn


Meds reviewed


Agree with current management


Observe off abs-- Likely chf


monitor lytes/BGM


Robitussin for cough


will follow


Smoking cessation counselling

## 2020-09-04 NOTE — PN
Progress Note, Physician


History of Present Illness: 





PULMONARY





ALERT,OOB-CHAIR,FEELING BETTER,LESS SOB.





- Current Medication List


Current Medications: 


Active Medications





Albuterol Sulfate (Ventolin Hfa Inhaler -)  2 puff IH Q4H PRN


   PRN Reason: SHORT OF BREATH/WHEEZING


   Last Admin: 09/04/20 03:24 Dose:  2 puff


   Documented by: 


Albuterol/Ipratropium (Duoneb -)  1 amp NEB Q4H PRN


   PRN Reason: SHORTNESS OF BREATH


Amlodipine Besylate (Norvasc -)  10 mg PO DAILY [facility]


   Last Admin: 09/03/20 09:56 Dose:  10 mg


   Documented by: 


Aspirin (Ecotrin -)  81 mg PO DAILY [facility]


   Last Admin: 09/03/20 09:56 Dose:  81 mg


   Documented by: 


Brimonidine Tartrate (Alphagan 0.2% -)  1 drop OU BID [facility]


   Last Admin: 09/03/20 22:59 Dose:  1 drop


   Documented by: 


Duloxetine HCl (Cymbalta -)  60 mg PO DAILY [facility]


   Last Admin: 09/03/20 09:56 Dose:  60 mg


   Documented by: 


Furosemide (Lasix Injection -)  40 mg IVPUSH DAILY [facility]


   Last Admin: 09/03/20 09:47 Dose:  Not Given


   Documented by: 


Heparin Sodium (Porcine) (Heparin -)  5,000 unit SQ BID [facility]


   Last Admin: 09/03/20 23:04 Dose:  5,000 unit


   Documented by: 


Insulin Aspart (Novolog Vial Sliding Scale -)  1 vial SQ [facility]; Protocol


   Last Admin: 09/04/20 06:23 Dose:  Not Given


   Documented by: 


Insulin Detemir (Levemir Vial)  60 units SQ HS [facility]


   Last Admin: 09/03/20 23:19 Dose:  Not Given


   Documented by: 


Insulin Detemir (Levemir Vial)  30 units SQ AM [facility]


   Last Admin: 09/04/20 06:23 Dose:  Not Given


   Documented by: 


Latanoprost (Xalatan 0.005% Eye Drops -)  1 drop OU HS [facility]


   Last Admin: 09/03/20 23:02 Dose:  Not Given


   Documented by: 


Multivitamins/Minerals/Vitamin C (Tab-A-Vit -)  1 tab PO DAILY [facility]


   Last Admin: 09/03/20 09:56 Dose:  1 tab


   Documented by: 


Nebivolol (Bystolic -)  10 mg PO DAILY [facility]


   Last Admin: 09/03/20 09:56 Dose:  10 mg


   Documented by: 


Pantoprazole Sodium (Protonix -)  40 mg PO DAILY [facility]


Rosuvastatin Calcium (Crestor -)  10 mg PO HS [facility]


   Last Admin: 09/03/20 23:04 Dose:  10 mg


   Documented by: 


Timolol Maleate (Timoptic 0.5%)  1 drop OU BID [facility]


   Last Admin: 09/03/20 22:59 Dose:  1 drop


   Documented by: 


Tiotropium Bromide (Spiriva Respimat)  2 puff IH DAILY [facility]


   Last Admin: 09/03/20 10:38 Dose:  2 puff


   Documented by: 











- Objective


Vital Signs: 


                                   Vital Signs











Temperature  98.4 F   09/04/20 07:00


 


Pulse Rate  69   09/04/20 07:00


 


Respiratory Rate  20   09/04/20 07:00


 


Blood Pressure  145/54 L  09/04/20 07:00


 


O2 Sat by Pulse Oximetry (%)  95   09/04/20 07:00











Constitutional: Yes: Well Nourished, Calm


Eyes: Yes: WNL


HENT: Yes: WNL


Neck: Yes: WNL


Cardiovascular: Yes: Regular Rate and Rhythm, S1, S2


Respiratory: Yes: Diminished


Gastrointestinal: Yes: Normal Bowel Sounds, Soft


Extremities: Yes: WNL


Edema: Yes


Labs: 


                                    CBC, BMP








Problem List





- Problems


(1) COPD (chronic obstructive pulmonary disease)


Code(s): J44.9 - CHRONIC OBSTRUCTIVE PULMONARY DISEASE, UNSPECIFIED   


Qualifiers: 


   COPD type: unspecified COPD   Qualified Code(s): J44.9 - Chronic obstructive 

pulmonary disease, unspecified   





(2) Pleural effusion


Code(s): J90 - PLEURAL EFFUSION, NOT ELSEWHERE CLASSIFIED   





(3) Shortness of breath on exertion


Code(s): R06.02 - SHORTNESS OF BREATH   





(4) ASHD (arteriosclerotic heart disease)


Code(s): I25.10 - ATHSCL HEART DISEASE OF NATIVE CORONARY ARTERY W/O ANG PCTRS  







(5) Acute on chronic diastolic CHF (congestive heart failure)


Code(s): I50.33 - ACUTE ON CHRONIC DIASTOLIC (CONGESTIVE) HEART FAILURE   





(6) CKD (chronic kidney disease) stage 3, GFR 30-59 ml/min


Code(s): N18.3 - CHRONIC KIDNEY DISEASE, STAGE 3 (MODERATE)   





Assessment/Plan





Assessment/Plan





Acute on Chronic Diastolic Heart Failure


Right Pleural Effusion


COPD


CKD


HTN





-  IV lasix


-  monitor urine output, creatinine


-  daily weights


-  O2 to keep SpO2 >90%


- Pleural fluid chemistries pending


-  inhaled bronchodilators as needed


-  DVT prophylaxis





DR REED

## 2020-09-04 NOTE — ECHO
Version:  1



Name:  NATANAEL COE                               Exam: Adult Echocardiogram

MRN:  [id number]                                       Study Date:  2020, 9:46 AM

Age:  64 Years



 ____________________________________________________________________________________________________
__________



MMode/2D Measurements & Calculations





IVSd: 1.17 cm                                          LVIDs: 2.8 cm

LVIDd: 3.9 cm

LVPWd: 1.46 cm

LAV (MOD-bp):  90.0 ml

LVOT diam: 1.89 cm                                     Ao root diam: 2.8 cm

                                                       LA dimension: 3.3 cm



Doppler Measurements & Calculations



MV E max esa: 95.0 cm/sec                              Med E/e':  14.7

MV A max esa: 110.4 cm/sec                             Med Peak E' Esa:  6.5 cm/sec

MV E/A: 0.86

Lat E/e':  11.6

Lat Peak E' Esa:  8.2 cm/sec

Ao max P.3 mmHg

Ao V2 max: 134.4 cm/sec



 Left Ventricle

 Left ventricular systolic function is normal. Ejection Fraction = 55-60%. The transmitral spectral D
oppler

 flow pattern is suggestive of impaired LV relaxation.

 Right Ventricle

 The right ventricle is normal in size and function.

 Atria

 The left atrium is mildly dilated. Right atrial size is normal.

 Mitral Valve

 There is mild mitral annular calcification. There is no mitral valve stenosis. There is mild mitral

 regurgitation.

 Tricuspid Valve

 The tricuspid valve is not well visualized, but is grossly normal. There is Trace to mild tricuspid

 regurgitation. There was insufficient TR detected to calculate RV systolic pressure.

 Aortic Valve

 The aortic valve is trileaflet. No hemodynamically significant valvular aortic stenosis. No aortic

 regurgitation is present.

 Pulmonic Valve

 The pulmonic valve is not well seen, but is grossly normal. There is no pulmonic valvular stenosis. 
Trace

 pulmonic valvular regurgitation.

 Great Vessels

 The aortic root is normal size.

 Pericardium/Pleura

 There is no pericardial effusion.







 Summary Statements

 Left ventricular systolic function is normal.

 Ejection Fraction = 55-60%.

 The transmitral spectral Doppler flow pattern is suggestive of impaired LV relaxation.

 There is mild mitral regurgitation.

 There is Trace to mild tricuspid regurgitation.

 There is no pericardial effusion.





                          MD Riojas

                       *Alexis                   2020, 11:05 AM

 Ordering Physician:  Norman Lyons

 Referring Physician:  NORMAN LYONS

 Performed By:  Lakeisha Mccloud

## 2020-09-04 NOTE — CON.CARD
Consult


Consult Specialty:: Cardiology


Referred by:: Dr. Lyons


Reason for Consultation:: SOB





- History of Present Illness


Chief Complaint: SOB


History of Present Illness: 





This is a 65 y/o female with a PMHx OF diastolic CHF, CKD, COPD (no home O2), 

HTN, Obesity. Who present to the ED progressive SOB and cough x 3 days. Patient 

reports she believes this is an exacerbation of her COPD, her PCP prescribed 

prednisone pack and azithromycin yesterday (she is now s/p 50mg prednisone x1 

yesterday). She reports that her home nebulizer has been helping each day but 

today provided no relief. She reports having a productive cough- clear sputum. 

She unilaterally increased her Lasix from 40 to 80mg and has seen a reduction in

her LE edema over the past 2 days without any improvement in her breathing. 

Patient denies fever, chills, dizziness, HA, CP, palpitations, AP, N/V/D, 

constipation, dysuria. Patient denies recent sick contacts, except being around 

her grandchildren. Denies recent travel





She was seen by pulmonary and ID and the impression is CHF vs PNA. BNP is only 

in 600s. 


She does have an elevated WBC but this may be due to recent outpatient course of

steroids.


She denies CP/fever


+ LE edema, now improved. 








- History Source


History Provided By: Patient, Medical Record





- Past Medical History


Cardio/Vascular: Yes: CHF, HTN


Pulmonary: Yes: COPD


Renal/: Yes: Renal Inusuff


...Pregnant: No


Psych: Yes: Anxiety


Endocrine: Yes: Diabetes Mellitus





- Alcohol/Substance Use


Hx Alcohol Use: No





- Smoking History


Smoking history: Current every day smoker


Have you smoked in the past 12 months: Yes


Aproximately how many cigarettes per day: 10





- Social History


Usual Living Arrangement: Alone


ADL: Independent


Occupation:  here at [employer]


History of Recent Travel: No





Home Medications





- Allergies


Allergies/Adverse Reactions: 


                                    Allergies











Allergy/AdvReac Type Severity Reaction Status Date / Time


 


No Known Drug Allergies Allergy   Verified 09/03/20 14:19


 


IV CONTRAST Allergy Mild  Uncoded 09/03/20 14:20














- Home Medications


Home Medications: 


Ambulatory Orders





Rosuvastatin Calcium [Crestor] 10 mg PO HS 10/14/16 


Amlodipine Besylate [Norvasc -] 10 mg PO DAILY #30 tablet 10/18/16 


Bimatoprost [Lumigan] 1 drop OU DAILY 12/05/17 


Brimonidine Tartrate/Timolol [Combigan 0.2%-0.5% Eye Drops] 5 5ml OU BID 

12/05/17 


Aspirin [Ecotrin] 81 mg PO DAILY 05/03/18 


Furosemide [Lasix -] 40 mg PO DAILY #30 tablet 05/15/18 


Multivitamin [Multiple Vitamins] 1 each PO DAILY 11/15/19 


Nebivolol HCl [Bystolic] 10 mg PO DAILY 11/15/19 


Olmesartan/Hydrochlorothiazide [Olmesartan-Hctz 20-12.5 mg Tab] 1 each PO DAILY 

11/15/19 


Umeclidinium Bromide [Incruse Ellipta] 62.5 mcg IH DAILY 11/15/19 


Duloxetine HCl [Cymbalta] 60 mg PO DAILY 07/16/20 


Insulin Glargine,Hum.rec.anlog [Toujeo Solostar] 30 unit SQ AM 09/02/20 


Insulin Glargine,Hum.rec.anlog [Toujeo Solostar] 60 unit SQ HS 09/02/20 


Albuterol Sulfate Inhaler - [Ventolin Hfa Inhaler -] 2 inh PO PRN PRN 09/03/20 


Alprazolam [Xanax] 0.5 mg PO PRN MDD no more than 1mg a day 09/03/20 











Family Medical History


Family History: Unremarkable





Review of Systems


Findings/Remarks: 





see HPI





- Review of Systems


Constitutional: reports: No Symptoms


Eyes: reports: No Symptoms


HENT: reports: No Symptoms


Cardiovascular: reports: Edema, Shortness of Breath


Respiratory: reports: Exercise Intolerance, SOB


Gastrointestinal: denies: No Symptoms, Abdominal Pain, Bloating, Constipation, 

Diarrhea, Dysphagia, Indigestion, Melena, Nausea, Rectal Bleeding, Vomiting, 

Vomiting Blood, Other


Genitourinary: denies: No Symptoms, Burning, Discharge, Dysuria, Flank Pain, 

Frequency, Hematuria, Incontinence, Lesions, Menses, Pain, Testicular Mass, 

Testicular Pain, Testicular Swelling, Urgency, Vaginal Bleeding, Other


Breasts: denies: No Symptoms Reported, See HPI, Breast Implants, Discharge from 

Nipple, Lumps, Pain, Skin Changes, Other


Musculoskeletal: denies: No Symptoms, Back Pain, Crepitus, Decreased ROM, 

Extremity Pain, Joint Pain, Joint Swelling, Muscle Pain, Muscle Cramps, Muscle 

Weakness, Other


Integumentary: denies: No Symptoms, Blister, Bruising, Change in Color, Eczema, 

Erythema, Incision, Lesions, Lump, Pallor, Pruritis, Rash, Wound, Other


Neurological: denies: No Symptoms, Change in LOC, Change in Speech, Confusion, 

Dizziness, Headache, Incoordination, Numbness, Parasthesia, Pre-Existing 

Deficit, Seizure, Syncope, Tremors, Unsteady Gait, Weakness, Other


Endocrine: denies: No Symptoms, Excessive Sweating, Flushing, Increased Hunger, 

Increased Thirst, Intolerance to Cold, Intolerance to Heat, Unexplained Weight 

Gain, Unexplained Weight Loss, Other


Hematology/Lymphatic: denies: No Symptoms, Easily Bruised, Excessive Bleeding, 

Swollen Glands, Other


Psychiatric: denies: No Symptoms, Altered Sleep Pattern, Anxiety, Depression, 

Hallucinations, Panic, Paranoia, Suicidal, Other





- Risk Factors


Known Risk Factors: Yes: Diabetes Mellitus, Hypertension, Smoking


Vital Signs: 


                                   Vital Signs











Temperature  97.6 F   09/04/20 03:00


 


Pulse Rate  72   09/04/20 03:00


 


Respiratory Rate  20   09/04/20 03:00


 


Blood Pressure  150/66   09/04/20 03:00


 


O2 Sat by Pulse Oximetry (%)  98   09/04/20 03:00











Constitutional: Yes: No Distress


Respiratory: Yes: Other (decreased breath sounds at bases, no rales scattered 

expiratory wheezing)


Gastrointestinal: Yes: Soft, Abdomen, Obese


Cardiovascular: Yes: Regular Rate and Rhythm


JVD: No


Heart Sounds: Yes: S1, S2 (rrr)


Edema: Yes


Edema: LLE: 1+, RLE: 1+


Neurological: Yes: Alert, Oriented


...Motor Strength: WNL





- Other Data


Labs, Other Data: 


                                    CBC, BMP





                                 09/03/20 07:35 





                                 09/03/20 07:35 





                                    INR, PTT











INR  0.94  (0.83-1.09)   09/02/20  16:36    








                                  Troponin, BNP











  09/02/20 09/03/20





  23:50 07:35


 


Troponin I  < 0.02  < 0.02








                                  Troponin, BNP











  09/02/20 09/03/20





  23:50 07:35


 


Troponin I  < 0.02  < 0.02














nsr 64bpm, borderline low voltage, no acute ST changes


Prior Cardiac Procedures: Cardiac Catheterization (non obstx CAD within last 5 

years)


Ejection Fraction %: LVEF > or = 40 %





Imaging





- Results


Cat Scan: Report Reviewed


EKG: Image Reviewed





Assessment/Plan





IMP:


Chronic diastolic CHF


Bilateral lung consolidation: PNA vs CHF; COVID NEGATIVE


Non obstx CAD


CKD


DM


HTN








REC:


1. Acute on chronic diastolic CHF:


-Clinically improved on IV lasix


-Daily BMP to monitor renal fx


-Continue bystolic, bp fairly well controlled


-Non obstx CAD on cath within last 5 years





2. PNA?:


-CT pattern more c/w consolidation but clinically improved with diuresis


-WBC elevated, but could be from outpt steroids


-+ Cough, non productive


-f/u pleural fluid


-Pulm and ID following.





3. Nonobstx CAD:


-should be on low dose ASA and statin for LDL goal 70mg/dl








4. CKD:


-Monitor BMP while on IV Lasix


-Unclear why not on ACE/ARB, perhaps GFR or K+ would not permit. 














5. DM: as per PMD








6. COPD/smoking: smoking cessation recommended.

## 2020-09-05 NOTE — PN
Progress Note (short form)





- Note


Progress Note: 


s:  no cp palps dizzy; sob and le edema improving





                               Current Medications











Generic Name Dose Route Start Last Admin





  Trade Name Freq  PRN Reason Stop Dose Admin


 


Albuterol Sulfate  2 puff  09/03/20 00:47  09/04/20 22:53





  Ventolin Hfa Inhaler -  IH   2 puff





  Q4H PRN   Administration





  SHORT OF BREATH/WHEEZING  


 


Albuterol/Ipratropium  1 amp  09/03/20 13:39  09/04/20 21:35





  Duoneb -  NEB   1 amp





  Q4H PRN   Administration





  SHORTNESS OF BREATH  


 


Amlodipine Besylate  10 mg  09/03/20 10:00  09/05/20 09:34





  Norvasc -  PO   10 mg





  DAILY JAYCEE   Administration


 


Aspirin  81 mg  09/03/20 10:00  09/05/20 09:36





  Ecotrin -  PO   81 mg





  DAILY JAYCEE   Administration


 


Brimonidine Tartrate  1 drop  09/03/20 10:00  09/05/20 09:37





  Alphagan 0.2% -  OU   1 drop





  BID JAYCEE   Administration


 


Duloxetine HCl  60 mg  09/03/20 10:00  09/05/20 09:33





  Cymbalta -  PO   60 mg





  DAILY JAYCEE   Administration


 


Furosemide  40 mg  09/03/20 10:00  09/05/20 09:34





  Lasix Injection -  IVPUSH   40 mg





  DAILY JAYCEE   Administration


 


Guaifenesin  10 ml  09/04/20 14:34  09/04/20 22:53





  Diabetic Tussin Dm -  PO   10 ml





  Q6H PRN   Administration





  COUGH  


 


Heparin Sodium (Porcine)  5,000 unit  09/03/20 10:00  09/05/20 09:31





  Heparin -  SQ   5,000 unit





  BID JAYCEE   Administration


 


Insulin Aspart  1 vial  09/03/20 07:00  09/05/20 11:41





  Novolog Vial Sliding Scale -  SQ   4 unit





  [facility] JAYCEE   Administration





  Protocol  


 


Insulin Detemir  60 units  09/03/20 22:00  09/04/20 22:54





  Levemir Vial  SQ   Not Given





  HS JAYCEE  


 


Insulin Detemir  30 units  09/04/20 07:00  09/05/20 08:17





  Levemir Vial  SQ   Not Given





  AM JAYCEE  


 


Latanoprost  1 drop  09/03/20 22:00  09/04/20 22:52





  Xalatan 0.005% Eye Drops -  OU   Not Given





  HS JAYCEE  


 


Multivitamins/Minerals/Vitamin C  1 tab  09/03/20 10:00  09/05/20 09:34





  Tab-A-Vit -  PO   1 tab





  DAILY JAYCEE   Administration


 


Nebivolol  10 mg  09/03/20 10:00  09/05/20 09:36





  Bystolic -  PO   10 mg





  DAILY JAYCEE   Administration


 


Nicotine  14 mg  09/05/20 10:00  09/05/20 11:35





  Nicoderm Patch -  TD   14 mg





  DAILY JAYCEE   Administration


 


Pantoprazole Sodium  40 mg  09/04/20 10:00  09/05/20 09:33





  Protonix -  PO   40 mg





  DAILY JAYCEE   Administration


 


Rosuvastatin Calcium  10 mg  09/03/20 22:00  09/04/20 22:53





  Crestor -  PO   10 mg





  HS JAYCEE   Administration


 


Timolol Maleate  1 drop  09/03/20 10:00  09/05/20 09:37





  Timoptic 0.5%  OU   1 drop





  BID JYACEE   Administration


 


Tiotropium Bromide  2 puff  09/03/20 10:00  09/05/20 09:38





  Spiriva Respimat  IH   2 puff





  DAILY JAYCEE   Administration











                                   Vital Signs











 Period  Temp  Pulse  Resp  BP Sys/Thrasher  Pulse Ox


 


 Last 24 Hr  97.8 F-98.5 F  62-68  16-18  136-149/62-91  96-99














Constitutional: Yes: No Distress


Respiratory: Yes: cta bl nl eff


Gastrointestinal: Yes: Soft, Abdomen, Obese


Cardiovascular: Yes: Regular Rate and Rhythm


JVD: No


Heart Sounds: Yes: S1, S2 (rrr)


Edema: trace le edema bl


Neurological: Yes: Alert, Oriented


...Motor Strength: WNL








                                    CBC, BMP





                                 09/04/20 09:02 





                                 09/04/20 09:02 








nsr 64bpm, borderline low voltage, no acute ST changes


Prior Cardiac Procedures: Cardiac Catheterization (non obstx CAD within last 5 

years)


Ejection Fraction %: LVEF > or = 40 %


echo 9/2020: nl lv/rv, no sig valve path





Imaging





- Results


Cat Scan: Report Reviewed


EKG: Image Reviewed





Assessment/Plan





IMP:


Chronic diastolic CHF


Bilateral lung consolidation: PNA vs CHF; COVID NEGATIVE


Non obstx CAD


CKD


DM


HTN








REC:


1. Acute on chronic diastolic CHF:


-Clinically improving on IV lasix


-Daily BMP to monitor renal fx


-Continue bystolic, bp fairly well controlled


-echo here unremarkable


-Non obstx CAD on cath within last 5 years





2. PNA?:


-CT pattern more c/w consolidation but clinically improved with diuresis


-WBC elevated, but could be from outpt steroids


-+ Cough, non productive


-f/u pleural fluid


-Pulm and ID following.





3. Nonobstx CAD:


-cont asa, statin





4. CKD:


-Monitor BMP while on IV Lasix


-Unclear why not on ACE/ARB, perhaps GFR or K+ would not permit. 








5. DM: as per PMD








6. COPD/smoking: smoking cessation recommended.

## 2020-09-05 NOTE — PN
Progress Note, Physician


History of Present Illness: 





PULMONARY





ALERT,FEELING BETTER,DYSPNEA IMPROVING,OOB-CHAIR





- Current Medication List


Current Medications: 


Active Medications





Albuterol Sulfate (Ventolin Hfa Inhaler -)  2 puff IH Q4H PRN


   PRN Reason: SHORT OF BREATH/WHEEZING


   Last Admin: 09/04/20 22:53 Dose:  2 puff


   Documented by: 


Albuterol/Ipratropium (Duoneb -)  1 amp NEB Q4H PRN


   PRN Reason: SHORTNESS OF BREATH


   Last Admin: 09/04/20 21:35 Dose:  1 amp


   Documented by: 


Amlodipine Besylate (Norvasc -)  10 mg PO DAILY [facility]


   Last Admin: 09/04/20 11:03 Dose:  10 mg


   Documented by: 


Aspirin (Ecotrin -)  81 mg PO DAILY [facility]


   Last Admin: 09/04/20 11:03 Dose:  81 mg


   Documented by: 


Brimonidine Tartrate (Alphagan 0.2% -)  1 drop OU BID [facility]


   Last Admin: 09/04/20 22:52 Dose:  1 drop


   Documented by: 


Duloxetine HCl (Cymbalta -)  60 mg PO DAILY [facility]


   Last Admin: 09/04/20 11:04 Dose:  60 mg


   Documented by: 


Furosemide (Lasix Injection -)  40 mg IVPUSH DAILY [facility]


   Last Admin: 09/04/20 11:04 Dose:  40 mg


   Documented by: 


Guaifenesin (Diabetic Tussin Dm -)  10 ml PO Q6H PRN


   PRN Reason: COUGH


   Last Admin: 09/04/20 22:53 Dose:  10 ml


   Documented by: 


Heparin Sodium (Porcine) (Heparin -)  5,000 unit SQ BID [facility]


   Last Admin: 09/04/20 22:54 Dose:  5,000 unit


   Documented by: 


Insulin Aspart (Novolog Vial Sliding Scale -)  1 vial SQ ACHS [facility]; Protocol


   Last Admin: 09/04/20 23:29 Dose:  4 unit


   Documented by: 


Insulin Detemir (Levemir Vial)  60 units SQ HS [facility]


   Last Admin: 09/04/20 22:54 Dose:  Not Given


   Documented by: 


Insulin Detemir (Levemir Vial)  30 units SQ AM [facility]


   Last Admin: 09/04/20 06:23 Dose:  Not Given


   Documented by: 


Latanoprost (Xalatan 0.005% Eye Drops -)  1 drop OU HS [facility]


   Last Admin: 09/04/20 22:52 Dose:  Not Given


   Documented by: 


Multivitamins/Minerals/Vitamin C (Tab-A-Vit -)  1 tab PO DAILY [facility]


   Last Admin: 09/04/20 11:03 Dose:  1 tab


   Documented by: 


Nebivolol (Bystolic -)  10 mg PO DAILY [facility]


   Last Admin: 09/04/20 11:04 Dose:  10 mg


   Documented by: 


Pantoprazole Sodium (Protonix -)  40 mg PO DAILY [facility]


   Last Admin: 09/04/20 11:03 Dose:  40 mg


   Documented by: 


Rosuvastatin Calcium (Crestor -)  10 mg PO HS [facility]


   Last Admin: 09/04/20 22:53 Dose:  10 mg


   Documented by: 


Timolol Maleate (Timoptic 0.5%)  1 drop OU BID [facility]


   Last Admin: 09/04/20 22:52 Dose:  1 drop


   Documented by: 


Tiotropium Bromide (Spiriva Respimat)  2 puff IH DAILY [facility]


   Last Admin: 09/04/20 11:21 Dose:  2 puff


   Documented by: 











- Objective


Vital Signs: 


                                   Vital Signs











Temperature  97.8 F   09/05/20 05:00


 


Pulse Rate  62   09/05/20 05:00


 


Respiratory Rate  16   09/05/20 05:00


 


Blood Pressure  149/65   09/05/20 05:00


 


O2 Sat by Pulse Oximetry (%)  97   09/05/20 05:00











Constitutional: Yes: Well Nourished, Calm


Eyes: Yes: WNL


HENT: Yes: WNL


Neck: Yes: WNL


Cardiovascular: Yes: Regular Rate and Rhythm, S1, S2


Respiratory: Yes: Diminished


Gastrointestinal: Yes: Normal Bowel Sounds, Soft


Extremities: Yes: WNL


Edema: Yes


Labs: 


                                    CBC, BMP








Problem List





- Problems


(1) COPD (chronic obstructive pulmonary disease)


Code(s): J44.9 - CHRONIC OBSTRUCTIVE PULMONARY DISEASE, UNSPECIFIED   


Qualifiers: 


   COPD type: unspecified COPD   Qualified Code(s): J44.9 - Chronic obstructive 

pulmonary disease, unspecified   





(2) Pleural effusion


Code(s): J90 - PLEURAL EFFUSION, NOT ELSEWHERE CLASSIFIED   





(3) Shortness of breath on exertion


Code(s): R06.02 - SHORTNESS OF BREATH   





(4) ASHD (arteriosclerotic heart disease)


Code(s): I25.10 - ATHSCL HEART DISEASE OF NATIVE CORONARY ARTERY W/O ANG PCTRS  







(5) Acute on chronic diastolic CHF (congestive heart failure)


Code(s): I50.33 - ACUTE ON CHRONIC DIASTOLIC (CONGESTIVE) HEART FAILURE   





(6) CKD (chronic kidney disease) stage 3, GFR 30-59 ml/min


Code(s): N18.3 - CHRONIC KIDNEY DISEASE, STAGE 3 (MODERATE)   





Assessment/Plan





Assessment/Plan





Acute on Chronic Diastolic Heart Failure


Right Pleural Effusion


COPD


CKD


HTN


TOBACCO ABUSE





-  continue IV lasix


-  monitor urine output, creatinine


-  daily weights


-  O2 to keep SpO2 >90%


- Pleural fluid chemistries pending


-  inhaled bronchodilators as needed


-  DVT prophylaxis


- chest x-ray today


- smoking cessation counseled





DR REED

## 2020-09-05 NOTE — PN
Progress Note (short form)





- Note


Progress Note: 





coughing-- clear sputrum


denies chest pain 


s/p thoracentesis


SOB+ on getting OOB


Leg edema better


                               Vital Signs - 24 hr











  09/04/20 09/04/20 09/04/20





  15:34 20:18 21:00


 


Temperature 98.5 F 98.5 F 


 


Pulse Rate 68 65 


 


Respiratory 17 18 18





Rate   


 


Blood Pressure 144/91 143/62 


 


O2 Sat by Pulse   99





Oximetry (%)   














  09/05/20 09/05/20





  01:00 05:00


 


Temperature 98.0 F 97.8 F


 


Pulse Rate 62 62


 


Respiratory 16 16





Rate  


 


Blood Pressure 136/62 149/65


 


O2 Sat by Pulse 96 97





Oximetry (%)  








                               Current Medications











Generic Name Dose Route Start Last Admin





  Trade Name Freq  PRN Reason Stop Dose Admin


 


Albuterol Sulfate  2 puff  09/03/20 00:47  09/04/20 22:53





  Ventolin Hfa Inhaler -  IH   2 puff





  Q4H PRN   Administration





  SHORT OF BREATH/WHEEZING  


 


Albuterol/Ipratropium  1 amp  09/03/20 13:39  09/04/20 21:35





  Duoneb -  NEB   1 amp





  Q4H PRN   Administration





  SHORTNESS OF BREATH  


 


Amlodipine Besylate  10 mg  09/03/20 10:00  09/05/20 09:34





  Norvasc -  PO   10 mg





  DAILY JAYCEE   Administration


 


Aspirin  81 mg  09/03/20 10:00  09/05/20 09:36





  Ecotrin -  PO   81 mg





  DAILY JAYCEE   Administration


 


Brimonidine Tartrate  1 drop  09/03/20 10:00  09/05/20 09:37





  Alphagan 0.2% -  OU   1 drop





  BID JAYCEE   Administration


 


Duloxetine HCl  60 mg  09/03/20 10:00  09/05/20 09:33





  Cymbalta -  PO   60 mg





  DAILY JAYCEE   Administration


 


Furosemide  40 mg  09/03/20 10:00  09/05/20 09:34





  Lasix Injection -  IVPUSH   40 mg





  DAILY JAYCEE   Administration


 


Guaifenesin  10 ml  09/04/20 14:34  09/04/20 22:53





  Diabetic Tussin Dm -  PO   10 ml





  Q6H PRN   Administration





  COUGH  


 


Heparin Sodium (Porcine)  5,000 unit  09/03/20 10:00  09/05/20 09:31





  Heparin -  SQ   5,000 unit





  BID JAYCEE   Administration


 


Insulin Aspart  1 vial  09/03/20 07:00  09/05/20 08:17





  Novolog Vial Sliding Scale -  SQ   Not Given





  [facility]  





  Protocol  


 


Insulin Detemir  60 units  09/03/20 22:00  09/04/20 22:54





  Levemir Vial  SQ   Not Given





  HS JAYCEE  


 


Insulin Detemir  30 units  09/04/20 07:00  09/05/20 08:17





  Levemir Vial  SQ   Not Given





  AM [facility]  


 


Latanoprost  1 drop  09/03/20 22:00  09/04/20 22:52





  Xalatan 0.005% Eye Drops -  OU   Not Given





  HS JAYCEE  


 


Multivitamins/Minerals/Vitamin C  1 tab  09/03/20 10:00  09/05/20 09:34





  Tab-A-Vit -  PO   1 tab





  DAILY JAYCEE   Administration


 


Nebivolol  10 mg  09/03/20 10:00  09/05/20 09:36





  Bystolic -  PO   10 mg





  DAILY JAYCEE   Administration


 


Nicotine  14 mg  09/05/20 10:00 





  Nicoderm Patch -  TD  





  DAILY JAYCEE  


 


Pantoprazole Sodium  40 mg  09/04/20 10:00  09/05/20 09:33





  Protonix -  PO   40 mg





  DAILY JAYCEE   Administration


 


Rosuvastatin Calcium  10 mg  09/03/20 22:00  09/04/20 22:53





  Crestor -  PO   10 mg





  HS [facility]   Administration


 


Timolol Maleate  1 drop  09/03/20 10:00  09/05/20 09:37





  Timoptic 0.5%  OU   1 drop





  BID JAYCEE   Administration


 


Tiotropium Bromide  2 puff  09/03/20 10:00  09/05/20 09:38





  Spiriva Respimat  IH   2 puff





  DAILY JAYCEE   Administration








                         Laboratory Results - last 24 hr











  09/04/20 09/04/20 09/04/20





  11:14 16:53 23:03


 


POC Glucometer  114  224  216














  09/05/20





  08:02


 


POC Glucometer  112











S1 s2 RRR


Lungs decreased


Ronchi scattered


Abd- soft, obese


NT


Edema++








PLAN


CT chest reviewed


COVID negative


pleural fluid studies still pending


Nebs


 iv lasix


off antibiotics


nebs


OOB











Problem List





- Problems


(1) COPD exacerbation


Code(s): J44.1 - CHRONIC OBSTRUCTIVE PULMONARY DISEASE W (ACUTE) EXACERBATION   





(2) ASHD (arteriosclerotic heart disease)


Code(s): I25.10 - ATHSCL HEART DISEASE OF NATIVE CORONARY ARTERY W/O ANG PCTRS  







(3) Acute hypoxemic respiratory failure


Code(s): J96.01 - ACUTE RESPIRATORY FAILURE WITH HYPOXIA   





(4) Acute on chronic diastolic CHF (congestive heart failure)


Code(s): I50.33 - ACUTE ON CHRONIC DIASTOLIC (CONGESTIVE) HEART FAILURE   





(5) CKD (chronic kidney disease) stage 3, GFR 30-59 ml/min


Code(s): N18.3 - CHRONIC KIDNEY DISEASE, STAGE 3 (MODERATE)   





(6) HTN (hypertension)


Code(s): I10 - ESSENTIAL (PRIMARY) HYPERTENSION   


Qualifiers: 


   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   





(7) IDDM (insulin dependent diabetes mellitus)


Code(s): E11.9 - TYPE 2 DIABETES MELLITUS WITHOUT COMPLICATIONS; Z79.4 - LONG 

TERM (CURRENT) USE OF INSULIN   





(8) Pneumonia


Code(s): J18.9 - PNEUMONIA, UNSPECIFIED ORGANISM   


Qualifiers: 


   Laterality: right   Lung location: lower lobe of lung

## 2020-09-06 NOTE — PN
Progress Note, Physician


History of Present Illness: 





PULMONARY





ALERT,COMFORTABLE AT REST,+YEH,-CP





- Current Medication List


Current Medications: 


Active Medications





Albuterol Sulfate (Ventolin Hfa Inhaler -)  2 puff IH Q4H PRN


   PRN Reason: SHORT OF BREATH/WHEEZING


   Last Admin: 09/06/20 06:58 Dose:  2 puff


   Documented by: 


Albuterol/Ipratropium (Duoneb -)  1 amp NEB Q4H PRN


   PRN Reason: SHORTNESS OF BREATH


   Last Admin: 09/05/20 23:20 Dose:  1 amp


   Documented by: 


Amlodipine Besylate (Norvasc -)  10 mg PO DAILY [facility]


   Last Admin: 09/05/20 09:34 Dose:  10 mg


   Documented by: 


Aspirin (Ecotrin -)  81 mg PO DAILY [facility]


   Last Admin: 09/05/20 09:36 Dose:  81 mg


   Documented by: 


Brimonidine Tartrate (Alphagan 0.2% -)  1 drop OU BID [facility]


   Last Admin: 09/05/20 22:40 Dose:  1 drop


   Documented by: 


Duloxetine HCl (Cymbalta -)  60 mg PO DAILY [facility]


   Last Admin: 09/05/20 09:33 Dose:  60 mg


   Documented by: 


Furosemide (Lasix Injection -)  40 mg IVPUSH DAILY [facility]


   Last Admin: 09/05/20 09:34 Dose:  40 mg


   Documented by: 


Guaifenesin (Diabetic Tussin Dm -)  10 ml PO Q6H PRN


   PRN Reason: COUGH


   Last Admin: 09/06/20 06:58 Dose:  10 ml


   Documented by: 


Heparin Sodium (Porcine) (Heparin -)  5,000 unit SQ BID [facility]


   Last Admin: 09/05/20 22:38 Dose:  5,000 unit


   Documented by: 


Insulin Aspart (Novolog Vial Sliding Scale -)  1 vial SQ ACHS [facility]; Protocol


   Last Admin: 09/06/20 06:58 Dose:  Not Given


   Documented by: 


Insulin Detemir (Levemir Vial)  60 units SQ HS [facility]


   Last Admin: 09/05/20 22:50 Dose:  Not Given


   Documented by: 


Insulin Detemir (Levemir Vial)  30 units SQ AM [facility]


   Last Admin: 09/06/20 06:58 Dose:  Not Given


   Documented by: 


Latanoprost (Xalatan 0.005% Eye Drops -)  1 drop OU HS [facility]


   Last Admin: 09/05/20 22:50 Dose:  Not Given


   Documented by: 


Multivitamins/Minerals/Vitamin C (Tab-A-Vit -)  1 tab PO DAILY [facility]


   Last Admin: 09/05/20 09:34 Dose:  1 tab


   Documented by: 


Nebivolol (Bystolic -)  10 mg PO DAILY [facility]


   Last Admin: 09/05/20 09:36 Dose:  10 mg


   Documented by: 


Nicotine (Nicoderm Patch -)  14 mg TD DAILY [facility]


   Last Admin: 09/05/20 11:35 Dose:  14 mg


   Documented by: 


Pantoprazole Sodium (Protonix -)  40 mg PO DAILY [facility]


   Last Admin: 09/05/20 09:33 Dose:  40 mg


   Documented by: 


Polyethylene Glycol (Miralax (For Daily Use) -)  17 gm PO DAILY PRN


   PRN Reason: CONSTIPATION


   Last Admin: 09/05/20 16:41 Dose:  17 grams


   Documented by: 


Rosuvastatin Calcium (Crestor -)  10 mg PO HS [facility]


   Last Admin: 09/05/20 22:38 Dose:  10 mg


   Documented by: 


Timolol Maleate (Timoptic 0.5%)  1 drop OU BID [facility]


   Last Admin: 09/05/20 22:40 Dose:  1 drop


   Documented by: 


Tiotropium Bromide (Spiriva Respimat)  2 puff IH DAILY [facility]


   Last Admin: 09/05/20 09:38 Dose:  2 puff


   Documented by: 











- Objective


Vital Signs: 


                                   Vital Signs











Temperature  98.4 F   09/06/20 06:00


 


Pulse Rate  67   09/06/20 06:00


 


Respiratory Rate  20   09/06/20 06:00


 


Blood Pressure  153/72   09/06/20 06:00


 


O2 Sat by Pulse Oximetry (%)  95   09/06/20 06:00











Constitutional: Yes: Well Nourished, Calm


Eyes: Yes: WNL


HENT: Yes: WNL


Neck: Yes: WNL


Cardiovascular: Yes: Regular Rate and Rhythm, S1, S2


Respiratory: Yes: Diminished


Gastrointestinal: Yes: Normal Bowel Sounds, Soft


Extremities: Yes: WNL


Edema: Yes


Labs: 


                                        





Problem List





- Problems


(1) COPD (chronic obstructive pulmonary disease)


Code(s): J44.9 - CHRONIC OBSTRUCTIVE PULMONARY DISEASE, UNSPECIFIED   


Qualifiers: 


   COPD type: unspecified COPD   Qualified Code(s): J44.9 - Chronic obstructive 

pulmonary disease, unspecified   





(2) Pleural effusion


Code(s): J90 - PLEURAL EFFUSION, NOT ELSEWHERE CLASSIFIED   





(3) Shortness of breath on exertion


Code(s): R06.02 - SHORTNESS OF BREATH   





(4) ASHD (arteriosclerotic heart disease)


Code(s): I25.10 - ATHSCL HEART DISEASE OF NATIVE CORONARY ARTERY W/O ANG PCTRS  







(5) Acute on chronic diastolic CHF (congestive heart failure)


Code(s): I50.33 - ACUTE ON CHRONIC DIASTOLIC (CONGESTIVE) HEART FAILURE   





(6) CKD (chronic kidney disease) stage 3, GFR 30-59 ml/min


Code(s): N18.3 - CHRONIC KIDNEY DISEASE, STAGE 3 (MODERATE)   





Assessment/Plan





Assessment/Plan





Acute on Chronic Diastolic Heart Failure


Right Pleural Effusion


COPD


CKD


HTN


TOBACCO ABUSE





-  continue IV lasix


-  monitor urine output, creatinine


-  daily weights


-  O2 to keep SpO2 >90%


- Pleural fluid chemistries pending


-  inhaled bronchodilators as needed


-  DVT prophylaxis


- chest x-ray am


- smoking cessation counseled





DR REED

## 2020-09-06 NOTE — PN
Progress Note (short form)





- Note


Progress Note: 


s:  no cp palps dizzy; sob and le edema improving





                              Current Medications











Generic Name Dose Route Start Last Admin





  Trade Name Freq  PRN Reason Stop Dose Admin


 


Albuterol Sulfate  2 puff  09/03/20 00:47  09/06/20 06:58





  Ventolin Hfa Inhaler -  IH   2 puff





  Q4H PRN   Administration





  SHORT OF BREATH/WHEEZING  


 


Albuterol/Ipratropium  1 amp  09/03/20 13:39  09/05/20 23:20





  Duoneb -  NEB   1 amp





  Q4H PRN   Administration





  SHORTNESS OF BREATH  


 


Amlodipine Besylate  10 mg  09/03/20 10:00  09/06/20 09:37





  Norvasc -  PO   10 mg





  DAILY JAYCEE   Administration


 


Aspirin  81 mg  09/03/20 10:00  09/06/20 09:38





  Ecotrin -  PO   81 mg





  DAILY JAYCEE   Administration


 


Brimonidine Tartrate  1 drop  09/03/20 10:00  09/06/20 09:39





  Alphagan 0.2% -  OU   1 drop





  BID JAYCEE   Administration


 


Duloxetine HCl  60 mg  09/03/20 10:00  09/06/20 09:38





  Cymbalta -  PO   60 mg





  DAILY JAYCEE   Administration


 


Furosemide  40 mg  09/03/20 10:00  09/06/20 09:37





  Lasix Injection -  IVPUSH   40 mg





  DAILY JAYCEE   Administration


 


Guaifenesin  10 ml  09/04/20 14:34  09/06/20 06:58





  Diabetic Tussin Dm -  PO   10 ml





  Q6H PRN   Administration





  COUGH  


 


Heparin Sodium (Porcine)  5,000 unit  09/03/20 10:00  09/06/20 09:38





  Heparin -  SQ   5,000 unit





  BID JAYCEE   Administration


 


Insulin Aspart  1 vial  09/03/20 07:00  09/06/20 06:58





  Novolog Vial Sliding Scale -  SQ   Not Given





  [facility]  





  Protocol  


 


Insulin Detemir  60 units  09/03/20 22:00  09/05/20 22:50





  Levemir Vial  SQ   Not Given





  HS [facility]  


 


Insulin Detemir  30 units  09/04/20 07:00  09/06/20 06:58





  Levemir Vial  SQ   Not Given





  AM [facility]  


 


Latanoprost  1 drop  09/03/20 22:00  09/05/20 22:50





  Xalatan 0.005% Eye Drops -  OU   Not Given





  HS [facility]  


 


Multivitamins/Minerals/Vitamin C  1 tab  09/03/20 10:00  09/06/20 09:37





  Tab-A-Vit -  PO   1 tab





  DAILY JAYCEE   Administration


 


Nebivolol  10 mg  09/03/20 10:00  09/06/20 09:39





  Bystolic -  PO   10 mg





  DAILY JAYCEE   Administration


 


Nicotine  14 mg  09/05/20 10:00  09/06/20 09:37





  Nicoderm Patch -  TD   14 mg





  DAILY JAYCEE   Administration


 


Pantoprazole Sodium  40 mg  09/04/20 10:00  09/06/20 09:37





  Protonix -  PO   40 mg





  DAILY JAYCEE   Administration


 


Polyethylene Glycol  17 gm  09/05/20 15:15  09/05/20 16:41





  Miralax (For Daily Use) -  PO   17 grams





  DAILY PRN   Administration





  CONSTIPATION  


 


Rosuvastatin Calcium  10 mg  09/03/20 22:00  09/05/20 22:38





  Crestor -  PO   10 mg





  HS JAYCEE   Administration


 


Timolol Maleate  1 drop  09/03/20 10:00  09/06/20 09:39





  Timoptic 0.5%  OU   1 drop





  BID JAYCEE   Administration


 


Tiotropium Bromide  2 puff  09/03/20 10:00  09/06/20 09:39





  Spiriva Respimat  IH   2 puff





  DAILY JAYCEE   Administration








                                   Vital Signs











 Period  Temp  Pulse  Resp  BP Sys/Thrasher  Pulse Ox


 


 Last 24 Hr  97.9 F-98.7 F  55-70  18-20  129-180/52-80  95-98














Constitutional: Yes: No Distress


Respiratory: Yes: cta bl nl eff


Gastrointestinal: Yes: Soft, Abdomen, Obese


Cardiovascular: Yes: Regular Rate and Rhythm


JVD: No


Heart Sounds: Yes: S1, S2 (rrr)


Edema: trace le edema bl


Neurological: Yes: Alert, Oriented


...Motor Strength: WNL








                                    CBC, BMP





                                 09/06/20 06:35 





                                 09/06/20 06:35 











nsr 64bpm, borderline low voltage, no acute ST changes


Prior Cardiac Procedures: Cardiac Catheterization (non obstx CAD within last 5 

years)


Ejection Fraction %: LVEF > or = 40 %


echo 9/2020: nl lv/rv, no sig valve path





Imaging





- Results


Cat Scan: Report Reviewed


EKG: Image Reviewed





Assessment/Plan





IMP:


Chronic diastolic CHF


Bilateral lung consolidation: PNA vs CHF; COVID NEGATIVE


Non obstx CAD


CKD


DM


HTN








REC:


1. Acute on chronic diastolic CHF (on lasix 40 po qd at home):


-Clinically improving on IV lasix


-Daily BMP to monitor renal fx


-Continue bystolic, bp fairly well controlled


-echo here unremarkable


-Non obstx CAD on cath within last 5 years





2. PNA?:


-CT pattern more c/w consolidation but clinically improved with diuresis


-WBC elevated, but could be from outpt steroids


-+ Cough, non productive


-f/u pleural fluid


-Pulm and ID following.





3. Nonobstx CAD:


-cont asa, statin





4. CKD:


-Monitor BMP while on IV Lasix


-Unclear why not on ACE/ARB, perhaps GFR or K+ would not permit. 








5. DM: as per PMD








6. COPD/smoking: smoking cessation recommended.

## 2020-09-06 NOTE — PN
Progress Note (short form)





- Note


Progress Note: 





coughing-- clear sputrum


denies chest pain 


s/p thoracentesis


SOB+ decreased


Vital Signs - 24 hr











  09/05/20 09/05/20 09/05/20





  18:10 21:00 22:00


 


Temperature 97.9 F  98.3 F


 


Pulse Rate 55 L  70


 


Respiratory 18 20 20





Rate   


 


Blood Pressure 129/52 L  154/68


 


O2 Sat by Pulse 95 97 97





Oximetry (%)   














  09/06/20 09/06/20





  02:00 06:00


 


Temperature 98.7 F 98.4 F


 


Pulse Rate 65 67


 


Respiratory 20 20





Rate  


 


Blood Pressure 145/59 L 153/72


 


O2 Sat by Pulse 96 95





Oximetry (%)  








                               Current Medications











Generic Name Dose Route Start Last Admin





  Trade Name Freq  PRN Reason Stop Dose Admin


 


Albuterol Sulfate  2 puff  09/03/20 00:47  09/06/20 06:58





  Ventolin Hfa Inhaler -  IH   2 puff





  Q4H PRN   Administration





  SHORT OF BREATH/WHEEZING  


 


Albuterol/Ipratropium  1 amp  09/03/20 13:39  09/05/20 23:20





  Duoneb -  NEB   1 amp





  Q4H PRN   Administration





  SHORTNESS OF BREATH  


 


Amlodipine Besylate  10 mg  09/03/20 10:00  09/06/20 09:37





  Norvasc -  PO   10 mg





  DAILY JAYCEE   Administration


 


Aspirin  81 mg  09/03/20 10:00  09/06/20 09:38





  Ecotrin -  PO   81 mg





  DAILY JAYCEE   Administration


 


Brimonidine Tartrate  1 drop  09/03/20 10:00  09/06/20 09:39





  Alphagan 0.2% -  OU   1 drop





  BID JAYCEE   Administration


 


Duloxetine HCl  60 mg  09/03/20 10:00  09/06/20 09:38





  Cymbalta -  PO   60 mg





  DAILY JAYCEE   Administration


 


Furosemide  40 mg  09/03/20 10:00  09/06/20 09:37





  Lasix Injection -  IVPUSH   40 mg





  DAILY JAYCEE   Administration


 


Guaifenesin  10 ml  09/04/20 14:34  09/06/20 06:58





  Diabetic Tussin Dm -  PO   10 ml





  Q6H PRN   Administration





  COUGH  


 


Heparin Sodium (Porcine)  5,000 unit  09/03/20 10:00  09/06/20 09:38





  Heparin -  SQ   5,000 unit





  BID JAYCEE   Administration


 


Insulin Aspart  1 vial  09/03/20 07:00  09/06/20 06:58





  Novolog Vial Sliding Scale -  SQ   Not Given





  [facility]  





  Protocol  


 


Insulin Detemir  60 units  09/03/20 22:00  09/05/20 22:50





  Levemir Vial  SQ   Not Given





  HS [facility]  


 


Insulin Detemir  30 units  09/04/20 07:00  09/06/20 06:58





  Levemir Vial  SQ   Not Given





  AM JAYCEE  


 


Latanoprost  1 drop  09/03/20 22:00  09/05/20 22:50





  Xalatan 0.005% Eye Drops -  OU   Not Given





  HS [facility]  


 


Multivitamins/Minerals/Vitamin C  1 tab  09/03/20 10:00  09/06/20 09:37





  Tab-A-Vit -  PO   1 tab





  DAILY JAYCEE   Administration


 


Nebivolol  10 mg  09/03/20 10:00  09/06/20 09:39





  Bystolic -  PO   10 mg





  DAILY JAYCEE   Administration


 


Nicotine  14 mg  09/05/20 10:00  09/06/20 09:37





  Nicoderm Patch -  TD   14 mg





  DAILY JAYCEE   Administration


 


Pantoprazole Sodium  40 mg  09/04/20 10:00  09/06/20 09:37





  Protonix -  PO   40 mg





  DAILY JAYCEE   Administration


 


Polyethylene Glycol  17 gm  09/05/20 15:15  09/05/20 16:41





  Miralax (For Daily Use) -  PO   17 grams





  DAILY PRN   Administration





  CONSTIPATION  


 


Rosuvastatin Calcium  10 mg  09/03/20 22:00  09/05/20 22:38





  Crestor -  PO   10 mg





  HS JAYCEE   Administration


 


Timolol Maleate  1 drop  09/03/20 10:00  09/06/20 09:39





  Timoptic 0.5%  OU   1 drop





  BID JAYCEE   Administration


 


Tiotropium Bromide  2 puff  09/03/20 10:00  09/06/20 09:39





  Spiriva Respimat  IH   2 puff





  DAILY JAYCEE   Administration








                         Laboratory Results - last 24 hr











  09/05/20 09/05/20 09/05/20





  11:40 16:40 22:43


 


WBC   


 


RBC   


 


Hgb   


 


Hct   


 


MCV   


 


MCH   


 


MCHC   


 


RDW   


 


Plt Count   


 


MPV   


 


Sodium   


 


Potassium   


 


Chloride   


 


Carbon Dioxide   


 


Anion Gap   


 


BUN   


 


Creatinine   


 


Est GFR (CKD-EPI)AfAm   


 


Est GFR (CKD-EPI)NonAf   


 


POC Glucometer  247  248  216


 


Random Glucose   


 


Calcium   


 


Total Bilirubin   


 


AST   


 


ALT   


 


Alkaline Phosphatase   


 


Total Protein   


 


Albumin   














  09/06/20 09/06/20 09/06/20





  06:35 06:35 06:54


 


WBC  11.0 H  


 


RBC  3.89  


 


Hgb  12.8  


 


Hct  38.2  


 


MCV  98.1 H  


 


MCH  32.9  


 


MCHC  33.5  


 


RDW  14.7  


 


Plt Count  222  


 


MPV  10.9  


 


Sodium   142 


 


Potassium   5.1 


 


Chloride   113 H 


 


Carbon Dioxide   22 


 


Anion Gap   7 L 


 


BUN   67.1 H 


 


Creatinine   1.6 H 


 


Est GFR (CKD-EPI)AfAm   38.52 


 


Est GFR (CKD-EPI)NonAf   33.23 


 


POC Glucometer    114


 


Random Glucose   118 H 


 


Calcium   9.3 


 


Total Bilirubin   0.3 


 


AST   48 H 


 


ALT   54 


 


Alkaline Phosphatase   298 H 


 


Total Protein   6.4 


 


Albumin   2.7 L 











S1 s2 RRR


Lungs decreased


Ronchi scattered


Abd- soft, obese


NT


Edema++








PLAN


CT chest reviewed


COVID negative


pleural fluid studies still pending


Nebs


 iv lasix


off antibiotics


nebs


OOB











Problem List





- Problems


(1) COPD exacerbation


Code(s): J44.1 - CHRONIC OBSTRUCTIVE PULMONARY DISEASE W (ACUTE) EXACERBATION   





(2) ASHD (arteriosclerotic heart disease)


Code(s): I25.10 - ATHSCL HEART DISEASE OF NATIVE CORONARY ARTERY W/O ANG PCTRS  







(3) Acute hypoxemic respiratory failure


Code(s): J96.01 - ACUTE RESPIRATORY FAILURE WITH HYPOXIA   





(4) Acute on chronic diastolic CHF (congestive heart failure)


Code(s): I50.33 - ACUTE ON CHRONIC DIASTOLIC (CONGESTIVE) HEART FAILURE   





(5) CKD (chronic kidney disease) stage 3, GFR 30-59 ml/min


Code(s): N18.3 - CHRONIC KIDNEY DISEASE, STAGE 3 (MODERATE)   





(6) HTN (hypertension)


Code(s): I10 - ESSENTIAL (PRIMARY) HYPERTENSION   


Qualifiers: 


   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   





(7) IDDM (insulin dependent diabetes mellitus)


Code(s): E11.9 - TYPE 2 DIABETES MELLITUS WITHOUT COMPLICATIONS; Z79.4 - LONG 

TERM (CURRENT) USE OF INSULIN   





(8) Pneumonia


Code(s): J18.9 - PNEUMONIA, UNSPECIFIED ORGANISM   


Qualifiers: 


   Laterality: right   Lung location: lower lobe of lung

## 2020-09-07 NOTE — PN
Progress Note (short form)





- Note


Progress Note: 





PULMONARY





States breathing better but still some dyspnea on exertion. No cough, fevers.





                                   Vital Signs











 Period  Temp  Pulse  Resp  BP Sys/Thrasher  Pulse Ox


 


 Last 24 Hr  98.1 F-99.0 F  57-64  18-20  149-160/58-77  95-96








Gen:  NAD in chair


Heart: RRR


Lung: decreased breath sounds at the bases


Abd: soft, nontender


Ext: + edema





                                    CBC, BMP





                                 09/06/20 06:35 





                                 09/06/20 06:35 





Active Medications





Albuterol Sulfate (Ventolin Hfa Inhaler -)  2 puff IH Q4H PRN


   PRN Reason: SHORT OF BREATH/WHEEZING


   Last Admin: 09/06/20 06:58 Dose:  2 puff


   Documented by: 


Albuterol/Ipratropium (Duoneb -)  1 amp NEB Q4H PRN


   PRN Reason: SHORTNESS OF BREATH


   Last Admin: 09/05/20 23:20 Dose:  1 amp


   Documented by: 


Amlodipine Besylate (Norvasc -)  10 mg PO DAILY [facility]


   Last Admin: 09/06/20 09:37 Dose:  10 mg


   Documented by: 


Aspirin (Ecotrin -)  81 mg PO DAILY [facility]


   Last Admin: 09/06/20 09:38 Dose:  81 mg


   Documented by: 


Brimonidine Tartrate (Alphagan 0.2% -)  1 drop OU BID [facility]


   Last Admin: 09/06/20 22:05 Dose:  1 drop


   Documented by: 


Duloxetine HCl (Cymbalta -)  60 mg PO DAILY [facility]


   Last Admin: 09/06/20 09:38 Dose:  60 mg


   Documented by: 


Furosemide (Lasix Injection -)  40 mg IVPUSH DAILY [facility]


   Last Admin: 09/06/20 09:37 Dose:  40 mg


   Documented by: 


Guaifenesin (Diabetic Tussin Dm -)  10 ml PO Q6H PRN


   PRN Reason: COUGH


   Last Admin: 09/06/20 22:15 Dose:  10 ml


   Documented by: 


Heparin Sodium (Porcine) (Heparin -)  5,000 unit SQ BID [facility]


   Last Admin: 09/06/20 22:05 Dose:  5,000 unit


   Documented by: 


Insulin Aspart [facility] -[facility]  [facility]


   Last Admin: 09/07/20 06:44 Dose:  Not Given


   Documented by: 


Insulin Detemir (Levemir Vial)  60 units SQ HS [facility]


   Last Admin: 09/06/20 22:06 Dose:  60 units


   Documented by: 


Insulin Detemir (Levemir Vial)  30 units SQ AM [facility]


   Last Admin: 09/07/20 06:44 Dose:  Not Given


   Documented by: 


Latanoprost (Xalatan 0.005% Eye Drops -)  1 drop OU HS [facility]


   Last Admin: 09/06/20 22:07 Dose:  1 drop


   Documented by: 


Multivitamins/Minerals/Vitamin C (Tab-A-Vit -)  1 tab PO DAILY [facility]


   Last Admin: 09/06/20 09:37 Dose:  1 tab


   Documented by: 


Nebivolol (Bystolic -)  10 mg PO DAILY [facility]


   Last Admin: 09/06/20 09:39 Dose:  10 mg


   Documented by: 


Nicotine (Nicoderm Patch -)  14 mg TD DAILY [facility]


   Last Admin: 09/06/20 09:37 Dose:  14 mg


   Documented by: 


Pantoprazole Sodium (Protonix -)  40 mg PO DAILY [facility]


   Last Admin: 09/06/20 09:37 Dose:  40 mg


   Documented by: 


Polyethylene Glycol (Miralax (For Daily Use) -)  17 gm PO DAILY PRN


   PRN Reason: CONSTIPATION


   Last Admin: 09/06/20 18:55 Dose:  17 grams


   Documented by: 


Rosuvastatin Calcium (Crestor -)  10 mg PO HS [facility]


   Last Admin: 09/06/20 22:05 Dose:  10 mg


   Documented by: 


Timolol Maleate (Timoptic 0.5%)  1 drop OU BID [facility]


   Last Admin: 09/06/20 22:07 Dose:  1 drop


   Documented by: 


Tiotropium Bromide (Spiriva Respimat)  2 puff IH DAILY [facility]


   Last Admin: 09/06/20 09:39 Dose:  2 puff


   Documented by: 





A/P


Acute on Chronic Diastolic Heart Failure


Right Pleural Effusion


COPD


CKD


HTN


TOBACCO ABUSE





-  continue lasix


-  monitor urine output, creatinine


-  daily weights


-  CXR in AM


-  O2 to keep SpO2 >90%


-  f/u pleural fluid chemistries, cytology


-  inhaled bronchodilators as needed


-  DVT prophylaxis

## 2020-09-07 NOTE — PN
Progress Note (short form)





- Note


Progress Note: 





coughing-- clear sputum


denies chest pain 


ambulating without difficulty 





                               Vital Signs - 24 hr











  09/06/20 09/06/20 09/06/20





  14:30 18:16 21:00


 


Temperature 98.4 F 98.5 F 


 


Pulse Rate 64 57 L 


 


Respiratory 20 18 19





Rate   


 


Blood Pressure 149/58 L 152/67 


 


O2 Sat by Pulse  96 96





Oximetry (%)   














  09/06/20 09/07/20





  22:00 06:00


 


Temperature 99.0 F 98.1 F


 


Pulse Rate 63 60


 


Respiratory 19 19





Rate  


 


Blood Pressure 157/67 160/77


 


O2 Sat by Pulse 96 95





Oximetry (%)  








                               Current Medications











Generic Name Dose Route Start Last Admin





  Trade Name Freq  PRN Reason Stop Dose Admin


 


Albuterol Sulfate  2 puff  09/03/20 00:47  09/06/20 06:58





  Ventolin Hfa Inhaler -  IH   2 puff





  Q4H PRN   Administration





  SHORT OF BREATH/WHEEZING  


 


Albuterol/Ipratropium  1 amp  09/03/20 13:39  09/05/20 23:20





  Duoneb -  NEB   1 amp





  Q4H PRN   Administration





  SHORTNESS OF BREATH  


 


Amlodipine Besylate  10 mg  09/03/20 10:00  09/07/20 10:20





  Norvasc -  PO   10 mg





  DAILY JAYCEE   Administration


 


Aspirin  81 mg  09/03/20 10:00  09/07/20 10:20





  Ecotrin -  PO   81 mg





  DAILY JAYCEE   Administration


 


Brimonidine Tartrate  1 drop  09/03/20 10:00  09/07/20 10:17





  Alphagan 0.2% -  OU   1 drop





  BID JAYCEE   Administration


 


Duloxetine HCl  60 mg  09/03/20 10:00  09/07/20 10:20





  Cymbalta -  PO   60 mg





  DAILY JAYCEE   Administration


 


Furosemide  40 mg  09/08/20 10:00 





  Lasix -  PO  





  DAILY JAYCEE  


 


Guaifenesin  10 ml  09/04/20 14:34  09/06/20 22:15





  Diabetic Tussin Dm -  PO   10 ml





  Q6H PRN   Administration





  COUGH  


 


Heparin Sodium (Porcine)  5,000 unit  09/03/20 10:00  09/07/20 10:19





  Heparin -  SQ   5,000 unit





  BID JAYCEE   Administration


 


Insulin Aspart  1 vial  09/03/20 07:00  09/07/20 11:26





  Novolog Vial Sliding Scale -  SQ   4 unit





  [facility] JAYCEE   Administration





  Protocol  


 


Insulin Detemir  60 units  09/03/20 22:00  09/06/20 22:06





  Levemir Vial  SQ   60 units





  HS JAYCEE   Administration


 


Insulin Detemir  30 units  09/04/20 07:00  09/07/20 06:44





  Levemir Vial  SQ   Not Given





  AM JAYCEE  


 


Latanoprost  1 drop  09/03/20 22:00  09/06/20 22:07





  Xalatan 0.005% Eye Drops -  OU   1 drop





  HS JAYCEE   Administration


 


Multivitamins/Minerals/Vitamin C  1 tab  09/03/20 10:00  09/07/20 10:20





  Tab-A-Vit -  PO   1 tab





  DAILY JAYCEE   Administration


 


Nebivolol  10 mg  09/03/20 10:00  09/07/20 10:20





  Bystolic -  PO   10 mg





  DAILY JAYCEE   Administration


 


Nicotine  14 mg  09/05/20 10:00  09/07/20 10:19





  Nicoderm Patch -  TD   14 mg





  DAILY JAYCEE   Administration


 


Pantoprazole Sodium  40 mg  09/04/20 10:00  09/07/20 10:20





  Protonix -  PO   40 mg





  DAILY JAYCEE   Administration


 


Polyethylene Glycol  17 gm  09/05/20 15:15  09/06/20 18:55





  Miralax (For Daily Use) -  PO   17 grams





  DAILY PRN   Administration





  CONSTIPATION  


 


Rosuvastatin Calcium  10 mg  09/03/20 22:00  09/06/20 22:05





  Crestor -  PO   10 mg





  HS JAYCEE   Administration


 


Timolol Maleate  1 drop  09/03/20 10:00  09/07/20 10:18





  Timoptic 0.5%  OU   1 drop





  BID JAYCEE   Administration


 


Tiotropium Bromide  2 puff  09/03/20 10:00  09/07/20 10:17





  Spiriva Respimat  IH   2 puff





  DAILY JAYCEE   Administration








                         Laboratory Results - last 24 hr











  09/06/20 09/06/20 09/07/20





  16:14 22:03 06:39


 


POC Glucometer  305  309  118














  09/07/20





  11:26


 


POC Glucometer  221











S1 s2 RRR


Lungs decreased


Ronchi scattered


Abd- soft, obese


NT


Edema decreased 








PLAN


CT chest reviewed


COVID negative


pleural fluid studies still pending


Nebs


 iv lasix-- change to po Lasix tomorrow


check cxr IN am


off antibiotics


nebs


OOB











Problem List





- Problems


(1) COPD exacerbation


Code(s): J44.1 - CHRONIC OBSTRUCTIVE PULMONARY DISEASE W (ACUTE) EXACERBATION   





(2) ASHD (arteriosclerotic heart disease)


Code(s): I25.10 - ATHSCL HEART DISEASE OF NATIVE CORONARY ARTERY W/O ANG PCTRS  







(3) Acute hypoxemic respiratory failure


Code(s): J96.01 - ACUTE RESPIRATORY FAILURE WITH HYPOXIA   





(4) Acute on chronic diastolic CHF (congestive heart failure)


Code(s): I50.33 - ACUTE ON CHRONIC DIASTOLIC (CONGESTIVE) HEART FAILURE   





(5) CKD (chronic kidney disease) stage 3, GFR 30-59 ml/min


Code(s): N18.3 - CHRONIC KIDNEY DISEASE, STAGE 3 (MODERATE)   





(6) HTN (hypertension)


Code(s): I10 - ESSENTIAL (PRIMARY) HYPERTENSION   


Qualifiers: 


   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   





(7) IDDM (insulin dependent diabetes mellitus)


Code(s): E11.9 - TYPE 2 DIABETES MELLITUS WITHOUT COMPLICATIONS; Z79.4 - LONG 

TERM (CURRENT) USE OF INSULIN   





(8) Pneumonia


Code(s): J18.9 - PNEUMONIA, UNSPECIFIED ORGANISM   


Qualifiers: 


   Laterality: right   Lung location: lower lobe of lung

## 2020-09-07 NOTE — PN
Progress Note (short form)





- Note


Progress Note: 


s:  no cp palps dizzy; sob back to baseline, le edema resolved





                                        


                               Current Medications











Generic Name Dose Route Start Last Admin





  Trade Name Roberto  PRN Reason Stop Dose Admin


 


Albuterol Sulfate  2 puff  09/03/20 00:47  09/06/20 06:58





  Ventolin Hfa Inhaler -  IH   2 puff





  Q4H PRN   Administration





  SHORT OF BREATH/WHEEZING  


 


Albuterol/Ipratropium  1 amp  09/03/20 13:39  09/05/20 23:20





  Duoneb -  NEB   1 amp





  Q4H PRN   Administration





  SHORTNESS OF BREATH  


 


Amlodipine Besylate  10 mg  09/03/20 10:00  09/07/20 10:20





  Norvasc -  PO   10 mg





  DAILY JAYCEE   Administration


 


Aspirin  81 mg  09/03/20 10:00  09/07/20 10:20





  Ecotrin -  PO   81 mg





  DAILY JAYCEE   Administration


 


Brimonidine Tartrate  1 drop  09/03/20 10:00  09/07/20 10:17





  Alphagan 0.2% -  OU   1 drop





  BID JAYCEE   Administration


 


Duloxetine HCl  60 mg  09/03/20 10:00  09/07/20 10:20





  Cymbalta -  PO   60 mg





  DAILY JAYCEE   Administration


 


Furosemide  40 mg  09/03/20 10:00  09/07/20 10:20





  Lasix Injection -  IVPUSH   40 mg





  DAILY JAYCEE   Administration


 


Guaifenesin  10 ml  09/04/20 14:34  09/06/20 22:15





  Diabetic Tussin Dm -  PO   10 ml





  Q6H PRN   Administration





  COUGH  


 


Heparin Sodium (Porcine)  5,000 unit  09/03/20 10:00  09/07/20 10:19





  Heparin -  SQ   5,000 unit





  BID JAYCEE   Administration


 


Insulin Aspart  1 vial  09/03/20 07:00  09/07/20 06:44





  Novolog Vial Sliding Scale -  SQ   Not Given





  [facility]  





  Protocol  


 


Insulin Detemir  60 units  09/03/20 22:00  09/06/20 22:06





  Levemir Vial  SQ   60 units





  HS JAYCEE   Administration


 


Insulin Detemir  30 units  09/04/20 07:00  09/07/20 06:44





  Levemir Vial  SQ   Not Given





  AM JAYCEE  


 


Latanoprost  1 drop  09/03/20 22:00  09/06/20 22:07





  Xalatan 0.005% Eye Drops -  OU   1 drop





  HS JAYCEE   Administration


 


Multivitamins/Minerals/Vitamin C  1 tab  09/03/20 10:00  09/07/20 10:20





  Tab-A-Vit -  PO   1 tab





  DAILY JAYCEE   Administration


 


Nebivolol  10 mg  09/03/20 10:00  09/07/20 10:20





  Bystolic -  PO   10 mg





  DAILY JAYCEE   Administration


 


Nicotine  14 mg  09/05/20 10:00  09/07/20 10:19





  Nicoderm Patch -  TD   14 mg





  DAILY JAYCEE   Administration


 


Pantoprazole Sodium  40 mg  09/04/20 10:00  09/07/20 10:20





  Protonix -  PO   40 mg





  DAILY JAYCEE   Administration


 


Polyethylene Glycol  17 gm  09/05/20 15:15  09/06/20 18:55





  Miralax (For Daily Use) -  PO   17 grams





  DAILY PRN   Administration





  CONSTIPATION  


 


Rosuvastatin Calcium  10 mg  09/03/20 22:00  09/06/20 22:05





  Crestor -  PO   10 mg





  HS JAYCEE   Administration


 


Timolol Maleate  1 drop  09/03/20 10:00  09/07/20 10:18





  Timoptic 0.5%  OU   1 drop





  BID JAYCEE   Administration


 


Tiotropium Bromide  2 puff  09/03/20 10:00  09/07/20 10:17





  Spiriva Respimat  IH   2 puff





  DAILY JAYCEE   Administration








                                   Vital Signs











 Period  Temp  Pulse  Resp  BP Sys/Thrasher  Pulse Ox


 


 Last 24 Hr  98.1 F-99.0 F  57-64  18-20  149-160/58-77  95-96











Constitutional: Yes: No Distress


Respiratory: Yes: cta bl nl eff


Gastrointestinal: Yes: Soft, Abdomen, Obese


Cardiovascular: Yes: Regular Rate and Rhythm


JVD: No


Heart Sounds: Yes: S1, S2 (rrr)


Edema: trace le edema bl


Neurological: Yes: Alert, Oriented


...Motor Strength: WNL








                                        


                                    CBC, BMP





                                 09/06/20 06:35 





                                 09/06/20 06:35 











nsr 64bpm, borderline low voltage, no acute ST changes


Prior Cardiac Procedures: Cardiac Catheterization (non obstx CAD within last 5 

years)


Ejection Fraction %: LVEF > or = 40 %


echo 9/2020: nl lv/rv, no sig valve path





Imaging





- Results


Cat Scan: Report Reviewed


EKG: Image Reviewed





Assessment/Plan





IMP:


Chronic diastolic CHF


Bilateral lung consolidation: PNA vs CHF; COVID NEGATIVE


Non obstx CAD


CKD


DM


HTN








REC:


1. Acute on chronic diastolic CHF (on lasix 40 po qd at home):


-Clinically improved on IV lasix, back to euvolemic baseline, will change to po 

lasix now


-Daily BMP to monitor renal fx


-Continue bystolic, bp fairly well controlled


-echo here unremarkable


-Non obstx CAD on cath within last 5 years





2. PNA?:


-CT pattern more c/w consolidation but clinically improved with diuresis


-WBC elevated, but could be from outpt steroids


-+ Cough, non productive


-f/u pleural fluid


-Pulm and ID following.





3. Nonobstx CAD:


-cont asa, statin





4. CKD:


-Unclear why not on ACE/ARB, perhaps GFR or K+ would not permit. 








5. DM: as per PMD








6. COPD/smoking: smoking cessation recommended.

## 2020-09-08 NOTE — PN
Progress Note (short form)





- Note


Progress Note: 





PULMONARY





States breathing much improved from admission but still some dyspnea with AM 

ritual. No cough, fevers. AM CXR showing reaccumulation of right effusion.





                                   Vital Signs











 Period  Temp  Pulse  Resp  BP Sys/Thrasher  Pulse Ox


 


 Last 24 Hr  98.5 F-98.7 F  58-71  19-20  141-186/58-78  95-96








                                 Intake & Output











 09/05/20 09/06/20 09/07/20 09/08/20





 23:59 23:59 23:59 23:59


 


Intake Total 900 1160 200 400


 


Balance 900 1160 200 400


 


Weight 114.124 kg 114.759 kg 115.354 kg 114.929 kg











Gen:  NAD in chair


Heart: RRR


Lung: decreased breath sounds at the bases


Abd: soft, nontender


Ext: + edema





                                    CBC, BMP





                                 09/06/20 06:35 





                                 09/06/20 06:35 





Active Medications





Albuterol Sulfate (Ventolin Hfa Inhaler -)  2 puff IH Q4H PRN


   PRN Reason: SHORT OF BREATH/WHEEZING


   Last Admin: 09/07/20 21:29 Dose:  2 puff


   Documented by: 


Albuterol/Ipratropium (Duoneb -)  1 amp NEB Q4H PRN


   PRN Reason: SHORTNESS OF BREATH


   Last Admin: 09/05/20 23:20 Dose:  1 amp


   Documented by: 


Alprazolam (Xanax -)  0.5 mg PO Q8H PRN


   PRN Reason: ANXIETY


   Last Admin: 09/07/20 21:27 Dose:  0.5 mg


   Documented by: 


Amlodipine Besylate (Norvasc -)  10 mg PO DAILY [facility]


   Last Admin: 09/07/20 10:20 Dose:  10 mg


   Documented by: 


Aspirin (Ecotrin -)  81 mg PO DAILY [facility]


   Last Admin: 09/07/20 10:20 Dose:  81 mg


   Documented by: 


Brimonidine Tartrate (Alphagan 0.2% -)  1 drop OU BID [facility]


   Last Admin: 09/07/20 21:28 Dose:  1 drop


   Documented by: 


Duloxetine HCl (Cymbalta -)  60 mg PO DAILY [facility]


   Last Admin: 09/07/20 10:20 Dose:  60 mg


   Documented by: 


Furosemide (Lasix -)  40 mg PO DAILY [facility]


Guaifenesin (Diabetic Tussin Dm -)  10 ml PO Q6H PRN


   PRN Reason: COUGH


   Last Admin: 09/06/20 22:15 Dose:  10 ml


   Documented by: 


Heparin Sodium (Porcine) (Heparin -)  5,000 unit SQ BID [facility]


   Last Admin: 09/07/20 21:27 Dose:  5,000 unit


   Documented by: 


Insulin Aspart (Novolog Vial Sliding Scale -)  1 vial SQ ACHS [facility]; Protocol


   Last Admin: 09/08/20 06:11 Dose:  Not Given


   Documented by: 


Insulin Detemir (Levemir Vial)  60 units SQ HS [facility]


   Last Admin: 09/07/20 21:27 Dose:  60 units


   Documented by: 


Insulin Detemir (Levemir Vial)  30 units SQ AM [facility]


   Last Admin: 09/08/20 06:11 Dose:  Not Given


   Documented by: 


Latanoprost (Xalatan 0.005% Eye Drops -)  1 drop OU [facility]


   Last Admin: 09/07/20 21:28 Dose:  1 drop


   Documented by: 


Multivitamins/Minerals/Vitamin C (Tab-A-Vit -)  1 tab PO DAILY [facility]


   Last Admin: 09/07/20 10:20 Dose:  1 tab


   Documented by: 


Nebivolol (Bystolic -)  10 mg PO DAILY [facility]


   Last Admin: 09/07/20 10:20 Dose:  10 mg


   Documented by: 


Nicotine (Nicoderm Patch -)  14 mg TD DAILY [facility]


   Last Admin: 09/07/20 10:19 Dose:  14 mg


   Documented by: 


Pantoprazole Sodium (Protonix -)  40 mg PO DAILY [facility]


   Last Admin: 09/07/20 10:20 Dose:  40 mg


   Documented by: 


Polyethylene Glycol (Miralax (For Daily Use) -)  17 gm PO DAILY PRN


   PRN Reason: CONSTIPATION


   Last Admin: 09/06/20 18:55 Dose:  17 grams


   Documented by: 


Rosuvastatin Calcium (Crestor -)  10 mg PO HS [facility]


   Last Admin: 09/07/20 21:27 Dose:  10 mg


   Documented by: 


Timolol Maleate (Timoptic 0.5%)  1 drop OU BID [facility]


   Last Admin: 09/07/20 21:28 Dose:  1 drop


   Documented by: 


Tiotropium Bromide (Spiriva Respimat)  2 puff IH DAILY [facility]


   Last Admin: 09/07/20 10:17 Dose:  2 puff


   Documented by: 











A/P


Acute on Chronic Diastolic Heart Failure


Right Pleural Effusion


COPD


CKD


HTN


TOBACCO ABUSE





-  continue lasix


-  monitor urine output, creatinine


-  daily weights


-  O2 to keep SpO2 >90%


-  f/u pleural fluid chemistries, cytology


-  inhaled bronchodilators as needed


-  pt symptoms have improved despite reaccumulation of effusion, can continue 

diuresis as outpt with close follow up from pulmonary standpoint 


-  DVT prophylaxis

## 2020-09-08 NOTE — PN
Progress Note (short form)





- Note


Progress Note: 





s:  no cp palps dizzy. edema resolved, still has sob when walking short 

distances


                                        


                               Current Medications











Generic Name Dose Route Start Last Admin





  Trade Name Roberto  PRN Reason Stop Dose Admin


 


Albuterol Sulfate  2 puff  09/03/20 00:47  09/06/20 06:58





  Ventolin Hfa Inhaler -  IH   2 puff





  Q4H PRN   Administration





  SHORT OF BREATH/WHEEZING  


 


Albuterol/Ipratropium  1 amp  09/03/20 13:39  09/05/20 23:20





  Duoneb -  NEB   1 amp





  Q4H PRN   Administration





  SHORTNESS OF BREATH  


 


Amlodipine Besylate  10 mg  09/03/20 10:00  09/07/20 10:20





  Norvasc -  PO   10 mg





  DAILY JAYCEE   Administration


 


Aspirin  81 mg  09/03/20 10:00  09/07/20 10:20





  Ecotrin -  PO   81 mg





  DAILY JAYCEE   Administration


 


Brimonidine Tartrate  1 drop  09/03/20 10:00  09/07/20 10:17





  Alphagan 0.2% -  OU   1 drop





  BID JAYCEE   Administration


 


Duloxetine HCl  60 mg  09/03/20 10:00  09/07/20 10:20





  Cymbalta -  PO   60 mg





  DAILY JAYCEE   Administration


 


Furosemide  40 mg  09/03/20 10:00  09/07/20 10:20





  Lasix Injection -  IVPUSH   40 mg





  DAILY JAYCEE   Administration


 


Guaifenesin  10 ml  09/04/20 14:34  09/06/20 22:15





  Diabetic Tussin Dm -  PO   10 ml





  Q6H PRN   Administration





  COUGH  


 


Heparin Sodium (Porcine)  5,000 unit  09/03/20 10:00  09/07/20 10:19





  Heparin -  SQ   5,000 unit





  BID JAYCEE   Administration


 


Insulin Aspart  1 vial  09/03/20 07:00  09/07/20 06:44





  Novolog Vial Sliding Scale -  SQ   Not Given





  [facility]  





  Protocol  


 


Insulin Detemir  60 units  09/03/20 22:00  09/06/20 22:06





  Levemir Vial  SQ   60 units





  HS JAYCEE   Administration


 


Insulin Detemir  30 units  09/04/20 07:00  09/07/20 06:44





  Levemir Vial  SQ   Not Given





  AM JAYCEE  


 


Latanoprost  1 drop  09/03/20 22:00  09/06/20 22:07





  Xalatan 0.005% Eye Drops -  OU   1 drop





  HS JAYCEE   Administration


 


Multivitamins/Minerals/Vitamin C  1 tab  09/03/20 10:00  09/07/20 10:20





  Tab-A-Vit -  PO   1 tab





  DAILY JAYCEE   Administration


 


Nebivolol  10 mg  09/03/20 10:00  09/07/20 10:20





  Bystolic -  PO   10 mg





  DAILY JAYCEE   Administration


 


Nicotine  14 mg  09/05/20 10:00  09/07/20 10:19





  Nicoderm Patch -  TD   14 mg





  DAILY JAYCEE   Administration


 


Pantoprazole Sodium  40 mg  09/04/20 10:00  09/07/20 10:20





  Protonix -  PO   40 mg





  DAILY JAYCEE   Administration


 


Polyethylene Glycol  17 gm  09/05/20 15:15  09/06/20 18:55





  Miralax (For Daily Use) -  PO   17 grams





  DAILY PRN   Administration





  CONSTIPATION  


 


Rosuvastatin Calcium  10 mg  09/03/20 22:00  09/06/20 22:05





  Crestor -  PO   10 mg





  HS JAYCEE   Administration


 


Timolol Maleate  1 drop  09/03/20 10:00  09/07/20 10:18





  Timoptic 0.5%  OU   1 drop





  BID JAYCEE   Administration


 


Tiotropium Bromide  2 puff  09/03/20 10:00  09/07/20 10:17





  Spiriva Respimat  IH   2 puff





  DAILY JAYCEE   Administration








                                   Vital Signs











 Period  Temp  Pulse  Resp  BP Sys/Thrasher  Pulse Ox


 


 Last 24 Hr  98.1 F-99.0 F  57-64  18-20  149-160/58-77  95-96











Constitutional: Yes: No Distress


Respiratory: Yes: cta bl nl eff


Gastrointestinal: Yes: Soft, Abdomen, Obese


Cardiovascular: Yes: Regular Rate and Rhythm


JVD: No


Heart Sounds: Yes: S1, S2 (rrr)


Edema: trace le edema bl


Neurological: Yes: Alert, Oriented


no jaundice, diaphoresis


not agitated








nsr 64bpm, borderline low voltage, no acute ST changes


Prior Cardiac Procedures: Cardiac Catheterization (non obstx CAD within last 5 

years)


Ejection Fraction %: LVEF > or = 40 %


echo 9/2020: nl lv/rv, no sig valve path





Imaging





- Results


Cat Scan: Report Reviewed


EKG: Image Reviewed





Assessment/Plan





IMP:


Chronic diastolic CHF


Bilateral lung consolidation: PNA vs CHF; COVID NEGATIVE


Non obstx CAD


CKD


DM


HTN








REC:


1. Acute on chronic diastolic CHF (on lasix 40 po qd at home):


-Clinically improved on IV lasix, however CXR 9/8 shows worsening congestion, R 

sided effusion.  feels short of breath getting out of bed and walking short 

distances


- resume IV lasix - she was not making much urine the last two days so will 

increase dose to 80 mg IV daily for now


-Daily BMP to monitor renal fx


-Continue bystolic, bp fairly well controlled


-echo here unremarkable


-Non obstx CAD on cath within last 5 years





2. PNA?:


-CT pattern more c/w consolidation but clinically improved with diuresis


-WBC elevated, but could be from outpt steroids


-+ Cough, non productive


-f/u pleural fluid


-Pulm and ID following.





3. Nonobstx CAD:


-cont asa, statin





4. CKD:


-Unclear why not on ACE/ARB, perhaps GFR or K+ would not permit. 








5. DM: as per PMD








6. COPD/smoking: smoking cessation recommended.

## 2020-09-08 NOTE — PN
Progress Note (short form)





- Note


Progress Note: 








denies chest pain 


ambulating without difficulty 


she gets sob when doing routine activities like going to bathroom , dressing 

herself


                               Vital Signs - 24 hr











  09/07/20 09/07/20 09/07/20





  14:37 18:00 21:00


 


Temperature 98.5 F 98.5 F 


 


Pulse Rate 64 63 


 


Respiratory 20 20 





Rate   


 


Blood Pressure 143/65 141/58 L 


 


O2 Sat by Pulse 95 96 96





Oximetry (%)   














  09/07/20 09/08/20 09/08/20





  22:00 02:00 05:49


 


Temperature 98.7 F 98.5 F 98.7 F


 


Pulse Rate 58 L 61 71


 


Respiratory 19 19 19





Rate   


 


Blood Pressure 162/75 150/72 186/78 H


 


O2 Sat by Pulse 96 96 96





Oximetry (%)   








                               Current Medications











Generic Name Dose Route Start Last Admin





  Trade Name Freq  PRN Reason Stop Dose Admin


 


Albuterol Sulfate  2 puff  09/03/20 00:47  09/07/20 21:29





  Ventolin Hfa Inhaler -  IH   2 puff





  Q4H PRN   Administration





  SHORT OF BREATH/WHEEZING  


 


Albuterol/Ipratropium  1 amp  09/03/20 13:39  09/05/20 23:20





  Duoneb -  NEB   1 amp





  Q4H PRN   Administration





  SHORTNESS OF BREATH  


 


Alprazolam  0.5 mg  09/07/20 12:10  09/07/20 21:27





  Xanax -  PO   0.5 mg





  Q8H PRN   Administration





  ANXIETY  


 


Amlodipine Besylate  10 mg  09/03/20 10:00  09/08/20 12:10





  Norvasc -  PO   Not Given





  DAILY JAYCEE  


 


Aspirin  81 mg  09/03/20 10:00  09/08/20 11:44





  Ecotrin -  PO   81 mg





  DAILY JAYCEE   Administration


 


Brimonidine Tartrate  1 drop  09/03/20 10:00  09/08/20 11:42





  Alphagan 0.2% -  OU   1 drop





  BID JAYCEE   Administration


 


Duloxetine HCl  60 mg  09/03/20 10:00  09/08/20 11:44





  Cymbalta -  PO   60 mg





  DAILY JAYCEE   Administration


 


Furosemide  80 mg  09/09/20 10:00 





  Lasix Injection -  IVPB  





  DAILY JAYCEE  


 


Furosemide  80 mg  09/08/20 12:30 





  Lasix Injection -  IVPUSH  09/08/20 12:31 





  ONCE ONE  


 


Guaifenesin  10 ml  09/04/20 14:34  09/06/20 22:15





  Diabetic Tussin Dm -  PO   10 ml





  Q6H PRN   Administration





  COUGH  


 


Heparin Sodium (Porcine)  5,000 unit  09/03/20 10:00  09/08/20 11:45





  Heparin -  SQ   5,000 unit





  BID JAYCEE   Administration


 


Insulin Aspart  1 vial  09/03/20 07:00  09/08/20 12:13





  Novolog Vial Sliding Scale -  SQ   2 unit





  ACHS JAYCEE   Administration





  Protocol  


 


Insulin Detemir  60 units  09/03/20 22:00  09/07/20 21:27





  Levemir Vial  SQ   60 units





  HS JAYCEE   Administration


 


Insulin Detemir  30 units  09/04/20 07:00  09/08/20 06:11





  Levemir Vial  SQ   Not Given





  AM JAYCEE  


 


Latanoprost  1 drop  09/03/20 22:00  09/07/20 21:28





  Xalatan 0.005% Eye Drops -  OU   1 drop





  HS JAYCEE   Administration


 


Multivitamins/Minerals/Vitamin C  1 tab  09/03/20 10:00  09/08/20 11:44





  Tab-A-Vit -  PO   1 tab





  DAILY JAYCEE   Administration


 


Nebivolol  10 mg  09/03/20 10:00  09/08/20 11:45





  Bystolic -  PO   10 mg





  DAILY JAYCEE   Administration


 


Nicotine  14 mg  09/05/20 10:00  09/08/20 11:46





  Nicoderm Patch -  TD   14 mg





  DAILY JAYCEE   Administration


 


Pantoprazole Sodium  40 mg  09/04/20 10:00  09/08/20 11:45





  Protonix -  PO   40 mg





  DAILY JAYCEE   Administration


 


Polyethylene Glycol  17 gm  09/05/20 15:15  09/06/20 18:55





  Miralax (For Daily Use) -  PO   17 grams





  DAILY PRN   Administration





  CONSTIPATION  


 


Rosuvastatin Calcium  10 mg  09/03/20 22:00  09/07/20 21:27





  Crestor -  PO   10 mg





  HS JAYCEE   Administration


 


Timolol Maleate  1 drop  09/03/20 10:00  09/08/20 11:42





  Timoptic 0.5%  OU   1 drop





  BID JAYCEE   Administration


 


Tiotropium Bromide  2 puff  09/03/20 10:00  09/08/20 11:46





  Spiriva Respimat  IH   2 puff





  DAILY JAYCEE   Administration








                         Laboratory Results - last 24 hr











  09/07/20 09/07/20 09/08/20





  16:14 21:25 06:07


 


POC Glucometer  321  243  104














  09/08/20





  12:12


 


POC Glucometer  161














S1 s2 RRR


Lungs decreased right 


Abd- soft, obese


NT


Edema decreased 








PLAN


CXR shows worsening 


COVID negative


pleural fluid studies still pending


Nebs


 spoke with  Cardiology and Pulmonary -- kenyetta increase lasix today 








OOB











Problem List





- Problems


(1) COPD exacerbation


Code(s): J44.1 - CHRONIC OBSTRUCTIVE PULMONARY DISEASE W (ACUTE) EXACERBATION   





(2) ASHD (arteriosclerotic heart disease)


Code(s): I25.10 - ATHSCL HEART DISEASE OF NATIVE CORONARY ARTERY W/O ANG PCTRS  







(3) Acute hypoxemic respiratory failure


Code(s): J96.01 - ACUTE RESPIRATORY FAILURE WITH HYPOXIA   





(4) Acute on chronic diastolic CHF (congestive heart failure)


Code(s): I50.33 - ACUTE ON CHRONIC DIASTOLIC (CONGESTIVE) HEART FAILURE   





(5) CKD (chronic kidney disease) stage 3, GFR 30-59 ml/min


Code(s): N18.3 - CHRONIC KIDNEY DISEASE, STAGE 3 (MODERATE)   





(6) HTN (hypertension)


Code(s): I10 - ESSENTIAL (PRIMARY) HYPERTENSION   


Qualifiers: 


   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   





(7) IDDM (insulin dependent diabetes mellitus)


Code(s): E11.9 - TYPE 2 DIABETES MELLITUS WITHOUT COMPLICATIONS; Z79.4 - LONG 

TERM (CURRENT) USE OF INSULIN   





(8) Pneumonia


Code(s): J18.9 - PNEUMONIA, UNSPECIFIED ORGANISM   


Qualifiers: 


   Laterality: right   Lung location: lower lobe of lung

## 2020-09-08 NOTE — PATH
Cytology Non-Gynecological Report



Patient Name:  NATANAEL COE

Accession #:  

Med. Rec. #:  [id number]                                                        

   /Age/Gender:  1955 (Age: 64) / F

Account:  [id number]                                                          

             Location: [facility] MED/SURG

Taken:  9/3/2020

Received:  2020

Reported:  2020

Physicians:  ADITI Dow M.D.

  



Specimen(s) Received

 PLEURAL FLUID 





Clinical History

Pleural effusion







Final Diagnosis

PLEURAL FLUID, THORACENTESIS:

SATISFACTORY FOR EVALUATION

NO MALIGNANT CELLS IDENTIFIED.

BLOOD, RARE MESOTHELIAL CELLS, MACROPHAGES AND LYMPHOCYTE PRESENT.





***Electronically Signed***

Whit Hampton M.D.





Gross Description

Approximately 50cc of yellow fluid received fixed in 50% alcohol.  One cytospin

and one cellblock prepared.

## 2020-09-09 NOTE — PN
Progress Note, Physician


History of Present Illness: 





pulmonary





alert,comfortable at rest,+ mayo





- Current Medication List


Current Medications: 


Active Medications





Albuterol Sulfate (Ventolin Hfa Inhaler -)  2 puff IH Q4H PRN


   PRN Reason: SHORT OF BREATH/WHEEZING


   Last Admin: 09/07/20 21:29 Dose:  2 puff


   Documented by: 


Alprazolam (Xanax -)  0.5 mg PO Q8H PRN


   PRN Reason: ANXIETY


   Last Admin: 09/08/20 21:29 Dose:  0.5 mg


   Documented by: 


Amlodipine Besylate (Norvasc -)  10 mg PO DAILY Counts include 234 beds at the [facility]


   Last Admin: 09/08/20 12:10 Dose:  Not Given


   Documented by: 


Aspirin (Ecotrin -)  81 mg PO DAILY Counts include 234 beds at the [facility]


   Last Admin: 09/08/20 11:44 Dose:  81 mg


   Documented by: 


Brimonidine Tartrate (Alphagan 0.2% -)  1 drop OU BID Counts include 234 beds at the [facility]


   Last Admin: 09/08/20 21:28 Dose:  1 drop


   Documented by: 


Duloxetine HCl (Cymbalta -)  60 mg PO DAILY Counts include 234 beds at the [facility]


   Last Admin: 09/08/20 11:44 Dose:  60 mg


   Documented by: 


Furosemide (Lasix Injection -)  80 mg IVPB DAILY Counts include 234 beds at the [facility]


Guaifenesin (Diabetic Tussin Dm -)  10 ml PO Q6H PRN


   PRN Reason: COUGH


   Last Admin: 09/06/20 22:15 Dose:  10 ml


   Documented by: 


Heparin Sodium (Porcine) (Heparin -)  5,000 unit SQ BID Counts include 234 beds at the [facility]


   Last Admin: 09/08/20 21:29 Dose:  5,000 unit


   Documented by: 


Insulin Aspart (Novolog Vial Sliding Scale -)  1 vial SQ [facility]; Protocol


   Last Admin: 09/09/20 06:25 Dose:  Not Given


   Documented by: 


Insulin Detemir (Levemir Vial)  60 units SQ HS Counts include 234 beds at the [facility]


   Last Admin: 09/08/20 21:31 Dose:  60 units


   Documented by: 


Insulin Detemir (Levemir Vial)  30 units SQ AM Counts include 234 beds at the [facility]


   Last Admin: 09/09/20 06:25 Dose:  Not Given


   Documented by: 


Latanoprost (Xalatan 0.005% Eye Drops -)  1 drop OU HS Counts include 234 beds at the [facility]


   Last Admin: 09/08/20 21:36 Dose:  1 drop


   Documented by: 


Multivitamins/Minerals/Vitamin C (Tab-A-Vit -)  1 tab PO DAILY Counts include 234 beds at the [facility]


   Last Admin: 09/08/20 11:44 Dose:  1 tab


   Documented by: 


Nebivolol (Bystolic -)  10 mg PO DAILY Counts include 234 beds at the [facility]


   Last Admin: 09/08/20 11:45 Dose:  10 mg


   Documented by: 


Nicotine (Nicoderm Patch -)  14 mg TD DAILY Counts include 234 beds at the [facility]


   Last Admin: 09/08/20 11:46 Dose:  14 mg


   Documented by: 


Pantoprazole Sodium (Protonix -)  40 mg PO DAILY Counts include 234 beds at the [facility]


   Last Admin: 09/08/20 11:45 Dose:  40 mg


   Documented by: 


Polyethylene Glycol (Miralax (For Daily Use) -)  17 gm PO DAILY PRN


   PRN Reason: CONSTIPATION


   Last Admin: 09/06/20 18:55 Dose:  17 grams


   Documented by: 


Rosuvastatin Calcium (Crestor -)  10 mg PO HS Counts include 234 beds at the [facility]


   Last Admin: 09/08/20 21:29 Dose:  10 mg


   Documented by: 


Timolol Maleate (Timoptic 0.5%)  1 drop OU BID Counts include 234 beds at the [facility]


   Last Admin: 09/08/20 21:28 Dose:  1 drop


   Documented by: 


Tiotropium Bromide (Spiriva Respimat)  2 puff IH DAILY Counts include 234 beds at the [facility]


   Last Admin: 09/08/20 11:46 Dose:  2 puff


   Documented by: 











- Objective


Vital Signs: 


                                   Vital Signs











Temperature  97.6 F   09/09/20 05:00


 


Pulse Rate  71   09/09/20 05:00


 


Respiratory Rate  20   09/09/20 05:00


 


Blood Pressure  168/66   09/09/20 05:00


 


O2 Sat by Pulse Oximetry (%)  95   09/09/20 05:00











Constitutional: Yes: Well Nourished, Calm


Eyes: Yes: WNL


HENT: Yes: WNL


Neck: Yes: WNL


Cardiovascular: Yes: Regular Rate and Rhythm, S1, S2


Respiratory: Yes: Diminished (DIMINISHED BS R 1/3 UP)


Gastrointestinal: Yes: Normal Bowel Sounds, Soft


Extremities: Yes: WNL


Edema: Yes


Labs: 


                                        





Problem List





- Problems


(1) COPD (chronic obstructive pulmonary disease)


Code(s): J44.9 - CHRONIC OBSTRUCTIVE PULMONARY DISEASE, UNSPECIFIED   


Qualifiers: 


   COPD type: unspecified COPD   Qualified Code(s): J44.9 - Chronic obstructive 

pulmonary disease, unspecified   





(2) Pleural effusion


Code(s): J90 - PLEURAL EFFUSION, NOT ELSEWHERE CLASSIFIED   





(3) Shortness of breath on exertion


Code(s): R06.02 - SHORTNESS OF BREATH   





(4) ASHD (arteriosclerotic heart disease)


Code(s): I25.10 - ATHSCL HEART DISEASE OF NATIVE CORONARY ARTERY W/O ANG PCTRS  







(5) Acute on chronic diastolic CHF (congestive heart failure)


Code(s): I50.33 - ACUTE ON CHRONIC DIASTOLIC (CONGESTIVE) HEART FAILURE   





(6) CKD (chronic kidney disease) stage 3, GFR 30-59 ml/min


Code(s): N18.3 - CHRONIC KIDNEY DISEASE, STAGE 3 (MODERATE)   





Assessment/Plan





Assessment/Plan





Acute on Chronic Diastolic Heart Failure


Right Pleural Effusion reaccumulating


COPD


CKD


HTN


TOBACCO ABUSE





-   IV lasix


-  monitor urine output, creatinine


-  daily weights


-  O2 to keep SpO2 >90%


- Pleural fluid chemistries pending


-  inhaled bronchodilators as needed


-  DVT prophylaxis


- smoking cessation counseled





DR REED

## 2020-09-09 NOTE — PN
Progress Note (short form)





- Note


Progress Note: 





s:  no cp palps dizzy. stable dyspnea


                                        


                              Current Medications











Generic Name Dose Route Start Last Admin





  Trade Name Freq  PRN Reason Stop Dose Admin


 


Albuterol Sulfate  2 puff  09/03/20 00:47  09/07/20 21:29





  Ventolin Hfa Inhaler -  IH   2 puff





  Q4H PRN   Administration





  SHORT OF BREATH/WHEEZING  


 


Alprazolam  0.5 mg  09/07/20 12:10  09/08/20 21:29





  Xanax -  PO   0.5 mg





  Q8H PRN   Administration





  ANXIETY  


 


Amlodipine Besylate  10 mg  09/03/20 10:00  09/09/20 09:15





  Norvasc -  PO   10 mg





  DAILY JAYCEE   Administration


 


Aspirin  81 mg  09/03/20 10:00  09/09/20 09:15





  Ecotrin -  PO   81 mg





  DAILY JAYCEE   Administration


 


Brimonidine Tartrate  1 drop  09/03/20 10:00  09/09/20 09:14





  Alphagan 0.2% -  OU   1 drop





  BID JAYCEE   Administration


 


Duloxetine HCl  60 mg  09/03/20 10:00  09/09/20 09:15





  Cymbalta -  PO   60 mg





  DAILY JAYCEE   Administration


 


Furosemide  80 mg  09/09/20 10:00  09/09/20 09:16





  Lasix Injection -  IVPB   80 mg





  DAILY JAYCEE   Administration


 


Guaifenesin  10 ml  09/04/20 14:34  09/06/20 22:15





  Diabetic Tussin Dm -  PO   10 ml





  Q6H PRN   Administration





  COUGH  


 


Heparin Sodium (Porcine)  5,000 unit  09/03/20 10:00  09/09/20 09:15





  Heparin -  SQ   5,000 unit





  BID JAYCEE   Administration


 


Insulin Aspart  1 vial  09/03/20 07:00  09/09/20 11:20





  Novolog Vial Sliding Scale -  SQ   2 unit





  ACHS JAYCEE   Administration





  Protocol  


 


Insulin Detemir  60 units  09/03/20 22:00  09/08/20 21:31





  Levemir Vial  SQ   60 units





  HS JAYCEE   Administration


 


Insulin Detemir  30 units  09/04/20 07:00  09/09/20 06:25





  Levemir Vial  SQ   Not Given





  AM JAYCEE  


 


Latanoprost  1 drop  09/03/20 22:00  09/08/20 21:36





  Xalatan 0.005% Eye Drops -  OU   1 drop





  HS JAYCEE   Administration


 


Multivitamins/Minerals/Vitamin C  1 tab  09/03/20 10:00  09/09/20 09:15





  Tab-A-Vit -  PO   1 tab





  DAILY JAYCEE   Administration


 


Nebivolol  10 mg  09/03/20 10:00  09/09/20 09:15





  Bystolic -  PO   10 mg





  DAILY JAYCEE   Administration


 


Nicotine  14 mg  09/05/20 10:00  09/09/20 09:16





  Nicoderm Patch -  TD   14 mg





  DAILY JAYCEE   Administration


 


Pantoprazole Sodium  40 mg  09/04/20 10:00  09/09/20 09:15





  Protonix -  PO   40 mg





  DAILY JAYCEE   Administration


 


Polyethylene Glycol  17 gm  09/05/20 15:15  09/06/20 18:55





  Miralax (For Daily Use) -  PO   17 grams





  DAILY PRN   Administration





  CONSTIPATION  


 


Rosuvastatin Calcium  10 mg  09/03/20 22:00  09/08/20 21:29





  Crestor -  PO   10 mg





  HS JAYCEE   Administration


 


Timolol Maleate  1 drop  09/03/20 10:00  09/09/20 09:14





  Timoptic 0.5%  OU   1 drop





  BID JAYCEE   Administration


 


Tiotropium Bromide  2 puff  09/03/20 10:00  09/09/20 09:16





  Spiriva Respimat  IH   2 puff





  DAILY JAYCEE   Administration








                                   Vital Signs











 Period  Temp  Pulse  Resp  BP Sys/Thrasher  Pulse Ox


 


 Last 24 Hr  97.6 F-98.7 F  64-86  18-20  137-168/60-85  94-97











Constitutional: Yes: No Distress


Respiratory: Yes: cta bl nl eff


Gastrointestinal: Yes: Soft, Abdomen, Obese


Cardiovascular: Yes: Regular Rate and Rhythm


JVD: No


Heart Sounds: Yes: S1, S2 (rrr)


Edema: trace le edema bl


Neurological: Yes: Alert, Oriented


no jaundice, diaphoresis


not agitated








nsr 64bpm, borderline low voltage, no acute ST changes


Prior Cardiac Procedures: Cardiac Catheterization (non obstx CAD within last 5 

years)


Ejection Fraction %: LVEF > or = 40 %


echo 9/2020: nl lv/rv, no sig valve path





Imaging





- Results


Cat Scan: Report Reviewed


EKG: Image Reviewed





Assessment/Plan





IMP:


Chronic diastolic CHF


Bilateral lung consolidation: PNA vs CHF; COVID NEGATIVE


Non obstx CAD


CKD


DM


HTN








REC:


1. Acute on chronic diastolic CHF (on lasix 40 po qd at home):


-Clinically improved on IV lasix, however CXR 9/8 shows worsening congestion, R 

sided effusion.  feels short of breath getting out of bed and walking short 

distances


- resumed IV lasix 80 mg daily, notes subjectively increased urine output, 

continue


-Daily BMP to monitor renal fx


-Continue bystolic, bp fairly well controlled


-echo here unremarkable


-Non obstx CAD on cath within last 5 years





2. PNA?:


-CT pattern more c/w consolidation but clinically improved with diuresis


-WBC elevated, but could be from outpt steroids


-+ Cough, non productive


-f/u pleural fluid


-Pulm and ID following.





3. Nonobstx CAD:


-cont asa, statin





4. CKD:


-Unclear why not on ACE/ARB, perhaps GFR or K+ would not permit. 








5. DM: as per PMD








6. COPD/smoking: smoking cessation recommended.

## 2020-09-09 NOTE — PN
Progress Note (short form)





- Note


Progress Note: 








denies chest pain 


ambulating without difficulty 


she gets sob when doing routine activities like going to bathroom , dressing 

herself- sight better


                              Vital Signs - 24 hr











  09/08/20 09/08/20 09/08/20





  14:31 19:48 21:00


 


Temperature 98.7 F 97.7 F 


 


Pulse Rate 65 68 


 


Respiratory 18 18 20





Rate   


 


Blood Pressure 137/60 157/60 


 


O2 Sat by Pulse 94 L 95 95





Oximetry (%)   














  09/08/20 09/09/20 09/09/20





  23:00 01:57 05:00


 


Temperature 98.5 F 98.4 F 97.6 F


 


Pulse Rate 64 70 71


 


Respiratory 20 20 20





Rate   


 


Blood Pressure 157/85 160/65 168/66


 


O2 Sat by Pulse 95 97 95





Oximetry (%)   














  09/09/20 09/09/20





  09:00 10:00


 


Temperature  98.4 F


 


Pulse Rate  86


 


Respiratory 20 20





Rate  


 


Blood Pressure  164/67


 


O2 Sat by Pulse 95 95





Oximetry (%)  








                               Current Medications











Generic Name Dose Route Start Last Admin





  Trade Name Freq  PRN Reason Stop Dose Admin


 


Albuterol Sulfate  2 puff  09/03/20 00:47  09/07/20 21:29





  Ventolin Hfa Inhaler -  IH   2 puff





  Q4H PRN   Administration





  SHORT OF BREATH/WHEEZING  


 


Alprazolam  0.5 mg  09/07/20 12:10  09/08/20 21:29





  Xanax -  PO   0.5 mg





  Q8H PRN   Administration





  ANXIETY  


 


Amlodipine Besylate  10 mg  09/03/20 10:00  09/09/20 09:15





  Norvasc -  PO   10 mg





  DAILY JAYCEE   Administration


 


Aspirin  81 mg  09/03/20 10:00  09/09/20 09:15





  Ecotrin -  PO   81 mg





  DAILY JAYCEE   Administration


 


Brimonidine Tartrate  1 drop  09/03/20 10:00  09/09/20 09:14





  Alphagan 0.2% -  OU   1 drop





  BID JAYCEE   Administration


 


Duloxetine HCl  60 mg  09/03/20 10:00  09/09/20 09:15





  Cymbalta -  PO   60 mg





  DAILY JAYCEE   Administration


 


Furosemide  80 mg  09/09/20 10:00  09/09/20 09:16





  Lasix Injection -  IVPB   80 mg





  DAILY JAYCEE   Administration


 


Guaifenesin  10 ml  09/04/20 14:34  09/06/20 22:15





  Diabetic Tussin Dm -  PO   10 ml





  Q6H PRN   Administration





  COUGH  


 


Heparin Sodium (Porcine)  5,000 unit  09/03/20 10:00  09/09/20 09:15





  Heparin -  SQ   5,000 unit





  BID JAYCEE   Administration


 


Insulin Aspart  1 vial  09/03/20 07:00  09/09/20 11:20





  Novolog Vial Sliding Scale -  SQ   2 unit





  ACHS JAYCEE   Administration





  Protocol  


 


Insulin Detemir  60 units  09/03/20 22:00  09/08/20 21:31





  Levemir Vial  SQ   60 units





  HS JAYCEE   Administration


 


Insulin Detemir  30 units  09/04/20 07:00  09/09/20 06:25





  Levemir Vial  SQ   Not Given





  AM JAYCEE  


 


Latanoprost  1 drop  09/03/20 22:00  09/08/20 21:36





  Xalatan 0.005% Eye Drops -  OU   1 drop





  HS JAYCEE   Administration


 


Multivitamins/Minerals/Vitamin C  1 tab  09/03/20 10:00  09/09/20 09:15





  Tab-A-Vit -  PO   1 tab





  DAILY JAYCEE   Administration


 


Nebivolol  10 mg  09/03/20 10:00  09/09/20 09:15





  Bystolic -  PO   10 mg





  DAILY JAYCEE   Administration


 


Nicotine  14 mg  09/05/20 10:00  09/09/20 09:16





  Nicoderm Patch -  TD   14 mg





  DAILY JAYCEE   Administration


 


Pantoprazole Sodium  40 mg  09/04/20 10:00  09/09/20 09:15





  Protonix -  PO   40 mg





  DAILY JAYCEE   Administration


 


Polyethylene Glycol  17 gm  09/05/20 15:15  09/06/20 18:55





  Miralax (For Daily Use) -  PO   17 grams





  DAILY PRN   Administration





  CONSTIPATION  


 


Rosuvastatin Calcium  10 mg  09/03/20 22:00  09/08/20 21:29





  Crestor -  PO   10 mg





  HS JAYCEE   Administration


 


Timolol Maleate  1 drop  09/03/20 10:00  09/09/20 09:14





  Timoptic 0.5%  OU   1 drop





  BID JAYCEE   Administration


 


Tiotropium Bromide  2 puff  09/03/20 10:00  09/09/20 09:16





  Spiriva Respimat  IH   2 puff





  DAILY JAYCEE   Administration








                         Laboratory Results - last 24 hr











  09/08/20 09/08/20 09/08/20





  13:08 16:26 21:18


 


WBC   


 


RBC   


 


Hgb   


 


Hct   


 


MCV   


 


MCH   


 


MCHC   


 


RDW   


 


Plt Count   


 


MPV   


 


Sodium  141  


 


Potassium  5.2 H  


 


Chloride  111 H  


 


Carbon Dioxide  22  


 


Anion Gap  9  


 


BUN  64.6 H  


 


Creatinine  1.5 H  


 


Est GFR (CKD-EPI)AfAm  41.64  


 


Est GFR (CKD-EPI)NonAf  35.93  


 


POC Glucometer   204  314


 


Random Glucose  189 H  


 


Calcium  9.3  


 


Total Bilirubin   


 


AST   


 


ALT   


 


Alkaline Phosphatase   


 


Total Protein   


 


Albumin   














  09/09/20 09/09/20 09/09/20





  06:20 06:46 08:05


 


WBC    10.2 H


 


RBC    4.11


 


Hgb    12.9


 


Hct    39.9


 


MCV    97.0 H


 


MCH    31.4


 


MCHC    32.3


 


RDW    14.5


 


Plt Count    256


 


MPV    10.7


 


Sodium   


 


Potassium   


 


Chloride   


 


Carbon Dioxide   


 


Anion Gap   


 


BUN   


 


Creatinine   


 


Est GFR (CKD-EPI)AfAm   


 


Est GFR (CKD-EPI)NonAf   


 


POC Glucometer  79  91 


 


Random Glucose   


 


Calcium   


 


Total Bilirubin   


 


AST   


 


ALT   


 


Alkaline Phosphatase   


 


Total Protein   


 


Albumin   














  09/09/20 09/09/20





  08:05 11:19


 


WBC  


 


RBC  


 


Hgb  


 


Hct  


 


MCV  


 


MCH  


 


MCHC  


 


RDW  


 


Plt Count  


 


MPV  


 


Sodium  144 


 


Potassium  5.2 H 


 


Chloride  115 H 


 


Carbon Dioxide  22 


 


Anion Gap  8 


 


BUN  64.4 H 


 


Creatinine  1.6 H 


 


Est GFR (CKD-EPI)AfAm  38.52 


 


Est GFR (CKD-EPI)NonAf  33.23 


 


POC Glucometer   165


 


Random Glucose  85 


 


Calcium  9.4 


 


Total Bilirubin  0.2 


 


AST  43 H 


 


ALT  47 


 


Alkaline Phosphatase  277 H 


 


Total Protein  6.6 


 


Albumin  2.7 L 

















S1 s2 RRR


Lungs decreased right 


Abd- soft, obese


NT


Edema decreased 








PLAN


CXR shows worsening 


COVID negative


pleural fluid studies - cytology negative


Nebs


 spoke with  Cardiology and Pulmonary -- continue with Lasix





OOB











Problem List





- Problems


(1) COPD exacerbation


Code(s): J44.1 - CHRONIC OBSTRUCTIVE PULMONARY DISEASE W (ACUTE) EXACERBATION   





(2) ASHD (arteriosclerotic heart disease)


Code(s): I25.10 - ATHSCL HEART DISEASE OF NATIVE CORONARY ARTERY W/O ANG PCTRS  







(3) Acute hypoxemic respiratory failure


Code(s): J96.01 - ACUTE RESPIRATORY FAILURE WITH HYPOXIA   





(4) Acute on chronic diastolic CHF (congestive heart failure)


Code(s): I50.33 - ACUTE ON CHRONIC DIASTOLIC (CONGESTIVE) HEART FAILURE   





(5) CKD (chronic kidney disease) stage 3, GFR 30-59 ml/min


Code(s): N18.3 - CHRONIC KIDNEY DISEASE, STAGE 3 (MODERATE)   





(6) HTN (hypertension)


Code(s): I10 - ESSENTIAL (PRIMARY) HYPERTENSION   


Qualifiers: 


   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   





(7) IDDM (insulin dependent diabetes mellitus)


Code(s): E11.9 - TYPE 2 DIABETES MELLITUS WITHOUT COMPLICATIONS; Z79.4 - LONG 

TERM (CURRENT) USE OF INSULIN   





(8) Pneumonia


Code(s): J18.9 - PNEUMONIA, UNSPECIFIED ORGANISM   


Qualifiers: 


   Laterality: right   Lung location: lower lobe of lung

## 2020-09-10 NOTE — PN
Progress Note (short form)





- Note


Progress Note: 








s:  no cp palps dizzy. stable dyspnea, not improving


                                        


                                        


                               Current Medications











Generic Name Dose Route Start Last Admin





  Trade Name Freq  PRN Reason Stop Dose Admin


 


Albuterol Sulfate  2 puff  09/03/20 00:47  09/09/20 22:16





  Ventolin Hfa Inhaler -  IH   2 puff





  Q4H PRN   Administration





  SHORT OF BREATH/WHEEZING  


 


Alprazolam  0.5 mg  09/07/20 12:10  09/09/20 22:24





  Xanax -  PO   0.5 mg





  Q8H PRN   Administration





  ANXIETY  


 


Amlodipine Besylate  10 mg  09/03/20 10:00  09/10/20 10:07





  Norvasc -  PO   10 mg





  DAILY JAYCEE   Administration


 


Aspirin  81 mg  09/03/20 10:00  09/10/20 10:09





  Ecotrin -  PO   81 mg





  DAILY JAYCEE   Administration


 


Brimonidine Tartrate  1 drop  09/03/20 10:00  09/10/20 09:59





  Alphagan 0.2% -  OU   1 drop





  BID JAYCEE   Administration


 


Duloxetine HCl  60 mg  09/03/20 10:00  09/10/20 10:07





  Cymbalta -  PO   60 mg





  DAILY JAYCEE   Administration


 


Furosemide  80 mg  09/09/20 10:00  09/10/20 10:02





  Lasix Injection -  IVPB   80 mg





  DAILY JAYCEE   Administration


 


Furosemide  80 mg  09/10/20 16:00 





  Lasix Injection -  IVPUSH  09/10/20 16:01 





  ONCE ONE  


 


Guaifenesin  10 ml  09/04/20 14:34  09/06/20 22:15





  Diabetic Tussin Dm -  PO   10 ml





  Q6H PRN   Administration





  COUGH  


 


Heparin Sodium (Porcine)  5,000 unit  09/03/20 10:00  09/10/20 10:08





  Heparin -  SQ   5,000 unit





  BID JAYCEE   Administration


 


Insulin Aspart  1 vial  09/03/20 07:00  09/10/20 11:46





  Novolog Vial Sliding Scale -  SQ   Not Given





  [facility]  





  Protocol  


 


Insulin Detemir  30 units  09/09/20 22:00  09/09/20 22:16





  Levemir Vial  SQ   30 units





  HS JAYCEE   Administration


 


Insulin Detemir  60 units  09/10/20 07:00  09/10/20 06:48





  Levemir Vial  SQ   60 units





  AM JAYCEE   Administration


 


Latanoprost  1 drop  09/03/20 22:00  09/09/20 22:15





  Xalatan 0.005% Eye Drops -  OU   1 drop





  HS JAYCEE   Administration


 


Multivitamins/Minerals/Vitamin C  1 tab  09/03/20 10:00  09/10/20 10:07





  Tab-A-Vit -  PO   1 tab





  DAILY JAYCEE   Administration


 


Nebivolol  10 mg  09/03/20 10:00  09/10/20 10:09





  Bystolic -  PO   10 mg





  DAILY JAYCEE   Administration


 


Nicotine  14 mg  09/05/20 10:00  09/10/20 10:09





  Nicoderm Patch -  TD   14 mg





  DAILY JAYCEE   Administration


 


Pantoprazole Sodium  40 mg  09/04/20 10:00  09/10/20 10:07





  Protonix -  PO   40 mg





  DAILY JAYCEE   Administration


 


Polyethylene Glycol  17 gm  09/05/20 15:15  09/06/20 18:55





  Miralax (For Daily Use) -  PO   17 grams





  DAILY PRN   Administration





  CONSTIPATION  


 


Rosuvastatin Calcium  10 mg  09/03/20 22:00  09/09/20 22:24





  Crestor -  PO   10 mg





  HS JAYCEE   Administration


 


Timolol Maleate  1 drop  09/03/20 10:00  09/10/20 10:00





  Timoptic 0.5%  OU   1 drop





  BID JAYCEE   Administration


 


Tiotropium Bromide  2 puff  09/03/20 10:00  09/10/20 10:00





  Spiriva Respimat  IH   2 puff





  DAILY JAYCEE   Administration








                                   Vital Signs











 Period  Temp  Pulse  Resp  BP Sys/Thrasher  Pulse Ox


 


 Last 24 Hr  97.5 F-98.5 F  67-74  18-20  142-159/58-79  92-96











Constitutional: Yes: No Distress


Respiratory: Yes: cta bl nl eff


Gastrointestinal: Yes: Soft, Abdomen, Obese


Cardiovascular: Yes: Regular Rate and Rhythm


JVD: No


Heart Sounds: Yes: S1, S2 (rrr)


Edema: trace le edema bl


Neurological: Yes: Alert, Oriented


no jaundice, diaphoresis


not agitated





                                    CBC, BMP





                                 09/10/20 06:25 





                                 09/10/20 06:25 











nsr 64bpm, borderline low voltage, no acute ST changes


Prior Cardiac Procedures: Cardiac Catheterization (non obstx CAD within last 5 

years)


Ejection Fraction %: LVEF > or = 40 %


echo 9/2020: nl lv/rv, no sig valve path





Imaging





- Results


Cat Scan: Report Reviewed


EKG: Image Reviewed





Assessment/Plan





IMP:


Chronic diastolic CHF


Bilateral lung consolidation: PNA vs CHF; COVID NEGATIVE


Non obstx CAD


CKD


DM


HTN








REC:


1. Acute on chronic diastolic CHF (on lasix 40 po qd at home):


-still with chf sxs despite increased IV lasix 80 mg daily.  cr stable, will 

give another dose lasix 80 iv today and see if sxs improve tomorrow.


-Daily BMP to monitor renal fx


-Continue bystolic, bp fairly well controlled


-echo here unremarkable


-Non obstx CAD on cath within last 5 years





2. PNA?:


-CT pattern more c/w consolidation but clinically improved with diuresis


-WBC elevated, but could be from outpt steroids


-+ Cough, non productive


-pleural fluid c/w transudate


-Pulm and ID following.





3. Nonobstx CAD:


-cont asa, statin





4. CKD:


-Unclear why not on ACE/ARB, perhaps GFR or K+ would not permit. 








5. DM: as per PMD








6. COPD/smoking: smoking cessation recommended.

## 2020-09-10 NOTE — PN
Progress Note (short form)





- Note


Progress Note: 








denies chest pain 


SOB


                              Vital Signs - 24 hr











  09/09/20 09/09/20 09/10/20





  21:00 22:00 02:00


 


Temperature  97.7 F 97.9 F


 


Pulse Rate  74 72


 


Respiratory 20 20 18





Rate   


 


Blood Pressure  148/78 144/62


 


O2 Sat by Pulse 96 96 96





Oximetry (%)   














  09/10/20 09/10/20 09/10/20





  05:54 08:57 09:00


 


Temperature 97.8 F 97.5 F L 


 


Pulse Rate 67 72 


 


Respiratory 18 20 20





Rate   


 


Blood Pressure 159/78 149/73 


 


O2 Sat by Pulse 95 95 95





Oximetry (%)   














  09/10/20





  15:13


 


Temperature 97.6 F


 


Pulse Rate 62


 


Respiratory 20





Rate 


 


Blood Pressure 159/62


 


O2 Sat by Pulse 95





Oximetry (%) 








                               Current Medications











Generic Name Dose Route Start Last Admin





  Trade Name Freq  PRN Reason Stop Dose Admin


 


Albuterol Sulfate  2 puff  09/03/20 00:47  09/09/20 22:16





  Ventolin Hfa Inhaler -  IH   2 puff





  Q4H PRN   Administration





  SHORT OF BREATH/WHEEZING  


 


Alprazolam  0.5 mg  09/07/20 12:10  09/09/20 22:24





  Xanax -  PO   0.5 mg





  Q8H PRN   Administration





  ANXIETY  


 


Amlodipine Besylate  10 mg  09/03/20 10:00  09/10/20 10:07





  Norvasc -  PO   10 mg





  DAILY JAYCEE   Administration


 


Aspirin  81 mg  09/03/20 10:00  09/10/20 10:09





  Ecotrin -  PO   81 mg





  DAILY JAYCEE   Administration


 


Brimonidine Tartrate  1 drop  09/03/20 10:00  09/10/20 09:59





  Alphagan 0.2% -  OU   1 drop





  BID JAYCEE   Administration


 


Duloxetine HCl  60 mg  09/03/20 10:00  09/10/20 10:07





  Cymbalta -  PO   60 mg





  DAILY JAYCEE   Administration


 


Furosemide  80 mg  09/09/20 10:00  09/10/20 10:02





  Lasix Injection -  IVPB   80 mg





  DAILY JAYCEE   Administration


 


Guaifenesin  10 ml  09/04/20 14:34  09/06/20 22:15





  Diabetic Tussin Dm -  PO   10 ml





  Q6H PRN   Administration





  COUGH  


 


Heparin Sodium (Porcine)  5,000 unit  09/03/20 10:00  09/10/20 10:08





  Heparin -  SQ   5,000 unit





  BID JAYCEE   Administration


 


Insulin Aspart  1 vial  09/03/20 07:00  09/10/20 16:26





  Novolog Vial Sliding Scale -  SQ   2 unit





  [facility] JAYCEE   Administration





  Protocol  


 


Insulin Detemir  30 units  09/09/20 22:00  09/09/20 22:16





  Levemir Vial  SQ   30 units





  HS JAYCEE   Administration


 


Insulin Detemir  60 units  09/10/20 07:00  09/10/20 06:48





  Levemir Vial  SQ   60 units





  AM JAYCEE   Administration


 


Latanoprost  1 drop  09/03/20 22:00  09/09/20 22:15





  Xalatan 0.005% Eye Drops -  OU   1 drop





  HS JAYCEE   Administration


 


Multivitamins/Minerals/Vitamin C  1 tab  09/03/20 10:00  09/10/20 10:07





  Tab-A-Vit -  PO   1 tab





  DAILY JAYCEE   Administration


 


Nebivolol  10 mg  09/03/20 10:00  09/10/20 10:09





  Bystolic -  PO   10 mg





  DAILY JAYCEE   Administration


 


Nicotine  14 mg  09/05/20 10:00  09/10/20 10:09





  Nicoderm Patch -  TD   14 mg





  DAILY JAYCEE   Administration


 


Pantoprazole Sodium  40 mg  09/04/20 10:00  09/10/20 10:07





  Protonix -  PO   40 mg





  DAILY JAYCEE   Administration


 


Polyethylene Glycol  17 gm  09/05/20 15:15  09/06/20 18:55





  Miralax (For Daily Use) -  PO   17 grams





  DAILY PRN   Administration





  CONSTIPATION  


 


Rosuvastatin Calcium  10 mg  09/03/20 22:00  09/09/20 22:24





  Crestor -  PO   10 mg





  HS JAYCEE   Administration


 


Timolol Maleate  1 drop  09/03/20 10:00  09/10/20 10:00





  Timoptic 0.5%  OU   1 drop





  BID JAYCEE   Administration


 


Tiotropium Bromide  2 puff  09/03/20 10:00  09/10/20 10:00





  Spiriva Respimat  IH   2 puff





  DAILY JAYCEE   Administration








                         Laboratory Results - last 24 hr











  09/09/20 09/10/20 09/10/20





  23:17 06:25 06:25


 


WBC   11.0 H 


 


RBC   3.91 


 


Hgb   12.2 


 


Hct   37.7 


 


MCV   96.6 H 


 


MCH   31.2 


 


MCHC   32.3 


 


RDW   14.9 


 


Plt Count   254 


 


MPV   11.0 


 


Sodium    144


 


Potassium    5.0


 


Chloride    114 H


 


Carbon Dioxide    23


 


Anion Gap    7 L


 


BUN    68.8 H


 


Creatinine    1.8 H


 


Est GFR (CKD-EPI)AfAm    33.40


 


Est GFR (CKD-EPI)NonAf    28.82


 


POC Glucometer  235  


 


Random Glucose    126 H


 


Calcium    9.1


 


Total Bilirubin    0.3


 


AST    48 H


 


ALT    49


 


Alkaline Phosphatase    289 H


 


Total Protein    6.6


 


Albumin    2.8 L














  09/10/20 09/10/20 09/10/20





  06:53 11:46 16:25


 


WBC   


 


RBC   


 


Hgb   


 


Hct   


 


MCV   


 


MCH   


 


MCHC   


 


RDW   


 


Plt Count   


 


MPV   


 


Sodium   


 


Potassium   


 


Chloride   


 


Carbon Dioxide   


 


Anion Gap   


 


BUN   


 


Creatinine   


 


Est GFR (CKD-EPI)AfAm   


 


Est GFR (CKD-EPI)NonAf   


 


POC Glucometer  121  136  180


 


Random Glucose   


 


Calcium   


 


Total Bilirubin   


 


AST   


 


ALT   


 


Alkaline Phosphatase   


 


Total Protein   


 


Albumin   




















S1 s2 RRR


Lungs decreased right 


Abd- soft, obese


NT


Edema decreased 








PLAN


CXR shows worsening 


COVID negative


pleural fluid studies - cytology negative


Nebs


 spoke with  Cardiology and Pulmonary -- continue with Lasix


monitor renal function 





OOB











Problem List





- Problems


(1) COPD exacerbation


Code(s): J44.1 - CHRONIC OBSTRUCTIVE PULMONARY DISEASE W (ACUTE) EXACERBATION   





(2) ASHD (arteriosclerotic heart disease)


Code(s): I25.10 - ATHSCL HEART DISEASE OF NATIVE CORONARY ARTERY W/O ANG PCTRS  







(3) Acute hypoxemic respiratory failure


Code(s): J96.01 - ACUTE RESPIRATORY FAILURE WITH HYPOXIA   





(4) Acute on chronic diastolic CHF (congestive heart failure)


Code(s): I50.33 - ACUTE ON CHRONIC DIASTOLIC (CONGESTIVE) HEART FAILURE   





(5) CKD (chronic kidney disease) stage 3, GFR 30-59 ml/min


Code(s): N18.3 - CHRONIC KIDNEY DISEASE, STAGE 3 (MODERATE)   





(6) HTN (hypertension)


Code(s): I10 - ESSENTIAL (PRIMARY) HYPERTENSION   


Qualifiers: 


   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   





(7) IDDM (insulin dependent diabetes mellitus)


Code(s): E11.9 - TYPE 2 DIABETES MELLITUS WITHOUT COMPLICATIONS; Z79.4 - LONG 

TERM (CURRENT) USE OF INSULIN   





(8) Pneumonia


Code(s): J18.9 - PNEUMONIA, UNSPECIFIED ORGANISM   


Qualifiers: 


   Laterality: right   Lung location: lower lobe of lung

## 2020-09-10 NOTE — PN
Progress Note (short form)





- Note


Progress Note: 





PULMONARY





Still some dyspnea with exertion. No cough, fevers. Pleural fluid chemistries 

c/w transudate.





                                   Vital Signs











 Period  Temp  Pulse  Resp  BP Sys/Thrasher  Pulse Ox


 


 Last 24 Hr  97.5 F-98.5 F  67-74  18-20  142-159/58-79  92-96








                                 Intake & Output











 09/07/20 09/08/20 09/09/20 09/10/20





 23:59 23:59 23:59 23:59


 


Intake Total 200 760 110 230


 


Balance 200 760 110 230


 


Weight 115.354 kg 114.929 kg 114.804 kg 114.305 kg











Gen:  NAD in chair


Heart: RRR


Lung: decreased breath sounds at the bases


Abd: soft, nontender


Ext: + edema





                                    CBC, BMP





                                 09/10/20 06:25 





                                 09/10/20 06:25 





Active Medications





Albuterol Sulfate (Ventolin Hfa Inhaler -)  2 puff IH Q4H PRN


   PRN Reason: SHORT OF BREATH/WHEEZING


   Last Admin: 09/09/20 22:16 Dose:  2 puff


   Documented by: 


Alprazolam (Xanax -)  0.5 mg PO Q8H PRN


   PRN Reason: ANXIETY


   Last Admin: 09/09/20 22:24 Dose:  0.5 mg


   Documented by: 


Amlodipine Besylate (Norvasc -)  10 mg PO DAILY [facility]


   Last Admin: 09/10/20 10:07 Dose:  10 mg


   Documented by: 


Aspirin (Ecotrin -)  81 mg PO DAILY [facility]


   Last Admin: 09/10/20 10:09 Dose:  81 mg


   Documented by: 


Brimonidine Tartrate (Alphagan 0.2% -)  1 drop OU BID [facility]


   Last Admin: 09/10/20 09:59 Dose:  1 drop


   Documented by: 


Duloxetine HCl (Cymbalta -)  60 mg PO DAILY [facility]


   Last Admin: 09/10/20 10:07 Dose:  60 mg


   Documented by: 


Furosemide (Lasix Injection -)  80 mg IVPB DAILY [facility]


   Last Admin: 09/10/20 10:02 Dose:  80 mg


   Documented by: 


Guaifenesin (Diabetic Tussin Dm -)  10 ml PO Q6H PRN


   PRN Reason: COUGH


   Last Admin: 09/06/20 22:15 Dose:  10 ml


   Documented by: 


Heparin Sodium (Porcine) (Heparin -)  5,000 unit SQ BID [facility]


   Last Admin: 09/10/20 10:08 Dose:  5,000 unit


   Documented by: 


Insulin Aspart (Novolog Vial Sliding Scale -)  1 vial SQ [facility]; Protocol


   Last Admin: 09/10/20 06:57 Dose:  Not Given


   Documented by: 


Insulin Detemir (Levemir Vial)  30 units SQ HS [facility]


   Last Admin: 09/09/20 22:16 Dose:  30 units


   Documented by: 


Insulin Detemir (Levemir Vial)  60 units SQ AM [facility]


   Last Admin: 09/10/20 06:48 Dose:  60 units


   Documented by: 


Latanoprost (Xalatan 0.005% Eye Drops -)  1 drop OU [facility]


   Last Admin: 09/09/20 22:15 Dose:  1 drop


   Documented by: 


Multivitamins/Minerals/Vitamin C (Tab-A-Vit -)  1 tab PO DAILY [facility]


   Last Admin: 09/10/20 10:07 Dose:  1 tab


   Documented by: 


Nebivolol (Bystolic -)  10 mg PO DAILY [facility]


   Last Admin: 09/10/20 10:09 Dose:  10 mg


   Documented by: 


Nicotine (Nicoderm Patch -)  14 mg TD DAILY [facility]


   Last Admin: 09/10/20 10:09 Dose:  14 mg


   Documented by: 


Pantoprazole Sodium (Protonix -)  40 mg PO DAILY [facility]


   Last Admin: 09/10/20 10:07 Dose:  40 mg


   Documented by: 


Polyethylene Glycol (Miralax (For Daily Use) -)  17 gm PO DAILY PRN


   PRN Reason: CONSTIPATION


   Last Admin: 09/06/20 18:55 Dose:  17 grams


   Documented by: 


Rosuvastatin Calcium (Crestor -)  10 mg PO [facility]


   Last Admin: 09/09/20 22:24 Dose:  10 mg


   Documented by: 


Timolol Maleate (Timoptic 0.5%)  1 drop OU BID [facility]


   Last Admin: 09/10/20 10:00 Dose:  1 drop


   Documented by: 


Tiotropium Bromide (Spiriva Respimat)  2 puff IH DAILY [facility]


   Last Admin: 09/10/20 10:00 Dose:  2 puff


   Documented by: 











A/P


Acute on Chronic Diastolic Heart Failure


Right Pleural Effusion


COPD


CKD


HTN


TOBACCO ABUSE





-  continue lasix


-  monitor urine output, creatinine


-  daily weights


-  O2 to keep SpO2 >90%


-  inhaled bronchodilators as needed


-  DVT prophylaxis

## 2020-09-11 NOTE — PN
Progress Note, Physician


History of Present Illness: 


pt seen/ examined


sitting in chair


mild sob +


mood some on low side


denies cp


all f/u noted














- Current Medication List


Current Medications: 


Active Medications





Albuterol Sulfate (Ventolin Hfa Inhaler -)  2 puff IH Q4H PRN


   PRN Reason: SHORT OF BREATH/WHEEZING


   Last Admin: 09/09/20 22:16 Dose:  2 puff


   Documented by: 


Alprazolam (Xanax -)  0.5 mg PO Q8H PRN


   PRN Reason: ANXIETY


   Last Admin: 09/10/20 21:44 Dose:  0.5 mg


   Documented by: 


Amlodipine Besylate (Norvasc -)  10 mg PO DAILY [facility]


   Last Admin: 09/11/20 09:28 Dose:  10 mg


   Documented by: 


Aspirin (Ecotrin -)  81 mg PO DAILY [facility]


   Last Admin: 09/11/20 09:27 Dose:  81 mg


   Documented by: 


Brimonidine Tartrate (Alphagan 0.2% -)  1 drop OU BID [facility]


   Last Admin: 09/11/20 09:28 Dose:  1 drop


   Documented by: 


Carvedilol (Coreg -)  6.25 mg PO BID [facility]


   Last Admin: 09/11/20 09:28 Dose:  6.25 mg


   Documented by: 


Duloxetine HCl (Cymbalta -)  60 mg PO DAILY [facility]


   Last Admin: 09/11/20 09:27 Dose:  60 mg


   Documented by: 


Furosemide (Lasix Injection -)  80 mg IVPB DAILY [facility]


   Last Admin: 09/10/20 10:02 Dose:  80 mg


   Documented by: 


Guaifenesin (Diabetic Tussin Dm -)  10 ml PO Q6H PRN


   PRN Reason: COUGH


   Last Admin: 09/06/20 22:15 Dose:  10 ml


   Documented by: 


Heparin Sodium (Porcine) (Heparin -)  5,000 unit SQ BID [facility]


   Last Admin: 09/11/20 09:27 Dose:  5,000 unit


   Documented by: 


Insulin Aspart (Novolog Vial Sliding Scale -)  [facility]; Protocol


   Last Admin: 09/11/20 11:52 Dose:  2 unit


   Documented by: 


Insulin Detemir (Levemir Vial)  30 units SQ HS [facility]


   Last Admin: 09/10/20 21:40 Dose:  30 units


   Documented by: 


Insulin Detemir (Levemir Vial)  30 units SQ AM [facility]


Latanoprost (Xalatan 0.005% Eye Drops -)  1 drop OU HS [facility]


   Last Admin: 09/10/20 21:41 Dose:  1 drop


   Documented by: 


Multivitamins/Minerals/Vitamin C (Tab-A-Vit -)  1 tab PO DAILY [facility]


   Last Admin: 09/11/20 09:27 Dose:  1 tab


   Documented by: 


Nicotine (Nicoderm Patch -)  14 mg TD DAILY [facility]


   Last Admin: 09/11/20 09:27 Dose:  14 mg


   Documented by: 


Pantoprazole Sodium (Protonix -)  40 mg PO DAILY [facility]


   Last Admin: 09/11/20 09:28 Dose:  40 mg


   Documented by: 


Polyethylene Glycol (Miralax (For Daily Use) -)  17 gm PO DAILY PRN


   PRN Reason: CONSTIPATION


   Last Admin: 09/06/20 18:55 Dose:  17 grams


   Documented by: 


Rosuvastatin Calcium (Crestor -)  10 mg PO HS [facility]


   Last Admin: 09/10/20 21:40 Dose:  10 mg


   Documented by: 


Timolol Maleate (Timoptic 0.5%)  1 drop OU BID [facility]


   Last Admin: 09/11/20 09:28 Dose:  1 drop


   Documented by: 


Tiotropium Bromide (Spiriva Respimat)  2 puff IH DAILY [facility]


   Last Admin: 09/11/20 09:26 Dose:  2 puff


   Documented by: 











- Objective


Vital Signs: 


                                   Vital Signs











Temperature  97.6 F   09/11/20 05:00


 


Pulse Rate  64   09/11/20 05:00


 


Respiratory Rate  22 H  09/11/20 05:00


 


Blood Pressure  180/72 H  09/11/20 05:00


 


O2 Sat by Pulse Oximetry (%)  97   09/11/20 05:00











Constitutional: Yes: No Distress


Neck: Yes: Supple


Cardiovascular: Yes: Regular Rate and Rhythm


Respiratory: Yes: Diminished


Gastrointestinal: Yes: Abdomen, Obese


Edema: RUE: 1+, LLE: 1+


Neurological: Yes: Alert


Labs: 


                                    CBC, BMP





                                 09/10/20 06:25 





                                 09/11/20 06:43 





                                    INR, PTT











INR  0.94  (0.83-1.09)   09/02/20  16:36    














Problem List





- Problems


(1) COPD (chronic obstructive pulmonary disease)


Code(s): J44.9 - CHRONIC OBSTRUCTIVE PULMONARY DISEASE, UNSPECIFIED   


Qualifiers: 


   COPD type: unspecified COPD   Qualified Code(s): J44.9 - Chronic obstructive 

pulmonary disease, unspecified   





(2) Encounter for screening laboratory testing for COVID-19 virus


Code(s): Z11.59 - ENCOUNTER FOR SCREENING FOR OTHER VIRAL DISEASES   





(3) Pleural effusion


Code(s): J90 - PLEURAL EFFUSION, NOT ELSEWHERE CLASSIFIED   





(4) Shortness of breath on exertion


Code(s): R06.02 - SHORTNESS OF BREATH   





(5) ASHD (arteriosclerotic heart disease)


Code(s): I25.10 - ATHSCL HEART DISEASE OF NATIVE CORONARY ARTERY W/O ANG PCTRS  







(6) Acute hypoxemic respiratory failure


Code(s): J96.01 - ACUTE RESPIRATORY FAILURE WITH HYPOXIA   





(7) Acute on chronic diastolic CHF (congestive heart failure)


Code(s): I50.33 - ACUTE ON CHRONIC DIASTOLIC (CONGESTIVE) HEART FAILURE   





(8) CKD (chronic kidney disease) stage 3, GFR 30-59 ml/min


Code(s): N18.3 - CHRONIC KIDNEY DISEASE, STAGE 3 (MODERATE)   





(9) HTN (hypertension)


Code(s): I10 - ESSENTIAL (PRIMARY) HYPERTENSION   


Qualifiers: 


   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   





(10) IDDM (insulin dependent diabetes mellitus)


Code(s): E11.9 - TYPE 2 DIABETES MELLITUS WITHOUT COMPLICATIONS; Z79.4 - LONG 

TERM (CURRENT) USE OF INSULIN   





(11) Obesity (BMI 30-39.9)


Code(s): E66.9 - OBESITY, UNSPECIFIED   





Assessment/Plan


discussed with pt/ RN 


Meds reviewed


Agree with current management


agree with holding lasix today due to elevated bun/cr


will follow


decrease insulin dose


monitor

## 2020-09-11 NOTE — PN
Progress Note (short form)





- Note


Progress Note: 





PULMONARY





OOB TO CHAIR


SPEAKING ON PHONE


APPEARS STABLE





Gen:  NAD in chair


Heart: RRR


Lung: decreased breath sounds at the bases


Abd: soft, nontender


Ext: + edema








LABS/MEDS/NOTES/IMAGES REVIEWED





A/P


Acute on Chronic Diastolic Heart Failure


Right Pleural Effusion


COPD


CKD


HTN


TOBACCO ABUSE





-  continue lasix


-  monitor urine output, creatinine


-  daily weights


-  O2 to keep SpO2 >90%


-  inhaled bronchodilators as needed


-  DVT prophylaxis





YANN NOBLE MD

## 2020-09-11 NOTE — PN
Progress Note, Physician


Chief Complaint: 





AM BP elevated.


Creat has been inching up





She feels better, less SOB walking from bathroom to bed.


Edema improved





No CP/palps/dizziness


History of Present Illness: 





This is a 63 y/o female with a PMHx OF diastolic CHF, CKD, COPD (no home O2), 

HTN, Obesity. Who present to the ED progressive SOB and cough x 3 days. 








SHX: Smoker





- Current Medication List


Current Medications: 


Active Medications





Albuterol Sulfate (Ventolin Hfa Inhaler -)  2 puff IH Q4H PRN


   PRN Reason: SHORT OF BREATH/WHEEZING


   Last Admin: 09/09/20 22:16 Dose:  2 puff


   Documented by: 


Alprazolam (Xanax -)  0.5 mg PO Q8H PRN


   PRN Reason: ANXIETY


   Last Admin: 09/10/20 21:44 Dose:  0.5 mg


   Documented by: 


Amlodipine Besylate (Norvasc -)  10 mg PO DAILY [facility]


   Last Admin: 09/10/20 10:07 Dose:  10 mg


   Documented by: 


Aspirin (Ecotrin -)  81 mg PO DAILY [facility]


   Last Admin: 09/10/20 10:09 Dose:  81 mg


   Documented by: 


Brimonidine Tartrate (Alphagan 0.2% -)  1 drop OU BID [facility]


   Last Admin: 09/10/20 21:40 Dose:  1 drop


   Documented by: 


Duloxetine HCl (Cymbalta -)  60 mg PO DAILY [facility]


   Last Admin: 09/10/20 10:07 Dose:  60 mg


   Documented by: 


Furosemide (Lasix Injection -)  80 mg IVPB DAILY [facility]


   Last Admin: 09/10/20 10:02 Dose:  80 mg


   Documented by: 


Guaifenesin (Diabetic Tussin Dm -)  10 ml PO Q6H PRN


   PRN Reason: COUGH


   Last Admin: 09/06/20 22:15 Dose:  10 ml


   Documented by: 


Heparin Sodium (Porcine) (Heparin -)  5,000 unit SQ BID [facility]


   Last Admin: 09/10/20 21:40 Dose:  5,000 unit


   Documented by: 


Insulin Aspart (Novolog Vial Sliding Scale -)  [facility]; Protocol


   Last Admin: 09/10/20 21:40 Dose:  4 unit


   Documented by: 


Insulin Detemir (Levemir Vial)  30 units SQ [facility]


   Last Admin: 09/10/20 21:40 Dose:  30 units


   Documented by: 


Insulin Detemir (Levemir Vial)  60 units SQ AM [facility]


   Last Admin: 09/10/20 06:48 Dose:  60 units


   Documented by: 


Latanoprost (Xalatan 0.005% Eye Drops -)  1 drop OU HS [facility]


   Last Admin: 09/10/20 21:41 Dose:  1 drop


   Documented by: 


Multivitamins/Minerals/Vitamin C (Tab-A-Vit -)  1 tab PO DAILY [facility]


   Last Admin: 09/10/20 10:07 Dose:  1 tab


   Documented by: 


Nebivolol (Bystolic -)  10 mg PO DAILY [facility]


   Last Admin: 09/10/20 10:09 Dose:  10 mg


   Documented by: 


Nicotine (Nicoderm Patch -)  14 mg TD DAILY [facility]


   Last Admin: 09/10/20 10:09 Dose:  14 mg


   Documented by: 


Pantoprazole Sodium (Protonix -)  40 mg PO DAILY [facility]


   Last Admin: 09/10/20 10:07 Dose:  40 mg


   Documented by: 


Polyethylene Glycol (Miralax (For Daily Use) -)  17 gm PO DAILY [facility]N


   PRN Reason: CONSTIPATION


   Last Admin: 09/06/20 18:55 Dose:  17 grams


   Documented by: 


Rosuvastatin Calcium (Crestor -)  10 mg PO HS [facility]


   Last Admin: 09/10/20 21:40 Dose:  10 mg


   Documented by: 


Timolol Maleate (Timoptic 0.5%)  1 drop OU BID [facility]


   Last Admin: 09/10/20 21:41 Dose:  1 drop


   Documented by: 


Tiotropium Bromide (Spiriva Respimat)  2 puff IH DAILY [facility]


   Last Admin: 09/10/20 10:00 Dose:  2 puff


   Documented by: 











- Objective


Vital Signs: 


                                   Vital Signs











Temperature  97.9 F   09/10/20 21:49


 


Pulse Rate  68   09/10/20 21:49


 


Respiratory Rate  22 H  09/10/20 21:49


 


Blood Pressure  164/71   09/10/20 21:49


 


O2 Sat by Pulse Oximetry (%)  95   09/10/20 21:49











Constitutional: Yes: No Distress, Calm


Eyes: Yes: Conjunctiva Clear


Cardiovascular: Yes: Regular Rate and Rhythm


Respiratory: Yes: Other (no wheezing or rales.)


Gastrointestinal: Yes: Soft, Abdomen, Obese


Edema: Yes


Edema: LLE: Trace, RLE: Trace


Neurological: Yes: Alert, Oriented


Labs: 


                                    CBC, BMP





                                 09/10/20 06:25 





                                 09/10/20 06:25 





                                    INR, PTT











INR  0.94  (0.83-1.09)   09/02/20  16:36    














Assessment/Plan





IMP:


Chronic diastolic CHF


Bilateral lung consolidation: PNA vs CHF; COVID NEGATIVE


Non obstx CAD


CKD


DM


HTN








REC:


1. Acute on chronic diastolic CHF (on lasix 40 po qd at home):


-Clinically improved now with rising BUN/creat. Will hold Lasix today.


-Daily BMP to monitor renal fx


-echo here unremarkable


-Non obstx CAD on cath within last 5 years











2. HTN: early AM spikes


-Will switch Bystolic to Coreg 6.25 BID, alpha effect and twice daily dosing may

help smoothen BP and avoid early AM spike


-Titrate to goal < 140/90








3. PNA?:


-CT pattern more c/w consolidation but clinically improved with diuresis


-WBC elevated, but could be from outpt steroids


-+ Cough, non productive


-pleural fluid c/w transudate


-Pulm and ID following.





4. Nonobstx CAD:


-cont asa, statin





5. CKD:


-Unclear why not on ACE/ARB, perhaps GFR or K+ would not permit. 








6. DM: as per PMD








7. COPD/smoking: smoking cessation recommended.

## 2020-09-12 NOTE — PN
Progress Note (short form)





- Note


Progress Note: 


Sitting at the edge bed. 


Breathing feels slightly better. 


No acute events overnight. 





                                 Intake & Output











 09/09/20 09/10/20 09/11/20 09/12/20





 23:59 23:59 23:59 23:59


 


Intake Total  


 


Balance  


 


Weight 253 lb 1.6 oz 252 lb 252 lb 9.6 oz 253 lb








                                Last Vital Signs











Temp Pulse Resp BP Pulse Ox


 


 98.3 F   76   20   135/69   95 


 


 09/12/20 08:52  09/12/20 08:52  09/12/20 09:00  09/12/20 08:52  09/12/20 09:00








Active Medications





Albuterol Sulfate (Ventolin Hfa Inhaler -)  2 puff IH Q4H PRN


   PRN Reason: SHORT OF BREATH/WHEEZING


   Last Admin: 09/09/20 22:16 Dose:  2 puff


   Documented by: 


Alprazolam (Xanax -)  0.5 mg PO Q8H PRN


   PRN Reason: ANXIETY


Amlodipine Besylate (Norvasc -)  10 mg PO DAILY [facility]


   Last Admin: 09/12/20 09:39 Dose:  10 mg


   Documented by: 


Aspirin (Ecotrin -)  81 mg PO DAILY [facility]


   Last Admin: 09/12/20 09:39 Dose:  81 mg


   Documented by: 


Brimonidine Tartrate (Alphagan 0.2% -)  1 drop OU BID [facility]


   Last Admin: 09/12/20 09:38 Dose:  1 drop


   Documented by: 


Carvedilol (Coreg -)  6.25 mg PO BID [facility]


   Last Admin: 09/12/20 09:39 Dose:  6.25 mg


   Documented by: 


Duloxetine HCl (Cymbalta -)  60 mg PO DAILY [facility]


   Last Admin: 09/12/20 09:39 Dose:  60 mg


   Documented by: 


Guaifenesin (Diabetic Tussin Dm -)  10 ml PO Q6H PRN


   PRN Reason: COUGH


   Last Admin: 09/06/20 22:15 Dose:  10 ml


   Documented by: 


Heparin Sodium (Porcine) (Heparin -)  5,000 unit SQ BID [facility]


   Last Admin: 09/12/20 09:40 Dose:  5,000 unit


   Documented by: 


Insulin Aspart (Novolog Vial Sliding Scale -)  1 vial SQ [facility]; Protocol


   Last Admin: 09/12/20 11:46 Dose:  2 unit


   Documented by: 


Insulin Detemir (Levemir Vial)  30 units SQ HS [facility]


   Last Admin: 09/11/20 22:33 Dose:  30 units


   Documented by: 


Insulin Detemir (Levemir Vial)  30 units SQ AM [facility]


   Last Admin: 09/12/20 06:00 Dose:  Not Given


   Documented by: 


Latanoprost (Xalatan 0.005% Eye Drops -)  1 drop OU HS [facility]


   Last Admin: 09/11/20 22:39 Dose:  1 drop


   Documented by: 


Multivitamins/Minerals/Vitamin C (Tab-A-Vit -)  1 tab PO DAILY [facility]


   Last Admin: 09/12/20 09:39 Dose:  1 tab


   Documented by: 


Nicotine (Nicoderm Patch -)  14 mg TD DAILY [facility]


   Last Admin: 09/12/20 09:38 Dose:  14 mg


   Documented by: 


Nitroglycerin (Nitro-Bid 2% Paste -)  1 inch TD Q6HPO [facility]


   Last Admin: 09/12/20 12:54 Dose:  1 inch


   Documented by: 


Pantoprazole Sodium (Protonix -)  40 mg PO DAILY [facility]


   Last Admin: 09/12/20 09:39 Dose:  40 mg


   Documented by: 


Polyethylene Glycol (Miralax (For Daily Use) -)  17 gm PO DAILY [facility]N


   PRN Reason: CONSTIPATION


   Last Admin: 09/06/20 18:55 Dose:  17 grams


   Documented by: 


Rosuvastatin Calcium (Crestor -)  10 mg PO HS [facility]


   Last Admin: 09/11/20 22:33 Dose:  10 mg


   Documented by: 


Timolol Maleate (Timoptic 0.5%)  1 drop OU BID [facility]


   Last Admin: 09/12/20 09:38 Dose:  1 applic


   Documented by: 


Tiotropium Bromide (Spiriva Respimat)  2 puff IH DAILY [facility]


   Last Admin: 09/12/20 09:37 Dose:  2 puff


   Documented by: 











Gen:  NAD


Heart: RRR


Lung: decreased breath sounds at the bases


Abd: soft, nontender


Ext: + edema


                         Laboratory Results - last 24 hr











  09/03/20 09/11/20 09/11/20





  14:05 16:36 22:33


 


Sodium   


 


Potassium   


 


Chloride   


 


Carbon Dioxide   


 


Anion Gap   


 


BUN   


 


Creatinine   


 


Est GFR (CKD-EPI)AfAm   


 


Est GFR (CKD-EPI)NonAf   


 


POC Glucometer   233  225


 


Random Glucose   


 


Calcium   


 


Fluid Cholesterol  17  














  09/12/20 09/12/20 09/12/20





  05:56 06:45 11:43


 


Sodium   142 


 


Potassium   4.4 


 


Chloride   111 H 


 


Carbon Dioxide   25 


 


Anion Gap   6 L 


 


BUN   68.4 H 


 


Creatinine   1.7 H 


 


Est GFR (CKD-EPI)AfAm   35.79 


 


Est GFR (CKD-EPI)NonAf   30.88 


 


POC Glucometer  99   194


 


Random Glucose   94 


 


Calcium   9.5 


 


Fluid Cholesterol   














A/P


Acute on Chronic Diastolic Heart Failure


Right Pleural Effusion


COPD


CKD


HTN


Tobacco abuse 





-  Lasix


-  monitor urine output, creatinine


-  daily weights


-  O2 to keep SpO2 >90%


-  inhaled bronchodilators as needed


-  DVT prophylaxis





Dr Tucker

## 2020-09-12 NOTE — PN
Progress Note, Physician


History of Present Illness: 


pt seen/ examined


sitting in chair


mild sob +


mood some on low side


denies cp


all f/u noted

















- Current Medication List


Current Medications: 


Active Medications





Albuterol Sulfate (Ventolin Hfa Inhaler -)  2 puff IH Q4H PRN


   PRN Reason: SHORT OF BREATH/WHEEZING


   Last Admin: 09/09/20 22:16 Dose:  2 puff


   Documented by: 


Amlodipine Besylate (Norvasc -)  10 mg PO DAILY [facility]


   Last Admin: 09/11/20 09:28 Dose:  10 mg


   Documented by: 


Aspirin (Ecotrin -)  81 mg PO DAILY [facility]


   Last Admin: 09/11/20 09:27 Dose:  81 mg


   Documented by: 


Brimonidine Tartrate (Alphagan 0.2% -)  1 drop OU BID [facility]


   Last Admin: 09/11/20 22:39 Dose:  1 drop


   Documented by: 


Carvedilol (Coreg -)  6.25 mg PO BID [facility]


   Last Admin: 09/11/20 22:33 Dose:  6.25 mg


   Documented by: 


Duloxetine HCl (Cymbalta -)  60 mg PO DAILY [facility]


   Last Admin: 09/11/20 09:27 Dose:  60 mg


   Documented by: 


Furosemide (Lasix Injection -)  80 mg IVPB DAILY [facility]


   Last Admin: 09/10/20 10:02 Dose:  80 mg


   Documented by: 


Guaifenesin (Diabetic Tussin Dm -)  10 ml PO Q6H PRN


   PRN Reason: COUGH


   Last Admin: 09/06/20 22:15 Dose:  10 ml


   Documented by: 


Heparin Sodium (Porcine) (Heparin -)  5,000 unit SQ BID [facility]


   Last Admin: 09/11/20 22:33 Dose:  5,000 unit


   Documented by: 


Insulin Aspart (Novolog Vial Sliding Scale -)  [facility]; Protocol


   Last Admin: 09/12/20 06:00 Dose:  Not Given


   Documented by: 


Insulin Detemir (Levemir Vial)  30 units SQ HS [facility]


   Last Admin: 09/11/20 22:33 Dose:  30 units


   Documented by: 


Insulin Detemir (Levemir Vial)  30 units SQ AM [facility]


   Last Admin: 09/12/20 06:00 Dose:  Not Given


   Documented by: 


Latanoprost (Xalatan 0.005% Eye Drops -)  1 drop OU HS [facility]


   Last Admin: 09/11/20 22:39 Dose:  1 drop


   Documented by: 


Multivitamins/Minerals/Vitamin C (Tab-A-Vit -)  1 tab PO DAILY [facility]


   Last Admin: 09/11/20 09:27 Dose:  1 tab


   Documented by: 


Nicotine (Nicoderm Patch -)  14 mg TD DAILY [facility]


   Last Admin: 09/11/20 09:27 Dose:  14 mg


   Documented by: 


Pantoprazole Sodium (Protonix -)  40 mg PO DAILY [facility]


   Last Admin: 09/11/20 09:28 Dose:  40 mg


   Documented by: 


Polyethylene Glycol (Miralax (For Daily Use) -)  17 gm PO DAILY [facility]


   PRN Reason: CONSTIPATION


   Last Admin: 09/06/20 18:55 Dose:  17 grams


   Documented by: 


Rosuvastatin Calcium (Crestor -)  10 mg PO HS [facility]


   Last Admin: 09/11/20 22:33 Dose:  10 mg


   Documented by: 


Timolol Maleate (Timoptic 0.5%)  1 drop OU BID [facility]


   Last Admin: 09/11/20 22:39 Dose:  1 drop


   Documented by: 


Tiotropium Bromide (Spiriva Respimat)  2 puff IH DAILY [facility]


   Last Admin: 09/11/20 09:26 Dose:  2 puff


   Documented by: 











- Objective


Vital Signs: 


                                   Vital Signs











Temperature  98.3 F   09/12/20 08:52


 


Pulse Rate  76   09/12/20 08:52


 


Respiratory Rate  20   09/12/20 08:52


 


Blood Pressure  135/69   09/12/20 08:52


 


O2 Sat by Pulse Oximetry (%)  95   09/12/20 08:52











Constitutional: Yes: No Distress, Anxious


Neck: Yes: Supple


Cardiovascular: Yes: Regular Rate and Rhythm


Respiratory: Yes: Diminished, Poor Air Entry


Gastrointestinal: Yes: Abdomen, Obese


Edema: LLE: 1+, RLE: 1+


Neurological: Yes: Alert


Psychiatric: Yes: Alert


Labs: 


                                    CBC, BMP





                                 09/10/20 06:25 





                                 09/12/20 06:45 





                                    INR, PTT











INR  0.94  (0.83-1.09)   09/02/20  16:36    














Problem List





- Problems


(1) COPD (chronic obstructive pulmonary disease)


Code(s): J44.9 - CHRONIC OBSTRUCTIVE PULMONARY DISEASE, UNSPECIFIED   


Qualifiers: 


   COPD type: unspecified COPD   Qualified Code(s): J44.9 - Chronic obstructive 

pulmonary disease, unspecified   





(2) Encounter for screening laboratory testing for COVID-19 virus


Code(s): Z11.59 - ENCOUNTER FOR SCREENING FOR OTHER VIRAL DISEASES   





(3) Pleural effusion


Code(s): J90 - PLEURAL EFFUSION, NOT ELSEWHERE CLASSIFIED   





(4) Shortness of breath on exertion


Code(s): R06.02 - SHORTNESS OF BREATH   





(5) ASHD (arteriosclerotic heart disease)


Code(s): I25.10 - ATHSCL HEART DISEASE OF NATIVE CORONARY ARTERY W/O ANG PCTRS  







(6) Acute hypoxemic respiratory failure


Code(s): J96.01 - ACUTE RESPIRATORY FAILURE WITH HYPOXIA   





(7) Acute on chronic diastolic CHF (congestive heart failure)


Code(s): I50.33 - ACUTE ON CHRONIC DIASTOLIC (CONGESTIVE) HEART FAILURE   





(8) CKD (chronic kidney disease) stage 3, GFR 30-59 ml/min


Code(s): N18.3 - CHRONIC KIDNEY DISEASE, STAGE 3 (MODERATE)   





(9) HTN (hypertension)


Code(s): I10 - ESSENTIAL (PRIMARY) HYPERTENSION   


Qualifiers: 


   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   





(10) IDDM (insulin dependent diabetes mellitus)


Code(s): E11.9 - TYPE 2 DIABETES MELLITUS WITHOUT COMPLICATIONS; Z79.4 - LONG 

TERM (CURRENT) USE OF INSULIN   





(11) Obesity (BMI 30-39.9)


Code(s): E66.9 - OBESITY, UNSPECIFIED   





Assessment/Plan


discussed with pt/ RN 


Meds reviewed


Agree with current management


will give 40 mg lasix dose today


Monitor lytes 


will follow


xanax for anxiety


monitor

## 2020-09-12 NOTE — PN
Progress Note, Physician


Chief Complaint: 





sob


History of Present Illness: 





sob with little activity (washing up in bathroom), approx 8 out of 10, vs was 9 

out of 10 at home.


leg swelling improved signif from home.


++ abd distension at home, not much better here.


+ orthopnea though perhaps b/c feels abdomen pressing up on thoracic cavity





no cp





probable MAGNOLIA she reports, never worked up





urinates around 3 times after lasix 80 iv doses





+ cigs hx





- Current Medication List


Current Medications: 


Active Medications





Albuterol Sulfate (Ventolin Hfa Inhaler -)  2 puff IH Q4H PRN


   PRN Reason: SHORT OF BREATH/WHEEZING


   Last Admin: 09/09/20 22:16 Dose:  2 puff


   Documented by: 


Amlodipine Besylate (Norvasc -)  10 mg PO DAILY [facility]


   Last Admin: 09/11/20 09:28 Dose:  10 mg


   Documented by: 


Aspirin (Ecotrin -)  81 mg PO DAILY [facility]


   Last Admin: 09/11/20 09:27 Dose:  81 mg


   Documented by: 


Brimonidine Tartrate (Alphagan 0.2% -)  1 drop OU BID [facility]


   Last Admin: 09/11/20 22:39 Dose:  1 drop


   Documented by: 


Carvedilol (Coreg -)  6.25 mg PO BID [facility]


   Last Admin: 09/11/20 22:33 Dose:  6.25 mg


   Documented by: 


Duloxetine HCl (Cymbalta -)  60 mg PO DAILY [facility]


   Last Admin: 09/11/20 09:27 Dose:  60 mg


   Documented by: 


Furosemide (Lasix Injection -)  80 mg IVPB DAILY [facility]


   Last Admin: 09/10/20 10:02 Dose:  80 mg


   Documented by: 


Guaifenesin (Diabetic Tussin Dm -)  10 ml PO Q6H PRN


   PRN Reason: COUGH


   Last Admin: 09/06/20 22:15 Dose:  10 ml


   Documented by: 


Heparin Sodium (Porcine) (Heparin -)  5,000 unit SQ BID [facility]


   Last Admin: 09/11/20 22:33 Dose:  5,000 unit


   Documented by: 


Insulin Aspart (Novolog Vial Sliding Scale -)  1 vial SQ [facility]; Protocol


   Last Admin: 09/12/20 06:00 Dose:  Not Given


   Documented by: 


Insulin Detemir (Levemir Vial)  30 units SQ [facility]


   Last Admin: 09/11/20 22:33 Dose:  30 units


   Documented by: 


Insulin Detemir (Levemir Vial)  30 units SQ AM [facility]


   Last Admin: 09/12/20 06:00 Dose:  Not Given


   Documented by: 


Latanoprost (Xalatan 0.005% Eye Drops -)  1 drop OU HS [facility]


   Last Admin: 09/11/20 22:39 Dose:  1 drop


   Documented by: 


Multivitamins/Minerals/Vitamin C (Tab-A-Vit -)  1 tab PO DAILY [facility]


   Last Admin: 09/11/20 09:27 Dose:  1 tab


   Documented by: 


Nicotine (Nicoderm Patch -)  14 mg TD DAILY [facility]


   Last Admin: 09/11/20 09:27 Dose:  14 mg


   Documented by: 


Pantoprazole Sodium (Protonix -)  40 mg PO DAILY [facility]


   Last Admin: 09/11/20 09:28 Dose:  40 mg


   Documented by: 


Polyethylene Glycol (Miralax (For Daily Use) -)  17 gm PO DAILY [facility]


   PRN Reason: CONSTIPATION


   Last Admin: 09/06/20 18:55 Dose:  17 grams


   Documented by: 


Rosuvastatin Calcium (Crestor -)  10 mg PO HS [facility]


   Last Admin: 09/11/20 22:33 Dose:  10 mg


   Documented by: 


Timolol Maleate (Timoptic 0.5%)  1 drop OU BID [facility]


   Last Admin: 09/11/20 22:39 Dose:  1 drop


   Documented by: 


Tiotropium Bromide (Spiriva Respimat)  2 puff IH DAILY [facility]


   Last Admin: 09/11/20 09:26 Dose:  2 puff


   Documented by: 











- Objective


Vital Signs: 


                                   Vital Signs











Temperature  97.5 F L  09/12/20 06:00


 


Pulse Rate  88   09/12/20 06:00


 


Respiratory Rate  20   09/12/20 06:00


 


Blood Pressure  155/75   09/12/20 06:00


 


O2 Sat by Pulse Oximetry (%)  93 L  09/12/20 06:00











Constitutional: Yes: No Distress, Calm, Obese


Eyes: No: Sclera Icterus


HENT: No: Nasal Congestion


Cardiovascular: Yes: Regular Rate and Rhythm, JVD (probable), S1, S2, Other (PMI

non diplaced).  No: Gallop, Murmur


Respiratory: Yes: CTA Bilaterally, Diminished (R base).  No: Accessory Muscle 

Use, Rales, Wheezes


Gastrointestinal: Yes: Normal Bowel Sounds, Soft, Distention.  No: Tenderness


Musculoskeletal: Yes: Other (No kyphosis)


Extremities: No: Cyanosis


Edema: Yes (trace pretib)


Integumentary: No: Jaundice


Neurological: Yes: Alert, Oriented (x3)


Psychiatric: No: Agitated


Labs: 


                                    CBC, BMP





                                 09/10/20 06:25 





                                    INR, PTT











INR  0.94  (0.83-1.09)   09/02/20  16:36    














Assessment/Plan





IMP:


Chronic diastolic CHF


Bilateral lung consolidation: PNA vs CHF; COVID NEGATIVE


Non obstx CAD


CKD


DM


HTN








REC:


1. Acute on chronic diastolic CHF (on lasix 40 po qd at home):


-treated with lasix 40 iv qd, increased to 80 iv qd few days ago for worsening 

cxr, then held 9/11 for rising bun/creat


-pt remains with minimal improvement in sob with activity (NYHA III sx's), net 

wt loss only 3 lb. + signif abd distension persists. probable JVD


-suspect ongoing fluid overload, with worsening renal fxn secondary to 

cardiorenal syndrome with very hi venous side pressures.


-rec lasix 80 iv x 1 today with metolazone 2.5 trial. add nitro paste for 

cardiorenal. expect renal fxn to improve with good diuretic response


-Daily BMP to monitor renal fx


-echo here unremarkable (nl LV/RV/valves, no pulm HTN noted)


-Non obstx CAD on cath within last 5 years


-rec MAGNOLIA workup and treatment as outpt if she is amenable





2. HTN: early AM spikes


-switched Bystolic to Coreg 6.25 BID, alpha effect and twice daily dosing may 

help smoothe out BP and avoid early AM spike


-observe BP trend





3. PNA?:


-CT pattern more c/w consolidation, though normal temp curve, no infectious sx's

, pleural fluid transudative (suspect dry cough is chf sx)


-clinically volume overloaded


-Pulm and ID following.





4. Nonobstx CAD:


-cont asa, statin





5. CKD:


-Unclear why not on ACE/ARB, perhaps GFR or K+ would not permit. no change to 

prior tx plan





6. DM: as per PMD








7. COPD/smoking: smoking cessation recommended.

## 2020-09-13 NOTE — CONSULT
Consult


Consult Specialty:: Nephrology


Reason for Consultation:: CKD





- History of Present Illness


Chief Complaint: shortness of breath and cough


History of Present Illness: 





Pt is a 64 year old female with pmhx of chf, ckd, copd, htn, and obesity who 

presents to the ER with cough and progressive shortness of breath. She was found

to be in chf and started on lasix. I was called to evaluate her for elevated 

crt. She does have history of ckd. She has gained about 10 pounds of water 

weight. She failed higher dose of lasix (80mg) at home. She denies chest pain or

palpitations. She denies dysuria or hematuria. She does complain of lower ext 

edema and abd wall fullness. She denies nsaid use. 





- History Source


History Provided By: Patient





- Past Medical History


Cardio/Vascular: Yes: CHF, HTN


Pulmonary: Yes: COPD


Renal/: Yes: Renal Inusuff


...Pregnant: No


Psych: Yes: Anxiety


Endocrine: Yes: Diabetes Mellitus





- Alcohol/Substance Use


Hx Alcohol Use: No





- Smoking History


Smoking history: Current every day smoker


Have you smoked in the past 12 months: Yes


Aproximately how many cigarettes per day: 10





- Social History


Usual Living Arrangement: Alone


ADL: Independent


Occupation:  here at [employer]


History of Recent Travel: No





Home Medications





- Allergies


Allergies/Adverse Reactions: 


                                    Allergies











Allergy/AdvReac Type Severity Reaction Status Date / Time


 


No Known Drug Allergies Allergy   Verified 09/03/20 14:19


 


IV CONTRAST Allergy Mild  Uncoded 09/03/20 14:20














- Home Medications


Home Medications: 


Ambulatory Orders





Rosuvastatin Calcium [Crestor] 10 mg PO HS 10/14/16 


Amlodipine Besylate [Norvasc -] 10 mg PO DAILY #30 tablet 10/18/16 


Bimatoprost [Lumigan] 1 drop OU DAILY 12/05/17 


Brimonidine Tartrate/Timolol [Combigan 0.2%-0.5% Eye Drops] 5 5ml OU BID 

12/05/17 


Aspirin [Ecotrin] 81 mg PO DAILY 05/03/18 


Furosemide [Lasix -] 40 mg PO DAILY #30 tablet 05/15/18 


Multivitamin [Multiple Vitamins] 1 each PO DAILY 11/15/19 


Nebivolol HCl [Bystolic] 10 mg PO DAILY 11/15/19 


Olmesartan/Hydrochlorothiazide [Olmesartan-Hctz 20-12.5 mg Tab] 1 each PO DAILY 

11/15/19 


Umeclidinium Bromide [Incruse Ellipta] 62.5 mcg IH DAILY 11/15/19 


Duloxetine HCl [Cymbalta] 60 mg PO DAILY 07/16/20 


Insulin Glargine,Hum.rec.anlog [Toujeo Solostar] 30 unit SQ AM 09/02/20 


Insulin Glargine,Hum.rec.anlog [Toujeo Solostar] 60 unit SQ HS 09/02/20 


Albuterol Sulfate Inhaler - [Ventolin Hfa Inhaler -] 2 inh PO PRN PRN 09/03/20 


Alprazolam [Xanax] 0.5 mg PO PRN MDD no more than 1mg a day 09/03/20 











Family Medical History


Family History: Unremarkable





Review of Systems





- Review of Systems


Constitutional: reports: No Symptoms


Eyes: reports: No Symptoms


HENT: reports: No Symptoms


Neck: reports: No Symptoms


Cardiovascular: reports: Edema, Shortness of Breath


Respiratory: reports: SOB on Exertion


Gastrointestinal: reports: No Symptoms


Genitourinary: reports: No Symptoms


Musculoskeletal: reports: No Symptoms


Neurological: reports: No Symptoms


Endocrine: reports: No Symptoms


Hematology/Lymphatic: reports: No Symptoms


Psychiatric: reports: No Symptoms





Physical Exam


Vital Signs: 


                                   Vital Signs











Temperature  97.9 F   09/13/20 08:26


 


Pulse Rate  76   09/13/20 08:26


 


Respiratory Rate  20   09/13/20 09:00


 


Blood Pressure  140/71   09/13/20 08:26


 


O2 Sat by Pulse Oximetry (%)  95   09/13/20 09:00











Constitutional: Yes: Calm


Eyes: Yes: Conjunctiva Clear


HENT: Yes: Atraumatic


Neck: Yes: Supple


Cardiovascular: Yes: S1, S2


Respiratory: Yes: Rhonchi


Gastrointestinal: Yes: Soft


Renal/: Yes: WNL


Musculoskeletal: Yes: WNL


Edema: Yes


Neurological: Yes: Oriented


Psychiatric: Yes: Oriented


Labs: 


                                    CBC, BMP





                                 09/10/20 06:25 





                                 09/13/20 12:10 











Imaging





- Results


Chest X-ray: Report Reviewed





Problem List





- Problems


(1) COPD (chronic obstructive pulmonary disease)


Code(s): J44.9 - CHRONIC OBSTRUCTIVE PULMONARY DISEASE, UNSPECIFIED   


Qualifiers: 


   COPD type: unspecified COPD   Qualified Code(s): J44.9 - Chronic obstructive 

pulmonary disease, unspecified   





(2) COPD exacerbation


Code(s): J44.1 - CHRONIC OBSTRUCTIVE PULMONARY DISEASE W (ACUTE) EXACERBATION   





(3) CKD (chronic kidney disease) stage 3, GFR 30-59 ml/min


Code(s): N18.3 - CHRONIC KIDNEY DISEASE, STAGE 3 (MODERATE)   





Assessment/Plan





                               Current Medications











Generic Name Dose Route Start Last Admin





  Trade Name Freq  PRN Reason Stop Dose Admin


 


Albuterol Sulfate  2 puff  09/03/20 00:47  09/09/20 22:16





  Ventolin Hfa Inhaler -  IH   2 puff





  Q4H PRN   Administration





  SHORT OF BREATH/WHEEZING  


 


Alprazolam  0.5 mg  09/12/20 09:45  09/12/20 21:06





  Xanax -  PO   0.5 mg





  Q8H PRN   Administration





  ANXIETY  


 


Amlodipine Besylate  10 mg  09/03/20 10:00  09/13/20 09:28





  Norvasc -  PO   10 mg





  DAILY JAYCEE   Administration


 


Aspirin  81 mg  09/03/20 10:00  09/13/20 09:28





  Ecotrin -  PO   81 mg





  DAILY JAYCEE   Administration


 


Brimonidine Tartrate  1 drop  09/03/20 10:00  09/13/20 09:25





  Alphagan 0.2% -  OU   1 drop





  BID JAYCEE   Administration


 


Carvedilol  6.25 mg  09/11/20 10:00  09/13/20 09:27





  Coreg -  PO   6.25 mg





  BID JAYCEE   Administration


 


Duloxetine HCl  60 mg  09/03/20 10:00  09/13/20 09:28





  Cymbalta -  PO   60 mg





  DAILY JAYCEE   Administration


 


Guaifenesin  10 ml  09/13/20 01:02 





  Robitussin Dm -  PO  





  Q6H PRN  





  COUGH  


 


Heparin Sodium (Porcine)  5,000 unit  09/03/20 10:00  09/13/20 09:27





  Heparin -  SQ   5,000 unit





  BID JAYCEE   Administration


 


Insulin Aspart  1 vial  09/03/20 07:00  09/13/20 12:19





  Novolog Vial Sliding Scale -  SQ   2 unit





  [facility] JAYCEE   Administration





  Protocol  


 


Insulin Detemir  30 units  09/09/20 22:00  09/12/20 21:02





  Levemir Vial  SQ   30 units





  HS JAYCEE   Administration


 


Insulin Detemir  30 units  09/12/20 07:00  09/13/20 06:01





  Levemir Vial  SQ   Not Given





  AM JAYCEE  


 


Latanoprost  1 drop  09/03/20 22:00  09/12/20 22:30





  Xalatan 0.005% Eye Drops -  OU   1 drop





  HS JAYCEE   Administration


 


Multivitamins/Minerals/Vitamin C  1 tab  09/03/20 10:00  09/13/20 09:27





  Tab-A-Vit -  PO   1 tab





  DAILY JAYCEE   Administration


 


Nicotine  14 mg  09/05/20 10:00  09/13/20 09:27





  Nicoderm Patch -  TD   14 mg





  DAILY JAYCEE   Administration


 


Nitroglycerin  1 inch  09/12/20 12:00  09/13/20 11:50





  Nitro-Bid 2% Paste -  TD   1 inch





  Q6HPO JAYCEE   Administration


 


Pantoprazole Sodium  40 mg  09/04/20 10:00  09/13/20 09:27





  Protonix -  PO   40 mg





  DAILY JAYCEE   Administration


 


Polyethylene Glycol  17 gm  09/05/20 15:15  09/06/20 18:55





  Miralax (For Daily Use) -  PO   17 grams





  DAILY PRN   Administration





  CONSTIPATION  


 


Rosuvastatin Calcium  10 mg  09/03/20 22:00  09/12/20 21:01





  Crestor -  PO   10 mg





  HS JAYCEE   Administration


 


Timolol Maleate  1 drop  09/03/20 10:00  09/13/20 09:26





  Timoptic 0.5%  OU   1 applic





  BID JAYCEE   Administration


 


Tiotropium Bromide  2 puff  09/03/20 10:00  09/13/20 09:25





  Spiriva Respimat  IH   2 puff





  DAILY JAYCEE   Administration





                                Laboratory Tests











  05/03/18 08/02/19 11/11/19





  17:36 14:21 13:20


 


WBC   


 


Hgb   


 


Sodium   


 


Potassium   


 


Creatinine  2.1 H  1.6 H  1.4 H


 


Urine Protein   


 


Urine Blood   


 


COVID-19 ([facility])   














  11/12/19 02/04/20 06/19/20





  12:00 10:43 08:25


 


WBC   


 


Hgb   


 


Sodium   


 


Potassium   


 


Creatinine  1.4 H  1.5 H  1.5 H


 


Urine Protein   


 


Urine Blood   


 


COVID-19 ([facility])   














  09/02/20 09/02/20 09/02/20





  18:26 18:26 23:50


 


WBC   


 


Hgb   


 


Sodium   


 


Potassium   


 


Creatinine   1.6 H  1.7 H


 


Urine Protein   


 


Urine Blood   


 


COVID-19 ([facility])  Not detected  














  09/03/20 09/03/20 09/04/20





  06:57 07:35 09:02


 


WBC   


 


Hgb   


 


Sodium   


 


Potassium   


 


Creatinine   1.7 H  1.7 H


 


Urine Protein  4+ H  


 


Urine Blood  Trace  


 


COVID-19 ([facility])   














  09/06/20 09/08/20 09/09/20





  06:35 13:08 08:05


 


WBC   


 


Hgb   


 


Sodium   


 


Potassium   


 


Creatinine  1.6 H  1.5 H  1.6 H


 


Urine Protein   


 


Urine Blood   


 


COVID-19 ([facility])   














  09/10/20 09/10/20 09/11/20





  06:25 06:25 06:43


 


WBC  11.0 H  


 


Hgb  12.2  


 


Sodium   


 


Potassium   


 


Creatinine   1.8 H  1.9 H


 


Urine Protein   


 


Urine Blood   


 


COVID-19 ([facility])   














  09/12/20 09/13/20





  06:45 12:10


 


WBC  


 


Hgb  


 


Sodium   140


 


Potassium   4.9


 


Creatinine  1.7 H  1.9 H


 


Urine Protein  


 


Urine Blood  


 


COVID-19 ([facility])  











Impression


1. CKD


2. CHF


3. COPD


4. hld


5. dm


6. cad


7. htn


8. proteinuria





Plan


- cont to monitor renal function


- crt is higher than baseline


- repeat labs in am


- check prt to crt ratio


- pt has history of proteinuria


- renal failure is not entirely cardiorenal as she


does have ckd and significant proteinuria


- cont lasix for volume control


- discussed fluid intake


- will order serologies including spep


- check a1c (dm can explain proteinuria)

## 2020-09-13 NOTE — PN
Progress Note, Physician


History of Present Illness: 


pt seen/ examined


sitting in chair


looks and feels much better


decreased sob


mood better




















- Current Medication List


Current Medications: 


Active Medications





Albuterol Sulfate (Ventolin Hfa Inhaler -)  2 puff IH Q4H PRN


   PRN Reason: SHORT OF BREATH/WHEEZING


   Last Admin: 09/09/20 22:16 Dose:  2 puff


   Documented by: 


Alprazolam (Xanax -)  0.5 mg PO Q8H PRN


   PRN Reason: ANXIETY


   Last Admin: 09/12/20 21:06 Dose:  0.5 mg


   Documented by: 


Amlodipine Besylate (Norvasc -)  10 mg PO DAILY Pending sale to [facility]


   Last Admin: 09/13/20 09:28 Dose:  10 mg


   Documented by: 


Aspirin (Ecotrin -)  81 mg PO DAILY Pending sale to [facility]


   Last Admin: 09/13/20 09:28 Dose:  81 mg


   Documented by: 


Brimonidine Tartrate (Alphagan 0.2% -)  1 drop OU BID [facility]ending [facility] to [facility]


   Last Admin: 09/13/20 09:25 Dose:  1 drop


   Documented by: 


Carvedilol (Coreg -)  6.25 mg PO BID Pending sale to [facility]


   Last Admin: 09/13/20 09:27 Dose:  6.25 mg


   Documented by: 


Duloxetine HCl (Cymbalta -)  60 mg PO DAILY Pending sale to [facility]


   Last Admin: 09/13/20 09:28 Dose:  60 mg


   Documented by: 


Guaifenesin (Robitussin Dm -)  10 ml PO Q6H PRN


   PRN Reason: COUGH


Heparin Sodium (Porcine) (Heparin -)  5,000 unit SQ BID Pending sale to [facility]


   Last Admin: 09/13/20 09:27 Dose:  5,000 unit


   Documented by: 


Insulin Aspart (Novolog Vial Sliding Scale -)  1 vial SQ [facility] to [facility]; Protocol


   Last Admin: 09/13/20 12:19 Dose:  2 unit


   Documented by: 


Insulin Detemir (Levemir Vial)  30 units SQ HS Pending sale to [facility]


   Last Admin: 09/12/20 21:02 Dose:  30 units


   Documented by: 


Insulin Detemir (Levemir Vial)  30 units SQ AM Pending sale to [facility]


   Last Admin: 09/13/20 06:01 Dose:  Not Given


   Documented by: 


Latanoprost (Xalatan 0.005% Eye Drops -)  1 drop OU HS Pending [facility] to [facility]


   Last Admin: 09/12/20 22:30 Dose:  1 drop


   Documented by: 


Multivitamins/Minerals/Vitamin C (Tab-A-Vit -)  1 tab PO DAILY Pending sale to [facility]


   Last Admin: 09/13/20 09:27 Dose:  1 tab


   Documented by: 


Nicotine (Nicoderm Patch -)  14 mg TD DAILY Pending sale to [facility]


   Last Admin: 09/13/20 09:27 Dose:  14 mg


   Documented by: 


Nitroglycerin (Nitro-Bid 2% Paste -)  1 inch TD Q6HPO Pending sale to [facility]


   Last Admin: 09/13/20 11:50 Dose:  1 inch


   Documented by: 


Pantoprazole Sodium (Protonix -)  40 mg PO DAILY Pending sale to [facility]


   Last Admin: 09/13/20 09:27 Dose:  40 mg


   Documented by: 


Polyethylene Glycol (Miralax (For Daily Use) -)  17 gm PO DAILY PRN


   PRN Reason: CONSTIPATION


   Last Admin: 09/06/20 18:55 Dose:  17 grams


   Documented by: 


Rosuvastatin Calcium (Crestor -)  10 mg PO HS Pending sale to [facility]


   Last Admin: 09/12/20 21:01 Dose:  10 mg


   Documented by: 


Timolol Maleate (Timoptic 0.5%)  1 drop OU BID Pending sale to [facility]


   Last Admin: 09/13/20 09:26 Dose:  1 applic


   Documented by: 


Tiotropium Bromide (Spiriva Respimat)  2 puff IH DAILY Pending sale to [facility]


   Last Admin: 09/13/20 09:25 Dose:  2 puff


   Documented by: 











- Objective


Vital Signs: 


                                   Vital Signs











Temperature  97.9 F   09/13/20 08:26


 


Pulse Rate  76   09/13/20 08:26


 


Respiratory Rate  20   09/13/20 09:00


 


Blood Pressure  140/71   09/13/20 08:26


 


O2 Sat by Pulse Oximetry (%)  95   09/13/20 09:00











Constitutional: Yes: No Distress


Neck: Yes: Supple


Cardiovascular: Yes: Regular Rate and Rhythm


Respiratory: Yes: Diminished


Edema: LLE: 1+, RLE: 1+


Neurological: Yes: Alert


Psychiatric: Yes: Alert


Labs: 


                                    CBC, BMP





                                 09/10/20 06:25 





                                 09/12/20 06:45 





                                    INR, PTT











INR  0.94  (0.83-1.09)   09/02/20  16:36    














Problem List





- Problems


(1) COPD (chronic obstructive pulmonary disease)


Code(s): J44.9 - CHRONIC OBSTRUCTIVE PULMONARY DISEASE, UNSPECIFIED   


Qualifiers: 


   COPD type: unspecified COPD   Qualified Code(s): J44.9 - Chronic obstructive 

pulmonary disease, unspecified   





(2) Encounter for screening laboratory testing for COVID-19 virus


Code(s): Z11.59 - ENCOUNTER FOR SCREENING FOR OTHER VIRAL DISEASES   





(3) Pleural effusion


Code(s): J90 - PLEURAL EFFUSION, NOT ELSEWHERE CLASSIFIED   





(4) Shortness of breath on exertion


Code(s): R06.02 - SHORTNESS OF BREATH   





(5) ASHD (arteriosclerotic heart disease)


Code(s): I25.10 - ATHSCL HEART DISEASE OF NATIVE CORONARY ARTERY W/O ANG PCTRS  







(6) Acute hypoxemic respiratory failure


Code(s): J96.01 - ACUTE RESPIRATORY FAILURE WITH HYPOXIA   





(7) Acute on chronic diastolic CHF (congestive heart failure)


Code(s): I50.33 - ACUTE ON CHRONIC DIASTOLIC (CONGESTIVE) HEART FAILURE   





(8) CKD (chronic kidney disease) stage 3, GFR 30-59 ml/min


Code(s): N18.3 - CHRONIC KIDNEY DISEASE, STAGE 3 (MODERATE)   





(9) HTN (hypertension)


Code(s): I10 - ESSENTIAL (PRIMARY) HYPERTENSION   


Qualifiers: 


   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   





(10) IDDM (insulin dependent diabetes mellitus)


Code(s): E11.9 - TYPE 2 DIABETES MELLITUS WITHOUT COMPLICATIONS; Z79.4 - LONG 

TERM (CURRENT) USE OF INSULIN   





(11) Obesity (BMI 30-39.9)


Code(s): E66.9 - OBESITY, UNSPECIFIED   





Assessment/Plan


discussed with pt/ RN again today.


Agree with current management


i/v  lasix


Monitor lytes 


xanax for anxiety


monitor 


will  continue to follow

## 2020-09-13 NOTE — PN
Progress Note (short form)





- Note


Progress Note: 


OOB to chair.  


Breathing feels better today. 


No acute events overnight. 





                                 Intake & Output











 09/10/20 09/11/20 09/12/20 09/13/20





 23:59 23:59 23:59 23:59


 


Intake Total 600 1070 250 


 


Balance 600 1070 250 


 


Weight 252 lb 252 lb 9.6 oz 253 lb 253 lb 4.8 oz











                                Last Vital Signs











Temp Pulse Resp BP Pulse Ox


 


 97.9 F   76   20   140/71   95 


 


 09/13/20 08:26  09/13/20 08:26  09/13/20 09:00  09/13/20 08:26  09/13/20 09:00








Active Medications





Albuterol Sulfate (Ventolin Hfa Inhaler -)  2 puff IH Q4H PRN


   PRN Reason: SHORT OF BREATH/WHEEZING


   Last Admin: 09/09/20 22:16 Dose:  2 puff


   Documented by: 


Alprazolam (Xanax -)  0.5 mg PO Q8H PRN


   PRN Reason: ANXIETY


   Last Admin: 09/12/20 21:06 Dose:  0.5 mg


   Documented by: 


Amlodipine Besylate (Norvasc -)  10 mg PO DAILY [facility]


   Last Admin: 09/13/20 09:28 Dose:  10 mg


   Documented by: 


Aspirin (Ecotrin -)  81 mg PO DAILY [facility]


   Last Admin: 09/13/20 09:28 Dose:  81 mg


   Documented by: 


Brimonidine Tartrate (Alphagan 0.2% -)  1 drop OU BID [facility]


   Last Admin: 09/13/20 09:25 Dose:  1 drop


   Documented by: 


Carvedilol (Coreg -)  6.25 mg PO BID [facility]


   Last Admin: 09/13/20 09:27 Dose:  6.25 mg


   Documented by: 


Duloxetine HCl (Cymbalta -)  60 mg PO DAILY [facility]


   Last Admin: 09/13/20 09:28 Dose:  60 mg


   Documented by: 


Guaifenesin (Robitussin Dm -)  10 ml PO Q6H PRN


   PRN Reason: COUGH


Heparin Sodium (Porcine) (Heparin -)  5,000 unit SQ BID [facility]


   Last Admin: 09/13/20 09:27 Dose:  5,000 unit


   Documented by: 


Insulin Aspart (Novolog Vial Sliding Scale -)  1 vial SQ [facility]; Protocol


   Last Admin: 09/13/20 12:19 Dose:  2 unit


   Documented by: 


Insulin Detemir (Levemir Vial)  30 units SQ HS [facility]


   Last Admin: 09/12/20 21:02 Dose:  30 units


   Documented by: 


Insulin Detemir (Levemir Vial)  30 units SQ AM [facility]


   Last Admin: 09/13/20 06:01 Dose:  Not Given


   Documented by: 


Latanoprost (Xalatan 0.005% Eye Drops -)  1 drop OU HS [facility]


   Last Admin: 09/12/20 22:30 Dose:  1 drop


   Documented by: 


Multivitamins/Minerals/Vitamin C (Tab-A-Vit -)  1 tab PO DAILY [facility]


   Last Admin: 09/13/20 09:27 Dose:  1 tab


   Documented by: 


Nicotine (Nicoderm Patch -)  14 mg TD DAILY [facility]


   Last Admin: 09/13/20 09:27 Dose:  14 mg


   Documented by: 


Nitroglycerin (Nitro-Bid 2% Paste -)  1 inch TD Q6HPO [facility]


   Last Admin: 09/13/20 11:50 Dose:  1 inch


   Documented by: 


Pantoprazole Sodium (Protonix -)  40 mg PO DAILY [facility]


   Last Admin: 09/13/20 09:27 Dose:  40 mg


   Documented by: 


Polyethylene Glycol (Miralax (For Daily Use) -)  17 gm PO DAILY PRN


   PRN Reason: CONSTIPATION


   Last Admin: 09/06/20 18:55 Dose:  17 grams


   Documented by: 


Rosuvastatin Calcium (Crestor -)  10 mg PO [facility]


   Last Admin: 09/12/20 21:01 Dose:  10 mg


   Documented by: 


Timolol Maleate (Timoptic 0.5%)  1 drop OU BID [facility]


   Last Admin: 09/13/20 09:26 Dose:  1 applic


   Documented by: 


Tiotropium Bromide (Spiriva Respimat)  2 puff IH DAILY [facility]


   Last Admin: 09/13/20 09:25 Dose:  2 puff


   Documented by: 








Gen:  NAD


Heart: RRR


Lung: decreased breath sounds at the bases


Abd: soft, nontender


Ext: + edema


                         Laboratory Results - last 24 hr











  09/12/20 09/12/20 09/13/20





  16:06 20:45 05:59


 


POC Glucometer  267  257  90














  09/13/20





  11:48


 


POC Glucometer  177











A/P


Acute on Chronic Diastolic Heart Failure


Right Pleural Effusion


COPD


CKD


HTN


Tobacco abuse 





-  Lasix


-  monitor urine output, creatinine


-  daily weights


-  O2 to keep SpO2 >90%


-  inhaled bronchodilators as needed


-  DVT prophylaxis





Dr Tucker

## 2020-09-13 NOTE — PN
Progress Note, Physician


Chief Complaint: 





sob


History of Present Illness: 





incr UOP volume and slight incr frequency (4x) yest.


sob at rest is signif improved today vs yest, hasn't yet washed up or walked 

around


stomach still distended





drinking approx 2L fluid daily





no cp, palp








- Current Medication List


Current Medications: 


Active Medications





Albuterol Sulfate (Ventolin Hfa Inhaler -)  2 puff IH Q4H PRN


   PRN Reason: SHORT OF BREATH/WHEEZING


   Last Admin: 09/09/20 22:16 Dose:  2 puff


   Documented by: 


Alprazolam (Xanax -)  0.5 mg PO Q8H PRN


   PRN Reason: ANXIETY


   Last Admin: 09/12/20 21:06 Dose:  0.5 mg


   Documented by: 


Amlodipine Besylate (Norvasc -)  10 mg PO DAILY [facility]


   Last Admin: 09/13/20 09:28 Dose:  10 mg


   Documented by: 


Aspirin (Ecotrin -)  81 mg PO DAILY [facility]


   Last Admin: 09/13/20 09:28 Dose:  81 mg


   Documented by: 


Brimonidine Tartrate (Alphagan 0.2% -)  1 drop OU BID [facility]


   Last Admin: 09/13/20 09:25 Dose:  1 drop


   Documented by: 


Carvedilol (Coreg -)  6.25 mg PO BID [facility]


   Last Admin: 09/13/20 09:27 Dose:  6.25 mg


   Documented by: 


Duloxetine HCl (Cymbalta -)  60 mg PO DAILY [facility]


   Last Admin: 09/13/20 09:28 Dose:  60 mg


   Documented by: 


Guaifenesin (Robitussin Dm -)  10 ml PO Q6H PRN


   PRN Reason: COUGH


Heparin Sodium (Porcine) (Heparin -)  5,000 unit SQ BID [facility]


   Last Admin: 09/13/20 09:27 Dose:  5,000 unit


   Documented by: 


Insulin Aspart (Novolog Vial Sliding Scale -)  1 vial SQ [facility]; Protocol


   Last Admin: 09/13/20 06:01 Dose:  Not Given


   Documented by: 


Insulin Detemir (Levemir Vial)  30 units SQ HS [facility]


   Last Admin: 09/12/20 21:02 Dose:  30 units


   Documented by: 


Insulin Detemir (Levemir Vial)  30 units SQ AM [facility]


   Last Admin: 09/13/20 06:01 Dose:  Not Given


   Documented by: 


Latanoprost (Xalatan 0.005% Eye Drops -)  1 drop OU HS [facility]


   Last Admin: 09/12/20 22:30 Dose:  1 drop


   Documented by: 


Multivitamins/Minerals/Vitamin C (Tab-A-Vit -)  1 tab PO DAILY [facility]


   Last Admin: 09/13/20 09:27 Dose:  1 tab


   Documented by: 


Nicotine (Nicoderm Patch -)  14 mg TD DAILY [facility]


   Last Admin: 09/13/20 09:27 Dose:  14 mg


   Documented by: 


Nitroglycerin (Nitro-Bid 2% Paste -)  1 inch TD Q6HPO [facility]


   Last Admin: 09/13/20 06:01 Dose:  1 inch


   Documented by: 


Pantoprazole Sodium (Protonix -)  40 mg PO DAILY [facility]


   Last Admin: 09/13/20 09:27 Dose:  40 mg


   Documented by: 


Polyethylene Glycol (Miralax (For Daily Use) -)  17 gm PO DAILY [facility]


   PRN Reason: CONSTIPATION


   Last Admin: 09/06/20 18:55 Dose:  17 grams


   Documented by: 


Rosuvastatin Calcium (Crestor -)  10 mg PO HS [facility]


   Last Admin: 09/12/20 21:01 Dose:  10 mg


   Documented by: 


Timolol Maleate (Timoptic 0.5%)  1 drop OU BID [facility]


   Last Admin: 09/13/20 09:26 Dose:  1 applic


   Documented by: 


Tiotropium Bromide (Spiriva Respimat)  2 puff IH DAILY [facility]


   Last Admin: 09/13/20 09:25 Dose:  2 puff


   Documented by: 











- Objective


Vital Signs: 


                                   Vital Signs











Temperature  97.9 F   09/13/20 08:26


 


Pulse Rate  76   09/13/20 08:26


 


Respiratory Rate  20   09/13/20 09:00


 


Blood Pressure  140/71   09/13/20 08:26


 


O2 Sat by Pulse Oximetry (%)  95   09/13/20 09:00











Constitutional: Yes: No Distress, Calm, Obese


Eyes: No: Sclera Icterus


HENT: No: Nasal Congestion


Cardiovascular: Yes: Regular Rate and Rhythm, JVD (prob), S1, S2, Other (PMI non

diplaced).  No: Gallop, Murmur


Respiratory: Yes: CTA Bilaterally.  No: Accessory Muscle Use, Rales, Wheezes


Gastrointestinal: Yes: Normal Bowel Sounds, Soft.  No: Tenderness


Musculoskeletal: Yes: Other (No kyphosis)


Extremities: No: Cyanosis


Edema: Yes (trace pretib)


Integumentary: No: Jaundice


Neurological: Yes: Alert, Oriented (x3)


Psychiatric: No: Agitated


Labs: 


                                    CBC, BMP





                                 09/10/20 06:25 





                                 09/12/20 06:45 





                                    INR, PTT











INR  0.94  (0.83-1.09)   09/02/20  16:36    














Assessment/Plan





IMP:


Chronic diastolic CHF


Bilateral lung consolidation: PNA vs CHF; COVID NEGATIVE


Non obstx CAD


CKD


DM


HTN








REC:


1. Acute on chronic diastolic CHF (on lasix 40 po qd at home):


-treated with lasix 40 iv qd, increased to 80 iv qd few days ago for worsening c

xr, then held 9/11 for rising bun/creat


-pt remains with minimal improvement in sob with activity (NYHA III sx's), net 

wt loss only 3 lb. + signif abd distension persists. probable JVD


-suspect ongoing fluid overload, with worsening renal fxn secondary to 

cardiorenal syndrome with very hi venous side pressures.


-added metoalzone 2.5 mg with lasix 80 iv x 1 9/12--slight incr diuresis 

subjectively, signif improvement in sob at rest, little wt change. still vol 

overloaded (++ abd distension, prob JVD). await today's labs, plan to re-dose 

metolazone with 5mg today


-Daily BMP to monitor renal fx


-echo here unremarkable (nl LV/RV/valves, no pulm HTN noted)


-Non obstx CAD on cath within last 5 years


-rec MAGNOLIA workup and treatment as outpt if she is amenable





2. HTN: early AM spikes


-switched Bystolic to Coreg 6.25 BID, alpha effect and twice daily dosing may 

help smoothe out BP and avoid early AM spike


-observe BP trend





3. PNA?:


-CT pattern more c/w consolidation, though normal temp curve, no infectious 

sx's, pleural fluid transudative (suspect dry cough is chf sx)


-clinically volume overloaded


-Pulm and ID following.





4. Nonobstx CAD:


-cont asa, statin





5. NAY on CKD:


-baseline creat 1.4-1.7 from prior hosp stay, ? outpt baseline when well 

compensated


-suspect cardiorenal syndr here, as above


-Unclear why not on ACE/ARB, perhaps GFR or K+ would not permit. no change to 

prior tx plan





6. DM: as per PMD





7. COPD/smoking: smoking cessation recommended.

## 2020-09-14 NOTE — PN
Progress Note (short form)





- Note


Progress Note: 


OOB to chair.  


Breathing continues to feel better. 


No acute events overnight. 





                                 Intake & Output











 09/11/20 09/12/20 09/13/20 09/14/20





 23:59 23:59 23:59 23:59


 


Intake Total 1070 250 0 0


 


Balance 1070 250 0 0


 


Weight 252 lb 9.6 oz 253 lb 253 lb 4.8 oz 251 lb 3.2 oz











                                Last Vital Signs











Temp Pulse Resp BP Pulse Ox


 


 97.8 F   79   18   159/69   97 


 


 09/14/20 10:00  09/14/20 10:00  09/14/20 10:00  09/14/20 10:00  09/14/20 10:00








Active Medications





Albuterol Sulfate (Ventolin Hfa Inhaler -)  2 puff IH Q4H PRN


   PRN Reason: SHORT OF BREATH/WHEEZING


   Last Admin: 09/09/20 22:16 Dose:  2 puff


   Documented by: 


Alprazolam (Xanax -)  0.5 mg PO Q8H PRN


   PRN Reason: ANXIETY


   Last Admin: 09/14/20 02:40 Dose:  0.5 mg


   Documented by: 


Amlodipine Besylate (Norvasc -)  10 mg PO DAILY [facility]


   Last Admin: 09/14/20 10:06 Dose:  10 mg


   Documented by: 


Aspirin (Ecotrin -)  81 mg PO DAILY [facility]


   Last Admin: 09/14/20 10:06 Dose:  81 mg


   Documented by: 


Brimonidine Tartrate (Alphagan 0.2% -)  1 drop OU BID [facility]


   Last Admin: 09/14/20 10:14 Dose:  1 drop


   Documented by: 


Carvedilol (Coreg -)  6.25 mg PO BID [facility]


   Last Admin: 09/14/20 10:06 Dose:  6.25 mg


   Documented by: 


Duloxetine HCl (Cymbalta -)  60 mg PO DAILY [facility]


   Last Admin: 09/14/20 10:06 Dose:  60 mg


   Documented by: 


Guaifenesin (Robitussin Dm -)  10 ml PO Q6H PRN


   PRN Reason: COUGH


Heparin Sodium (Porcine) (Heparin -)  5,000 unit SQ BID [facility]


   Last Admin: 09/14/20 10:06 Dose:  5,000 unit


   Documented by: 


Insulin Aspart (Novolog Vial Sliding Scale -)  1 vial SQ [facility]; Protocol


   Last Admin: 09/14/20 11:51 Dose:  4 unit


   Documented by: 


Insulin Detemir (Levemir Vial)  30 units SQ HS [facility]


   Last Admin: 09/13/20 21:14 Dose:  30 units


   Documented by: 


Insulin Detemir (Levemir Vial)  30 units SQ AM [facility]


   Last Admin: 09/14/20 06:59 Dose:  30 units


   Documented by: 


Latanoprost (Xalatan 0.005% Eye Drops -)  1 drop OU HS [facility]


   Last Admin: 09/13/20 22:50 Dose:  Not Given


   Documented by: 


Multivitamins/Minerals/Vitamin C (Tab-A-Vit -)  1 tab PO DAILY [facility]


   Last Admin: 09/14/20 10:06 Dose:  1 tab


   Documented by: 


Nicotine (Nicoderm Patch -)  14 mg TD DAILY [facility]


   Last Admin: 09/14/20 10:05 Dose:  14 mg


   Documented by: 


Nitroglycerin (Nitro-Bid 2% Paste -)  1 inch TD Q6HPO [facility]


   Last Admin: 09/14/20 11:51 Dose:  1 inch


   Documented by: 


Pantoprazole Sodium (Protonix -)  40 mg PO DAILY [facility]


   Last Admin: 09/14/20 10:06 Dose:  40 mg


   Documented by: 


Polyethylene Glycol (Miralax (For Daily Use) -)  17 gm PO DAILY [facility]


   PRN Reason: CONSTIPATION


   Last Admin: 09/06/20 18:55 Dose:  17 grams


   Documented by: 


Rosuvastatin Calcium (Crestor -)  10 mg PO [facility]


   Last Admin: 09/13/20 21:11 Dose:  10 mg


   Documented by: 


Timolol Maleate (Timoptic 0.5%)  1 drop OU BID [facility]


   Last Admin: 09/14/20 10:14 Dose:  1 applic


   Documented by: 


Tiotropium Bromide (Spiriva Respimat)  2 puff IH DAILY [facility]


   Last Admin: 09/14/20 10:14 Dose:  2 puff


   Documented by: 








Gen:  NAD


Heart: RRR


Lung: decreased breath sounds at the bases


Abd: soft, nontender


Ext: + edema


                        Laboratory Results - last 24 hr











  09/13/20 09/13/20 09/13/20





  12:10 16:12 17:35


 


Sodium  140  


 


Potassium  4.9  


 


Chloride  111 H  


 


Carbon Dioxide  22  


 


Anion Gap  6 L  


 


BUN  65.7 H  


 


Creatinine  1.9 H  


 


Est GFR (CKD-EPI)AfAm  31.29  


 


Est GFR (CKD-EPI)NonAf  27.00  


 


POC Glucometer   275 


 


Random Glucose  188 H  


 


Hemoglobin A1c %   


 


Calcium  9.5  


 


Urine Color    Yellow


 


Urine Appearance    Clear


 


Urine pH    5.0


 


Ur Specific Gravity    1.009 L


 


Urine Protein    2+ H


 


Urine Glucose (UA)    Negative


 


Urine Ketones    Negative


 


Urine Blood    Negative


 


Urine Nitrite    Negative


 


Urine Bilirubin    Negative


 


Urine Urobilinogen    0.2


 


Ur Leukocyte Esterase    Negative


 


Urine WBC (Auto)    5


 


Urine RBC (Auto)    8


 


Urine Casts (Auto)    1


 


U Epithel Cells (Auto)    7


 


Urine Bacteria (Auto)    104














  09/13/20 09/14/20 09/14/20





  21:08 05:45 06:50


 


Sodium    141


 


Potassium    4.6


 


Chloride    111 H


 


Carbon Dioxide    23


 


Anion Gap    7 L


 


BUN    64.4 H


 


Creatinine    1.8 H


 


Est GFR (CKD-EPI)AfAm    33.40


 


Est GFR (CKD-EPI)NonAf    28.82


 


POC Glucometer  293  165 


 


Random Glucose    153 H


 


Hemoglobin A1c %   


 


Calcium    9.4


 


Urine Color   


 


Urine Appearance   


 


Urine pH   


 


Ur Specific Gravity   


 


Urine Protein   


 


Urine Glucose (UA)   


 


Urine Ketones   


 


Urine Blood   


 


Urine Nitrite   


 


Urine Bilirubin   


 


Urine Urobilinogen   


 


Ur Leukocyte Esterase   


 


Urine WBC (Auto)   


 


Urine RBC (Auto)   


 


Urine Casts (Auto)   


 


U Epithel Cells (Auto)   


 


Urine Bacteria (Auto)   














  09/14/20 09/14/20





  06:50 11:19


 


Sodium  


 


Potassium  


 


Chloride  


 


Carbon Dioxide  


 


Anion Gap  


 


BUN  


 


Creatinine  


 


Est GFR (CKD-EPI)AfAm  


 


Est GFR (CKD-EPI)NonAf  


 


POC Glucometer   214


 


Random Glucose  


 


Hemoglobin A1c %  7.2 H 


 


Calcium  


 


Urine Color  


 


Urine Appearance  


 


Urine pH  


 


Ur Specific Gravity  


 


Urine Protein  


 


Urine Glucose (UA)  


 


Urine Ketones  


 


Urine Blood  


 


Urine Nitrite  


 


Urine Bilirubin  


 


Urine Urobilinogen  


 


Ur Leukocyte Esterase  


 


Urine WBC (Auto)  


 


Urine RBC (Auto)  


 


Urine Casts (Auto)  


 


U Epithel Cells (Auto)  


 


Urine Bacteria (Auto)  














A/P


Acute on Chronic Diastolic Heart Failure


Right Pleural Effusion


COPD


CKD


HTN


Tobacco abuse 





-  Lasix


-  monitor urine output, creatinine


-  daily weights


-  O2 to keep SpO2 >90%


-  inhaled bronchodilators as needed


-  DVT prophylaxis





Dr Tucker

## 2020-09-14 NOTE — PN
Progress Note, Physician


History of Present Illness: 





Pt seen and examined at bedside. She is awake and alert. She denies shortness of

breath. 





- Current Medication List


Current Medications: 


Active Medications





Albuterol Sulfate (Ventolin Hfa Inhaler -)  2 puff IH Q4H PRN


   PRN Reason: SHORT OF BREATH/WHEEZING


   Last Admin: 09/09/20 22:16 Dose:  2 puff


   Documented by: 


Alprazolam (Xanax -)  0.5 mg PO Q8H PRN


   PRN Reason: ANXIETY


   Last Admin: 09/14/20 02:40 Dose:  0.5 mg


   Documented by: 


Amlodipine Besylate (Norvasc -)  10 mg PO DAILY [facility]


   Last Admin: 09/14/20 10:06 Dose:  10 mg


   Documented by: 


Aspirin (Ecotrin -)  81 mg PO DAILY [facility]


   Last Admin: 09/14/20 10:06 Dose:  81 mg


   Documented by: 


Brimonidine Tartrate (Alphagan 0.2% -)  1 drop OU BID [facility]


   Last Admin: 09/14/20 10:14 Dose:  1 drop


   Documented by: 


Carvedilol (Coreg -)  6.25 mg PO BID [facility]


   Last Admin: 09/14/20 10:06 Dose:  6.25 mg


   Documented by: 


Duloxetine HCl (Cymbalta -)  60 mg PO DAILY [facility]


   Last Admin: 09/14/20 10:06 Dose:  60 mg


   Documented by: 


Guaifenesin (Robitussin Dm -)  10 ml PO Q6H PRN


   PRN Reason: COUGH


Heparin Sodium (Porcine) (Heparin -)  5,000 unit SQ BID [facility]


   Last Admin: 09/14/20 10:06 Dose:  5,000 unit


   Documented by: 


Insulin Aspart (Novolog Vial Sliding Scale -)  1 vial SQ [facility]; Protocol


   Last Admin: 09/14/20 11:51 Dose:  4 unit


   Documented by: 


Insulin Detemir (Levemir Vial)  30 units SQ HS [facility]


   Last Admin: 09/13/20 21:14 Dose:  30 units


   Documented by: 


Insulin Detemir (Levemir Vial)  30 units SQ AM [facility]


   Last Admin: 09/14/20 06:59 Dose:  30 units


   Documented by: 


Latanoprost (Xalatan 0.005% Eye Drops -)  1 drop OU HS [facility]


   Last Admin: 09/13/20 22:50 Dose:  Not Given


   Documented by: 


Multivitamins/Minerals/Vitamin C (Tab-A-Vit -)  1 tab PO DAILY [facility]


   Last Admin: 09/14/20 10:06 Dose:  1 tab


   Documented by: 


Nicotine (Nicoderm Patch -)  14 mg TD DAILY [facility]


   Last Admin: 09/14/20 10:05 Dose:  14 mg


   Documented by: 


Nitroglycerin (Nitro-Bid 2% Paste -)  1 inch TD Q6HPO [facility]


   Last Admin: 09/14/20 11:51 Dose:  1 inch


   Documented by: 


Pantoprazole Sodium (Protonix -)  40 mg PO DAILY [facility]


   Last Admin: 09/14/20 10:06 Dose:  40 mg


   Documented by: 


Polyethylene Glycol (Miralax (For Daily Use) -)  17 gm PO DAILY [facility]


   PRN Reason: CONSTIPATION


   Last Admin: 09/06/20 18:55 Dose:  17 grams


   Documented by: 


Rosuvastatin Calcium (Crestor -)  10 mg PO HS [facility]


   Last Admin: 09/13/20 21:11 Dose:  10 mg


   Documented by: 


Timolol Maleate (Timoptic 0.5%)  1 drop OU BID [facility]


   Last Admin: 09/14/20 10:14 Dose:  1 applic


   Documented by: 


Tiotropium Bromide (Spiriva Respimat)  2 puff IH DAILY [facility]


   Last Admin: 09/14/20 10:14 Dose:  2 puff


   Documented by: 











- Objective


Vital Signs: 


                                   Vital Signs











Temperature  97.8 F   09/14/20 10:00


 


Pulse Rate  79   09/14/20 10:00


 


Respiratory Rate  18   09/14/20 10:00


 


Blood Pressure  159/69   09/14/20 10:00


 


O2 Sat by Pulse Oximetry (%)  97   09/14/20 10:00











Constitutional: Yes: Calm


Eyes: Yes: Conjunctiva Clear


HENT: Yes: Atraumatic


Neck: Yes: Supple


Cardiovascular: Yes: S1, S2


Respiratory: Yes: CTA Bilaterally, Rhonchi


Gastrointestinal: Yes: Soft, Abdomen, Obese


Genitourinary: Yes: WNL


Musculoskeletal: Yes: WNL


Edema: Yes


Edema: LLE: 1+, RLE: 1+


Neurological: Yes: Oriented


Psychiatric: Yes: Oriented


Labs: 


                                    CBC, BMP





                                 09/10/20 06:25 





                                 09/14/20 06:50 





                                    INR, PTT











INR  0.94  (0.83-1.09)   09/02/20  16:36    














Problem List





- Problems


(1) COPD (chronic obstructive pulmonary disease)


Code(s): J44.9 - CHRONIC OBSTRUCTIVE PULMONARY DISEASE, UNSPECIFIED   


Qualifiers: 


   COPD type: unspecified COPD   Qualified Code(s): J44.9 - Chronic obstructive 

pulmonary disease, unspecified   





(2) COPD exacerbation


Code(s): J44.1 - CHRONIC OBSTRUCTIVE PULMONARY DISEASE W (ACUTE) EXACERBATION   





(3) CKD (chronic kidney disease) stage 3, GFR 30-59 ml/min


Code(s): N18.3 - CHRONIC KIDNEY DISEASE, STAGE 3 (MODERATE)   





Assessment/Plan


                               Current Medications











Generic Name Dose Route Start Last Admin





  Trade Name Freq  PRN Reason Stop Dose Admin


 


Albuterol Sulfate  2 puff  09/03/20 00:47  09/09/20 22:16





  Ventolin Hfa Inhaler -  IH   2 puff





  Q4H PRN   Administration





  SHORT OF BREATH/WHEEZING  


 


Alprazolam  0.5 mg  09/12/20 09:45  09/14/20 02:40





  Xanax -  PO   0.5 mg





  Q8H PRN   Administration





  ANXIETY  


 


Amlodipine Besylate  10 mg  09/03/20 10:00  09/14/20 10:06





  Norvasc -  PO   10 mg





  DAILY JAYCEE   Administration


 


Aspirin  81 mg  09/03/20 10:00  09/14/20 10:06





  Ecotrin -  PO   81 mg





  DAILY JAYCEE   Administration


 


Brimonidine Tartrate  1 drop  09/03/20 10:00  09/14/20 10:14





  Alphagan 0.2% -  OU   1 drop





  BID JAYCEE   Administration


 


Carvedilol  6.25 mg  09/11/20 10:00  09/14/20 10:06





  Coreg -  PO   6.25 mg





  BID JAYCEE   Administration


 


Duloxetine HCl  60 mg  09/03/20 10:00  09/14/20 10:06





  Cymbalta -  PO   60 mg





  DAILY JAYCEE   Administration


 


Guaifenesin  10 ml  09/13/20 01:02 





  Robitussin Dm -  PO  





  Q6H PRN  





  COUGH  


 


Heparin Sodium (Porcine)  5,000 unit  09/03/20 10:00  09/14/20 10:06





  Heparin -  SQ   5,000 unit





  BID JAYCEE   Administration


 


Insulin Aspart  1 vial  09/03/20 07:00  09/14/20 11:51





  Novolog Vial Sliding Scale -  SQ   4 unit





  [facility] JAYCEE   Administration





  Protocol  


 


Insulin Detemir  30 units  09/09/20 22:00  09/13/20 21:14





  Levemir Vial  SQ   30 units





  HS JAYCEE   Administration


 


Insulin Detemir  30 units  09/12/20 07:00  09/14/20 06:59





  Levemir Vial  SQ   30 units





  AM JAYCEE   Administration


 


Latanoprost  1 drop  09/03/20 22:00  09/13/20 22:50





  Xalatan 0.005% Eye Drops -  OU   Not Given





  HS JAYCEE  


 


Multivitamins/Minerals/Vitamin C  1 tab  09/03/20 10:00  09/14/20 10:06





  Tab-A-Vit -  PO   1 tab





  DAILY JAYCEE   Administration


 


Nicotine  14 mg  09/05/20 10:00  09/14/20 10:05





  Nicoderm Patch -  TD   14 mg





  DAILY JAYCEE   Administration


 


Nitroglycerin  1 inch  09/12/20 12:00  09/14/20 11:51





  Nitro-Bid 2% Paste -  TD   1 inch





  Q6HPO JAYCEE   Administration


 


Pantoprazole Sodium  40 mg  09/04/20 10:00  09/14/20 10:06





  Protonix -  PO   40 mg





  DAILY JAYCEE   Administration


 


Polyethylene Glycol  17 gm  09/05/20 15:15  09/06/20 18:55





  Miralax (For Daily Use) -  PO   17 grams





  DAILY PRN   Administration





  CONSTIPATION  


 


Rosuvastatin Calcium  10 mg  09/03/20 22:00  09/13/20 21:11





  Crestor -  PO   10 mg





  HS JAYCEE   Administration


 


Timolol Maleate  1 drop  09/03/20 10:00  09/14/20 10:14





  Timoptic 0.5%  OU   1 applic





  BID JAYCEE   Administration


 


Tiotropium Bromide  2 puff  09/03/20 10:00  09/14/20 10:14





  Spiriva Respimat  IH   2 puff





  DAILY JAYCEE   Administration











Impression


1. CKD


2. CHF


3. COPD


4. hld


5. dm


6. cad


7. htn


8. proteinuria





Plan


- cont diuretics


- follow serologies


- repeat ua shows less protein


- follow prt to crt ratio


- follow a1c


- pt still gets SOB with ambulation

## 2020-09-14 NOTE — PN
Progress Note (short form)





- Note


Progress Note: 


s:  no cp palps dizzy. stable dyspnea, not improving much


                                        


                                        


                              Current Medications











Generic Name Dose Route Start Last Admin





  Trade Name Louisq  PRN Reason Stop Dose Admin


 


Albuterol Sulfate  2 puff  09/03/20 00:47  09/09/20 22:16





  Ventolin Hfa Inhaler -  IH   2 puff





  Q4H PRN   Administration





  SHORT OF BREATH/WHEEZING  


 


Alprazolam  0.5 mg  09/12/20 09:45  09/14/20 02:40





  Xanax -  PO   0.5 mg





  Q8H PRN   Administration





  ANXIETY  


 


Amlodipine Besylate  10 mg  09/03/20 10:00  09/14/20 10:06





  Norvasc -  PO   10 mg





  DAILY JAYCEE   Administration


 


Aspirin  81 mg  09/03/20 10:00  09/14/20 10:06





  Ecotrin -  PO   81 mg





  DAILY JAYCEE   Administration


 


Brimonidine Tartrate  1 drop  09/03/20 10:00  09/14/20 10:14





  Alphagan 0.2% -  OU   1 drop





  BID JAYCEE   Administration


 


Carvedilol  6.25 mg  09/11/20 10:00  09/14/20 10:06





  Coreg -  PO   6.25 mg





  BID JAYCEE   Administration


 


Duloxetine HCl  60 mg  09/03/20 10:00  09/14/20 10:06





  Cymbalta -  PO   60 mg





  DAILY JAYCEE   Administration


 


Guaifenesin  10 ml  09/13/20 01:02 





  Robitussin Dm -  PO  





  Q6H PRN  





  COUGH  


 


Heparin Sodium (Porcine)  5,000 unit  09/03/20 10:00  09/14/20 10:06





  Heparin -  SQ   5,000 unit





  BID JAYCEE   Administration


 


Insulin Aspart  1 vial  09/03/20 07:00  09/14/20 06:59





  Novolog Vial Sliding Scale -  SQ   2 unit





  [facility] JAYCEE   Administration





  Protocol  


 


Insulin Detemir  30 units  09/09/20 22:00  09/13/20 21:14





  Levemir Vial  SQ   30 units





  HS JAYCEE   Administration


 


Insulin Detemir  30 units  09/12/20 07:00  09/14/20 06:59





  Levemir Vial  SQ   30 units





  AM JAYCEE   Administration


 


Latanoprost  1 drop  09/03/20 22:00  09/13/20 22:50





  Xalatan 0.005% Eye Drops -  OU   Not Given





  HS [facility]  


 


Multivitamins/Minerals/Vitamin C  1 tab  09/03/20 10:00  09/14/20 10:06





  Tab-A-Vit -  PO   1 tab





  DAILY JAYCEE   Administration


 


Nicotine  14 mg  09/05/20 10:00  09/14/20 10:05





  Nicoderm Patch -  TD   14 mg





  DAILY JAYCEE   Administration


 


Nitroglycerin  1 inch  09/12/20 12:00  09/14/20 05:49





  Nitro-Bid 2% Paste -  TD   1 inch





  Q6HPO JAYCEE   Administration


 


Pantoprazole Sodium  40 mg  09/04/20 10:00  09/14/20 10:06





  Protonix -  PO   40 mg





  DAILY JAYCEE   Administration


 


Polyethylene Glycol  17 gm  09/05/20 15:15  09/06/20 18:55





  Miralax (For Daily Use) -  PO   17 grams





  DAILY PRN   Administration





  CONSTIPATION  


 


Rosuvastatin Calcium  10 mg  09/03/20 22:00  09/13/20 21:11





  Crestor -  PO   10 mg





  HS JAYCEE   Administration


 


Timolol Maleate  1 drop  09/03/20 10:00  09/14/20 10:14





  Timoptic 0.5%  OU   1 applic





  BID JAYCEE   Administration


 


Tiotropium Bromide  2 puff  09/03/20 10:00  09/14/20 10:14





  Spiriva Respimat  IH   2 puff





  DAILY JAYCEE   Administration








                                   Vital Signs











 Period  Temp  Pulse  Resp  BP Sys/Thrasher  Pulse Ox


 


 Last 24 Hr  97.5 F-97.8 F  72-83  18-20  119-159/56-71  95-97








Constitutional: Yes: No Distress


Respiratory: Yes: cta bl nl eff


Gastrointestinal: Yes: Soft, Abdomen, Obese


Cardiovascular: Yes: Regular Rate and Rhythm


JVD: No


Heart Sounds: Yes: S1, S2 (rrr)


Edema: trace le edema bl


Neurological: Yes: Alert, Oriented


no jaundice, diaphoresis


not agitated





                                    CBC, BMP





                                 09/10/20 06:25 





                                 09/14/20 06:50 











nsr 64bpm, borderline low voltage, no acute ST changes


Prior Cardiac Procedures: Cardiac Catheterization (non obstx CAD within last 5 

years)


Ejection Fraction %: LVEF > or = 40 %


echo 9/2020: nl lv/rv, no sig valve path





Imaging





- Results


Cat Scan: Report Reviewed


EKG: Image Reviewed





IMP:


Chronic diastolic CHF


Bilateral lung consolidation: PNA vs CHF; COVID NEGATIVE


Non obstx CAD


CKD


DM


HTN








REC:


1. Acute on chronic diastolic CHF (on lasix 40 po qd at home):


-treated with lasix 40 iv qd, increased to 80 iv qd few days ago for worsening 

cxr, then held 9/11 for rising bun/creat


-pt remains with minimal improvement in sob with activity (NYHA III sx's), net 

wt loss only 3 lb. + signif abd distension persists. probable JVD


-yesterday got iv lasix 80 with metolazone 5 with some improvement in wt.  will 

redose the same for today.


-Daily BMP to monitor renal fx


-echo here unremarkable (nl LV/RV/valves, no pulm HTN noted)


-Non obstx CAD on cath within last 5 years


-rec MAGNOLIA workup and treatment as outpt if she is amenable





2. HTN: 


-cont current meds





3. PNA?:


-CT pattern more c/w consolidation, though normal temp curve, no infectious 

sx's, pleural fluid transudative (suspect dry cough is chf sx)


-clinically volume overloaded


-Pulm and ID following.





4. Nonobstx CAD:


-cont asa, statin





5. NAY on CKD:


-baseline creat 1.4-1.7 from prior hosp stay


-possible cardiorenal syndr contributing here as well, cr has been stable so 

far.


-Unclear why not on ACE/ARB, perhaps GFR or K+ would not permit. no change to 

prior tx plan





6. DM: as per PMD





7. COPD/smoking: smoking cessation recommended.

## 2020-09-14 NOTE — PN
Progress Note, Physician


History of Present Illness: 


pt seen/ examined


sitting in chair


better 


decreased sob


mood ok























- Current Medication List


Current Medications: 


Active Medications





Albuterol Sulfate (Ventolin Hfa Inhaler -)  2 puff IH Q4H PRN


   PRN Reason: SHORT OF BREATH/WHEEZING


   Last Admin: 09/09/20 22:16 Dose:  2 puff


   Documented by: 


Alprazolam (Xanax -)  0.5 mg PO Q8H PRN


   PRN Reason: ANXIETY


   Last Admin: 09/14/20 02:40 Dose:  0.5 mg


   Documented by: 


Amlodipine Besylate (Norvasc -)  10 mg PO DAILY [facility]


   Last Admin: 09/13/20 09:28 Dose:  10 mg


   Documented by: 


Aspirin (Ecotrin -)  81 mg PO DAILY [facility]


   Last Admin: 09/13/20 09:28 Dose:  81 mg


   Documented by: 


Brimonidine Tartrate (Alphagan 0.2% -)  1 drop OU BID [facility]


   Last Admin: 09/13/20 21:16 Dose:  1 drop


   Documented by: 


Carvedilol (Coreg -)  6.25 mg PO BID [facility]


   Last Admin: 09/13/20 21:11 Dose:  6.25 mg


   Documented by: 


Duloxetine HCl (Cymbalta -)  60 mg PO DAILY [facility]


   Last Admin: 09/13/20 09:28 Dose:  60 mg


   Documented by: 


Guaifenesin (Robitussin Dm -)  10 ml PO Q6H PRN


   PRN Reason: COUGH


Heparin Sodium (Porcine) (Heparin -)  5,000 unit SQ BID [facility]


   Last Admin: 09/13/20 21:10 Dose:  5,000 unit


   Documented by: 


Insulin Aspart (Novolog Vial Sliding Scale -)  [facility]; Protocol


   Last Admin: 09/14/20 06:59 Dose:  2 unit


   Documented by: 


Insulin Detemir (Levemir Vial)  30 units SQ HS [facility]


   Last Admin: 09/13/20 21:14 Dose:  30 units


   Documented by: 


Insulin Detemir (Levemir Vial)  30 units SQ AM [facility]


   Last Admin: 09/14/20 06:59 Dose:  30 units


   Documented by: 


Latanoprost (Xalatan 0.005% Eye Drops -)  1 drop OU HS [facility]


   Last Admin: 09/13/20 22:50 Dose:  Not Given


   Documented by: 


Multivitamins/Minerals/Vitamin C (Tab-A-Vit -)  1 tab PO DAILY [facility]


   Last Admin: 09/13/20 09:27 Dose:  1 tab


   Documented by: 


Nicotine (Nicoderm Patch -)  14 mg TD DAILY [facility]


   Last Admin: 09/13/20 09:27 Dose:  14 mg


   Documented by: 


Nitroglycerin (Nitro-Bid 2% Paste -)  1 inch TD Q6HPO [facility]


   Last Admin: 09/14/20 05:49 Dose:  1 inch


   Documented by: 


Pantoprazole Sodium (Protonix -)  40 mg PO DAILY [facility]


   Last Admin: 09/13/20 09:27 Dose:  40 mg


   Documented by: 


Polyethylene Glycol (Miralax (For Daily Use) -)  17 gm PO DAILY PRN


   PRN Reason: CONSTIPATION


   Last Admin: 09/06/20 18:55 Dose:  17 grams


   Documented by: 


Rosuvastatin Calcium (Crestor -)  10 mg PO HS [facility]


   Last Admin: 09/13/20 21:11 Dose:  10 mg


   Documented by: 


Timolol Maleate (Timoptic 0.5%)  1 drop OU BID [facility]


   Last Admin: 09/13/20 21:16 Dose:  1 applic


   Documented by: 


Tiotropium Bromide (Spiriva Respimat)  2 puff IH DAILY [facility]


   Last Admin: 09/13/20 09:25 Dose:  2 puff


   Documented by: 











- Objective


Vital Signs: 


                                   Vital Signs











Temperature  97.7 F   09/14/20 06:00


 


Pulse Rate  83   09/14/20 06:00


 


Respiratory Rate  18   09/14/20 06:00


 


Blood Pressure  145/71   09/14/20 06:00


 


O2 Sat by Pulse Oximetry (%)  95   09/14/20 06:00











Constitutional: Yes: No Distress, Obese


Neck: Yes: Supple


Cardiovascular: Yes: Regular Rate and Rhythm


Respiratory: Yes: CTA Bilaterally


Gastrointestinal: Yes: Soft, Abdomen, Obese


Edema: LLE: 1+, RLE: 1+


Neurological: Yes: Alert


Labs: 


                                    CBC, BMP





                                 09/10/20 06:25 





                                 09/14/20 06:50 





                                    INR, PTT











INR  0.94  (0.83-1.09)   09/02/20  16:36    














Problem List





- Problems


(1) COPD (chronic obstructive pulmonary disease)


Code(s): J44.9 - CHRONIC OBSTRUCTIVE PULMONARY DISEASE, UNSPECIFIED   


Qualifiers: 


   COPD type: unspecified COPD   Qualified Code(s): J44.9 - Chronic obstructive 

pulmonary disease, unspecified   





(2) Encounter for screening laboratory testing for COVID-19 virus


Code(s): Z11.59 - ENCOUNTER FOR SCREENING FOR OTHER VIRAL DISEASES   





(3) Pleural effusion


Code(s): J90 - PLEURAL EFFUSION, NOT ELSEWHERE CLASSIFIED   





(4) Shortness of breath on exertion


Code(s): R06.02 - SHORTNESS OF BREATH   





(5) ASHD (arteriosclerotic heart disease)


Code(s): I25.10 - ATHSCL HEART DISEASE OF NATIVE CORONARY ARTERY W/O ANG PCTRS  







(6) Acute hypoxemic respiratory failure


Code(s): J96.01 - ACUTE RESPIRATORY FAILURE WITH HYPOXIA   





(7) Acute on chronic diastolic CHF (congestive heart failure)


Code(s): I50.33 - ACUTE ON CHRONIC DIASTOLIC (CONGESTIVE) HEART FAILURE   





(8) CKD (chronic kidney disease) stage 3, GFR 30-59 ml/min


Code(s): N18.3 - CHRONIC KIDNEY DISEASE, STAGE 3 (MODERATE)   





(9) HTN (hypertension)


Code(s): I10 - ESSENTIAL (PRIMARY) HYPERTENSION   


Qualifiers: 


   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   





(10) IDDM (insulin dependent diabetes mellitus)


Code(s): E11.9 - TYPE 2 DIABETES MELLITUS WITHOUT COMPLICATIONS; Z79.4 - LONG 

TERM (CURRENT) USE OF INSULIN   





(11) Obesity (BMI 30-39.9)


Code(s): E66.9 - OBESITY, UNSPECIFIED   





Assessment/Plan


discussed again with pt/ RN again today.


Agree with current management


i/v  lasix


Monitor lytes 


xanax for anxiety


monitor 


will  continue to follow

## 2020-09-15 NOTE — PN
Progress Note (short form)





- Note


Progress Note: 


OOB to chair.  


Breathing continues to feel better. 


No acute events overnight. 





                                 Intake & Output











 09/12/20 09/13/20 09/14/20 09/15/20





 23:59 23:59 23:59 23:59


 


Intake Total 250 0 840 300


 


Balance 250 0 840 300


 


Weight 253 lb 253 lb 4.8 oz 251 lb 3.2 oz 250 lb 12.8 oz











                                Last Vital Signs











Temp Pulse Resp BP Pulse Ox


 


 97.7 F   89   20   129/56 L  94 L


 


 09/15/20 14:59  09/15/20 14:59  09/15/20 14:59  09/15/20 14:59  09/15/20 10:00








Active Medications





Albuterol Sulfate (Ventolin Hfa Inhaler -)  2 puff IH Q4H PRN


   PRN Reason: SHORT OF BREATH/WHEEZING


   Last Admin: 09/09/20 22:16 Dose:  2 puff


   Documented by: 


Amlodipine Besylate (Norvasc -)  10 mg PO DAILY [facility]


   Last Admin: 09/15/20 10:21 Dose:  10 mg


   Documented by: 


Aspirin (Ecotrin -)  81 mg PO DAILY [facility]


   Last Admin: 09/15/20 10:20 Dose:  81 mg


   Documented by: 


Brimonidine Tartrate (Alphagan 0.2% -)  1 drop OU BID [facility]


   Last Admin: 09/15/20 10:19 Dose:  1 drop


   Documented by: 


Carvedilol (Coreg -)  6.25 mg PO BID [facility]


   Last Admin: 09/15/20 10:20 Dose:  6.25 mg


   Documented by: 


Duloxetine HCl (Cymbalta -)  60 mg PO DAILY [facility]


   Last Admin: 09/15/20 10:20 Dose:  60 mg


   Documented by: 


Furosemide (Lasix Injection -)  80 mg IVPB DAILY [facility]


Guaifenesin (Robitussin Dm -)  10 ml PO Q6H PRN


   PRN Reason: COUGH


Heparin Sodium (Porcine) (Heparin -)  5,000 unit SQ BID [facility]


   Last Admin: 09/15/20 10:20 Dose:  5,000 unit


   Documented by: 


Insulin Aspart (Novolog Vial Sliding Scale -)  1 vial SQ [facility]; Protocol


   Last Admin: 09/15/20 11:43 Dose:  2 unit


   Documented by: 


Insulin Detemir (Levemir Vial)  30 units SQ HS [facility]


   Last Admin: 09/14/20 23:13 Dose:  30 units


   Documented by: 


Insulin Detemir (Levemir Vial)  30 units SQ AM [facility]


   Last Admin: 09/15/20 07:14 Dose:  30 units


   Documented by: 


Latanoprost (Xalatan 0.005% Eye Drops -)  1 drop OU HS [facility]


   Last Admin: 09/14/20 23:21 Dose:  Not Given


   Documented by: 


Melatonin (Melatonin)  5 mg PO HS PRN


   PRN Reason: INSOMNIA


Multivitamins/Minerals/Vitamin C (Tab-A-Vit -)  1 tab PO DAILY [facility]


   Last Admin: 09/15/20 10:21 Dose:  1 tab


   Documented by: 


Nicotine (Nicoderm Patch -)  14 mg TD DAILY [facility]


   Last Admin: 09/15/20 10:20 Dose:  14 mg


   Documented by: 


Nitroglycerin (Nitro-Bid 2% Paste -)  1 inch TD Q6HPO [facility]


   Last Admin: 09/15/20 11:44 Dose:  1 inch


   Documented by: 


Pantoprazole Sodium (Protonix -)  40 mg PO DAILY [facility]


   Last Admin: 09/15/20 10:21 Dose:  40 mg


   Documented by: 


Polyethylene Glycol (Miralax (For Daily Use) -)  17 gm PO DAILY PRN


   PRN Reason: CONSTIPATION


   Last Admin: 09/06/20 18:55 Dose:  17 grams


   Documented by: 


Rosuvastatin Calcium (Crestor -)  10 mg PO HS [facility]


   Last Admin: 09/14/20 23:11 Dose:  10 mg


   Documented by: 


Timolol Maleate (Timoptic 0.5%)  1 drop OU BID [facility]


   Last Admin: 09/15/20 10:21 Dose:  1 drop


   Documented by: 


Tiotropium Bromide (Spiriva Respimat)  2 puff IH DAILY [facility]


   Last Admin: 09/15/20 10:21 Dose:  2 puff


   Documented by: 











Gen:  NAD


Heart: RRR


Lung: decreased breath sounds at the bases


Abd: soft, nontender


Ext: + edema


                                        


                         Laboratory Results - last 24 hr











  09/13/20 09/14/20 09/14/20





  17:35 16:20 23:10


 


Sodium   


 


Potassium   


 


Chloride   


 


Carbon Dioxide   


 


Anion Gap   


 


BUN   


 


Creatinine   


 


Est GFR (CKD-EPI)AfAm   


 


Est GFR (CKD-EPI)NonAf   


 


POC Glucometer   228  248


 


Random Glucose   


 


Calcium   


 


Ur Random Creatinine  41.0  


 


U Random Total Protein  113.7 H  


 


Protein/Creatinin Ratio  2.8  














  09/15/20 09/15/20 09/15/20





  06:50 07:12 11:35


 


Sodium  141  


 


Potassium  4.3  


 


Chloride  112 H  


 


Carbon Dioxide  24  


 


Anion Gap  5 L  


 


BUN  71.1 H  


 


Creatinine  1.9 H  


 


Est GFR (CKD-EPI)AfAm  31.29  


 


Est GFR (CKD-EPI)NonAf  27.00  


 


POC Glucometer   164  192


 


Random Glucose  173 H  


 


Calcium  9.1  


 


Ur Random Creatinine   


 


U Random Total Protein   


 


Protein/Creatinin Ratio   











A/P


Acute on Chronic Diastolic Heart Failure


Right Pleural Effusion


COPD


CKD


HTN


Tobacco abuse 





-  Lasix


-  monitor urine output, creatinine


-  daily weights


-  O2 to keep SpO2 >90%


-  inhaled bronchodilators as needed


-  DVT prophylaxis





Dr Tucker

## 2020-09-15 NOTE — PN
Progress Note (short form)





- Note


Progress Note: 


s: feels better today, wants to go home. minimal dyspnea with exertion. no chest

pain, palps, dizziness


                              Current Medications











Generic Name Dose Route Start Last Admin





  Trade Name Freq  PRN Reason Stop Dose Admin


 


Albuterol Sulfate  2 puff  09/03/20 00:47  09/09/20 22:16





  Ventolin Hfa Inhaler -  IH   2 puff





  Q4H PRN   Administration





  SHORT OF BREATH/WHEEZING  


 


Amlodipine Besylate  10 mg  09/03/20 10:00  09/15/20 10:21





  Norvasc -  PO   10 mg





  DAILY JAYCEE   Administration


 


Aspirin  81 mg  09/03/20 10:00  09/15/20 10:20





  Ecotrin -  PO   81 mg





  DAILY JAYCEE   Administration


 


Brimonidine Tartrate  1 drop  09/03/20 10:00  09/15/20 10:19





  Alphagan 0.2% -  OU   1 drop





  BID JAYCEE   Administration


 


Carvedilol  6.25 mg  09/11/20 10:00  09/15/20 10:20





  Coreg -  PO   6.25 mg





  BID JAYCEE   Administration


 


Duloxetine HCl  60 mg  09/03/20 10:00  09/15/20 10:20





  Cymbalta -  PO   60 mg





  DAILY JAYCEE   Administration


 


Furosemide  80 mg  09/15/20 11:30  09/15/20 11:44





  Lasix -  PO   80 mg





  DAILY JAYCEE   Administration


 


Guaifenesin  10 ml  09/13/20 01:02 





  Robitussin Dm -  PO  





  Q6H PRN  





  COUGH  


 


Heparin Sodium (Porcine)  5,000 unit  09/03/20 10:00  09/15/20 10:20





  Heparin -  SQ   5,000 unit





  BID JAYCEE   Administration


 


Insulin Aspart  1 vial  09/03/20 07:00  09/15/20 11:43





  Novolog Vial Sliding Scale -  SQ   2 unit





  [facility] JAYCEE   Administration





  Protocol  


 


Insulin Detemir  30 units  09/09/20 22:00  09/14/20 23:13





  Levemir Vial  SQ   30 units





  HS JAYCEE   Administration


 


Insulin Detemir  30 units  09/12/20 07:00  09/15/20 07:14





  Levemir Vial  SQ   30 units





  AM JAYCEE   Administration


 


Latanoprost  1 drop  09/03/20 22:00  09/14/20 23:21





  Xalatan 0.005% Eye Drops -  OU   Not Given





  HS JAYCEE  


 


Melatonin  5 mg  09/14/20 21:23 





  Melatonin  PO  





  HS PRN  





  INSOMNIA  


 


Multivitamins/Minerals/Vitamin C  1 tab  09/03/20 10:00  09/15/20 10:21





  Tab-A-Vit -  PO   1 tab





  DAILY JAYCEE   Administration


 


Nicotine  14 mg  09/05/20 10:00  09/15/20 10:20





  Nicoderm Patch -  TD   14 mg





  DAILY JAYCEE   Administration


 


Nitroglycerin  1 inch  09/12/20 12:00  09/15/20 11:44





  Nitro-Bid 2% Paste -  TD   1 inch





  Q6HPO JAYCEE   Administration


 


Pantoprazole Sodium  40 mg  09/04/20 10:00  09/15/20 10:21





  Protonix -  PO   40 mg





  DAILY JAYCEE   Administration


 


Polyethylene Glycol  17 gm  09/05/20 15:15  09/06/20 18:55





  Miralax (For Daily Use) -  PO   17 grams





  DAILY PRN   Administration





  CONSTIPATION  


 


Rosuvastatin Calcium  10 mg  09/03/20 22:00  09/14/20 23:11





  Crestor -  PO   10 mg





  HS JAYCEE   Administration


 


Timolol Maleate  1 drop  09/03/20 10:00  09/15/20 10:21





  Timoptic 0.5%  OU   1 drop





  BID JAYCEE   Administration


 


Tiotropium Bromide  2 puff  09/03/20 10:00  09/15/20 10:21





  Spiriva Respimat  IH   2 puff





  DAILY JAYCEE   Administration








                                   Vital Signs











 Period  Temp  Pulse  Resp  BP Sys/Thrasher  Pulse Ox


 


 Last 24 Hr  97.5 F-97.8 F  84-89  18-18  122-155/53-75  92-95











Constitutional: Yes: No Distress


Respiratory: Yes: cta bl nl eff


Gastrointestinal: Yes: Soft, Abdomen, Obese


Cardiovascular: Yes: Regular Rate and Rhythm


JVD: No


Heart Sounds: Yes: S1, S2 (rrr)


Edema: trace le edema bl


Neurological: Yes: Alert, Oriented


no jaundice, diaphoresis


not agitated





nsr 64bpm, borderline low voltage, no acute ST changes


Prior Cardiac Procedures: Cardiac Catheterization (non obstx CAD within last 5 

years)


Ejection Fraction %: LVEF > or = 40 %


echo 9/2020: nl lv/rv, no sig valve path





Imaging





- Results


Cat Scan: Report Reviewed


EKG: Image Reviewed





IMP:


Chronic diastolic CHF


Bilateral lung consolidation: PNA vs CHF; COVID NEGATIVE


Non obstx CAD


CKD


DM


HTN








REC:


1. Acute on chronic diastolic CHF (on lasix 40 po qd at home):


-treated with lasix 40 iv qd, increased to 80 iv qd few days ago for worsening 

cxr, then held 9/11 for rising bun/creat


-pt remains with minimal improvement in sob with activity (NYHA III sx's), net 

wt loss only 3 lb. + signif abd distension persists. probable JVD


-last two days got iv lasix 80 with metolazone 5 with some improvement in wt


-Daily BMP to monitor renal fx


-echo here unremarkable (nl LV/RV/valves, no pulm HTN noted)


-Non obstx CAD on cath within last 5 years


-rec MAGNOLIA workup and treatment as outpt if she is amenable


- respiratory status now near baseline with exertion.  will repeat CXR, trial PO

lasix 80 mg daily





2. HTN: 


-cont current meds





3. PNA?:


-CT pattern more c/w consolidation, though normal temp curve, no infectious 

sx's, pleural fluid transudative (suspect dry cough is chf sx)


-clinically volume overloaded


-Pulm and ID following.





4. Nonobstx CAD:


-cont asa, statin





5. NAY on CKD:


-baseline creat 1.4-1.7 from prior hosp stay


-possible cardiorenal syndr contributing here as well, cr has been stable so 

far.


-Unclear why not on ACE/ARB, perhaps GFR or K+ would not permit. no change to 

prior tx plan





6. DM: as per PMD





7. COPD/smoking: smoking cessation recommended.

## 2020-09-15 NOTE — PN
Progress Note, Physician


History of Present Illness: 





Pt seen and examined at bedside. She feels that her breathing is improved. SHe 

denies chest pain. 





- Current Medication List


Current Medications: 


Active Medications





Albuterol Sulfate (Ventolin Hfa Inhaler -)  2 puff IH Q4H PRN


   PRN Reason: SHORT OF BREATH/WHEEZING


   Last Admin: 09/09/20 22:16 Dose:  2 puff


   Documented by: 


Amlodipine Besylate (Norvasc -)  10 mg PO DAILY [facility]


   Last Admin: 09/15/20 10:21 Dose:  10 mg


   Documented by: 


Aspirin (Ecotrin -)  81 mg PO DAILY [facility]


   Last Admin: 09/15/20 10:20 Dose:  81 mg


   Documented by: 


Brimonidine Tartrate (Alphagan 0.2% -)  1 drop OU BID [facility]


   Last Admin: 09/15/20 10:19 Dose:  1 drop


   Documented by: 


Carvedilol (Coreg -)  6.25 mg PO BID [facility]


   Last Admin: 09/15/20 10:20 Dose:  6.25 mg


   Documented by: 


Duloxetine HCl (Cymbalta -)  60 mg PO DAILY [facility]


   Last Admin: 09/15/20 10:20 Dose:  60 mg


   Documented by: 


Furosemide (Lasix Injection -)  80 mg IVPB DAILY [facility]


Guaifenesin (Robitussin Dm -)  10 ml PO Q6H PRN


   PRN Reason: COUGH


Heparin Sodium (Porcine) (Heparin -)  5,000 unit SQ BID [facility]


   Last Admin: 09/15/20 10:20 Dose:  5,000 unit


   Documented by: 


Insulin Aspart (Novolog Vial Sliding Scale -)  1 vial SQ [facility]; Protocol


   Last Admin: 09/15/20 11:43 Dose:  2 unit


   Documented by: 


Insulin Detemir (Levemir Vial)  30 units SQ HS [facility]


   Last Admin: 09/14/20 23:13 Dose:  30 units


   Documented by: 


Insulin Detemir (Levemir Vial)  30 units SQ AM [facility]


   Last Admin: 09/15/20 07:14 Dose:  30 units


   Documented by: 


Latanoprost (Xalatan 0.005% Eye Drops -)  1 drop OU HS [facility]


   Last Admin: 09/14/20 23:21 Dose:  Not Given


   Documented by: 


Melatonin (Melatonin)  5 mg PO HS PRN


   PRN Reason: INSOMNIA


Multivitamins/Minerals/Vitamin C (Tab-A-Vit -)  1 tab PO DAILY [facility]


   Last Admin: 09/15/20 10:21 Dose:  1 tab


   Documented by: 


Nicotine (Nicoderm Patch -)  14 mg TD DAILY [facility]


   Last Admin: 09/15/20 10:20 Dose:  14 mg


   Documented by: 


Nitroglycerin (Nitro-Bid 2% Paste -)  1 inch TD Q6HPO [facility]


   Last Admin: 09/15/20 11:44 Dose:  1 inch


   Documented by: 


Pantoprazole Sodium (Protonix -)  40 mg PO DAILY [facility]


   Last Admin: 09/15/20 10:21 Dose:  40 mg


   Documented by: 


Polyethylene Glycol (Miralax (For Daily Use) -)  17 gm PO DAILY PRN


   PRN Reason: CONSTIPATION


   Last Admin: 09/15/20 15:35 Dose:  17 grams


   Documented by: 


Rosuvastatin Calcium (Crestor -)  10 mg PO HS [facility]


   Last Admin: 09/14/20 23:11 Dose:  10 mg


   Documented by: 


Timolol Maleate (Timoptic 0.5%)  1 drop OU BID [facility]


   Last Admin: 09/15/20 10:21 Dose:  1 drop


   Documented by: 


Tiotropium Bromide (Spiriva Respimat)  2 puff IH DAILY [facility]


   Last Admin: 09/15/20 10:21 Dose:  2 puff


   Documented by: 











- Objective


Vital Signs: 


                                   Vital Signs











Temperature  97.7 F   09/15/20 14:59


 


Pulse Rate  89   09/15/20 14:59


 


Respiratory Rate  20   09/15/20 14:59


 


Blood Pressure  129/56 L  09/15/20 14:59


 


O2 Sat by Pulse Oximetry (%)  94 L  09/15/20 10:00











Constitutional: Yes: Calm


Eyes: Yes: Conjunctiva Clear


HENT: Yes: Atraumatic


Cardiovascular: Yes: S1, S2


Respiratory: Yes: CTA Bilaterally


Gastrointestinal: Yes: Normal Bowel Sounds, Soft, Abdomen, Obese


Genitourinary: Yes: WNL


Musculoskeletal: Yes: WNL


Edema: Yes


Edema: LLE: 2+, RLE: 2+


Neurological: Yes: Oriented


Psychiatric: Yes: Oriented


Labs: 


                                    CBC, BMP





                                 09/10/20 06:25 





                                 09/15/20 06:50 





                                    INR, PTT











INR  0.94  (0.83-1.09)   09/02/20  16:36    














Problem List





- Problems


(1) COPD (chronic obstructive pulmonary disease)


Code(s): J44.9 - CHRONIC OBSTRUCTIVE PULMONARY DISEASE, UNSPECIFIED   


Qualifiers: 


   COPD type: unspecified COPD   Qualified Code(s): J44.9 - Chronic obstructive 

pulmonary disease, unspecified   





(2) COPD exacerbation


Code(s): J44.1 - CHRONIC OBSTRUCTIVE PULMONARY DISEASE W (ACUTE) EXACERBATION   





(3) CKD (chronic kidney disease) stage 3, GFR 30-59 ml/min


Code(s): N18.3 - CHRONIC KIDNEY DISEASE, STAGE 3 (MODERATE)   





Assessment/Plan


                               Current Medications











Generic Name Dose Route Start Last Admin





  Trade Name Freq  PRN Reason Stop Dose Admin


 


Albuterol Sulfate  2 puff  09/03/20 00:47  09/09/20 22:16





  Ventolin Hfa Inhaler -  IH   2 puff





  Q4H PRN   Administration





  SHORT OF BREATH/WHEEZING  


 


Amlodipine Besylate  10 mg  09/03/20 10:00  09/15/20 10:21





  Norvasc -  PO   10 mg





  DAILY JAYCEE   Administration


 


Aspirin  81 mg  09/03/20 10:00  09/15/20 10:20





  Ecotrin -  PO   81 mg





  DAILY JAYCEE   Administration


 


Brimonidine Tartrate  1 drop  09/03/20 10:00  09/15/20 10:19





  Alphagan 0.2% -  OU   1 drop





  BID JAYCEE   Administration


 


Carvedilol  6.25 mg  09/11/20 10:00  09/15/20 10:20





  Coreg -  PO   6.25 mg





  BID JAYCEE   Administration


 


Duloxetine HCl  60 mg  09/03/20 10:00  09/15/20 10:20





  Cymbalta -  PO   60 mg





  DAILY JAYCEE   Administration


 


Furosemide  80 mg  09/15/20 15:00 





  Lasix Injection -  IVPB  





  DAILY [facility]  


 


Guaifenesin  10 ml  09/13/20 01:02 





  Robitussin Dm -  PO  





  Q6H PRN  





  COUGH  


 


Heparin Sodium (Porcine)  5,000 unit  09/03/20 10:00  09/15/20 10:20





  Heparin -  SQ   5,000 unit





  BID JAYCEE   Administration


 


Insulin Aspart  1 vial  09/03/20 07:00  09/15/20 11:43





  Novolog Vial Sliding Scale -  SQ   2 unit





  [facility] JAYCEE   Administration





  Protocol  


 


Insulin Detemir  30 units  09/09/20 22:00  09/14/20 23:13





  Levemir Vial  SQ   30 units





  HS JAYCEE   Administration


 


Insulin Detemir  30 units  09/12/20 07:00  09/15/20 07:14





  Levemir Vial  SQ   30 units





  AM JAYCEE   Administration


 


Latanoprost  1 drop  09/03/20 22:00  09/14/20 23:21





  Xalatan 0.005% Eye Drops -  OU   Not Given





  HS JAYCEE  


 


Melatonin  5 mg  09/14/20 21:23 





  Melatonin  PO  





  HS PRN  





  INSOMNIA  


 


Multivitamins/Minerals/Vitamin C  1 tab  09/03/20 10:00  09/15/20 10:21





  Tab-A-Vit -  PO   1 tab





  DAILY JAYCEE   Administration


 


Nicotine  14 mg  09/05/20 10:00  09/15/20 10:20





  Nicoderm Patch -  TD   14 mg





  DAILY JAYCEE   Administration


 


Nitroglycerin  1 inch  09/12/20 12:00  09/15/20 11:44





  Nitro-Bid 2% Paste -  TD   1 inch





  Q6HPO JAYCEE   Administration


 


Pantoprazole Sodium  40 mg  09/04/20 10:00  09/15/20 10:21





  Protonix -  PO   40 mg





  DAILY JAYCEE   Administration


 


Polyethylene Glycol  17 gm  09/05/20 15:15  09/15/20 15:35





  Miralax (For Daily Use) -  PO   17 grams





  DAILY PRN   Administration





  CONSTIPATION  


 


Rosuvastatin Calcium  10 mg  09/03/20 22:00  09/14/20 23:11





  Crestor -  PO   10 mg





  HS JAYCEE   Administration


 


Timolol Maleate  1 drop  09/03/20 10:00  09/15/20 10:21





  Timoptic 0.5%  OU   1 drop





  BID JAYCEE   Administration


 


Tiotropium Bromide  2 puff  09/03/20 10:00  09/15/20 10:21





  Spiriva Respimat  IH   2 puff





  DAILY JAYCEE   Administration














Impression


1. CKD


2. CHF


3. COPD


4. hld


5. dm


6. cad


7. htn


8. proteinuria





Plan


- cont lasix


- monitor weights


- monitor crt


- serologies pending


- repeat labs in am


- reviewed urine studies


- dm can explain proteinuria

## 2020-09-15 NOTE — PN
Progress Note (short form)





- Note


Progress Note: 








denies chest pain 


better 


denies SOB 


                                        


                               Vital Signs - 24 hr











  09/14/20 09/14/20 09/14/20





  20:12 21:00 23:33


 


Temperature 97.8 F  


 


Pulse Rate 85  88


 


Respiratory 18 18 





Rate   


 


Blood Pressure 122/53 L  142/75


 


O2 Sat by Pulse 92 L 95 





Oximetry (%)   














  09/15/20 09/15/20 09/15/20





  02:01 06:00 09:00


 


Temperature 97.7 F 97.5 F L 


 


Pulse Rate 84 89 


 


Respiratory  18 





Rate   


 


Blood Pressure 142/71 155/71 


 


O2 Sat by Pulse  93 L 94 L





Oximetry (%)   














  09/15/20 09/15/20





  10:00 14:59


 


Temperature 98 F 97.7 F


 


Pulse Rate 86 89


 


Respiratory 20 20





Rate  


 


Blood Pressure 158/83 129/56 L


 


O2 Sat by Pulse 94 L 





Oximetry (%)  








                               Current Medications











Generic Name Dose Route Start Last Admin





  Trade Name Freq  PRN Reason Stop Dose Admin


 


Albuterol Sulfate  2 puff  09/03/20 00:47  09/09/20 22:16





  Ventolin Hfa Inhaler -  IH   2 puff





  Q4H PRN   Administration





  SHORT OF BREATH/WHEEZING  


 


Amlodipine Besylate  10 mg  09/03/20 10:00  09/15/20 10:21





  Norvasc -  PO   10 mg





  DAILY JAYCEE   Administration


 


Aspirin  81 mg  09/03/20 10:00  09/15/20 10:20





  Ecotrin -  PO   81 mg





  DAILY JAYCEE   Administration


 


Brimonidine Tartrate  1 drop  09/03/20 10:00  09/15/20 10:19





  Alphagan 0.2% -  OU   1 drop





  BID JAYCEE   Administration


 


Carvedilol  6.25 mg  09/11/20 10:00  09/15/20 10:20





  Coreg -  PO   6.25 mg





  BID JAYCEE   Administration


 


Duloxetine HCl  60 mg  09/03/20 10:00  09/15/20 10:20





  Cymbalta -  PO   60 mg





  DAILY JAYCEE   Administration


 


Furosemide  80 mg  09/15/20 15:00  09/15/20 16:06





  Lasix Injection -  IVPB   80 mg





  DAILY JAYCEE   Administration


 


Guaifenesin  10 ml  09/13/20 01:02 





  Robitussin Dm -  PO  





  Q6H PRN  





  COUGH  


 


Insulin Aspart  1 vial  09/03/20 07:00  09/15/20 11:43





  Novolog Vial Sliding Scale -  SQ   2 unit





  [facility] JAYCEE   Administration





  Protocol  


 


Insulin Detemir  30 units  09/09/20 22:00  09/14/20 23:13





  Levemir Vial  SQ   30 units





  HS JAYCEE   Administration


 


Insulin Detemir  30 units  09/12/20 07:00  09/15/20 07:14





  Levemir Vial  SQ   30 units





  AM JAYCEE   Administration


 


Latanoprost  1 drop  09/03/20 22:00  09/14/20 23:21





  Xalatan 0.005% Eye Drops -  OU   Not Given





  HS JAYCEE  


 


Melatonin  5 mg  09/14/20 21:23 





  Melatonin  PO  





  HS PRN  





  INSOMNIA  


 


Multivitamins/Minerals/Vitamin C  1 tab  09/03/20 10:00  09/15/20 10:21





  Tab-A-Vit -  PO   1 tab





  DAILY JAYCEE   Administration


 


Nicotine  14 mg  09/05/20 10:00  09/15/20 10:20





  Nicoderm Patch -  TD   14 mg





  DAILY JAYCEE   Administration


 


Nitroglycerin  1 inch  09/12/20 12:00  09/15/20 11:44





  Nitro-Bid 2% Paste -  TD   1 inch





  Q6HPO JAYCEE   Administration


 


Pantoprazole Sodium  40 mg  09/04/20 10:00  09/15/20 10:21





  Protonix -  PO   40 mg





  DAILY JAYCEE   Administration


 


Polyethylene Glycol  17 gm  09/05/20 15:15  09/15/20 15:35





  Miralax (For Daily Use) -  PO   17 grams





  DAILY PRN   Administration





  CONSTIPATION  


 


Rosuvastatin Calcium  10 mg  09/03/20 22:00  09/14/20 23:11





  Crestor -  PO   10 mg





  HS JAYCEE   Administration


 


Timolol Maleate  1 drop  09/03/20 10:00  09/15/20 10:21





  Timoptic 0.5%  OU   1 drop





  BID JAYCEE   Administration


 


Tiotropium Bromide  2 puff  09/03/20 10:00  09/15/20 10:21





  Spiriva Respimat  IH   2 puff





  DAILY JAYCEE   Administration








                         Laboratory Results - last 24 hr











  09/13/20 09/14/20 09/14/20





  17:35 06:50 23:10


 


Sodium   


 


Potassium   


 


Chloride   


 


Carbon Dioxide   


 


Anion Gap   


 


BUN   


 


Creatinine   


 


Est GFR (CKD-EPI)AfAm   


 


Est GFR (CKD-EPI)NonAf   


 


POC Glucometer    248


 


Random Glucose   


 


Calcium   


 


Ur Random Creatinine  41.0  


 


U Random Total Protein  113.7 H  


 


Protein/Creatinin Ratio  2.8  


 


Double Strand DNA Ab   <1 














  09/15/20 09/15/20 09/15/20





  06:50 07:12 11:35


 


Sodium  141  


 


Potassium  4.3  


 


Chloride  112 H  


 


Carbon Dioxide  24  


 


Anion Gap  5 L  


 


BUN  71.1 H  


 


Creatinine  1.9 H  


 


Est GFR (CKD-EPI)AfAm  31.29  


 


Est GFR (CKD-EPI)NonAf  27.00  


 


POC Glucometer   164  192


 


Random Glucose  173 H  


 


Calcium  9.1  


 


Ur Random Creatinine   


 


U Random Total Protein   


 


Protein/Creatinin Ratio   


 


Double Strand DNA Ab   














  09/15/20





  16:14


 


Sodium 


 


Potassium 


 


Chloride 


 


Carbon Dioxide 


 


Anion Gap 


 


BUN 


 


Creatinine 


 


Est GFR (CKD-EPI)AfAm 


 


Est GFR (CKD-EPI)NonAf 


 


POC Glucometer  269


 


Random Glucose 


 


Calcium 


 


Ur Random Creatinine 


 


U Random Total Protein 


 


Protein/Creatinin Ratio 


 


Double Strand DNA Ab 























S1 s2 RRR


Lungs decreased right 


Abd- soft, obese


NT


Edema decreased 








PLAN


CXR shows right pleural effusion - slight decreased


spoke with cardiology-- will give iv lasix today with metolazone


will follow up tomorrow to see if any improvement





COVID negative


pleural fluid studies - cytology negative


Nebs


 spoke with  Cardiology and Pulmonary -- continue with Lasix


monitor renal function 





OOB











Problem List





- Problems


(1) COPD exacerbation


Code(s): J44.1 - CHRONIC OBSTRUCTIVE PULMONARY DISEASE W (ACUTE) EXACERBATION   





(2) ASHD (arteriosclerotic heart disease)


Code(s): I25.10 - ATHSCL HEART DISEASE OF NATIVE CORONARY ARTERY W/O ANG PCTRS  







(3) Acute hypoxemic respiratory failure


Code(s): J96.01 - ACUTE RESPIRATORY FAILURE WITH HYPOXIA   





(4) Acute on chronic diastolic CHF (congestive heart failure)


Code(s): I50.33 - ACUTE ON CHRONIC DIASTOLIC (CONGESTIVE) HEART FAILURE   





(5) CKD (chronic kidney disease) stage 3, GFR 30-59 ml/min


Code(s): N18.3 - CHRONIC KIDNEY DISEASE, STAGE 3 (MODERATE)   





(6) HTN (hypertension)


Code(s): I10 - ESSENTIAL (PRIMARY) HYPERTENSION   


Qualifiers: 


   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   





(7) IDDM (insulin dependent diabetes mellitus)


Code(s): E11.9 - TYPE 2 DIABETES MELLITUS WITHOUT COMPLICATIONS; Z79.4 - LONG 

TERM (CURRENT) USE OF INSULIN   





(8) Pneumonia


Code(s): J18.9 - PNEUMONIA, UNSPECIFIED ORGANISM   


Qualifiers: 


   Laterality: right   Lung location: lower lobe of lung

## 2020-09-16 NOTE — PN
Progress Note, Physician


History of Present Illness: 





PULMONARY








ALERT,OOB-CHAIR,STILL DYSPNEIC WITH EXERTION





- Current Medication List


Current Medications: 


Active Medications





Albuterol Sulfate (Ventolin Hfa Inhaler -)  2 puff IH Q4H PRN


   PRN Reason: SHORT OF BREATH/WHEEZING


   Last Admin: 09/09/20 22:16 Dose:  2 puff


   Documented by: 


Amlodipine Besylate (Norvasc -)  10 mg PO DAILY [facility]


   Last Admin: 09/15/20 10:21 Dose:  10 mg


   Documented by: 


Aspirin (Ecotrin -)  81 mg PO DAILY [facility]


   Last Admin: 09/15/20 10:20 Dose:  81 mg


   Documented by: 


Brimonidine Tartrate (Alphagan 0.2% -)  1 drop OU BID [facility]


   Last Admin: 09/15/20 21:22 Dose:  1 drop


   Documented by: 


Carvedilol (Coreg -)  6.25 mg PO BID [facility]


   Last Admin: 09/15/20 21:18 Dose:  6.25 mg


   Documented by: 


Duloxetine HCl (Cymbalta -)  60 mg PO DAILY [facility]


   Last Admin: 09/15/20 10:20 Dose:  60 mg


   Documented by: 


Guaifenesin (Robitussin Dm -)  10 ml PO Q6H PRN


   PRN Reason: COUGH


Insulin Aspart (Novolog Vial Sliding Scale -)  1 vial SQ [facility]; Protocol


   Last Admin: 09/16/20 06:24 Dose:  Not Given


   Documented by: 


Insulin Detemir (Levemir Vial)  30 units SQ HS [facility]


   Last Admin: 09/15/20 21:24 Dose:  30 units


   Documented by: 


Insulin Detemir (Levemir Vial)  30 units SQ AM [facility]


   Last Admin: 09/16/20 06:28 Dose:  30 units


   Documented by: 


Latanoprost (Xalatan 0.005% Eye Drops -)  1 drop OU HS [facility]


   Last Admin: 09/15/20 21:25 Dose:  Not Given


   Documented by: 


Melatonin (Melatonin)  5 mg PO HS PRN


   PRN Reason: INSOMNIA


Multivitamins/Minerals/Vitamin C (Tab-A-Vit -)  1 tab PO DAILY [facility]


   Last Admin: 09/15/20 10:21 Dose:  1 tab


   Documented by: 


Nicotine (Nicoderm Patch -)  14 mg TD DAILY [facility]


   Last Admin: 09/15/20 10:20 Dose:  14 mg


   Documented by: 


Nitroglycerin (Nitro-Bid 2% Paste -)  1 inch TD Q6HPO [facility]


   Last Admin: 09/16/20 06:24 Dose:  1 inch


   Documented by: 


Pantoprazole Sodium (Protonix -)  40 mg PO DAILY [facility]


   Last Admin: 09/15/20 10:21 Dose:  40 mg


   Documented by: 


Polyethylene Glycol (Miralax (For Daily Use) -)  17 gm PO DAILY PRN


   PRN Reason: CONSTIPATION


   Last Admin: 09/15/20 15:35 Dose:  17 grams


   Documented by: 


Rosuvastatin Calcium (Crestor -)  10 mg PO HS [facility]


   Last Admin: 09/15/20 21:18 Dose:  10 mg


   Documented by: 


Timolol Maleate (Timoptic 0.5%)  1 drop OU BID [facility]


   Last Admin: 09/15/20 21:24 Dose:  1 drop


   Documented by: 


Tiotropium Bromide (Spiriva Respimat)  2 puff IH DAILY [facility]


   Last Admin: 09/15/20 10:21 Dose:  2 puff


   Documented by: 











- Objective


Vital Signs: 


                                   Vital Signs











Temperature  97.5 F L  09/16/20 06:00


 


Pulse Rate  101 H  09/16/20 06:00


 


Respiratory Rate  18   09/16/20 06:00


 


Blood Pressure  157/81   09/16/20 06:00


 


O2 Sat by Pulse Oximetry (%)  94 L  09/16/20 06:00











Constitutional: Yes: Calm, Obese


Eyes: Yes: WNL


HENT: Yes: WNL


Neck: Yes: WNL


Cardiovascular: Yes: Regular Rate and Rhythm, S1, S2


Respiratory: Yes: Diminished


Gastrointestinal: Yes: Normal Bowel Sounds, Soft


Extremities: Yes: WNL


Edema: Yes


Edema: LLE: Trace, RLE: Trace


Labs: 


                                    CBC, BMP





                                 09/16/20 06:50 





                                    INR, PTT











INR  0.94  (0.83-1.09)   09/02/20  16:36    














Problem List





- Problems


(1) COPD (chronic obstructive pulmonary disease)


Code(s): J44.9 - CHRONIC OBSTRUCTIVE PULMONARY DISEASE, UNSPECIFIED   


Qualifiers: 


   COPD type: unspecified COPD   Qualified Code(s): J44.9 - Chronic obstructive 

pulmonary disease, unspecified   





(2) Pleural effusion


Code(s): J90 - PLEURAL EFFUSION, NOT ELSEWHERE CLASSIFIED   





(3) Shortness of breath on exertion


Code(s): R06.02 - SHORTNESS OF BREATH   





(4) ASHD (arteriosclerotic heart disease)


Code(s): I25.10 - ATHSCL HEART DISEASE OF NATIVE CORONARY ARTERY W/O ANG PCTRS  







(5) Acute on chronic diastolic CHF (congestive heart failure)


Code(s): I50.33 - ACUTE ON CHRONIC DIASTOLIC (CONGESTIVE) HEART FAILURE   





(6) CKD (chronic kidney disease) stage 3, GFR 30-59 ml/min


Code(s): N18.3 - CHRONIC KIDNEY DISEASE, STAGE 3 (MODERATE)   





Assessment/Plan





Assessment/Plan





Acute on Chronic Diastolic Heart Failure


Right Pleural Effusion reaccumulating


COPD


CKD


HTN


TOBACCO ABUSE





-   IV lasix


-  monitor urine output, creatinine


-  daily weights


-  O2 to keep SpO2 >90%


- Pleural fluid chemistries c/w transudate


-  inhaled bronchodilators as needed


-  DVT prophylaxis


- smoking cessation counseled





DR REED

## 2020-09-16 NOTE — PN
Progress Note, Physician


History of Present Illness: 





Pt seen and examined at bedside. She is awake and alert. She feels that her 

breathing is improved. 





- Current Medication List


Current Medications: 


Active Medications





Albuterol Sulfate (Ventolin Hfa Inhaler -)  2 puff IH Q4H PRN


   PRN Reason: SHORT OF BREATH/WHEEZING


   Last Admin: 09/09/20 22:16 Dose:  2 puff


   Documented by: 


Amlodipine Besylate (Norvasc -)  10 mg PO DAILY [facility]


   Last Admin: 09/16/20 10:25 Dose:  10 mg


   Documented by: 


Aspirin (Ecotrin -)  81 mg PO DAILY [facility]


   Last Admin: 09/16/20 10:26 Dose:  81 mg


   Documented by: 


Brimonidine Tartrate (Alphagan 0.2% -)  1 drop OU BID [facility]


   Last Admin: 09/16/20 10:26 Dose:  1 drop


   Documented by: 


Carvedilol (Coreg -)  6.25 mg PO BID [facility]


   Last Admin: 09/16/20 10:25 Dose:  6.25 mg


   Documented by: 


Duloxetine HCl (Cymbalta -)  60 mg PO DAILY [facility]


   Last Admin: 09/16/20 10:25 Dose:  60 mg


   Documented by: 


Guaifenesin (Robitussin Dm -)  10 ml PO Q6H PRN


   PRN Reason: COUGH


Heparin Sodium (Porcine) (Heparin -)  5,000 unit SQ BID [facility]


Insulin Aspart (Novolog Vial Sliding Scale -)  1 vial SQ ACHS [facility]; Protocol


   Last Admin: 09/16/20 13:16 Dose:  4 unit


   Documented by: 


Insulin Detemir (Levemir Vial)  30 units SQ HS [facility]


   Last Admin: 09/15/20 21:24 Dose:  30 units


   Documented by: 


Insulin Detemir (Levemir Vial)  30 units SQ AM [facility]


   Last Admin: 09/16/20 06:28 Dose:  30 units


   Documented by: 


Latanoprost (Xalatan 0.005% Eye Drops -)  1 drop OU HS [facility]


   Last Admin: 09/15/20 21:25 Dose:  Not Given


   Documented by: 


Melatonin (Melatonin)  5 mg PO HS PRN


   PRN Reason: INSOMNIA


Multivitamins/Minerals/Vitamin C (Tab-A-Vit -)  1 tab PO DAILY [facility]


   Last Admin: 09/16/20 10:25 Dose:  1 tab


   Documented by: 


Nicotine (Nicoderm Patch -)  14 mg TD DAILY [facility]


   Last Admin: 09/16/20 10:25 Dose:  14 mg


   Documented by: 


Nitroglycerin (Nitro-Bid 2% Paste -)  1 inch TD Q6HPO [facility]


   Last Admin: 09/16/20 13:16 Dose:  1 inch


   Documented by: 


Pantoprazole Sodium (Protonix -)  40 mg PO DAILY [facility]


   Last Admin: 09/16/20 10:25 Dose:  40 mg


   Documented by: 


Polyethylene Glycol (Miralax (For Daily Use) -)  17 gm PO DAILY PRN


   PRN Reason: CONSTIPATION


   Last Admin: 09/15/20 15:35 Dose:  17 grams


   Documented by: 


Rosuvastatin Calcium (Crestor -)  10 mg PO HS [facility]


   Last Admin: 09/15/20 21:18 Dose:  10 mg


   Documented by: 


Timolol Maleate (Timoptic 0.5%)  1 drop OU BID [facility]


   Last Admin: 09/16/20 10:27 Dose:  1 drop


   Documented by: 


Tiotropium Bromide (Spiriva Respimat)  2 puff IH DAILY [facility]


   Last Admin: 09/16/20 10:26 Dose:  2 puff


   Documented by: 











- Objective


Vital Signs: 


                                   Vital Signs











Temperature  97.5 F L  09/16/20 06:00


 


Pulse Rate  101 H  09/16/20 06:00


 


Respiratory Rate  18   09/16/20 06:00


 


Blood Pressure  157/81   09/16/20 06:00


 


O2 Sat by Pulse Oximetry (%)  94 L  09/16/20 06:00











Constitutional: Yes: Calm


Eyes: Yes: Conjunctiva Clear


HENT: Yes: Atraumatic


Neck: Yes: Supple


Cardiovascular: Yes: S1, S2


Respiratory: Yes: CTA Bilaterally


Gastrointestinal: Yes: Soft, Abdomen, Obese


Genitourinary: Yes: WNL


Musculoskeletal: Yes: WNL


Edema: Yes


Edema: LLE: 2+, RLE: 2+


Neurological: Yes: Oriented


Psychiatric: Yes: Oriented


Labs: 


                                    CBC, BMP





                                 09/10/20 06:25 





                                 09/16/20 06:50 





                                    INR, PTT











INR  0.94  (0.83-1.09)   09/02/20  16:36    














Problem List





- Problems


(1) COPD (chronic obstructive pulmonary disease)


Code(s): J44.9 - CHRONIC OBSTRUCTIVE PULMONARY DISEASE, UNSPECIFIED   


Qualifiers: 


   COPD type: unspecified COPD   Qualified Code(s): J44.9 - Chronic obstructive 

pulmonary disease, unspecified   





(2) COPD exacerbation


Code(s): J44.1 - CHRONIC OBSTRUCTIVE PULMONARY DISEASE W (ACUTE) EXACERBATION   





(3) CKD (chronic kidney disease) stage 3, GFR 30-59 ml/min


Code(s): N18.3 - CHRONIC KIDNEY DISEASE, STAGE 3 (MODERATE)   





Assessment/Plan


                               Current Medications











Generic Name Dose Route Start Last Admin





  Trade Name Freq  PRN Reason Stop Dose Admin


 


Albuterol Sulfate  2 puff  09/03/20 00:47  09/09/20 22:16





  Ventolin Hfa Inhaler -  IH   2 puff





  Q4H PRN   Administration





  SHORT OF BREATH/WHEEZING  


 


Amlodipine Besylate  10 mg  09/03/20 10:00  09/16/20 10:25





  Norvasc -  PO   10 mg





  DAILY JAYCEE   Administration


 


Aspirin  81 mg  09/03/20 10:00  09/16/20 10:26





  Ecotrin -  PO   81 mg





  DAILY JAYCEE   Administration


 


Brimonidine Tartrate  1 drop  09/03/20 10:00  09/16/20 10:26





  Alphagan 0.2% -  OU   1 drop





  BID JAYCEE   Administration


 


Carvedilol  6.25 mg  09/11/20 10:00  09/16/20 10:25





  Coreg -  PO   6.25 mg





  BID JAYCEE   Administration


 


Duloxetine HCl  60 mg  09/03/20 10:00  09/16/20 10:25





  Cymbalta -  PO   60 mg





  DAILY JAYCEE   Administration


 


Guaifenesin  10 ml  09/13/20 01:02 





  Robitussin Dm -  PO  





  Q6H PRN  





  COUGH  


 


Heparin Sodium (Porcine)  5,000 unit  09/16/20 22:00 





  Heparin -  SQ  





  BID JAYCEE  


 


Insulin Aspart  1 vial  09/03/20 07:00  09/16/20 13:16





  Novolog Vial Sliding Scale -  SQ   4 unit





  [facility] JAYCEE   Administration





  Protocol  


 


Insulin Detemir  30 units  09/09/20 22:00  09/15/20 21:24





  Levemir Vial  SQ   30 units





  HS JAYCEE   Administration


 


Insulin Detemir  30 units  09/12/20 07:00  09/16/20 06:28





  Levemir Vial  SQ   30 units





  AM JAYCEE   Administration


 


Latanoprost  1 drop  09/03/20 22:00  09/15/20 21:25





  Xalatan 0.005% Eye Drops -  OU   Not Given





  HS JAYCEE  


 


Melatonin  5 mg  09/14/20 21:23 





  Melatonin  PO  





  HS PRN  





  INSOMNIA  


 


Multivitamins/Minerals/Vitamin C  1 tab  09/03/20 10:00  09/16/20 10:25





  Tab-A-Vit -  PO   1 tab





  DAILY JAYCEE   Administration


 


Nicotine  14 mg  09/05/20 10:00  09/16/20 10:25





  Nicoderm Patch -  TD   14 mg





  DAILY JAYCEE   Administration


 


Nitroglycerin  1 inch  09/12/20 12:00  09/16/20 13:16





  Nitro-Bid 2% Paste -  TD   1 inch





  Q6HPO JAYCEE   Administration


 


Pantoprazole Sodium  40 mg  09/04/20 10:00  09/16/20 10:25





  Protonix -  PO   40 mg





  DAILY JAYCEE   Administration


 


Polyethylene Glycol  17 gm  09/05/20 15:15  09/15/20 15:35





  Miralax (For Daily Use) -  PO   17 grams





  DAILY PRN   Administration





  CONSTIPATION  


 


Rosuvastatin Calcium  10 mg  09/03/20 22:00  09/15/20 21:18





  Crestor -  PO   10 mg





  HS JAYCEE   Administration


 


Timolol Maleate  1 drop  09/03/20 10:00  09/16/20 10:27





  Timoptic 0.5%  OU   1 drop





  BID JAYCEE   Administration


 


Tiotropium Bromide  2 puff  09/03/20 10:00  09/16/20 10:26





  Spiriva Respimat  IH   2 puff





  DAILY JAYCEE   Administration














Impression


1. CKD


2. CHF


3. COPD


4. hld


5. dm


6. cad


7. htn


8. proteinuria





Plan


- cont with lasix


- repeat labs in am


- imfx ordered for m -spike


- cont to monitor renal function


- serologies pending


- dm can explain proteinuria

## 2020-09-16 NOTE — PN
Progress Note (short form)





- Note


Progress Note: 








denies chest pain 


felt sob today on ambulating


                               Vital Signs - 24 hr











  09/15/20 09/15/20 09/15/20





  20:54 21:00 22:00


 


Temperature 97.9 F  97.8 F


 


Pulse Rate 82  83


 


Respiratory 18  18





Rate   


 


Blood Pressure 126/58 L  122/58 L


 


O2 Sat by Pulse 96 96 95





Oximetry (%)   














  09/16/20 09/16/20 09/16/20





  01:22 06:00 09:00


 


Temperature 97.8 F 97.5 F L 


 


Pulse Rate 97 H 101 H 


 


Respiratory 18 18 





Rate   


 


Blood Pressure 120/55 L 157/81 


 


O2 Sat by Pulse 93 L 94 L 95





Oximetry (%)   














  09/16/20 09/16/20





  10:00 14:01


 


Temperature 98.0 F 98.3 F


 


Pulse Rate 99 H 89


 


Respiratory 20 20





Rate  


 


Blood Pressure 152/74 156/74


 


O2 Sat by Pulse 95 95





Oximetry (%)  








                               Current Medications











Generic Name Dose Route Start Last Admin





  Trade Name Freq  PRN Reason Stop Dose Admin


 


Albuterol Sulfate  2 puff  09/03/20 00:47  09/09/20 22:16





  Ventolin Hfa Inhaler -  IH   2 puff





  Q4H PRN   Administration





  SHORT OF BREATH/WHEEZING  


 


Amlodipine Besylate  10 mg  09/03/20 10:00  09/16/20 10:25





  Norvasc -  PO   10 mg





  DAILY JAYCEE   Administration


 


Aspirin  81 mg  09/03/20 10:00  09/16/20 10:26





  Ecotrin -  PO   81 mg





  DAILY JAYCEE   Administration


 


Brimonidine Tartrate  1 drop  09/03/20 10:00  09/16/20 10:26





  Alphagan 0.2% -  OU   1 drop





  BID JAYCEE   Administration


 


Carvedilol  6.25 mg  09/11/20 10:00  09/16/20 10:25





  Coreg -  PO   6.25 mg





  BID JAYCEE   Administration


 


Duloxetine HCl  60 mg  09/03/20 10:00  09/16/20 10:25





  Cymbalta -  PO   60 mg





  DAILY JAYCEE   Administration


 


Guaifenesin  10 ml  09/13/20 01:02 





  Robitussin Dm -  PO  





  Q6H PRN  





  COUGH  


 


Heparin Sodium (Porcine)  5,000 unit  09/16/20 22:00 





  Heparin -  SQ  





  BID [facility]  


 


Insulin Aspart  1 vial  09/03/20 07:00  09/16/20 17:38





  Novolog Vial Sliding Scale -  SQ   6 unit





  [facility] JAYCEE   Administration





  Protocol  


 


Insulin Detemir  30 units  09/09/20 22:00  09/15/20 21:24





  Levemir Vial  SQ   30 units





  HS JAYCEE   Administration


 


Insulin Detemir  30 units  09/12/20 07:00  09/16/20 06:28





  Levemir Vial  SQ   30 units





  AM JAYCEE   Administration


 


Latanoprost  1 drop  09/03/20 22:00  09/15/20 21:25





  Xalatan 0.005% Eye Drops -  OU   Not Given





  HS JAYCEE  


 


Melatonin  5 mg  09/14/20 21:23 





  Melatonin  PO  





  HS PRN  





  INSOMNIA  


 


Multivitamins/Minerals/Vitamin C  1 tab  09/03/20 10:00  09/16/20 10:25





  Tab-A-Vit -  PO   1 tab





  DAILY JAYCEE   Administration


 


Nicotine  14 mg  09/05/20 10:00  09/16/20 10:25





  Nicoderm Patch -  TD   14 mg





  DAILY JAYCEE   Administration


 


Nitroglycerin  1 inch  09/12/20 12:00  09/16/20 17:39





  Nitro-Bid 2% Paste -  TD   1 inch





  Q6HPO JAYCEE   Administration


 


Pantoprazole Sodium  40 mg  09/04/20 10:00  09/16/20 10:25





  Protonix -  PO   40 mg





  DAILY JAYCEE   Administration


 


Polyethylene Glycol  17 gm  09/05/20 15:15  09/16/20 17:43





  Miralax (For Daily Use) -  PO   17 grams





  DAILY PRN   Administration





  CONSTIPATION  


 


Rosuvastatin Calcium  10 mg  09/03/20 22:00  09/15/20 21:18





  Crestor -  PO   10 mg





  HS JAYCEE   Administration


 


Timolol Maleate  1 drop  09/03/20 10:00  09/16/20 10:27





  Timoptic 0.5%  OU   1 drop





  BID JAYCEE   Administration


 


Tiotropium Bromide  2 puff  09/03/20 10:00  09/16/20 10:26





  Spiriva Respimat  IH   2 puff





  DAILY JAYCEE   Administration








                         Laboratory Results - last 24 hr











  09/14/20 09/14/20 09/15/20





  06:50 06:50 21:20


 


Sodium   


 


Potassium   


 


Chloride   


 


Carbon Dioxide   


 


Anion Gap   


 


BUN   


 


Creatinine   


 


Est GFR (CKD-EPI)AfAm   


 


Est GFR (CKD-EPI)NonAf   


 


POC Glucometer    286


 


Random Glucose   


 


Calcium   


 


Total Protein (PEP)  5.7 L  


 


Albumin (PEP)  2.7 L  


 


Globulin  3.0  


 


Albumin/Globulin Ratio  0.9  


 


Beta Globulins  1.0  


 


BETHANY M-Bill  0.2 H  


 


Glomerular Base Memb Ab   3 














  09/16/20 09/16/20 09/16/20





  06:24 06:50 12:09


 


Sodium   145 


 


Potassium   4.4 


 


Chloride   114 H 


 


Carbon Dioxide   22 


 


Anion Gap   10 


 


BUN   69.0 H 


 


Creatinine   2.0 H 


 


Est GFR (CKD-EPI)AfAm   29.41 


 


Est GFR (CKD-EPI)NonAf   25.37 


 


POC Glucometer  128   218


 


Random Glucose   135 H 


 


Calcium   9.2 


 


Total Protein (PEP)   


 


Albumin (PEP)   


 


Globulin   


 


Albumin/Globulin Ratio   


 


Beta Globulins   


 


BETHANY M-Bill   


 


Glomerular Base Memb Ab   














  09/16/20





  17:19


 


Sodium 


 


Potassium 


 


Chloride 


 


Carbon Dioxide 


 


Anion Gap 


 


BUN 


 


Creatinine 


 


Est GFR (CKD-EPI)AfAm 


 


Est GFR (CKD-EPI)NonAf 


 


POC Glucometer  264


 


Random Glucose 


 


Calcium 


 


Total Protein (PEP) 


 


Albumin (PEP) 


 


Globulin 


 


Albumin/Globulin Ratio 


 


Beta Globulins 


 


BETHANY M-Bill 


 


Glomerular Base Memb Ab 


























S1 s2 RRR


Lungs decreased right 


Abd- soft, obese


NT


Edema decreased 








PLAN


CXR shows right pleural effusion - slight decreased


spoke with cardiology-- will give iv lasix today 


will follow up tomorrow to see if any improvement





COVID negative


pleural fluid studies - cytology negative


Nebs


 spoke with  Cardiology and Pulmonary -- continue with Lasix


monitor renal function 





OOB


?repeat thoracentesis


recheck CXR tomorrow


cytology negative


 pleural fluid studies -- transudate








Problem List





- Problems


(1) COPD exacerbation


Code(s): J44.1 - CHRONIC OBSTRUCTIVE PULMONARY DISEASE W (ACUTE) EXACERBATION   





(2) ASHD (arteriosclerotic heart disease)


Code(s): I25.10 - ATHSCL HEART DISEASE OF NATIVE CORONARY ARTERY W/O ANG PCTRS  







(3) Acute hypoxemic respiratory failure


Code(s): J96.01 - ACUTE RESPIRATORY FAILURE WITH HYPOXIA   





(4) Acute on chronic diastolic CHF (congestive heart failure)


Code(s): I50.33 - ACUTE ON CHRONIC DIASTOLIC (CONGESTIVE) HEART FAILURE   





(5) CKD (chronic kidney disease) stage 3, GFR 30-59 ml/min


Code(s): N18.3 - CHRONIC KIDNEY DISEASE, STAGE 3 (MODERATE)   





(6) HTN (hypertension)


Code(s): I10 - ESSENTIAL (PRIMARY) HYPERTENSION   


Qualifiers: 


   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   





(7) IDDM (insulin dependent diabetes mellitus)


Code(s): E11.9 - TYPE 2 DIABETES MELLITUS WITHOUT COMPLICATIONS; Z79.4 - LONG 

TERM (CURRENT) USE OF INSULIN   





(8) Pneumonia


Code(s): J18.9 - PNEUMONIA, UNSPECIFIED ORGANISM   


Qualifiers: 


   Laterality: right   Lung location: lower lobe of lung

## 2020-09-16 NOTE — PN
Progress Note (short form)





- Note


Progress Note: 


s: no chest pain, palps, dizziness. dyspnea improving


                              Current Medications











Generic Name Dose Route Start Last Admin





  Trade Name Freq  PRN Reason Stop Dose Admin


 


Albuterol Sulfate  2 puff  09/03/20 00:47  09/09/20 22:16





  Ventolin Hfa Inhaler -  IH   2 puff





  Q4H PRN   Administration





  SHORT OF BREATH/WHEEZING  


 


Amlodipine Besylate  10 mg  09/03/20 10:00  09/16/20 10:25





  Norvasc -  PO   10 mg





  DAILY JAYCEE   Administration


 


Aspirin  81 mg  09/03/20 10:00  09/16/20 10:26





  Ecotrin -  PO   81 mg





  DAILY JAYCEE   Administration


 


Brimonidine Tartrate  1 drop  09/03/20 10:00  09/16/20 10:26





  Alphagan 0.2% -  OU   1 drop





  BID JAYCEE   Administration


 


Carvedilol  6.25 mg  09/11/20 10:00  09/16/20 10:25





  Coreg -  PO   6.25 mg





  BID JAYCEE   Administration


 


Duloxetine HCl  60 mg  09/03/20 10:00  09/16/20 10:25





  Cymbalta -  PO   60 mg





  DAILY JAYCEE   Administration


 


Furosemide  80 mg  09/16/20 12:57 





  Lasix Injection -  IVPUSH  09/16/20 12:58 





  ONCE ONE  


 


Guaifenesin  10 ml  09/13/20 01:02 





  Robitussin Dm -  PO  





  Q6H PRN  





  COUGH  


 


Insulin Aspart  1 vial  09/03/20 07:00  09/16/20 06:24





  Novolog Vial Sliding Scale -  SQ   Not Given





  [facility]  





  Protocol  


 


Insulin Detemir  30 units  09/09/20 22:00  09/15/20 21:24





  Levemir Vial  SQ   30 units





  HS JAYCEE   Administration


 


Insulin Detemir  30 units  09/12/20 07:00  09/16/20 06:28





  Levemir Vial  SQ   30 units





  AM JAYCEE   Administration


 


Latanoprost  1 drop  09/03/20 22:00  09/15/20 21:25





  Xalatan 0.005% Eye Drops -  OU   Not Given





  HS JAYCEE  


 


Melatonin  5 mg  09/14/20 21:23 





  Melatonin  PO  





  HS PRN  





  INSOMNIA  


 


Multivitamins/Minerals/Vitamin C  1 tab  09/03/20 10:00  09/16/20 10:25





  Tab-A-Vit -  PO   1 tab





  DAILY JAYCEE   Administration


 


Nicotine  14 mg  09/05/20 10:00  09/16/20 10:25





  Nicoderm Patch -  TD   14 mg





  DAILY JAYCEE   Administration


 


Nitroglycerin  1 inch  09/12/20 12:00  09/16/20 06:24





  Nitro-Bid 2% Paste -  TD   1 inch





  Q6HPO JAYCEE   Administration


 


Pantoprazole Sodium  40 mg  09/04/20 10:00  09/16/20 10:25





  Protonix -  PO   40 mg





  DAILY JAYCEE   Administration


 


Polyethylene Glycol  17 gm  09/05/20 15:15  09/15/20 15:35





  Miralax (For Daily Use) -  PO   17 grams





  DAILY PRN   Administration





  CONSTIPATION  


 


Rosuvastatin Calcium  10 mg  09/03/20 22:00  09/15/20 21:18





  Crestor -  PO   10 mg





  HS JAYCEE   Administration


 


Timolol Maleate  1 drop  09/03/20 10:00  09/16/20 10:27





  Timoptic 0.5%  OU   1 drop





  BID JAYCEE   Administration


 


Tiotropium Bromide  2 puff  09/03/20 10:00  09/16/20 10:26





  Spiriva Respimat  IH   2 puff





  DAILY JAYCEE   Administration








                                   Vital Signs











 Period  Temp  Pulse  Resp  BP Sys/Thrasher  Pulse Ox


 


 Last 24 Hr  97.5 F-97.9 F    18-20  120-157/55-81  93-96














Constitutional: Yes: No Distress


Respiratory: Yes: cta bl nl eff


Gastrointestinal: Yes: Soft, Abdomen, Obese


Cardiovascular: Yes: Regular Rate and Rhythm


JVD: No


Heart Sounds: Yes: S1, S2 (rrr)


Edema: trace le edema bl


Neurological: Yes: Alert, Oriented


no jaundice, diaphoresis


not agitated





nsr 64bpm, borderline low voltage, no acute ST changes


Prior Cardiac Procedures: Cardiac Catheterization (non obstx CAD within last 5 

years)


Ejection Fraction %: LVEF > or = 40 %


echo 9/2020: nl lv/rv, no sig valve path





Imaging





- Results


Cat Scan: Report Reviewed


EKG: Image Reviewed





IMP:


Chronic diastolic CHF


Bilateral lung consolidation: PNA vs CHF; COVID NEGATIVE


Non obstx CAD


CKD


DM


HTN








REC:


1. Acute on chronic diastolic CHF (on lasix 40 po qd at home):


-treated with lasix 40 iv qd, increased to 80 iv qd few days ago for worsening 

cxr, then held 9/11 for rising bun/creat


-pt remains with minimal improvement in sob with activity (NYHA III sx's), net 

wt loss only 3 lb. + signif abd distension persists. probable JVD


-last two days got iv lasix 80 with metolazone 5 with some improvement in wt


-Daily BMP to monitor renal fx


-echo here unremarkable (nl LV/RV/valves, no pulm HTN noted)


-Non obstx CAD on cath within last 5 years


-rec MAGNOLIA workup and treatment as outpt if she is amenable


-CXR showed minimal improvement in R sided effusion


- received IV lasix with metolazone yesterday, Cr rising. d/w renal, will give 

lasix 80 mg IV





2. HTN: 


-cont current meds





3. PNA?:


-CT pattern more c/w consolidation, though normal temp curve, no infectious 

sx's, pleural fluid transudative (suspect dry cough is chf sx)


-clinically volume overloaded


-Pulm and ID following.





4. Nonobstx CAD:


-cont asa, statin





5. NAY on CKD:


-baseline creat 1.4-1.7 from prior hosp stay


-possible cardiorenal syndr contributing here as well, cr has been stable so 

far.


-Unclear why not on ACE/ARB, perhaps GFR or K+ would not permit. no change to 

prior tx plan





6. DM: as per PMD





7. COPD/smoking: smoking cessation recommended.

## 2020-09-17 NOTE — CONSULT
Consultation: 


REQUESTING PROVIDER: Dr. Lyons





CONSULT REQUEST: We have been asked to medically evaluate this patient for 

elevated M-spike.





HISTORY OF PRESENT ILLNESS:


Patient is a 64 year old female with past medical history of diastolic CHF, CKD,

COPD, HTN, obesity, presented to the ED due to shortness of breath and cough. 

She was subsequently admitted for acute on chronic CHF and COPD. Patient reports

feeling better, but still with dsypnea on exertion. Patient was also noted to 

have NAY on CKD. SPEP was sent and patient was found to have M-spike of 0.2. We 

were consulted for further evaluation. Of note, patient had history of 

proteinuria with slightly elevated M-spike of 0.2. She has seen Dr. Alfaro 2 

years ago where she was evaluated extensively and was told that there was 

nothing to worry about. At that time, FLC ratio 1.87. 





PMHx:diastolic CHF, CKD, COPD, HTN, obesity


Allergies: Iv contrast


SHx: denies smoking,drinking, illicit drug use


Lives with sister at home








REVIEW OF SYSTEMS:


CONSTITUTIONAL: 


Absent:  fever, chills, diaphoresis, generalized weakness, malaise, loss of 

appetite, weight change


HEENT: 


Absent:  rhinorrhea, nasal congestion, throat pain, throat swelling, difficulty 

swallowing, mouth swelling, ear pain, eye pain, visual changes


CARDIOVASCULAR: 


Absent: chest pain, syncope, palpitations, irregular heart rate, 

lightheadedness, peripheral edema


RESPIRATORY: dyspnea with exertion


Absent: cough, shortness of breath, orthopnea, wheezing, stridor, hemoptysis


GASTROINTESTINAL:


Absent: abdominal pain, abdominal distension, nausea, vomiting, diarrhea, 

constipation, melena, hematochezia


GENITOURINARY: 


Absent: dysuria, frequency, urgency, hesitancy, hematuria, flank pain, genital 

pain


MUSCULOSKELETAL: 


Absent: myalgia, arthralgia, joint swelling, back pain, neck pain


SKIN: 


Absent: rash, itching, pallor


HEMATOLOGIC/IMMUNOLOGIC: 


Absent: easy bleeding, easy bruising, lymphadenopathy, frequent infections


ENDOCRINE:


Absent: unexplained weight gain, unexplained weight loss, heat intolerance, cold

intolerance


NEUROLOGIC: 


Absent: headache, focal weakness or paresthesias, dizziness, unsteady gait, 

seizure, mental status changes, bladder or bowel incontinence


PSYCHIATRIC: 


Absent: anxiety, depression, suicidal or homicidal ideation, hallucinations.





PHYSICAL EXAMINATION





                               Vital Signs - 24 hr











  09/16/20 09/16/20 09/16/20





  14:01 19:46 21:00


 


Temperature 98.3 F 98.3 F 


 


Pulse Rate 89 91 H 


 


Respiratory 20 18 20





Rate   


 


Blood Pressure 156/74 134/65 


 


O2 Sat by Pulse 95 94 L 93 L





Oximetry (%)   














  09/17/20 09/17/20 09/17/20





  00:38 02:00 06:00


 


Temperature 98.5 F 97.9 F 98.0 F


 


Pulse Rate 92 H 105 H 102 H


 


Respiratory 18 20 20





Rate   


 


Blood Pressure 142/74 155/79 143/77


 


O2 Sat by Pulse 95 93 L 93 L





Oximetry (%)   














  09/17/20





  10:00


 


Temperature 97.8 F


 


Pulse Rate 86


 


Respiratory 18





Rate 


 


Blood Pressure 147/71


 


O2 Sat by Pulse 95





Oximetry (%) 














GENERAL: Awake, alert, and fully oriented


NECK: Normal range of motion, supple 


LUNGS: Decreased breath sounds on bilateral bases


HEART: Regular rate and rhythm, normal S1 and S2 


ABDOMEN: Soft, obese, nontender, not distended, normoactive bowel sounds


LOWER EXTREMITIES: 2+ pulses, warm, well-perfused. 


NEUROLOGICAL:  Cranial nerves II-XII intact. Normal speech. 


PSYCHIATRIC: Cooperative. Good eye contact. Appropriate mood and affect.


SKIN: Warm, dry, normal turgor.











                         Laboratory Results - last 24 hr











  09/14/20 09/16/20 09/16/20





  06:50 17:19 22:07


 


WBC   


 


RBC   


 


Hgb   


 


Hct   


 


MCV   


 


MCH   


 


MCHC   


 


RDW   


 


Plt Count   


 


MPV   


 


Sodium   


 


Potassium   


 


Chloride   


 


Carbon Dioxide   


 


Anion Gap   


 


BUN   


 


Creatinine   


 


Est GFR (CKD-EPI)AfAm   


 


Est GFR (CKD-EPI)NonAf   


 


POC Glucometer   264  223


 


Random Glucose   


 


Calcium   


 


Hep A IgM Ab Confirm  Negative  


 


Hepatitis A Ab Total  Negative  


 


Hep Bs Antigen  Negative  


 


Hep Bs Antibody  Non reactive  


 


Hep B Core Total Ab  Negative  


 


Hep B Core IgM Ab  Negative  


 


Hepatitis Be Antibody  Negative  


 


Hepatitis Be Antigen  Negative  


 


HCV Quantitation  Hcv not detected  


 


HCV RNA log copies/mL  TNP  














  09/17/20 09/17/20 09/17/20





  06:40 06:40 06:49


 


WBC   12.4 H 


 


RBC   3.55 L 


 


Hgb   11.2 


 


Hct   34.3 


 


MCV   96.6 H 


 


MCH   31.5 


 


MCHC   32.6 


 


RDW   14.6 


 


Plt Count   196  D 


 


MPV   11.3 H 


 


Sodium  142  


 


Potassium  4.3  


 


Chloride  112 H  


 


Carbon Dioxide  23  


 


Anion Gap  7 L  


 


BUN  67.5 H  


 


Creatinine  1.9 H  


 


Est GFR (CKD-EPI)AfAm  31.29  


 


Est GFR (CKD-EPI)NonAf  27.00  


 


POC Glucometer    131


 


Random Glucose  127 H  


 


Calcium  9.6  


 


Hep A IgM Ab Confirm   


 


Hepatitis A Ab Total   


 


Hep Bs Antigen   


 


Hep Bs Antibody   


 


Hep B Core Total Ab   


 


Hep B Core IgM Ab   


 


Hepatitis Be Antibody   


 


Hepatitis Be Antigen   


 


HCV Quantitation   


 


HCV RNA log copies/mL   














  09/17/20





  12:11


 


WBC 


 


RBC 


 


Hgb 


 


Hct 


 


MCV 


 


MCH 


 


MCHC 


 


RDW 


 


Plt Count 


 


MPV 


 


Sodium 


 


Potassium 


 


Chloride 


 


Carbon Dioxide 


 


Anion Gap 


 


BUN 


 


Creatinine 


 


Est GFR (CKD-EPI)AfAm 


 


Est GFR (CKD-EPI)NonAf 


 


POC Glucometer  202


 


Random Glucose 


 


Calcium 


 


Hep A IgM Ab Confirm 


 


Hepatitis A Ab Total 


 


Hep Bs Antigen 


 


Hep Bs Antibody 


 


Hep B Core Total Ab 


 


Hep B Core IgM Ab 


 


Hepatitis Be Antibody 


 


Hepatitis Be Antigen 


 


HCV Quantitation 


 


HCV RNA log copies/mL 








Active Medications











Generic Name Dose Route Start Last Admin





  Trade Name Freq  PRN Reason Stop Dose Admin


 


Albuterol Sulfate  2 puff  09/03/20 00:47  09/09/20 22:16





  Ventolin Hfa Inhaler -  IH   2 puff





  Q4H PRN   Administration





  SHORT OF BREATH/WHEEZING  


 


Alprazolam  0.5 mg  09/17/20 12:31 





  Xanax  PO  





  Q8H PRN  





  ANXIETY  


 


Amlodipine Besylate  10 mg  09/03/20 10:00  09/17/20 09:36





  Norvasc -  PO   10 mg





  DAILY JAYCEE   Administration


 


Aspirin  81 mg  09/03/20 10:00  09/17/20 09:36





  Ecotrin -  PO   81 mg





  DAILY JAYCEE   Administration


 


Brimonidine Tartrate  1 drop  09/03/20 10:00  09/17/20 09:37





  Alphagan 0.2% -  OU   1 drop





  BID JAYCEE   Administration


 


Carvedilol  6.25 mg  09/11/20 10:00  09/17/20 09:36





  Coreg -  PO   6.25 mg





  BID JAYCEE   Administration


 


Duloxetine HCl  60 mg  09/03/20 10:00  09/17/20 09:36





  Cymbalta -  PO   60 mg





  DAILY JAYCEE   Administration


 


Furosemide  80 mg  09/17/20 17:00 





  Lasix Injection -  IVPUSH  09/17/20 17:01 





  ONCE ONE  


 


Furosemide  80 mg  09/18/20 06:00 





  Lasix Injection -  IVPUSH  





  BID@0600,1400 JAYCEE  


 


Guaifenesin  10 ml  09/13/20 01:02 





  Robitussin Dm -  PO  





  Q6H PRN  





  COUGH  


 


Heparin Sodium (Porcine)  5,000 unit  09/16/20 22:00  09/17/20 09:36





  Heparin -  SQ   5,000 unit





  BID JAYCEE   Administration


 


Insulin Aspart  1 vial  09/03/20 07:00  09/17/20 06:51





  Novolog Vial Sliding Scale -  SQ   Not Given





  [facility]  





  Protocol  


 


Insulin Detemir  32 units  09/17/20 12:27 





  Levemir Vial  SQ  





  AM JAYCEE  


 


Insulin Detemir  32 units  09/17/20 12:27 





  Levemir Vial  SQ  





  HS JAYCEE  


 


Latanoprost  1 drop  09/03/20 22:00  09/16/20 22:17





  Xalatan 0.005% Eye Drops -  OU   Not Given





  HS JAYCEE  


 


Melatonin  5 mg  09/14/20 21:23 





  Melatonin  PO  





  HS PRN  





  INSOMNIA  


 


Multivitamins/Minerals/Vitamin C  1 tab  09/03/20 10:00  09/17/20 09:36





  Tab-A-Vit -  PO   1 tab





  DAILY JAYCEE   Administration


 


Nicotine  14 mg  09/05/20 10:00  09/17/20 09:36





  Nicoderm Patch -  TD   14 mg





  DAILY JAYCEE   Administration


 


Nitroglycerin  1 inch  09/12/20 12:00  09/17/20 06:53





  Nitro-Bid 2% Paste -  TD   1 inch





  Q6HPO JAYCEE   Administration


 


Pantoprazole Sodium  40 mg  09/04/20 10:00  09/17/20 09:36





  Protonix -  PO   40 mg





  DAILY JAYCEE   Administration


 


Polyethylene Glycol  17 gm  09/05/20 15:15  09/17/20 09:37





  Miralax (For Daily Use) -  PO   17 grams





  DAILY PRN   Administration





  CONSTIPATION  


 


Rosuvastatin Calcium  10 mg  09/03/20 22:00  09/16/20 22:10





  Crestor -  PO   10 mg





  HS JAYCEE   Administration


 


Timolol Maleate  1 drop  09/03/20 10:00  09/17/20 09:37





  Timoptic 0.5%  OU   1 drop





  BID JAYCEE   Administration


 


Tiotropium Bromide  2 puff  09/03/20 10:00  09/17/20 09:37





  Spiriva Respimat  IH   2 puff





  DAILY JAYCEE   Administration











ASSESSMENT/PLAN:


Patient is a 64 year old female with past medical history of diastolic CHF, CKD,

COPD, HTN, obesity, admitted to the hospital for CHF exacerbation. We were 

consulted for an elevated M-spike. 





#MGUS


-M-spike 0.2 (similar as with previous)


-BETHANY/UPEP/FLC ratio, Immunoglobulin levels pending








Dispo: We will continue to follow the patient. Thank you for this consultative 

opportunity.











Visit type





- Medication Review


Med list reviewed for High Risk Meds patients 65 and older: Yes





- Emergency Visit


Emergency Visit: Yes


ED Registration Date: 09/02/20


Care time: The patient presented to the Emergency Department on the above date 

and was hospitalized for further evaluation of their emergent condition.





- New Patient


This patient is new to me today: Yes


Date on this admission: 09/27/20





- Critical Care


Critical Care patient: No





ATTENDING PHYSICIAN STATEMENT





I saw and evaluated the patient.


I reviewed the resident's note and discussed the case with the resident.


I agree with the resident's findings and plan as documented.








SUBJECTIVE:








OBJECTIVE:








ASSESSMENT AND PLAN:

## 2020-09-17 NOTE — PN
Progress Note, Physician


History of Present Illness: 





Pt seen and examined at bedside. She is awake and alert. She denies shortness of

breath at rest. 





- Current Medication List


Current Medications: 


Active Medications





Albuterol Sulfate (Ventolin Hfa Inhaler -)  2 puff IH Q4H PRN


   PRN Reason: SHORT OF BREATH/WHEEZING


   Last Admin: 09/09/20 22:16 Dose:  2 puff


   Documented by: 


Alprazolam (Xanax)  0.5 mg PO Q8H PRN


   PRN Reason: ANXIETY


Amlodipine Besylate (Norvasc -)  10 mg PO DAILY [facility]


   Last Admin: 09/17/20 09:36 Dose:  10 mg


   Documented by: 


Aspirin (Ecotrin -)  81 mg PO DAILY [facility]


   Last Admin: 09/17/20 09:36 Dose:  81 mg


   Documented by: 


Brimonidine Tartrate (Alphagan 0.2% -)  1 drop OU BID [facility]


   Last Admin: 09/17/20 09:37 Dose:  1 drop


   Documented by: 


Carvedilol (Coreg -)  6.25 mg PO BID [facility]


   Last Admin: 09/17/20 09:36 Dose:  6.25 mg


   Documented by: 


Duloxetine HCl (Cymbalta -)  60 mg PO DAILY [facility]


   Last Admin: 09/17/20 09:36 Dose:  60 mg


   Documented by: 


Furosemide (Lasix Injection -)  80 mg IVPUSH ONCE ONE


   Stop: 09/17/20 17:01


Furosemide (Lasix Injection -)  80 mg IVPUSH BID@0600,1400 [facility]


Guaifenesin (Robitussin Dm -)  10 ml PO Q6H PRN


   PRN Reason: COUGH


Heparin Sodium (Porcine) (Heparin -)  5,000 unit SQ BID [facility]


   Last Admin: 09/17/20 09:36 Dose:  5,000 unit


   Documented by: 


Insulin Aspart (Novolog Vial Sliding Scale -)  [facility]; Protocol


   Last Admin: 09/17/20 12:38 Dose:  4 unit


   Documented by: 


Insulin Detemir (Levemir Vial)  32 units SQ AM JAYCEE


Insulin Detemir (Levemir Vial)  32 units SQ HS [facility]


Latanoprost (Xalatan 0.005% Eye Drops -)  1 drop OU HS [facility]


   Last Admin: 09/16/20 22:17 Dose:  Not Given


   Documented by: 


Melatonin (Melatonin)  5 mg PO HS PRN


   PRN Reason: INSOMNIA


Multivitamins/Minerals/Vitamin C (Tab-A-Vit -)  1 tab PO DAILY [facility]


   Last Admin: 09/17/20 09:36 Dose:  1 tab


   Documented by: 


Nicotine (Nicoderm Patch -)  14 mg TD DAILY [facility]


   Last Admin: 09/17/20 09:36 Dose:  14 mg


   Documented by: 


Nitroglycerin (Nitro-Bid 2% Paste -)  1 inch TD Q6HPO [facility]


   Last Admin: 09/17/20 12:39 Dose:  1 inch


   Documented by: 


Pantoprazole Sodium (Protonix -)  40 mg PO DAILY [facility]


   Last Admin: 09/17/20 09:36 Dose:  40 mg


   Documented by: 


Polyethylene Glycol (Miralax (For Daily Use) -)  17 gm PO DAILY PRN


   PRN Reason: CONSTIPATION


   Last Admin: 09/17/20 09:37 Dose:  17 grams


   Documented by: 


Rosuvastatin Calcium (Crestor -)  10 mg PO HS [facility]


   Last Admin: 09/16/20 22:10 Dose:  10 mg


   Documented by: 


Timolol Maleate (Timoptic 0.5%)  1 drop OU BID [facility]


   Last Admin: 09/17/20 09:37 Dose:  1 drop


   Documented by: 


Tiotropium Bromide (Spiriva Respimat)  2 puff IH DAILY [facility]


   Last Admin: 09/17/20 09:37 Dose:  2 puff


   Documented by: 











- Objective


Vital Signs: 


                                   Vital Signs











Temperature  98.3 F   09/17/20 13:30


 


Pulse Rate  87   09/17/20 13:30


 


Respiratory Rate  20   09/17/20 13:30


 


Blood Pressure  141/66   09/17/20 13:30


 


O2 Sat by Pulse Oximetry (%)  95   09/17/20 13:30











Constitutional: Yes: Calm


Eyes: Yes: Conjunctiva Clear


HENT: Yes: Atraumatic


Cardiovascular: Yes: S1, S2


Respiratory: Yes: CTA Bilaterally


Gastrointestinal: Yes: Soft, Abdomen, Obese


Genitourinary: Yes: WNL


Musculoskeletal: Yes: WNL


Edema: Yes


Edema: LLE: 1+, RLE: 1+


Neurological: Yes: Oriented


Psychiatric: Yes: Oriented


Labs: 


                                    CBC, BMP





                                 09/17/20 06:40 





                                 09/17/20 06:40 





                                    INR, PTT











INR  0.94  (0.83-1.09)   09/02/20  16:36    














Problem List





- Problems


(1) COPD (chronic obstructive pulmonary disease)


Code(s): J44.9 - CHRONIC OBSTRUCTIVE PULMONARY DISEASE, UNSPECIFIED   


Qualifiers: 


   Qualified Code(s): J44.9 - Chronic obstructive pulmonary disease, unspecified

  





(2) COPD exacerbation


Code(s): J44.1 - CHRONIC OBSTRUCTIVE PULMONARY DISEASE W (ACUTE) EXACERBATION   





(3) CKD (chronic kidney disease) stage 3, GFR 30-59 ml/min


Code(s): N18.3 - CHRONIC KIDNEY DISEASE, STAGE 3 (MODERATE)   





Assessment/Plan


                               Current Medications











Generic Name Dose Route Start Last Admin





  Trade Name Freq  PRN Reason Stop Dose Admin


 


Albuterol Sulfate  2 puff  09/03/20 00:47  09/09/20 22:16





  Ventolin Hfa Inhaler -  IH   2 puff





  Q4H PRN   Administration





  SHORT OF BREATH/WHEEZING  


 


Alprazolam  0.5 mg  09/17/20 12:31 





  Xanax  PO  





  Q8H PRN  





  ANXIETY  


 


Amlodipine Besylate  10 mg  09/03/20 10:00  09/17/20 09:36





  Norvasc -  PO   10 mg





  DAILY JAYCEE   Administration


 


Aspirin  81 mg  09/03/20 10:00  09/17/20 09:36





  Ecotrin -  PO   81 mg





  DAILY JAYCEE   Administration


 


Brimonidine Tartrate  1 drop  09/03/20 10:00  09/17/20 09:37





  Alphagan 0.2% -  OU   1 drop





  BID JAYCEE   Administration


 


Carvedilol  6.25 mg  09/11/20 10:00  09/17/20 09:36





  Coreg -  PO   6.25 mg





  BID JAYCEE   Administration


 


Duloxetine HCl  60 mg  09/03/20 10:00  09/17/20 09:36





  Cymbalta -  PO   60 mg





  DAILY JAYCEE   Administration


 


Furosemide  80 mg  09/17/20 17:00 





  Lasix Injection -  IVPUSH  09/17/20 17:01 





  ONCE ONE  


 


Furosemide  80 mg  09/18/20 06:00 





  Lasix Injection -  IVPUSH  





  BID@0600,1400 JAYCEE  


 


Guaifenesin  10 ml  09/13/20 01:02 





  Robitussin Dm -  PO  





  Q6H PRN  





  COUGH  


 


Heparin Sodium (Porcine)  5,000 unit  09/16/20 22:00  09/17/20 09:36





  Heparin -  SQ   5,000 unit





  BID JAYCEE   Administration


 


Insulin Aspart  1 vial  09/03/20 07:00  09/17/20 12:38





  Novolog Vial Sliding Scale -  SQ   4 unit





  [facility]AY[facility]E   Administration





  Protocol  


 


Insulin Detemir  32 units  09/17/20 12:27 





  Levemir Vial  SQ  





  AM JAYCEE  


 


Insulin Detemir  32 units  09/17/20 12:27 





  Levemir Vial  SQ  





  HS JAYCEE  


 


Latanoprost  1 drop  09/03/20 22:00  09/16/20 22:17





  Xalatan 0.005% Eye Drops -  OU   Not Given





  HS JAYCEE  


 


Melatonin  5 mg  09/14/20 21:23 





  Melatonin  PO  





  HS PRN  





  INSOMNIA  


 


Multivitamins/Minerals/Vitamin C  1 tab  09/03/20 10:00  09/17/20 09:36





  Tab-A-Vit -  PO   1 tab





  DAILY JAYCEE   Administration


 


Nicotine  14 mg  09/05/20 10:00  09/17/20 09:36





  Nicoderm Patch -  TD   14 mg





  DAILY JAYCEE   Administration


 


Nitroglycerin  1 inch  09/12/20 12:00  09/17/20 12:39





  Nitro-Bid 2% Paste -  TD   1 inch





  Q6HPO JAYCEE   Administration


 


Pantoprazole Sodium  40 mg  09/04/20 10:00  09/17/20 09:36





  Protonix -  PO   40 mg





  DAILY JAYCEE   Administration


 


Polyethylene Glycol  17 gm  09/05/20 15:15  09/17/20 09:37





  Miralax (For Daily Use) -  PO   17 grams





  DAILY PRN   Administration





  CONSTIPATION  


 


Rosuvastatin Calcium  10 mg  09/03/20 22:00  09/16/20 22:10





  Crestor -  PO   10 mg





  HS JAYCEE   Administration


 


Timolol Maleate  1 drop  09/03/20 10:00  09/17/20 09:37





  Timoptic 0.5%  OU   1 drop





  BID JAYCEE   Administration


 


Tiotropium Bromide  2 puff  09/03/20 10:00  09/17/20 09:37





  Spiriva Respimat  IH   2 puff





  DAILY JAYCEE   Administration














Impression


1. CKD


2. CHF


3. COPD


4. hld


5. dm


6. cad


7. htn


8. proteinuria


9. mgus





Plan


- cont lasix


- heme input appreciated


- repeat labs in am


- fluid restriction


- discussed with cardio


- discussed with primary team

## 2020-09-17 NOTE — PN
Progress Note (short form)





- Note


Progress Note: 


OOB to chair.  


Breathing continues to feel better.  Still with YEH (multifactorial)


No acute events overnight. 





                                 Intake & Output











 09/14/20 09/15/20 09/16/20 09/17/20





 23:59 23:59 23:59 23:59


 


Intake Total 840 420 500 


 


Balance 840 420 500 


 


Weight 251 lb 3.2 oz 250 lb 12.8 oz 251 lb 12.8 oz 250 lb 8 oz








                                Last Vital Signs











Temp Pulse Resp BP Pulse Ox


 


 97.8 F   86   18   147/71   95 


 


 09/17/20 10:00  09/17/20 10:00  09/17/20 10:00  09/17/20 10:00  09/17/20 10:00








Active Medications





Albuterol Sulfate (Ventolin Hfa Inhaler -)  2 puff IH Q4H PRN


   PRN Reason: SHORT OF BREATH/WHEEZING


   Last Admin: 09/09/20 22:16 Dose:  2 puff


   Documented by: 


Alprazolam (Xanax)  0.5 mg PO Q8H PRN


   PRN Reason: ANXIETY


Amlodipine Besylate (Norvasc -)  10 mg PO DAILY [facility]


   Last Admin: 09/17/20 09:36 Dose:  10 mg


   Documented by: 


Aspirin (Ecotrin -)  81 mg PO DAILY [facility]


   Last Admin: 09/17/20 09:36 Dose:  81 mg


   Documented by: 


Brimonidine Tartrate (Alphagan 0.2% -)  1 drop OU BID [facility]


   Last Admin: 09/17/20 09:37 Dose:  1 drop


   Documented by: 


Carvedilol (Coreg -)  6.25 mg PO BID [facility]


   Last Admin: 09/17/20 09:36 Dose:  6.25 mg


   Documented by: 


Duloxetine HCl (Cymbalta -)  60 mg PO DAILY [facility]


   Last Admin: 09/17/20 09:36 Dose:  60 mg


   Documented by: 


Furosemide (Lasix Injection -)  80 mg IVPUSH ONCE ONE


   Stop: 09/17/20 17:01


Furosemide (Lasix Injection -)  80 mg IVPUSH BID@0600,1400 [facility]


Guaifenesin (Robitussin Dm -)  10 ml PO Q6H PRN


   PRN Reason: COUGH


Heparin Sodium (Porcine) (Heparin -)  5,000 unit SQ BID [facility]


   Last Admin: 09/17/20 09:36 Dose:  5,000 unit


   Documented by: 


Insulin Aspart (Novolog Vial Sliding Scale -)  1 vial SQ ACHS [facility]; Protocol


   Last Admin: 09/17/20 12:38 Dose:  4 unit


   Documented by: 


Insulin Detemir (Levemir Vial)  32 units SQ AM JAYCEE


Insulin Detemir (Levemir Vial)  32 units SQ HS [facility]


Latanoprost (Xalatan 0.005% Eye Drops -)  1 drop OU HS [facility]


   Last Admin: 09/16/20 22:17 Dose:  Not Given


   Documented by: 


Melatonin (Melatonin)  5 mg PO HS PRN


   PRN Reason: INSOMNIA


Multivitamins/Minerals/Vitamin C (Tab-A-Vit -)  1 tab PO DAILY [facility]


   Last Admin: 09/17/20 09:36 Dose:  1 tab


   Documented by: 


Nicotine (Nicoderm Patch -)  14 mg TD DAILY [facility]


   Last Admin: 09/17/20 09:36 Dose:  14 mg


   Documented by: 


Nitroglycerin (Nitro-Bid 2% Paste -)  1 inch TD Q6HPO [facility]


   Last Admin: 09/17/20 12:39 Dose:  1 inch


   Documented by: 


Pantoprazole Sodium (Protonix -)  40 mg PO DAILY [facility]


   Last Admin: 09/17/20 09:36 Dose:  40 mg


   Documented by: 


Polyethylene Glycol (Miralax (For Daily Use) -)  17 gm PO DAILY [facility]


   PRN Reason: CONSTIPATION


   Last Admin: 09/17/20 09:37 Dose:  17 grams


   Documented by: 


Rosuvastatin Calcium (Crestor -)  10 mg PO [facility]


   Last Admin: 09/16/20 22:10 Dose:  10 mg


   Documented by: 


Timolol Maleate (Timoptic 0.5%)  1 drop OU BID [facility]


   Last Admin: 09/17/20 09:37 Dose:  1 drop


   Documented by: 


Tiotropium Bromide (Spiriva Respimat)  2 puff IH DAILY [facility]


   Last Admin: 09/17/20 09:37 Dose:  2 puff


   Documented by: 








Gen:  NAD


Heart: RRR


Lung: decreased breath sounds at the bases


Abd: soft, nontender


Ext: + edema


                                        


                        Laboratory Results - last 24 hr











  09/14/20 09/16/20 09/16/20





  06:50 17:19 22:07


 


WBC   


 


RBC   


 


Hgb   


 


Hct   


 


MCV   


 


MCH   


 


MCHC   


 


RDW   


 


Plt Count   


 


MPV   


 


Sodium   


 


Potassium   


 


Chloride   


 


Carbon Dioxide   


 


Anion Gap   


 


BUN   


 


Creatinine   


 


Est GFR (CKD-EPI)AfAm   


 


Est GFR (CKD-EPI)NonAf   


 


POC Glucometer   264  223


 


Random Glucose   


 


Calcium   


 


Hep A IgM Ab Confirm  Negative  


 


Hepatitis A Ab Total  Negative  


 


Hep Bs Antigen  Negative  


 


Hep Bs Antibody  Non reactive  


 


Hep B Core Total Ab  Negative  


 


Hep B Core IgM Ab  Negative  


 


Hepatitis Be Antibody  Negative  


 


Hepatitis Be Antigen  Negative  


 


HCV Quantitation  Hcv not detected  


 


HCV RNA log copies/mL  TNP  














  09/17/20 09/17/20 09/17/20





  06:40 06:40 06:49


 


WBC   12.4 H 


 


RBC   3.55 L 


 


Hgb   11.2 


 


Hct   34.3 


 


MCV   96.6 H 


 


MCH   31.5 


 


MCHC   32.6 


 


RDW   14.6 


 


Plt Count   196  D 


 


MPV   11.3 H 


 


Sodium  142  


 


Potassium  4.3  


 


Chloride  112 H  


 


Carbon Dioxide  23  


 


Anion Gap  7 L  


 


BUN  67.5 H  


 


Creatinine  1.9 H  


 


Est GFR (CKD-EPI)AfAm  31.29  


 


Est GFR (CKD-EPI)NonAf  27.00  


 


POC Glucometer    131


 


Random Glucose  127 H  


 


Calcium  9.6  


 


Hep A IgM Ab Confirm   


 


Hepatitis A Ab Total   


 


Hep Bs Antigen   


 


Hep Bs Antibody   


 


Hep B Core Total Ab   


 


Hep B Core IgM Ab   


 


Hepatitis Be Antibody   


 


Hepatitis Be Antigen   


 


HCV Quantitation   


 


HCV RNA log copies/mL   














  09/17/20





  12:11


 


WBC 


 


RBC 


 


Hgb 


 


Hct 


 


MCV 


 


MCH 


 


MCHC 


 


RDW 


 


Plt Count 


 


MPV 


 


Sodium 


 


Potassium 


 


Chloride 


 


Carbon Dioxide 


 


Anion Gap 


 


BUN 


 


Creatinine 


 


Est GFR (CKD-EPI)AfAm 


 


Est GFR (CKD-EPI)NonAf 


 


POC Glucometer  202


 


Random Glucose 


 


Calcium 


 


Hep A IgM Ab Confirm 


 


Hepatitis A Ab Total 


 


Hep Bs Antigen 


 


Hep Bs Antibody 


 


Hep B Core Total Ab 


 


Hep B Core IgM Ab 


 


Hepatitis Be Antibody 


 


Hepatitis Be Antigen 


 


HCV Quantitation 


 


HCV RNA log copies/mL 











A/P


Acute on Chronic Diastolic Heart Failure


Right Pleural Effusion


COPD


CKD


HTN


Tobacco abuse 


Likely OSAS 





-  Lasix


-  monitor urine output, creatinine


-  daily weights


-  O2 to keep SpO2 >90%


-  inhaled bronchodilators as needed


-  OSAS workup as an outpatient 


-  DVT prophylaxis





Dr Tucker

## 2020-09-17 NOTE — PN
Progress Note (short form)





- Note


Progress Note: 


s:  no cp palps dizzy. stable dyspnea, not improving much


                                        


                                        


                                        


                               Current Medications











Generic Name Dose Route Start Last Admin





  Trade Name Freq  PRN Reason Stop Dose Admin


 


Albuterol Sulfate  2 puff  09/03/20 00:47  09/09/20 22:16





  Ventolin Hfa Inhaler -  IH   2 puff





  Q4H PRN   Administration





  SHORT OF BREATH/WHEEZING  


 


Amlodipine Besylate  10 mg  09/03/20 10:00  09/17/20 09:36





  Norvasc -  PO   10 mg





  DAILY JAYCEE   Administration


 


Aspirin  81 mg  09/03/20 10:00  09/17/20 09:36





  Ecotrin -  PO   81 mg





  DAILY JAYCEE   Administration


 


Brimonidine Tartrate  1 drop  09/03/20 10:00  09/17/20 09:37





  Alphagan 0.2% -  OU   1 drop





  BID JAYCEE   Administration


 


Carvedilol  6.25 mg  09/11/20 10:00  09/17/20 09:36





  Coreg -  PO   6.25 mg





  BID JAYCEE   Administration


 


Duloxetine HCl  60 mg  09/03/20 10:00  09/17/20 09:36





  Cymbalta -  PO   60 mg





  DAILY JAYCEE   Administration


 


Furosemide  80 mg  09/17/20 11:36 





  Lasix Injection -  IVPUSH  09/17/20 11:37 





  ONCE ONE  


 


Furosemide  80 mg  09/17/20 17:00 





  Lasix Injection -  IVPUSH  09/17/20 17:01 





  ONCE ONE  


 


Furosemide  80 mg  09/18/20 06:00 





  Lasix Injection -  IVPUSH  





  BID@0600,1400 [facility]  


 


Guaifenesin  10 ml  09/13/20 01:02 





  Robitussin Dm -  PO  





  Q6H PRN  





  COUGH  


 


Heparin Sodium (Porcine)  5,000 unit  09/16/20 22:00  09/17/20 09:36





  Heparin -  SQ   5,000 unit





  BID JAYCEE   Administration


 


Insulin Aspart  1 vial  09/03/20 07:00  09/17/20 06:51





  Novolog Vial Sliding Scale -  SQ   Not Given





  [facility]  





  Protocol  


 


Insulin Detemir  30 units  09/09/20 22:00  09/16/20 22:13





  Levemir Vial  SQ   30 units





  HS JAYCEE   Administration


 


Insulin Detemir  30 units  09/12/20 07:00  09/17/20 06:51





  Levemir Vial  SQ   30 units





  AM JAYCEE   Administration


 


Latanoprost  1 drop  09/03/20 22:00  09/16/20 22:17





  Xalatan 0.005% Eye Drops -  OU   Not Given





  HS JAYCEE  


 


Melatonin  5 mg  09/14/20 21:23 





  Melatonin  PO  





  HS PRN  





  INSOMNIA  


 


Multivitamins/Minerals/Vitamin C  1 tab  09/03/20 10:00  09/17/20 09:36





  Tab-A-Vit -  PO   1 tab





  DAILY JAYCEE   Administration


 


Nicotine  14 mg  09/05/20 10:00  09/17/20 09:36





  Nicoderm Patch -  TD   14 mg





  DAILY JAYCEE   Administration


 


Nitroglycerin  1 inch  09/12/20 12:00  09/17/20 06:53





  Nitro-Bid 2% Paste -  TD   1 inch





  Q6HPO JAYCEE   Administration


 


Pantoprazole Sodium  40 mg  09/04/20 10:00  09/17/20 09:36





  Protonix -  PO   40 mg





  DAILY JAYCEE   Administration


 


Polyethylene Glycol  17 gm  09/05/20 15:15  09/17/20 09:37





  Miralax (For Daily Use) -  PO   17 grams





  DAILY PRN   Administration





  CONSTIPATION  


 


Rosuvastatin Calcium  10 mg  09/03/20 22:00  09/16/20 22:10





  Crestor -  PO   10 mg





  HS JAYCEE   Administration


 


Timolol Maleate  1 drop  09/03/20 10:00  09/17/20 09:37





  Timoptic 0.5%  OU   1 drop





  BID JAYCEE   Administration


 


Tiotropium Bromide  2 puff  09/03/20 10:00  09/17/20 09:37





  Spiriva Respimat  IH   2 puff





  DAILY JAYCEE   Administration








                                   Vital Signs











 Period  Temp  Pulse  Resp  BP Sys/Thrasher  Pulse Ox


 


 Last 24 Hr  97.8 F-98.5 F    18-20  134-156/65-79  93-95











Constitutional: Yes: No Distress


Respiratory: Yes: cta bl nl eff


Gastrointestinal: Yes: Soft, Abdomen, Obese


Cardiovascular: Yes: Regular Rate and Rhythm


JVD: No


Heart Sounds: Yes: S1, S2 (rrr)


Edema: trace le edema bl


Neurological: Yes: Alert, Oriented


no jaundice, diaphoresis


not agitated





                                        


                                    CBC, BMP





                                 09/17/20 06:40 





                                 09/17/20 06:40 














nsr 64bpm, borderline low voltage, no acute ST changes


Prior Cardiac Procedures: Cardiac Catheterization (non obstx CAD within last 5 

years)


Ejection Fraction %: LVEF > or = 40 %


echo 9/2020: nl lv/rv, no sig valve path





Imaging





- Results


Cat Scan: Report Reviewed


EKG: Image Reviewed





IMP:


Chronic diastolic CHF


Bilateral lung consolidation: PNA vs CHF; COVID NEGATIVE


Non obstx CAD


CKD


DM


HTN








REC:


1. Acute on chronic diastolic CHF (on lasix 40 po qd at home):


-with lasix 80qd and metolazone she has not lost much volume.  Cr stable.  d/w 

renal, will increase to lasix 80 bid.


-Daily BMP to monitor renal fx


-echo here unremarkable (nl LV/RV/valves, no pulm HTN noted)


-Non obstx CAD on cath within last 5 years


-rec MAGNOLIA workup and treatment as outpt if she is amenable





2. HTN: 


-cont current meds





3. PNA?:


-CT pattern more c/w consolidation, though normal temp curve, no infectious 

sx's, pleural fluid transudative (suspect dry cough is chf sx)


-clinically volume overloaded


-Pulm and ID following.





4. Nonobstx CAD:


-cont asa, statin





5. NAY on CKD:


-baseline creat 1.4-1.7 from prior hosp stay


-possible cardiorenal syndr contributing here as well, cr has been stable so 

far.


-Unclear why not on ACE/ARB, perhaps GFR or K+ would not permit. no change to 

prior tx plan





6. DM: as per PMD





7. COPD/smoking: smoking cessation recommended.

## 2020-09-17 NOTE — PN
Teaching Attending Note


Name of Resident: Linda Guo





ATTENDING PHYSICIAN STATEMENT





I saw and evaluated the patient.


I reviewed the resident's note and discussed the case with the resident.


I agree with the resident's findings and plan as documented.





: 64y F retired RN with CHF, CKD, COPD presented for sob and cough and admitted 

for CHF and COPD exacerbation. She was also found to have NAY on CKD possibly 

due to increased doses of diuretics at home. SPEP was sent off as part of work 

up for worsening creatinine and found to be 0.2g/dL. Of note patient had 

extensive workup as outpatient with Dr Alfaro with similar mspike and was found 

to have MGUS. Agree with BETHANY,UPEP,  KLC ratio, Immunoglobulin levels for 

completion of workup.

## 2020-09-17 NOTE — PN
Progress Note (short form)





- Note


Progress Note: 








denies chest pain 


no worsening SOB 


she was able to walk down the hallway yesterday 


                               Vital Signs - 24 hr











  09/16/20 09/16/20 09/16/20





  14:01 19:46 21:00


 


Temperature 98.3 F 98.3 F 


 


Pulse Rate 89 91 H 


 


Respiratory 20 18 20





Rate   


 


Blood Pressure 156/74 134/65 


 


O2 Sat by Pulse 95 94 L 93 L





Oximetry (%)   














  09/17/20 09/17/20 09/17/20





  00:38 02:00 06:00


 


Temperature 98.5 F 97.9 F 98.0 F


 


Pulse Rate 92 H 105 H 102 H


 


Respiratory 18 20 20





Rate   


 


Blood Pressure 142/74 155/79 143/77


 


O2 Sat by Pulse 95 93 L 93 L





Oximetry (%)   














  09/17/20





  10:00


 


Temperature 97.8 F


 


Pulse Rate 86


 


Respiratory 18





Rate 


 


Blood Pressure 147/71


 


O2 Sat by Pulse 95





Oximetry (%) 








                               Current Medications











Generic Name Dose Route Start Last Admin





  Trade Name Freq  PRN Reason Stop Dose Admin


 


Albuterol Sulfate  2 puff  09/03/20 00:47  09/09/20 22:16





  Ventolin Hfa Inhaler -  IH   2 puff





  Q4H PRN   Administration





  SHORT OF BREATH/WHEEZING  


 


Amlodipine Besylate  10 mg  09/03/20 10:00  09/17/20 09:36





  Norvasc -  PO   10 mg





  DAILY JAYCEE   Administration


 


Aspirin  81 mg  09/03/20 10:00  09/17/20 09:36





  Ecotrin -  PO   81 mg





  DAILY JAYCEE   Administration


 


Brimonidine Tartrate  1 drop  09/03/20 10:00  09/17/20 09:37





  Alphagan 0.2% -  OU   1 drop





  BID JAYCEE   Administration


 


Carvedilol  6.25 mg  09/11/20 10:00  09/17/20 09:36





  Coreg -  PO   6.25 mg





  BID JAYCEE   Administration


 


Duloxetine HCl  60 mg  09/03/20 10:00  09/17/20 09:36





  Cymbalta -  PO   60 mg





  DAILY JAYCEE   Administration


 


Furosemide  80 mg  09/17/20 12:30 





  Lasix Injection -  IVPUSH  09/17/20 12:31 





  ONCE ONE  


 


Furosemide  80 mg  09/17/20 17:00 





  Lasix Injection -  IVPUSH  09/17/20 17:01 





  ONCE ONE  


 


Furosemide  80 mg  09/18/20 06:00 





  Lasix Injection -  IVPUSH  





  [facility]ID[facility]0600,[facility]  


 


Guaifenesin  10 ml  09/13/20 01:02 





  Robitussin Dm -  PO  





  Q6H PRN  





  COUGH  


 


Heparin Sodium (Porcine)  5,000 unit  09/16/20 22:00  09/17/20 09:36





  Heparin -  SQ   5,000 unit





  BID JAYCEE   Administration


 


Insulin Aspart  1 vial  09/03/20 07:00  09/17/20 06:51





  Novolog Vial Sliding Scale -  SQ   Not Given





  [facility]  





  Protocol  


 


Insulin Detemir  32 units  09/17/20 12:27 





  Levemir Vial  SQ  





  AM JAYCEE  


 


Insulin Detemir  32 units  09/17/20 12:27 





  Levemir Vial  SQ  





  [facility]  


 


Latanoprost  1 drop  09/03/20 22:00  09/16/20 22:17





  Xalatan 0.005% Eye Drops -  OU   Not Given





  [facility]  


 


Melatonin  5 mg  09/14/20 21:23 





  Melatonin  PO  





  HS PRN  





  INSOMNIA  


 


Multivitamins/Minerals/Vitamin C  1 tab  09/03/20 10:00  09/17/20 09:36





  Tab-A-Vit -  PO   1 tab





  [facility]ILY [facility]   Administration


 


Nicotine  14 mg  09/05/20 10:00  09/17/20 09:36





  Nicoderm Patch -  TD   14 mg





  DAILY JAYCEE   Administration


 


Nitroglycerin  1 inch  09/12/20 12:00  09/17/20 06:53





  Nitro-Bid 2% Paste -  TD   1 inch





  Q6HPO JAYCEE   Administration


 


Pantoprazole Sodium  40 mg  09/04/20 10:00  09/17/20 09:36





  Protonix -  PO   40 mg





  DAILY JAYCEE   Administration


 


Polyethylene Glycol  17 gm  09/05/20 15:15  09/17/20 09:37





  Miralax (For Daily Use) -  PO   17 grams





  DAILY PRN   Administration





  CONSTIPATION  


 


Rosuvastatin Calcium  10 mg  09/03/20 22:00  09/16/20 22:10





  Crestor -  PO   10 mg





  HS JAYCEE   Administration


 


Timolol Maleate  1 drop  09/03/20 10:00  09/17/20 09:37





  Timoptic 0.5%  OU   1 drop





  BID JAYCEE   Administration


 


Tiotropium Bromide  2 puff  09/03/20 10:00  09/17/20 09:37





  Spiriva Respimat  IH   2 puff





  DAILY JAYCEE   Administration








                         Laboratory Results - last 24 hr











  09/14/20 09/16/20 09/16/20





  06:50 17:19 22:07


 


WBC   


 


RBC   


 


Hgb   


 


Hct   


 


MCV   


 


MCH   


 


MCHC   


 


RDW   


 


Plt Count   


 


MPV   


 


Sodium   


 


Potassium   


 


Chloride   


 


Carbon Dioxide   


 


Anion Gap   


 


BUN   


 


Creatinine   


 


Est GFR (CKD-EPI)AfAm   


 


Est GFR (CKD-EPI)NonAf   


 


POC Glucometer   264  223


 


Random Glucose   


 


Calcium   


 


Hep A IgM Ab Confirm  Negative  


 


Hepatitis A Ab Total  Negative  


 


Hep Bs Antigen  Negative  


 


Hep Bs Antibody  Non reactive  


 


Hep B Core Total Ab  Negative  


 


Hep B Core IgM Ab  Negative  


 


Hepatitis Be Antibody  Negative  


 


Hepatitis Be Antigen  Negative  


 


HCV Quantitation  Hcv not detected  


 


HCV RNA log copies/mL  TNP  














  09/17/20 09/17/20 09/17/20





  06:40 06:40 06:49


 


WBC   12.4 H 


 


RBC   3.55 L 


 


Hgb   11.2 


 


Hct   34.3 


 


MCV   96.6 H 


 


MCH   31.5 


 


MCHC   32.6 


 


RDW   14.6 


 


Plt Count   196  D 


 


MPV   11.3 H 


 


Sodium  142  


 


Potassium  4.3  


 


Chloride  112 H  


 


Carbon Dioxide  23  


 


Anion Gap  7 L  


 


BUN  67.5 H  


 


Creatinine  1.9 H  


 


Est GFR (CKD-EPI)AfAm  31.29  


 


Est GFR (CKD-EPI)NonAf  27.00  


 


POC Glucometer    131


 


Random Glucose  127 H  


 


Calcium  9.6  


 


Hep A IgM Ab Confirm   


 


Hepatitis A Ab Total   


 


Hep Bs Antigen   


 


Hep Bs Antibody   


 


Hep B Core Total Ab   


 


Hep B Core IgM Ab   


 


Hepatitis Be Antibody   


 


Hepatitis Be Antigen   


 


HCV Quantitation   


 


HCV RNA log copies/mL   














  09/17/20





  12:11


 


WBC 


 


RBC 


 


Hgb 


 


Hct 


 


MCV 


 


MCH 


 


MCHC 


 


RDW 


 


Plt Count 


 


MPV 


 


Sodium 


 


Potassium 


 


Chloride 


 


Carbon Dioxide 


 


Anion Gap 


 


BUN 


 


Creatinine 


 


Est GFR (CKD-EPI)AfAm 


 


Est GFR (CKD-EPI)NonAf 


 


POC Glucometer  202


 


Random Glucose 


 


Calcium 


 


Hep A IgM Ab Confirm 


 


Hepatitis A Ab Total 


 


Hep Bs Antigen 


 


Hep Bs Antibody 


 


Hep B Core Total Ab 


 


Hep B Core IgM Ab 


 


Hepatitis Be Antibody 


 


Hepatitis Be Antigen 


 


HCV Quantitation 


 


HCV RNA log copies/mL 











                                        

















S1 s2 RRR


Lungs decreased right 


Abd- soft, obese


NT


Edema decreased 








PLAN


Per Cardiology-- Lasix increased to 80 mg iv BID


will follow up tomorrow to see if any improvement


check CXR in AM


Monitor renal function


spoke with Nephrology 








pleural fluid studies - cytology negative


Nebs





OOB


?repeat thoracentesis if no improvement after medical management 





cytology negative


 pleural fluid studies -- transudate








Problem List





- Problems


(1) COPD exacerbation


Code(s): J44.1 - CHRONIC OBSTRUCTIVE PULMONARY DISEASE W (ACUTE) EXACERBATION   





(2) ASHD (arteriosclerotic heart disease)


Code(s): I25.10 - ATHSCL HEART DISEASE OF NATIVE CORONARY ARTERY W/O ANG PCTRS  







(3) Acute hypoxemic respiratory failure


Code(s): J96.01 - ACUTE RESPIRATORY FAILURE WITH HYPOXIA   





(4) Acute on chronic diastolic CHF (congestive heart failure)


Code(s): I50.33 - ACUTE ON CHRONIC DIASTOLIC (CONGESTIVE) HEART FAILURE   





(5) CKD (chronic kidney disease) stage 3, GFR 30-59 ml/min


Code(s): N18.3 - CHRONIC KIDNEY DISEASE, STAGE 3 (MODERATE)   





(6) HTN (hypertension)


Code(s): I10 - ESSENTIAL (PRIMARY) HYPERTENSION   


Qualifiers: 


   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   





(7) IDDM (insulin dependent diabetes mellitus)


Code(s): E11.9 - TYPE 2 DIABETES MELLITUS WITHOUT COMPLICATIONS; Z79.4 - LONG 

TERM (CURRENT) USE OF INSULIN   





(8) Pneumonia


Code(s): J18.9 - PNEUMONIA, UNSPECIFIED ORGANISM   


Qualifiers: 


   Laterality: right   Lung location: lower lobe of lung

## 2020-09-18 NOTE — PN
Progress Note, Physician


Chief Complaint: 





Lasix increased yesterday


Weight is unchanged.


She still feels SOB with minimal activity and feels she is not urinating to the 

degree expected for the amount of lasix she is receiving.


Her abdominal distension has mildly improved but not at baseline





- Current Medication List


Current Medications: 


Active Medications





Albuterol Sulfate (Ventolin Hfa Inhaler -)  2 puff IH Q4H PRN


   PRN Reason: SHORT OF BREATH/WHEEZING


   Last Admin: 09/09/20 22:16 Dose:  2 puff


   Documented by: 


Alprazolam (Xanax)  0.5 mg PO Q8H PRN


   PRN Reason: ANXIETY


Amlodipine Besylate (Norvasc -)  10 mg PO DAILY [facility]


   Last Admin: 09/17/20 09:36 Dose:  10 mg


   Documented by: 


Aspirin (Ecotrin -)  81 mg PO DAILY [facility]


   Last Admin: 09/17/20 09:36 Dose:  81 mg


   Documented by: 


Brimonidine Tartrate (Alphagan 0.2% -)  1 drop OU BID [facility]


   Last Admin: 09/17/20 21:48 Dose:  1 drop


   Documented by: 


Carvedilol (Coreg -)  6.25 mg PO BID [facility]


   Last Admin: 09/17/20 21:48 Dose:  6.25 mg


   Documented by: 


Duloxetine HCl (Cymbalta -)  60 mg PO DAILY [facility]


   Last Admin: 09/17/20 09:36 Dose:  60 mg


   Documented by: 


Furosemide (Lasix Injection -)  80 mg IVPUSH BID@0600,1400 [facility]


Guaifenesin (Robitussin Dm -)  10 ml PO Q6H PRN


   PRN Reason: COUGH


Heparin Sodium (Porcine) (Heparin -)  5,000 unit SQ BID [facility]


   Last Admin: 09/17/20 21:47 Dose:  5,000 unit


   Documented by: 


Insulin Aspart (Novolog Vial Sliding Scale -)  [facility]; Protocol


   Last Admin: 09/17/20 21:49 Dose:  6 unit


   Documented by: 


Insulin Detemir (Levemir Vial)  32 units SQ AM JAYCEE


Insulin Detemir (Levemir Vial)  32 units SQ HS [facility]


   Last Admin: 09/17/20 21:48 Dose:  32 units


   Documented by: 


Latanoprost (Xalatan 0.005% Eye Drops -)  1 drop OU HS [facility]


   Last Admin: 09/17/20 21:48 Dose:  1 drop


   Documented by: 


Melatonin (Melatonin)  5 mg PO HS PRN


   PRN Reason: INSOMNIA


Multivitamins/Minerals/Vitamin C (Tab-A-Vit -)  1 tab PO DAILY [facility]


   Last Admin: 09/17/20 09:36 Dose:  1 tab


   Documented by: 


Nicotine (Nicoderm Patch -)  14 mg TD DAILY [facility]


   Last Admin: 09/17/20 09:36 Dose:  14 mg


   Documented by: 


Nitroglycerin (Nitro-Bid 2% Paste -)  1 inch TD Q6HPO [facility]


   Last Admin: 09/18/20 00:46 Dose:  1 inch


   Documented by: 


Pantoprazole Sodium (Protonix -)  40 mg PO DAILY [facility]


   Last Admin: 09/17/20 09:36 Dose:  40 mg


   Documented by: 


Polyethylene Glycol (Miralax (For Daily Use) -)  17 gm PO DAILY PRN


   PRN Reason: CONSTIPATION


   Last Admin: 09/17/20 09:37 Dose:  17 grams


   Documented by: 


Rosuvastatin Calcium (Crestor -)  10 mg PO HS [facility]


   Last Admin: 09/17/20 21:48 Dose:  10 mg


   Documented by: 


Timolol Maleate (Timoptic 0.5%)  1 drop OU BID [facility]


   Last Admin: 09/17/20 21:48 Dose:  1 drop


   Documented by: 


Tiotropium Bromide (Spiriva Respimat)  2 puff IH DAILY [facility]


   Last Admin: 09/17/20 09:37 Dose:  2 puff


   Documented by: 











- Objective


Vital Signs: 


                                   Vital Signs











Temperature  98 F   09/17/20 21:00


 


Pulse Rate  85   09/18/20 01:00


 


Respiratory Rate  18   09/18/20 01:00


 


Blood Pressure  140/65   09/18/20 01:00


 


O2 Sat by Pulse Oximetry (%)  95   09/17/20 21:00











Constitutional: Yes: No Distress


Eyes: Yes: Conjunctiva Clear


Cardiovascular: Yes: Regular Rate and Rhythm


Respiratory: Yes: Other (markedly decreased breath sounds right base. No rales 

left, no wheeze)


Gastrointestinal: Yes: Soft, Abdomen, Obese


Edema: No


Neurological: Yes: Alert, Oriented


Labs: 


                                    CBC, BMP





                                 09/17/20 06:40 





                                 09/17/20 06:40 





                                    INR, PTT











INR  0.94  (0.83-1.09)   09/02/20  16:36    














Assessment/Plan








IMP:


Chronic diastolic CHF


Bilateral lung consolidation: PNA vs CHF; COVID NEGATIVE, RIGHT EFFUSION s/p 

thoracentesis, transudate


Non obstx CAD


CKD


DM


HTN








REC:


1. Acute on chronic diastolic CHF (on lasix 40 po qd at home):


-with lasix 80qd and metolazone she has not lost much volume.  Cr stable. lasix 

increased yesterday to 80mg IV BID after discussion with renal


-Will need to assess response to titrated Lasix; can consider switch to Bumex


-Right pleural fluid seems to be mildly improved on CXR today. Ambulate with O2 

sat monitoring





-Daily BMP to monitor renal fx


-echo here unremarkable (nl LV/RV/valves, no pulm HTN noted)


-Non obstx CAD on cath within last 5 years


-rec MAGNOLIA workup and treatment as outpt if she is amenable





2. HTN: 


-cont current meds





3. PNA?: with effusion, drained


-CT pattern more c/w consolidation, though normal temp curve, no infectious 

sx's, pleural fluid transudative (suspect dry cough is chf sx)


-clinically volume overloaded


-Pulm and ID following.





4. Nonobstx CAD:


-cont asa, statin





5. NAY on CKD:


-baseline creat 1.4-1.7 from prior hosp stay


-possible cardiorenal syndr contributing here as well, cr has been stable so 

far.


-Unclear why not on ACE/ARB, perhaps GFR or K+ would not permit. no change to 

prior tx plan





6. DM: as per PMD





7. COPD/smoking: smoking cessation recommended.





8. Depression: Agree with Dr. Antonio, component of depression likely. As per 

PMD

## 2020-09-18 NOTE — PN
Progress Note, Physician


History of Present Illness: 


pt seen/ examined


chart reviewed


all f/u noted


still gets sob with exertion























- Current Medication List


Current Medications: 


Active Medications





Acetaminophen (Tylenol -)  650 mg PO Q6H PRN


   PRN Reason: PAIN LEVEL 1-5


Albuterol Sulfate (Ventolin Hfa Inhaler -)  2 puff IH Q4H PRN


   PRN Reason: SHORT OF BREATH/WHEEZING


   Last Admin: 09/09/20 22:16 Dose:  2 puff


   Documented by: 


Alprazolam (Xanax)  0.5 mg PO Q8H PRN


   PRN Reason: ANXIETY


Amlodipine Besylate (Norvasc -)  10 mg PO DAILY [facility]


   Last Admin: 09/18/20 10:15 Dose:  10 mg


   Documented by: 


Aspirin (Ecotrin -)  81 mg PO DAILY [facility]


   Last Admin: 09/18/20 10:15 Dose:  81 mg


   Documented by: 


Brimonidine Tartrate (Alphagan 0.2% -)  1 drop OU BID [facility]


   Last Admin: 09/18/20 10:23 Dose:  1 drop


   Documented by: 


Carvedilol (Coreg -)  6.25 mg PO BID [facility]


   Last Admin: 09/18/20 10:15 Dose:  6.25 mg


   Documented by: 


Duloxetine HCl (Cymbalta -)  60 mg PO DAILY [facility]


   Last Admin: 09/18/20 10:14 Dose:  60 mg


   Documented by: 


Furosemide (Lasix Injection -)  80 mg IVPUSH BID@0600,1400 [facility]


   Last Admin: 09/18/20 06:10 Dose:  80 mg


   Documented by: 


Guaifenesin (Robitussin Dm -)  10 ml PO Q6H PRN


   PRN Reason: COUGH


Heparin Sodium (Porcine) (Heparin -)  5,000 unit SQ BID [facility]


   Last Admin: 09/18/20 10:14 Dose:  5,000 unit


   Documented by: 


Insulin Aspart (Novolog Vial Sliding Scale -)  1 vial SQ ACHS [facility]; Protocol


   Last Admin: 09/18/20 11:11 Dose:  4 unit


   Documented by: 


Insulin Detemir (Levemir Vial)  32 units SQ AM [facility]


   Last Admin: 09/18/20 06:11 Dose:  32 units


   Documented by: 


Insulin Detemir (Levemir Vial)  32 units SQ HS [facility]


   Last Admin: 09/17/20 21:48 Dose:  32 units


   Documented by: 


Latanoprost (Xalatan 0.005% Eye Drops -)  1 drop OU HS [facility]


   Last Admin: 09/17/20 21:48 Dose:  1 drop


   Documented by: 


Melatonin (Melatonin)  5 mg PO HS PRN


   PRN Reason: INSOMNIA


Multivitamins/Minerals/Vitamin C (Tab-A-Vit -)  1 tab PO DAILY [facility]


   Last Admin: 09/18/20 10:15 Dose:  1 tab


   Documented by: 


Nicotine (Nicoderm Patch -)  14 mg TD DAILY [facility]


   Last Admin: 09/18/20 10:14 Dose:  14 mg


   Documented by: 


Nitroglycerin (Nitro-Bid 2% Paste -)  1 inch TD Q6HPO [facility]


   Last Admin: 09/18/20 06:10 Dose:  1 inch


   Documented by: 


Pantoprazole Sodium (Protonix -)  40 mg PO DAILY [facility]


   Last Admin: 09/18/20 10:14 Dose:  40 mg


   Documented by: 


Polyethylene Glycol (Miralax (For Daily Use) -)  17 gm PO DAILY PRN


   PRN Reason: CONSTIPATION


   Last Admin: 09/17/20 09:37 Dose:  17 grams


   Documented by: 


Rosuvastatin Calcium (Crestor -)  10 mg PO HS [facility]


   Last Admin: 09/17/20 21:48 Dose:  10 mg


   Documented by: 


Timolol Maleate (Timoptic 0.5%)  1 drop OU BID [facility]


   Last Admin: 09/18/20 10:23 Dose:  1 drop


   Documented by: 


Tiotropium Bromide (Spiriva Respimat)  2 puff IH DAILY [facility]


   Last Admin: 09/18/20 10:50 Dose:  2 puff


   Documented by: 











- Objective


Vital Signs: 


                                   Vital Signs











Temperature  97.8 F   09/18/20 06:21


 


Pulse Rate  99 H  09/18/20 06:21


 


Respiratory Rate  20   09/18/20 06:21


 


Blood Pressure  161/85   09/18/20 06:21


 


O2 Sat by Pulse Oximetry (%)  93 L  09/18/20 06:21











Constitutional: Yes: No Distress


Neck: Yes: Supple


Cardiovascular: Yes: Regular Rate and Rhythm


Respiratory: Yes: Diminished (R>L)


Gastrointestinal: Yes: Soft, Abdomen, Obese


Edema: LLE: 1+, RLE: 1+


Neurological: Yes: Alert


Labs: 


                                    CBC, BMP





                                 09/18/20 06:20 





                                 09/18/20 06:20 





                                    INR, PTT











INR  0.94  (0.83-1.09)   09/02/20  16:36    














Problem List





- Problems


(1) COPD (chronic obstructive pulmonary disease)


Code(s): J44.9 - CHRONIC OBSTRUCTIVE PULMONARY DISEASE, UNSPECIFIED   


Qualifiers: 


   COPD type: unspecified COPD   Qualified Code(s): J44.9 - Chronic obstructive 

pulmonary disease, unspecified   





(2) Encounter for screening laboratory testing for COVID-19 virus


Code(s): Z11.59 - ENCOUNTER FOR SCREENING FOR OTHER VIRAL DISEASES   





(3) Pleural effusion


Code(s): J90 - PLEURAL EFFUSION, NOT ELSEWHERE CLASSIFIED   





(4) Shortness of breath on exertion


Code(s): R06.02 - SHORTNESS OF BREATH   





(5) ASHD (arteriosclerotic heart disease)


Code(s): I25.10 - ATHSCL HEART DISEASE OF NATIVE CORONARY ARTERY W/O ANG PCTRS  







(6) Acute hypoxemic respiratory failure


Code(s): J96.01 - ACUTE RESPIRATORY FAILURE WITH HYPOXIA   





(7) Acute on chronic diastolic CHF (congestive heart failure)


Code(s): I50.33 - ACUTE ON CHRONIC DIASTOLIC (CONGESTIVE) HEART FAILURE   





(8) CKD (chronic kidney disease) stage 3, GFR 30-59 ml/min


Code(s): N18.3 - CHRONIC KIDNEY DISEASE, STAGE 3 (MODERATE)   





(9) HTN (hypertension)


Code(s): I10 - ESSENTIAL (PRIMARY) HYPERTENSION   


Qualifiers: 


   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   





(10) IDDM (insulin dependent diabetes mellitus)


Code(s): E11.9 - TYPE 2 DIABETES MELLITUS WITHOUT COMPLICATIONS; Z79.4 - LONG 

TERM (CURRENT) USE OF INSULIN   





(11) Obesity (BMI 30-39.9)


Code(s): E66.9 - OBESITY, UNSPECIFIED   





Assessment/Plan


discussed again with pt/ RN 


Agree with current management


i/v  lasix


Monitor lytes 


xanax for anxiety


cxr - noted 


monitor 


daily oob - chair


will  continue to follow.

## 2020-09-18 NOTE — PN
Progress Note (short form)





- Note


Progress Note: 





PULMONARY





SITTING UP IN BED


APPEARS FATIGUED


DYSPNEA ON EXERTION





Gen: anicteric


Heart: RRR


Lung: decreased breath sounds at the bases


Abd: soft, nontender


Ext: + edema








LABS/MEDS/NOTES/IMAGES REVIEWED





A/P


Acute on Chronic Diastolic Heart Failure


Right Pleural Effusion minimally decreased post tap


COPD


CKD


HTN


TOBACCO ABUSE





-  continue lasix


-  monitor urine output, creatinine


-  daily weights


-  O2 to keep SpO2 >90%


-  inhaled bronchodilators as needed


-  DVT prophylaxis


-  Repeat tap 





YANN NOBLE MD

## 2020-09-18 NOTE — PN
Progress Note, Physician


History of Present Illness: 





Pt seen and examined at bedside. She is awake and alert. She does not feel much 

different than yesterday. 





- Current Medication List


Current Medications: 


Active Medications





Acetaminophen (Tylenol -)  650 mg PO Q6H PRN


   PRN Reason: PAIN LEVEL 1-5


Albuterol Sulfate (Ventolin Hfa Inhaler -)  2 puff IH Q4H PRN


   PRN Reason: SHORT OF BREATH/WHEEZING


   Last Admin: 09/09/20 22:16 Dose:  2 puff


   Documented by: 


Alprazolam (Xanax)  0.5 mg PO Q8H PRN


   PRN Reason: ANXIETY


Amlodipine Besylate (Norvasc -)  10 mg PO DAILY [facility]


   Last Admin: 09/18/20 10:15 Dose:  10 mg


   Documented by: 


Aspirin (Ecotrin -)  81 mg PO DAILY [facility]


   Last Admin: 09/18/20 10:15 Dose:  81 mg


   Documented by: 


Brimonidine Tartrate (Alphagan 0.2% -)  1 drop OU BID [facility]


   Last Admin: 09/18/20 10:23 Dose:  1 drop


   Documented by: 


Carvedilol (Coreg -)  6.25 mg PO BID [facility]


   Last Admin: 09/18/20 10:15 Dose:  6.25 mg


   Documented by: 


Duloxetine HCl (Cymbalta -)  60 mg PO DAILY [facility]


   Last Admin: 09/18/20 10:14 Dose:  60 mg


   Documented by: 


Furosemide (Lasix Injection -)  80 mg IVPUSH BID@0600,1400 [facility]


   Last Admin: 09/18/20 06:10 Dose:  80 mg


   Documented by: 


Guaifenesin (Robitussin Dm -)  10 ml PO Q6H PRN


   PRN Reason: COUGH


Heparin Sodium (Porcine) (Heparin -)  5,000 unit SQ BID [facility]


   Last Admin: 09/18/20 10:14 Dose:  5,000 unit


   Documented by: 


Insulin Aspart ([facility] Sliding Scale -)  [facility]


   Last Admin: 09/18/20 11:11 Dose:  4 unit


   Documented by: 


Insulin Detemir (Levemir Vial)  32 units SQ AM [facility]


   Last Admin: 09/18/20 06:11 Dose:  32 units


   Documented by: 


Insulin Detemir (Levemir Vial)  32 units SQ HS [facility]


   Last Admin: 09/17/20 21:48 Dose:  32 units


   Documented by: 


Latanoprost (Xalatan 0.005% Eye Drops -)  1 drop OU HS [facility]


   Last Admin: 09/17/20 21:48 Dose:  1 drop


   Documented by: 


Melatonin (Melatonin)  5 mg PO HS PRN


   PRN Reason: INSOMNIA


Multivitamins/Minerals/Vitamin C (Tab-A-Vit -)  1 tab PO DAILY [facility]


   Last Admin: 09/18/20 10:15 Dose:  1 tab


   Documented by: 


Nicotine (Nicoderm Patch -)  14 mg TD DAILY [facility]


   Last Admin: 09/18/20 10:14 Dose:  14 mg


   Documented by: 


Nitroglycerin (Nitro-Bid 2% Paste -)  1 inch TD Q6HPO [facility]


   Last Admin: 09/18/20 12:08 Dose:  1 inch


   Documented by: 


Pantoprazole Sodium (Protonix -)  40 mg PO DAILY [facility]


   Last Admin: 09/18/20 10:14 Dose:  40 mg


   Documented by: 


Polyethylene Glycol (Miralax (For Daily Use) -)  17 gm PO DAILY [facility]


   PRN Reason: CONSTIPATION


   Last Admin: 09/17/20 09:37 Dose:  17 grams


   Documented by: 


Rosuvastatin Calcium (Crestor -)  10 mg PO HS [facility]


   Last Admin: 09/17/20 21:48 Dose:  10 mg


   Documented by: 


Timolol Maleate (Timoptic 0.5%)  1 drop OU BID [facility]


   Last Admin: 09/18/20 10:23 Dose:  1 drop


   Documented by: 


Tiotropium Bromide (Spiriva Respimat)  2 puff IH DAILY [facility]


   Last Admin: 09/18/20 10:50 Dose:  2 puff


   Documented by: 











- Objective


Vital Signs: 


                                   Vital Signs











Temperature  98.7 F   09/18/20 10:00


 


Pulse Rate  91 H  09/18/20 10:00


 


Respiratory Rate  20   09/18/20 10:00


 


Blood Pressure  144/70   09/18/20 10:00


 


O2 Sat by Pulse Oximetry (%)  95   09/18/20 10:00











Constitutional: Yes: Calm


Eyes: Yes: Conjunctiva Clear


HENT: Yes: Atraumatic


Neck: Yes: Supple


Cardiovascular: Yes: S1, S2


Respiratory: Yes: CTA Bilaterally


Gastrointestinal: Yes: Soft, Abdomen, Obese


Genitourinary: Yes: WNL


Musculoskeletal: Yes: WNL


Edema: Yes


Edema: LLE: 2+, RLE: 2+


Neurological: Yes: Oriented


Psychiatric: Yes: Oriented


Labs: 


                                    CBC, BMP





                                 09/18/20 06:20 





                                 09/18/20 06:20 





                                    INR, PTT











INR  0.94  (0.83-1.09)   09/02/20  16:36    














Problem List





- Problems


(1) COPD (chronic obstructive pulmonary disease)


Code(s): J44.9 - CHRONIC OBSTRUCTIVE PULMONARY DISEASE, UNSPECIFIED   


Qualifiers: 


   COPD type: unspecified COPD   Qualified Code(s): J44.9 - Chronic obstructive 

pulmonary disease, unspecified   





(2) COPD exacerbation


Code(s): J44.1 - CHRONIC OBSTRUCTIVE PULMONARY DISEASE W (ACUTE) EXACERBATION   





(3) CKD (chronic kidney disease) stage 3, GFR 30-59 ml/min


Code(s): N18.3 - CHRONIC KIDNEY DISEASE, STAGE 3 (MODERATE)   





Assessment/Plan


                               Current Medications











Generic Name Dose Route Start Last Admin





  Trade Name Freq  PRN Reason Stop Dose Admin


 


Acetaminophen  650 mg  09/18/20 11:46 





  Tylenol -  PO  





  Q6H PRN  





  PAIN LEVEL 1-5  


 


Albuterol Sulfate  2 puff  09/03/20 00:47  09/09/20 22:16





  Ventolin Hfa Inhaler -  IH   2 puff





  Q4H PRN   Administration





  SHORT OF BREATH/WHEEZING  


 


Alprazolam  0.5 mg  09/17/20 12:31 





  Xanax  PO  





  Q8H PRN  





  ANXIETY  


 


Amlodipine Besylate  10 mg  09/03/20 10:00  09/18/20 10:15





  Norvasc -  PO   10 mg





  DAILY JAYCEE   Administration


 


Aspirin  81 mg  09/03/20 10:00  09/18/20 10:15





  Ecotrin -  PO   81 mg





  DAILY JAYCEE   Administration


 


Brimonidine Tartrate  1 drop  09/03/20 10:00  09/18/20 10:23





  Alphagan 0.2% -  OU   1 drop





  BID JAYCEE   Administration


 


Carvedilol  6.25 mg  09/11/20 10:00  09/18/20 10:15





  Coreg -  PO   6.25 mg





  BID JAYCEE   Administration


 


Duloxetine HCl  60 mg  09/03/20 10:00  09/18/20 10:14





  Cymbalta -  PO   60 mg





  DAILY JAYCEE   Administration


 


Furosemide  80 mg  09/18/20 06:00  09/18/20 06:10





  Lasix Injection -  IVPUSH   80 mg





  BID@0600,1400 JAYCEE   Administration


 


Guaifenesin  10 ml  09/13/20 01:02 





  Robitussin Dm -  PO  





  Q6H PRN  





  COUGH  


 


Heparin Sodium (Porcine)  5,000 unit  09/16/20 22:00  09/18/20 10:14





  Heparin -  SQ   5,000 unit





  BID JAYCEE   Administration


 


Insulin Aspart  1 vial  09/03/20 07:00  09/18/20 11:11





  Novolog Vial Sliding Scale -  SQ   4 unit





  [facility]   Administration





  Protocol  


 


Insulin Detemir  32 units  09/17/20 12:27  09/18/20 06:11





  Levemir Vial  SQ   32 units





  AM JAYCEE   Administration


 


Insulin Detemir  32 units  09/17/20 12:27  09/17/20 21:48





  Levemir Vial  SQ   32 units





  HS JAYCEE   Administration


 


Latanoprost  1 drop  09/03/20 22:00  09/17/20 21:48





  Xalatan 0.005% Eye Drops -  OU   1 drop





  HS JAYCEE   Administration


 


Melatonin  5 mg  09/14/20 21:23 





  Melatonin  PO  





  HS PRN  





  INSOMNIA  


 


Multivitamins/Minerals/Vitamin C  1 tab  09/03/20 10:00  09/18/20 10:15





  Tab-A-Vit -  PO   1 tab





  DAILY JAYCEE   Administration


 


Nicotine  14 mg  09/05/20 10:00  09/18/20 10:14





  Nicoderm Patch -  TD   14 mg





  DAILY JAYCEE   Administration


 


Nitroglycerin  1 inch  09/12/20 12:00  09/18/20 12:08





  Nitro-Bid 2% Paste -  TD   1 inch





  Q6HPO JAYCEE   Administration


 


Pantoprazole Sodium  40 mg  09/04/20 10:00  09/18/20 10:14





  Protonix -  PO   40 mg





  DAILY JAYCEE   Administration


 


Polyethylene Glycol  17 gm  09/05/20 15:15  09/17/20 09:37





  Miralax (For Daily Use) -  PO   17 grams





  DAILY PRN   Administration





  CONSTIPATION  


 


Rosuvastatin Calcium  10 mg  09/03/20 22:00  09/17/20 21:48





  Crestor -  PO   10 mg





  HS JAYCEE   Administration


 


Timolol Maleate  1 drop  09/03/20 10:00  09/18/20 10:23





  Timoptic 0.5%  OU   1 drop





  BID JAYCEE   Administration


 


Tiotropium Bromide  2 puff  09/03/20 10:00  09/18/20 10:50





  Spiriva Respimat  IH   2 puff





  DAILY JAYCEE   Administration














Impression


1. CKD


2. CHF


3. COPD


4. hld


5. dm


6. cad


7. htn


8. proteinuria


9. mgus





Plan


- cont lasix, dose is 80 bid


- will give metolazone


- repeat labs in am


- cardio follow up


- fluid restriction

## 2020-09-19 NOTE — PN
Progress Note (short form)





- Note


Progress Note: 








CHF


CKD on lasix











Active Medications





Acetaminophen (Tylenol -)  650 mg PO Q6H PRN


   PRN Reason: PAIN LEVEL 1-5


Albuterol Sulfate (Ventolin Hfa Inhaler -)  2 puff IH Q4H PRN


   PRN Reason: SHORT OF BREATH/WHEEZING


   Last Admin: 09/09/20 22:16 Dose:  2 puff


   Documented by: 


Alprazolam (Xanax)  0.5 mg PO Q8H PRN


   PRN Reason: ANXIETY


   Last Admin: 09/18/20 23:16 Dose:  0.5 mg


   Documented by: 


Amlodipine Besylate (Norvasc -)  10 mg PO DAILY [facility]


   Last Admin: 09/19/20 10:40 Dose:  10 mg


   Documented by: 


Aspirin (Ecotrin -)  81 mg PO DAILY [facility]


   Last Admin: 09/19/20 10:40 Dose:  81 mg


   Documented by: 


Brimonidine Tartrate (Alphagan 0.2% -)  1 drop OU BID [facility]


   Last Admin: 09/19/20 10:41 Dose:  1 drop


   Documented by: 


Carvedilol (Coreg -)  6.25 mg PO BID [facility]


   Last Admin: 09/19/20 10:40 Dose:  6.25 mg


   Documented by: 


Duloxetine HCl (Cymbalta -)  60 mg PO DAILY [facility]


   Last Admin: 09/19/20 10:39 Dose:  60 mg


   Documented by: 


Furosemide (Lasix Injection -)  80 mg IVPUSH BID@0600,1400 [facility]


   Last Admin: 09/19/20 14:22 Dose:  80 mg


   Documented by: 


Guaifenesin (Robitussin Dm -)  10 ml PO Q6H PRN


   PRN Reason: COUGH


Heparin Sodium (Porcine) (Heparin -)  5,000 unit SQ BID [facility]


   Last Admin: 09/19/20 10:41 Dose:  5,000 unit


   Documented by: 


Insulin Aspart (Novolog V[facility] Sliding Scale -)  [facility]


   Last Admin: 09/19/20 17:08 Dose:  4 unit


   Documented by: 


Insulin Detemir (Levemir Vial)  32 units SQ AM [facility]


   Last Admin: 09/19/20 06:47 Dose:  32 units


   Documented by: 


Insulin Detemir (Levemir Vial)  32 units SQ HS [facility]


   Last Admin: 09/18/20 22:57 Dose:  32 units


   Documented by: 


Latanoprost (Xalatan 0.005% Eye Drops -)  1 drop OU HS [facility]


   Last Admin: 09/18/20 23:00 Dose:  Not Given


   Documented by: 


Melatonin (Melatonin)  5 mg PO HS PRN


   PRN Reason: INSOMNIA


   Last Admin: 09/18/20 23:16 Dose:  5 mg


   Documented by: 


Multivitamins/Minerals/Vitamin C (Tab-A-Vit -)  1 tab PO DAILY [facility]


   Last Admin: 09/19/20 10:40 Dose:  1 tab


   Documented by: 


Nicotine (Nicoderm Patch -)  14 mg TD DAILY [facility]


   Last Admin: 09/19/20 10:39 Dose:  14 mg


   Documented by: 


Nitroglycerin (Nitro-Bid 2% Paste -)  1 inch TD Q6HPO [facility]


   Last Admin: 09/19/20 12:05 Dose:  1 inch


   Documented by: 


Pantoprazole Sodium (Protonix -)  40 mg PO DAILY [facility]


   Last Admin: 09/19/20 10:40 Dose:  40 mg


   Documented by: 


Polyethylene Glycol (Miralax (For Daily Use) -)  17 gm PO DAILY [facility]


   PRN Reason: CONSTIPATION


   Last Admin: 09/17/20 09:37 Dose:  17 grams


   Documented by: 


Rosuvastatin Calcium (Crestor -)  10 mg PO HS [facility]


   Last Admin: 09/18/20 22:59 Dose:  10 mg


   Documented by: 


Timolol Maleate (Timoptic 0.5%)  1 drop OU BID [facility]


   Last Admin: 09/19/20 10:41 Dose:  1 drop


   Documented by: 


Tiotropium Bromide (Spiriva Respimat)  2 puff IH DAILY [facility]


   Last Admin: 09/19/20 10:41 Dose:  2 puff


   Documented by: 





                                Last Vital Signs











Temp Pulse Resp BP Pulse Ox


 


 97.9 F   88   20   104/61   97 


 


 09/19/20 14:41  09/19/20 14:41  09/19/20 14:41  09/19/20 14:41  09/19/20 14:41

















                                    CBC, BMP





                                 09/18/20 06:20 





                                 09/19/20 12:10

## 2020-09-19 NOTE — PN
Progress Note (short form)





- Note


Progress Note: 








denies chest pain 


no worsening SOB 


                               Vital Signs - 24 hr











  09/18/20 09/18/20 09/18/20





  14:35 18:00 21:00


 


Temperature 98.1 F 98.3 F 


 


Pulse Rate 94 H 94 H 


 


Respiratory 20 20 20





Rate   


 


Blood Pressure 138/57 L 144/68 


 


O2 Sat by Pulse  94 L 94 L





Oximetry (%)   














  09/18/20 09/19/20 09/19/20





  22:00 02:00 06:00


 


Temperature 98.2 F 97.6 F 97.7 F


 


Pulse Rate 98 H 93 H 87


 


Respiratory 20 20 20





Rate   


 


Blood Pressure 150/74 135/59 L 153/74


 


O2 Sat by Pulse 94 L 95 94 L





Oximetry (%)   














  09/19/20





  10:00


 


Temperature 97.8 F


 


Pulse Rate 96 H


 


Respiratory 20





Rate 


 


Blood Pressure 143/87


 


O2 Sat by Pulse 94 L





Oximetry (%) 








                               Current Medications











Generic Name Dose Route Start Last Admin





  Trade Name Freq  PRN Reason Stop Dose Admin


 


Acetaminophen  650 mg  09/18/20 11:46 





  Tylenol -  PO  





  Q6H PRN  





  PAIN LEVEL 1-5  


 


Albuterol Sulfate  2 puff  09/03/20 00:47  09/09/20 22:16





  Ventolin Hfa Inhaler -  IH   2 puff





  Q4H PRN   Administration





  SHORT OF BREATH/WHEEZING  


 


Alprazolam  0.5 mg  09/17/20 12:31  09/18/20 23:16





  Xanax  PO   0.5 mg





  Q8H PRN   Administration





  ANXIETY  


 


Amlodipine Besylate  10 mg  09/03/20 10:00  09/19/20 10:40





  Norvasc -  PO   10 mg





  DAILY JAYCEE   Administration


 


Aspirin  81 mg  09/03/20 10:00  09/19/20 10:40





  Ecotrin -  PO   81 mg





  DAILY JAYCEE   Administration


 


Brimonidine Tartrate  1 drop  09/03/20 10:00  09/19/20 10:41





  Alphagan 0.2% -  OU   1 drop





  BID JAYCEE   Administration


 


Carvedilol  6.25 mg  09/11/20 10:00  09/19/20 10:40





  Coreg -  PO   6.25 mg





  BID JAYCEE   Administration


 


Duloxetine HCl  60 mg  09/03/20 10:00  09/19/20 10:39





  Cymbalta -  PO   60 mg





  DAILY JAYCEE   Administration


 


Furosemide  80 mg  09/18/20 06:00  09/19/20 06:51





  Lasix Injection -  IVPUSH   80 mg





  BID@0600,1400 JAYCEE   Administration


 


Guaifenesin  10 ml  09/13/20 01:02 





  Robitussin Dm -  PO  





  Q6H PRN  





  COUGH  


 


Heparin Sodium (Porcine)  5,000 unit  09/16/20 22:00  09/19/20 10:41





  Heparin -  SQ   5,000 unit





  BID JAYCEE   Administration


 


Insulin Aspart  1 vial  09/03/20 07:00  09/19/20 12:05





  Novolog Vial Sliding Scale -  SQ   4 unit





  [facility] JAYCEE   Administration





  Protocol  


 


Insulin Detemir  32 units  09/17/20 12:27  09/19/20 06:47





  Levemir Vial  SQ   32 units





  AM JAYCEE   Administration


 


Insulin Detemir  32 units  09/17/20 12:27  09/18/20 22:57





  Levemir Vial  SQ   32 units





  HS JAYCEE   Administration


 


Latanoprost  1 drop  09/03/20 22:00  09/18/20 23:00





  Xalatan 0.005% Eye Drops -  OU   Not Given





  HS [facility]  


 


Melatonin  5 mg  09/14/20 21:23  09/18/20 23:16





  Melatonin  PO   5 mg





  HS PRN   Administration





  INSOMNIA  


 


Multivitamins/Minerals/Vitamin C  1 tab  09/03/20 10:00  09/19/20 10:40





  Tab-A-Vit -  PO   1 tab





  DAILY JAYCEE   Administration


 


Nicotine  14 mg  09/05/20 10:00  09/19/20 10:39





  Nicoderm Patch -  TD   14 mg





  DAILY JAYCEE   Administration


 


Nitroglycerin  1 inch  09/12/20 12:00  09/19/20 12:05





  Nitro-Bid 2% Paste -  TD   1 inch





  Q6HPO JAYCEE   Administration


 


Pantoprazole Sodium  40 mg  09/04/20 10:00  09/19/20 10:40





  Protonix -  PO   40 mg





  DAILY JAYCEE   Administration


 


Polyethylene Glycol  17 gm  09/05/20 15:15  09/17/20 09:37





  Miralax (For Daily Use) -  PO   17 grams





  DAILY PRN   Administration





  CONSTIPATION  


 


Rosuvastatin Calcium  10 mg  09/03/20 22:00  09/18/20 22:59





  Crestor -  PO   10 mg





  HS JAYCEE   Administration


 


Timolol Maleate  1 drop  09/03/20 10:00  09/19/20 10:41





  Timoptic 0.5%  OU   1 drop





  BID JAYCEE   Administration


 


Tiotropium Bromide  2 puff  09/03/20 10:00  09/19/20 10:41





  Spiriva Respimat  IH   2 puff





  DAILY JAYCEE   Administration








                         Laboratory Results - last 24 hr











  09/16/20 09/18/20 09/18/20





  06:50 17:20 22:54


 


POC Glucometer   209  197


 


Serum BETHANY Interpret    


 


IEP IgG  981  


 


IEP IgA  325  


 


IEP IgM  37  














  09/19/20





  11:49


 


POC Glucometer  240


 


Serum BETHANY Interpret 


 


IEP IgG 


 


IEP IgA 


 


IEP IgM 








                                 Intake & Output











 09/16/20 09/17/20 09/18/20 09/19/20





 23:59 23:59 23:59 23:59


 


Intake Total 500 550 200 100


 


Balance 500 550 200 100


 


Weight 251 lb 12.8 oz 250 lb 8 oz 250 lb 1.6 oz 246 lb 1 oz








she was able to sleep well last night 


no distress





                                        

















S1 s2 RRR


Lungs decreased right 


Abd- soft, obese


NT


Edema decreased 








PLAN


Per Cardiology-- Lasix increased to 80 mg iv BID





Monitor renal function-- done today-- pending results





noted weight loss








pleural fluid studies - cytology negative


Nebs





OOB


?repeat thoracentesis if no improvement after medical management 





cytology negative


 pleural fluid studies -- transudate


check CXR on Monday 








Problem List





- Problems


(1) COPD exacerbation


Code(s): J44.1 - CHRONIC OBSTRUCTIVE PULMONARY DISEASE W (ACUTE) EXACERBATION   





(2) ASHD (arteriosclerotic heart disease)


Code(s): I25.10 - ATHSCL HEART DISEASE OF NATIVE CORONARY ARTERY W/O ANG PCTRS  







(3) Acute hypoxemic respiratory failure


Code(s): J96.01 - ACUTE RESPIRATORY FAILURE WITH HYPOXIA   





(4) Acute on chronic diastolic CHF (congestive heart failure)


Code(s): I50.33 - ACUTE ON CHRONIC DIASTOLIC (CONGESTIVE) HEART FAILURE   





(5) CKD (chronic kidney disease) stage 3, GFR 30-59 ml/min


Code(s): N18.3 - CHRONIC KIDNEY DISEASE, STAGE 3 (MODERATE)   





(6) HTN (hypertension)


Code(s): I10 - ESSENTIAL (PRIMARY) HYPERTENSION   


Qualifiers: 


   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   





(7) IDDM (insulin dependent diabetes mellitus)


Code(s): E11.9 - TYPE 2 DIABETES MELLITUS WITHOUT COMPLICATIONS; Z79.4 - LONG 

TERM (CURRENT) USE OF INSULIN   





(8) Pneumonia


Code(s): J18.9 - PNEUMONIA, UNSPECIFIED ORGANISM   


Qualifiers: 


   Laterality: right   Lung location: lower lobe of lung

## 2020-09-19 NOTE — PN
Progress Note (short form)





- Note


Progress Note: 





PULMONARY





SITTING UP IN BED


HAD A RESTFUL NIGHT


DYSPNEA ON EXERTION BUT LESS





Gen: anicteric


Heart: RRR


Lung: decreased breath sounds at right base mildly improved


Abd: soft, nontender


Ext: + edema








LABS/MEDS/NOTES/IMAGES REVIEWED





A/P


Acute on Chronic Diastolic Heart Failure


Right Pleural Effusion minimally decreased post tap


COPD


CKD


HTN


TOBACCO ABUSE





-  continue lasix


-  monitor urine output, creatinine


-  daily weights


-  O2 to keep SpO2 >90%


-  inhaled bronchodilators as needed


-  DVT prophylaxis


-  Repeat tap


-  CXR tomorrow 





YANN NOBLE MD

## 2020-09-19 NOTE — PN
Progress Note (short form)





- Note


Progress Note: 


cc: dyspnea





s: feels better today, has had more urine output and breathing is a little 

better. no chest pain, palps, dizziness


                               Current Medications











Generic Name Dose Route Start Last Admin





  Trade Name Freq  PRN Reason Stop Dose Admin


 


Acetaminophen  650 mg  09/18/20 11:46 





  Tylenol -  PO  





  Q6H PRN  





  PAIN LEVEL 1-5  


 


Albuterol Sulfate  2 puff  09/03/20 00:47  09/09/20 22:16





  Ventolin Hfa Inhaler -  IH   2 puff





  Q4H PRN   Administration





  SHORT OF BREATH/WHEEZING  


 


Alprazolam  0.5 mg  09/17/20 12:31  09/18/20 23:16





  Xanax  PO   0.5 mg





  Q8H PRN   Administration





  ANXIETY  


 


Amlodipine Besylate  10 mg  09/03/20 10:00  09/19/20 10:40





  Norvasc -  PO   10 mg





  DAILY JAYCEE   Administration


 


Aspirin  81 mg  09/03/20 10:00  09/19/20 10:40





  Ecotrin -  PO   81 mg





  DAILY JAYCEE   Administration


 


Brimonidine Tartrate  1 drop  09/03/20 10:00  09/19/20 10:41





  Alphagan 0.2% -  OU   1 drop





  BID JAYCEE   Administration


 


Carvedilol  6.25 mg  09/11/20 10:00  09/19/20 10:40





  Coreg -  PO   6.25 mg





  BID JAYCEE   Administration


 


Duloxetine HCl  60 mg  09/03/20 10:00  09/19/20 10:39





  Cymbalta -  PO   60 mg





  DAILY JAYCEE   Administration


 


Furosemide  80 mg  09/18/20 06:00  09/19/20 06:51





  Lasix Injection -  IVPUSH   80 mg





  BID@0600,1400 JAYCEE   Administration


 


Guaifenesin  10 ml  09/13/20 01:02 





  Robitussin Dm -  PO  





  Q6H PRN  





  COUGH  


 


Heparin Sodium (Porcine)  5,000 unit  09/16/20 22:00  09/19/20 10:41





  Heparin -  SQ   5,000 unit





  BID JAYCEE   Administration


 


Insulin Aspart  1 vial  09/03/20 07:00  09/19/20 07:08





  Novolog Vial Sliding Scale -  SQ   Not Given





  [facility]  





  Protocol  


 


Insulin Detemir  32 units  09/17/20 12:27  09/19/20 06:47





  Levemir Vial  SQ   32 units





  AM JAYCEE   Administration


 


Insulin Detemir  32 units  09/17/20 12:27  09/18/20 22:57





  Levemir Vial  SQ   32 units





  HS JAYCEE   Administration


 


Latanoprost  1 drop  09/03/20 22:00  09/18/20 23:00





  Xalatan 0.005% Eye Drops -  OU   Not Given





  HS JAYCEE  


 


Melatonin  5 mg  09/14/20 21:23  09/18/20 23:16





  Melatonin  PO   5 mg





  HS PRN   Administration





  INSOMNIA  


 


Multivitamins/Minerals/Vitamin C  1 tab  09/03/20 10:00  09/19/20 10:40





  Tab-A-Vit -  PO   1 tab





  DAILY JAYCEE   Administration


 


Nicotine  14 mg  09/05/20 10:00  09/19/20 10:39





  Nicoderm Patch -  TD   14 mg





  DAILY JAYCEE   Administration


 


Nitroglycerin  1 inch  09/12/20 12:00  09/19/20 06:52





  Nitro-Bid 2% Paste -  TD   1 inch





  Q6HPO JAYCEE   Administration


 


Pantoprazole Sodium  40 mg  09/04/20 10:00  09/19/20 10:40





  Protonix -  PO   40 mg





  DAILY JAYCEE   Administration


 


Polyethylene Glycol  17 gm  09/05/20 15:15  09/17/20 09:37





  Miralax (For Daily Use) -  PO   17 grams





  DAILY PRN   Administration





  CONSTIPATION  


 


Rosuvastatin Calcium  10 mg  09/03/20 22:00  09/18/20 22:59





  Crestor -  PO   10 mg





  HS JAYCEE   Administration


 


Timolol Maleate  1 drop  09/03/20 10:00  09/19/20 10:41





  Timoptic 0.5%  OU   1 drop





  BID JAYCEE   Administration


 


Tiotropium Bromide  2 puff  09/03/20 10:00  09/19/20 10:41





  Spiriva Respimat  IH   2 puff





  DAILY JAYCEE   Administration








                                   Vital Signs











 Period  Temp  Pulse  Resp  BP Sys/Thrasher  Pulse Ox


 


 Last 24 Hr  97.6 F-98.3 F  87-98  20-20  135-153/57-87  94-95











Constitutional: Yes: No Distress


Eyes: Yes: Conjunctiva Clear


Cardiovascular: Yes: Regular Rate and Rhythm


Respiratory: Yes:  decreased breath sounds right base. No rales left, no wheeze


Gastrointestinal: Yes: Soft, Abdomen, Obese


Edema: No


Neurological: Yes: Alert, Oriented


no jaundice, diaphoresis


not agitated





Assessment/Plan








IMP:


Chronic diastolic CHF


Bilateral lung consolidation: PNA vs CHF; COVID NEGATIVE, RIGHT EFFUSION s/p 

thoracentesis, transudate


Non obstx CAD


CKD


DM


HTN








REC:


1. Acute on chronic diastolic CHF (on lasix 40 po qd at home):


-echo here unremarkable (nl LV/RV/valves, no pulm HTN noted)


-Non obstx CAD on cath within last 5 years


- rec MAGNOLIA workup and treatment as outpt if she is amenable-with lasix 80qd and 

metolazone she had not lost much volume


- CXR 9/18 with minimal improvement in effusion,clinically improved today after 

increase to 80 mg IV lasix BID plus metolazone and 4 lb weight loss


- labs not done today - ordered


- cont lasix 80 mg IV BID, if Cr stable will add metolazone 5 mg x 1 prior to PM

dose


- pulm following - plan for thoracentesis next week








2. HTN: 


-cont current meds





3. PNA?: with effusion, drained


-CT pattern more c/w consolidation, though normal temp curve, no infectious 

sx's, pleural fluid transudative (suspect dry cough is chf sx)


-clinically volume overloaded


-Pulm and ID following.





4. Nonobstx CAD:


-cont asa, statin





5. NAY on CKD:


-baseline creat 1.4-1.7 from prior hosp stay


-possible cardiorenal syndr contributing here as well, cr has been stable so 

far.


-Unclear why not on ACE/ARB, perhaps GFR or K+ would not permit. no change to 

prior tx plan





6. DM: as per PMD





7. COPD/smoking: smoking cessation recommended.





8. Depression: manage per PMD

## 2020-09-20 NOTE — PN
Progress Note (short form)





- Note


Progress Note: 








denies chest pain 


no worsening SOB 


she is ambulating better without sob


no distress





                              Vital Signs - 24 hr











  09/19/20 09/19/20 09/19/20





  19:29 21:00 22:00


 


Temperature 98.1 F  98.5 F


 


Pulse Rate 86  89


 


Respiratory 18 18 22 H





Rate   


 


Blood Pressure 143/86  161/85


 


O2 Sat by Pulse 97 94 L 93 L





Oximetry (%)   














  09/19/20 09/20/20 09/20/20





  23:35 06:00 09:00


 


Temperature  97.5 F L 


 


Pulse Rate 86 95 H 


 


Respiratory 18 18 





Rate   


 


Blood Pressure 153/79 142/65 


 


O2 Sat by Pulse 94 L 92 L 95





Oximetry (%)   














  09/20/20 09/20/20





  10:00 14:26


 


Temperature 97.8 F 97.7 F


 


Pulse Rate 73 82


 


Respiratory 20 20





Rate  


 


Blood Pressure 130/73 144/60


 


O2 Sat by Pulse 95 95





Oximetry (%)  








                               Current Medications











Generic Name Dose Route Start Last Admin





  Trade Name Freq  PRN Reason Stop Dose Admin


 


Acetaminophen  650 mg  09/18/20 11:46  09/20/20 07:34





  Tylenol -  PO   650 mg





  Q6H PRN   Administration





  PAIN LEVEL 1-5  


 


Albuterol Sulfate  2 puff  09/03/20 00:47  09/09/20 22:16





  Ventolin Hfa Inhaler -  IH   2 puff





  Q4H PRN   Administration





  SHORT OF BREATH/WHEEZING  


 


Amlodipine Besylate  10 mg  09/03/20 10:00  09/20/20 10:33





  Norvasc -  PO   10 mg





  DAILY JAYCEE   Administration


 


Aspirin  81 mg  09/03/20 10:00  09/20/20 10:32





  Ecotrin -  PO   81 mg





  DAILY JAYCEE   Administration


 


Brimonidine Tartrate  1 drop  09/03/20 10:00  09/20/20 10:33





  Alphagan 0.2% -  OU   1 drop





  BID JAYCEE   Administration


 


Carvedilol  6.25 mg  09/11/20 10:00  09/20/20 10:32





  Coreg -  PO   6.25 mg





  BID JAYCEE   Administration


 


Duloxetine HCl  60 mg  09/03/20 10:00  09/20/20 10:32





  Cymbalta -  PO   60 mg





  DAILY JAYCEE   Administration


 


Furosemide  80 mg  09/18/20 06:00  09/20/20 15:26





  Lasix Injection -  IVPUSH   80 mg





  BID@0600,1400 JAYCEE   Administration


 


Guaifenesin  10 ml  09/13/20 01:02 





  Robitussin Dm -  PO  





  Q6H PRN  





  COUGH  


 


Heparin Sodium (Porcine)  5,000 unit  09/16/20 22:00  09/20/20 10:32





  Heparin -  SQ   5,000 unit





  BID JAYCEE   Administration


 


Insulin Aspart  1 vial  09/03/20 07:00  09/20/20 11:42





  Novolog Vial Sliding Scale -  SQ   4 unit





  [facility] JAYCEE   Administration





  Protocol  


 


Insulin Detemir  32 units  09/17/20 12:27  09/20/20 07:05





  Levemir Vial  SQ   32 units





  AM JAYCEE   Administration


 


Insulin Detemir  32 units  09/17/20 12:27  09/19/20 22:23





  Levemir Vial  SQ   32 units





  HS [facility]   Administration


 


Latanoprost  1 drop  09/03/20 22:00  09/19/20 22:38





  Xalatan 0.005% Eye Drops -  OU   Not Given





  HS JAYCEE  


 


Melatonin  5 mg  09/14/20 21:23  09/18/20 23:16





  Melatonin  PO   5 mg





  HS PRN   Administration





  INSOMNIA  


 


Multivitamins/Minerals/Vitamin C  1 tab  09/03/20 10:00  09/20/20 10:32





  Tab-A-Vit -  PO   1 tab





  DAILY JAYCEE   Administration


 


Nicotine  14 mg  09/05/20 10:00  09/20/20 10:33





  Nicoderm Patch -  TD   14 mg





  DAILY JAYCEE   Administration


 


Nitroglycerin  1 inch  09/12/20 12:00  09/20/20 11:43





  Nitro-Bid 2% Paste -  TD   1 inch





  Q6HPO JAYCEE   Administration


 


Pantoprazole Sodium  40 mg  09/04/20 10:00  09/20/20 10:32





  Protonix -  PO   40 mg





  DAILY JAYCEE   Administration


 


Polyethylene Glycol  17 gm  09/05/20 15:15  09/17/20 09:37





  Miralax (For Daily Use) -  PO   17 grams





  DAILY PRN   Administration





  CONSTIPATION  


 


Rosuvastatin Calcium  10 mg  09/03/20 22:00  09/19/20 22:23





  Crestor -  PO   10 mg





  [facility]   Administration


 


Timolol Maleate  1 drop  09/03/20 10:00  09/20/20 10:33





  Timoptic 0.5%  OU   1 drop





  BID JAYCEE   Administration


 


Tiotropium Bromide  2 puff  09/03/20 10:00  09/20/20 10:33





  Spiriva Respimat  IH   2 puff





  DAILY JAYCEE   Administration








                         Laboratory Results - last 24 hr











  09/14/20 09/19/20 09/19/20





  06:50 16:47 22:19


 


Sodium   


 


Potassium   


 


Chloride   


 


Carbon Dioxide   


 


Anion Gap   


 


BUN   


 


Creatinine   


 


Est GFR (CKD-EPI)AfAm   


 


Est GFR (CKD-EPI)NonAf   


 


POC Glucometer   207  183


 


Random Glucose   


 


Calcium   


 


c-ANCA  <1:20  


 


Proteinase 3 (PR3)  <3.5  


 


p-ANCA  <1:20  


 


Atypical p-ANCA  <1:20  


 


Myeloperoxidase Ab  <9.0  














  09/20/20 09/20/20 09/20/20





  06:11 07:00 11:25


 


Sodium   143 


 


Potassium   3.8 


 


Chloride   110 H 


 


Carbon Dioxide   26 


 


Anion Gap   7 L 


 


BUN   64.4 H 


 


Creatinine   2.0 H 


 


Est GFR (CKD-EPI)AfAm   29.41 


 


Est GFR (CKD-EPI)NonAf   25.37 


 


POC Glucometer  104   212


 


Random Glucose   115 H 


 


Calcium   9.2 


 


c-ANCA   


 


Proteinase 3 (PR3)   


 


p-ANCA   


 


Atypical p-ANCA   


 


Myeloperoxidase Ab   














  09/20/20





  16:32


 


Sodium 


 


Potassium 


 


Chloride 


 


Carbon Dioxide 


 


Anion Gap 


 


BUN 


 


Creatinine 


 


Est GFR (CKD-EPI)AfAm 


 


Est GFR (CKD-EPI)NonAf 


 


POC Glucometer  302


 


Random Glucose 


 


Calcium 


 


c-ANCA 


 


Proteinase 3 (PR3) 


 


p-ANCA 


 


Atypical p-ANCA 


 


Myeloperoxidase Ab 








                                 Intake & Output











 09/17/20 09/18/20 09/19/20 09/20/20





 23:59 23:59 23:59 23:59


 


Intake Total 550 200 600 450


 


Balance 550 200 600 450


 


Weight 250 lb 8 oz 250 lb 1.6 oz 246 lb 1 oz 245 lb


























S1 s2 RRR


Lungs decreased right 


Abd- soft, obese


NT


Edema decreased 








PLAN


Per Cardiology-- Lasix increased to 80 mg iv BID





Monitor renal function


noted weight loss











OOB


thoracentesis tomorrow


consider repeating CT chest afterwards 





cxr-- >persistent effusion








Problem List





- Problems


(1) COPD exacerbation


Code(s): J44.1 - CHRONIC OBSTRUCTIVE PULMONARY DISEASE W (ACUTE) EXACERBATION   





(2) ASHD (arteriosclerotic heart disease)


Code(s): I25.10 - ATHSCL HEART DISEASE OF NATIVE CORONARY ARTERY W/O ANG PCTRS  







(3) Acute hypoxemic respiratory failure


Code(s): J96.01 - ACUTE RESPIRATORY FAILURE WITH HYPOXIA   





(4) Acute on chronic diastolic CHF (congestive heart failure)


Code(s): I50.33 - ACUTE ON CHRONIC DIASTOLIC (CONGESTIVE) HEART FAILURE   





(5) CKD (chronic kidney disease) stage 3, GFR 30-59 ml/min


Code(s): N18.3 - CHRONIC KIDNEY DISEASE, STAGE 3 (MODERATE)   





(6) HTN (hypertension)


Code(s): I10 - ESSENTIAL (PRIMARY) HYPERTENSION   


Qualifiers: 


   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   





(7) IDDM (insulin dependent diabetes mellitus)


Code(s): E11.9 - TYPE 2 DIABETES MELLITUS WITHOUT COMPLICATIONS; Z79.4 - LONG 

TERM (CURRENT) USE OF INSULIN   





(8) Pneumonia


Code(s): J18.9 - PNEUMONIA, UNSPECIFIED ORGANISM   


Qualifiers: 


   Laterality: right   Lung location: lower lobe of lung

## 2020-09-20 NOTE — PN
Progress Note (short form)





- Note


Progress Note: 


cc: dyspnea





s:  no chest pain, palps, dizziness. walked down the halls yesterday, breathing 

better


                               Current Medications











Generic Name Dose Route Start Last Admin





  Trade Name Freq  PRN Reason Stop Dose Admin


 


Acetaminophen  650 mg  09/18/20 11:46  09/20/20 07:34





  Tylenol -  PO   650 mg





  Q6H PRN   Administration





  PAIN LEVEL 1-5  


 


Albuterol Sulfate  2 puff  09/03/20 00:47  09/09/20 22:16





  Ventolin Hfa Inhaler -  IH   2 puff





  Q4H PRN   Administration





  SHORT OF BREATH/WHEEZING  


 


Alprazolam  0.5 mg  09/17/20 12:31  09/19/20 22:23





  Xanax  PO   0.5 mg





  Q8H PRN   Administration





  ANXIETY  


 


Amlodipine Besylate  10 mg  09/03/20 10:00  09/20/20 10:33





  Norvasc -  PO   10 mg





  DAILY JAYCEE   Administration


 


Aspirin  81 mg  09/03/20 10:00  09/20/20 10:32





  Ecotrin -  PO   81 mg





  DAILY JAYCEE   Administration


 


Brimonidine Tartrate  1 drop  09/03/20 10:00  09/20/20 10:33





  Alphagan 0.2% -  OU   1 drop





  BID JAYCEE   Administration


 


Carvedilol  6.25 mg  09/11/20 10:00  09/20/20 10:32





  Coreg -  PO   6.25 mg





  BID JAYCEE   Administration


 


Duloxetine HCl  60 mg  09/03/20 10:00  09/20/20 10:32





  Cymbalta -  PO   60 mg





  DAILY JAYCEE   Administration


 


Furosemide  80 mg  09/18/20 06:00  09/20/20 06:12





  Lasix Injection -  IVPUSH   80 mg





  BID@0600,1400 JAYCEE   Administration


 


Guaifenesin  10 ml  09/13/20 01:02 





  Robitussin Dm -  PO  





  Q6H PRN  





  COUGH  


 


Heparin Sodium (Porcine)  5,000 unit  09/16/20 22:00  09/20/20 10:32





  Heparin -  SQ   5,000 unit





  BID JAYCEE   Administration


 


Insulin Aspart  1 vial  09/03/20 07:00  09/20/20 06:11





  Novolog Vial Sliding Scale -  SQ   Not Given





  [facility]  





  Protocol  


 


Insulin Detemir  32 units  09/17/20 12:27  09/20/20 07:05





  Levemir Vial  SQ   32 units





  AM JAYCEE   Administration


 


Insulin Detemir  32 units  09/17/20 12:27  09/19/20 22:23





  Levemir Vial  SQ   32 units





  HS JAYCEE   Administration


 


Latanoprost  1 drop  09/03/20 22:00  09/19/20 22:38





  Xalatan 0.005% Eye Drops -  OU   Not Given





  HS JAYCEE  


 


Melatonin  5 mg  09/14/20 21:23  09/18/20 23:16





  Melatonin  PO   5 mg





  HS PRN   Administration





  INSOMNIA  


 


Multivitamins/Minerals/Vitamin C  1 tab  09/03/20 10:00  09/20/20 10:32





  Tab-A-Vit -  PO   1 tab





  DAILY JAYCEE   Administration


 


Nicotine  14 mg  09/05/20 10:00  09/20/20 10:33





  Nicoderm Patch -  TD   14 mg





  DAILY JAYCEE   Administration


 


Nitroglycerin  1 inch  09/12/20 12:00  09/20/20 06:12





  Nitro-Bid 2% Paste -  TD   1 inch





  Q6HPO JAYCEE   Administration


 


Pantoprazole Sodium  40 mg  09/04/20 10:00  09/20/20 10:32





  Protonix -  PO   40 mg





  DAILY JAYCEE   Administration


 


Polyethylene Glycol  17 gm  09/05/20 15:15  09/17/20 09:37





  Miralax (For Daily Use) -  PO   17 grams





  DAILY PRN   Administration





  CONSTIPATION  


 


Rosuvastatin Calcium  10 mg  09/03/20 22:00  09/19/20 22:23





  Crestor -  PO   10 mg





  HS JAYCEE   Administration


 


Timolol Maleate  1 drop  09/03/20 10:00  09/20/20 10:33





  Timoptic 0.5%  OU   1 drop





  BID JAYCEE   Administration


 


Tiotropium Bromide  2 puff  09/03/20 10:00  09/20/20 10:33





  Spiriva Respimat  IH   2 puff





  DAILY JAYCEE   Administration








                                   Vital Signs











 Period  Temp  Pulse  Resp  BP Sys/Thrasher  Pulse Ox


 


 Last 24 Hr  97.5 F-98.5 F  73-95  18-22  104-161/61-86  92-97











Constitutional: Yes: No Distress


Eyes: Yes: Conjunctiva Clear


Cardiovascular: Yes: Regular Rate and Rhythm


Respiratory: Yes:  decreased breath sounds right base. No rales left, no wheeze


Gastrointestinal: Yes: Soft, Abdomen, Obese


Edema: No


Neurological: Yes: Alert, Oriented


no jaundice, diaphoresis


not agitated





Assessment/Plan








IMP:


Chronic diastolic CHF


Bilateral lung consolidation: PNA vs CHF; COVID NEGATIVE, RIGHT EFFUSION s/p 

thoracentesis, transudate


Non obstx CAD


CKD


DM


HTN








REC:


1. Acute on chronic diastolic CHF (on lasix 40 po qd at home):


-echo here unremarkable (nl LV/RV/valves, no pulm HTN noted)


-Non obstx CAD on cath within last 5 years


- rec MAGNOLIA workup and treatment as outpt if she is amenable


- with lasix 80qd and metolazone she had not lost much volume, changed to lasix 

80 IV BID and received metolazone 9/18, now with dyspnea improving, weight loss


- CXR 9/20 with improvement in effusion


- cont lasix 80 mg IV BID


- pulm following - plan for thoracentesis tomorrow








2. HTN: 


-cont current meds





3. PNA?: with effusion, drained


-CT pattern more c/w consolidation, though normal temp curve, no infectious 

sx's, pleural fluid transudative (suspect dry cough is chf sx)


-clinically volume overloaded


-Pulm and ID following.





4. Nonobstx CAD:


-cont asa, statin





5. NAY on CKD:


-baseline creat 1.4-1.7 from prior hosp stay


-possible cardiorenal syndr contributing here as well, cr has been stable so 

far.


-Unclear why not on ACE/ARB, perhaps GFR or K+ would not permit. no change to 

prior tx plan





6. DM: as per PMD





7. COPD/smoking: smoking cessation recommended.





8. Depression: manage per PMD

## 2020-09-20 NOTE — PN
Progress Note (short form)





- Note


Progress Note: 





PULMONARY





OOB TO CHAIR


SUBJECTIVE IMPROVEMENT


PLEURAL EFFUSION RE-ACCUMULATION ON TODAY'S XRAY





Gen: anicteric


Heart: RRR


Lung: decreased breath sounds at right base 


Abd: soft, nontender


Ext: + edema








LABS/MEDS/NOTES/IMAGES REVIEWED





A/P


Acute on Chronic Diastolic Heart Failure


Right Pleural Effusion minimally decreased post tap


COPD


CKD


HTN


TOBACCO ABUSE





-  continue lasix


-  monitor urine output, creatinine


-  daily weights


-  O2 to keep SpO2 >90%


-  inhaled bronchodilators as needed


-  DVT prophylaxis


-  Repeat tap





YANN NOBLE MD

## 2020-09-20 NOTE — PN
Progress Note (short form)





- Note


Progress Note: 








CHF


CKD on lasix





Active Medications





Acetaminophen (Tylenol -)  650 mg PO Q6H PRN


   PRN Reason: PAIN LEVEL 1-5


   Last Admin: 09/20/20 21:10 Dose:  650 mg


   Documented by: 


Albuterol Sulfate (Ventolin Hfa Inhaler -)  2 puff IH Q4H PRN


   PRN Reason: SHORT OF BREATH/WHEEZING


   Last Admin: 09/09/20 22:16 Dose:  2 puff


   Documented by: 


Amlodipine Besylate (Norvasc -)  10 mg PO DAILY [facility]


   Last Admin: 09/20/20 10:33 Dose:  10 mg


   Documented by: 


Aspirin (Ecotrin -)  81 mg PO DAILY [facility]


   Last Admin: 09/20/20 10:32 Dose:  81 mg


   Documented by: 


Brimonidine Tartrate (Alphagan 0.2% -)  1 drop OU BID [facility]


   Last Admin: 09/20/20 21:17 Dose:  1 drop


   Documented by: 


Carvedilol (Coreg -)  6.25 mg PO BID [facility]


   Last Admin: 09/20/20 21:10 Dose:  6.25 mg


   Documented by: 


Duloxetine HCl (Cymbalta -)  60 mg PO DAILY [facility]


   Last Admin: 09/20/20 10:32 Dose:  60 mg


   Documented by: 


Furosemide (Lasix Injection -)  80 mg IVPUSH BID@0600,1400 [facility]


   Last Admin: 09/20/20 15:26 Dose:  80 mg


   Documented by: 


Guaifenesin (Robitussin Dm -)  10 ml PO Q6H PRN


   PRN Reason: COUGH


Heparin Sodium (Porcine) (Heparin -)  5,000 unit SQ BID [facility]


   Last Admin: 09/20/20 21:13 Dose:  Not Given


   Documented by: 


Insulin Aspart (Novolog Vial Sliding Scale -)  1 vial SQ [facility]; Protocol


   Last Admin: 09/20/20 21:12 Dose:  6 unit


   Documented by: 


Insulin Detemir (Levemir Vial)  32 units SQ AM [facility]


   Last Admin: 09/20/20 07:05 Dose:  32 units


   Documented by: 


Insulin Detemir (Levemir Vial)  32 units SQ HS [facility]


   Last Admin: 09/20/20 21:13 Dose:  32 units


   Documented by: 


Latanoprost (Xalatan 0.005% Eye Drops -)  1 drop OU HS [facility]


   Last Admin: 09/20/20 21:13 Dose:  Not Given


   Documented by: 


Melatonin (Melatonin)  5 mg PO HS PRN


   PRN Reason: INSOMNIA


   Last Admin: 09/18/20 23:16 Dose:  5 mg


   Documented by: 


Multivitamins/Minerals/Vitamin C (Tab-A-Vit -)  1 tab PO DAILY [facility]


   Last Admin: 09/20/20 10:32 Dose:  1 tab


   Documented by: 


Nicotine (Nicoderm Patch -)  14 mg TD DAILY [facility]


   Last Admin: 09/20/20 10:33 Dose:  14 mg


   Documented by: 


Nitroglycerin (Nitro-Bid 2% Paste -)  1 inch TD Q6HPO [facility]


   Last Admin: 09/20/20 17:18 Dose:  1 inch


   Documented by: 


Pantoprazole Sodium (Protonix -)  40 mg PO DAILY [facility]


   Last Admin: 09/20/20 10:32 Dose:  40 mg


   Documented by: 


Polyethylene Glycol (Miralax (For Daily Use) -)  17 gm PO DAILY PRN


   PRN Reason: CONSTIPATION


   Last Admin: 09/17/20 09:37 Dose:  17 grams


   Documented by: 


Rosuvastatin Calcium (Crestor -)  10 mg PO HS [facility]


   Last Admin: 09/20/20 21:10 Dose:  10 mg


   Documented by: 


Timolol Maleate (Timoptic 0.5%)  1 drop OU BID [facility]


   Last Admin: 09/20/20 21:13 Dose:  1 drop


   Documented by: 


Tiotropium Bromide (Spiriva Respimat)  2 puff IH DAILY [facility]


   Last Admin: 09/20/20 10:33 Dose:  2 puff


   Documented by: 





                                Last Vital Signs











Temp Pulse Resp BP Pulse Ox


 


 98.3 F   97 H  22 H  140/70   95 


 


 09/20/20 21:19  09/20/20 21:19  09/20/20 21:19  09/20/20 21:19  09/20/20 21:19

















                                    CBC, BMP





                                 09/18/20 06:20 





                                 09/20/20 07:00 











                  CBC, BMP





                                 09/18/20 06:20 





                                 09/19/20 12:10

## 2020-09-21 NOTE — PN
Progress Note, Physician


History of Present Illness: 


pt seen/ examined


chart reviewed


all f/u noted


feels Better


 schedule for thoracocentesis today


anxiety present---Xanax before the procedure


 discussed with nursing  Staff also























- Current Medication List


Current Medications: 


Active Medications





Acetaminophen (Tylenol -)  650 mg PO Q6H PRN


   PRN Reason: PAIN LEVEL 1-5


   Last Admin: 09/20/20 21:10 Dose:  650 mg


   Documented by: 


Albuterol Sulfate (Ventolin Hfa Inhaler -)  2 puff IH Q4H PRN


   PRN Reason: SHORT OF BREATH/WHEEZING


   Last Admin: 09/09/20 22:16 Dose:  2 puff


   Documented by: 


Amlodipine Besylate (Norvasc -)  10 mg PO DAILY [facility]


   Last Admin: 09/20/20 10:33 Dose:  10 mg


   Documented by: 


Aspirin (Ecotrin -)  81 mg PO DAILY [facility]


   Last Admin: 09/20/20 10:32 Dose:  81 mg


   Documented by: 


Brimonidine Tartrate (Alphagan 0.2% -)  1 drop OU BID [facility]


   Last Admin: 09/20/20 21:17 Dose:  1 drop


   Documented by: 


Carvedilol (Coreg -)  6.25 mg PO BID [facility]


   Last Admin: 09/20/20 21:10 Dose:  6.25 mg


   Documented by: 


Duloxetine HCl (Cymbalta -)  60 mg PO DAILY [facility]


   Last Admin: 09/20/20 10:32 Dose:  60 mg


   Documented by: 


Furosemide (Lasix Injection -)  80 mg IVPUSH BID@0600,1400 [facility]


   Last Admin: 09/21/20 05:20 Dose:  80 mg


   Documented by: 


Guaifenesin (Robitussin Dm -)  10 ml PO Q6H PRN


   PRN Reason: COUGH


Heparin Sodium (Porcine) (Heparin -)  5,000 unit SQ BID [facility]


   Last Admin: 09/20/20 21:13 Dose:  Not Given


   Documented by: 


Insulin Aspart (Novolog Vial Sliding Scale -)  1 vial SQ [facility]; Protocol


   Last Admin: 09/21/20 06:02 Dose:  Not Given


   Documented by: 


Insulin Detemir (Levemir Vial)  32 units SQ AM [facility]


   Last Admin: 09/21/20 06:52 Dose:  32 units


   Documented by: 


Insulin Detemir (Levemir Vial)  32 units SQ HS [facility]


   Last Admin: 09/20/20 21:13 Dose:  32 units


   Documented by: 


Latanoprost (Xalatan 0.005% Eye Drops -)  1 drop OU HS [facility]


   Last Admin: 09/20/20 21:13 Dose:  Not Given


   Documented by: 


Melatonin (Melatonin)  5 mg PO HS PRN


   PRN Reason: INSOMNIA


   Last Admin: 09/18/20 23:16 Dose:  5 mg


   Documented by: 


Multivitamins/Minerals/Vitamin C (Tab-A-Vit -)  1 tab PO DAILY [facility]


   Last Admin: 09/20/20 10:32 Dose:  1 tab


   Documented by: 


Nicotine (Nicoderm Patch -)  14 mg TD DAILY [facility]


   Last Admin: 09/20/20 10:33 Dose:  14 mg


   Documented by: 


Nitroglycerin (Nitro-Bid 2% Paste -)  1 inch TD Q6HPO [facility]


   Last Admin: 09/21/20 05:20 Dose:  1 inch


   Documented by: 


Pantoprazole Sodium (Protonix -)  40 mg PO DAILY [facility]


   Last Admin: 09/20/20 10:32 Dose:  40 mg


   Documented by: 


Polyethylene Glycol (Miralax (For Daily Use) -)  17 gm PO DAILY PRN


   PRN Reason: CONSTIPATION


   Last Admin: 09/17/20 09:37 Dose:  17 grams


   Documented by: 


Rosuvastatin Calcium (Crestor -)  10 mg PO HS [facility]


   Last Admin: 09/20/20 21:10 Dose:  10 mg


   Documented by: 


Timolol Maleate (Timoptic 0.5%)  1 drop OU BID [facility]


   Last Admin: 09/20/20 21:13 Dose:  1 drop


   Documented by: 


Tiotropium Bromide (Spiriva Respimat)  2 puff IH DAILY [facility]


   Last Admin: 09/20/20 10:33 Dose:  2 puff


   Documented by: 











- Objective


Vital Signs: 


                                   Vital Signs











Temperature  97.8 F   09/21/20 05:49


 


Pulse Rate  97 H  09/21/20 05:49


 


Respiratory Rate  20   09/21/20 05:49


 


Blood Pressure  146/66   09/21/20 05:49


 


O2 Sat by Pulse Oximetry (%)  96   09/21/20 05:49











Constitutional: Yes: No Distress, Anxious


Neck: Yes: Supple


Cardiovascular: Yes: Regular Rate and Rhythm (at right base)


Respiratory: Yes: Diminished


Gastrointestinal: Yes: Soft, Abdomen, Obese


Edema: LLE: 1+, RLE: 1+


Neurological: Yes: Alert


Psychiatric: Yes: Alert


Labs: 


                                    CBC, BMP





                                 09/18/20 06:20 





                                 09/21/20 05:40 





                                    INR, PTT











INR  0.94  (0.83-1.09)   09/02/20  16:36    














Problem List





- Problems


(1) COPD (chronic obstructive pulmonary disease)


Code(s): J44.9 - CHRONIC OBSTRUCTIVE PULMONARY DISEASE, UNSPECIFIED   


Qualifiers: 


   COPD type: unspecified COPD   Qualified Code(s): J44.9 - Chronic obstructive 

pulmonary disease, unspecified   





(2) Encounter for screening laboratory testing for COVID-19 virus


Code(s): Z11.59 - ENCOUNTER FOR SCREENING FOR OTHER VIRAL DISEASES   





(3) Pleural effusion


Code(s): J90 - PLEURAL EFFUSION, NOT ELSEWHERE CLASSIFIED   





(4) Shortness of breath on exertion


Code(s): R06.02 - SHORTNESS OF BREATH   





(5) ASHD (arteriosclerotic heart disease)


Code(s): I25.10 - ATHSCL HEART DISEASE OF NATIVE CORONARY ARTERY W/O ANG PCTRS  







(6) Acute hypoxemic respiratory failure


Code(s): J96.01 - ACUTE RESPIRATORY FAILURE WITH HYPOXIA   





(7) Acute on chronic diastolic CHF (congestive heart failure)


Code(s): I50.33 - ACUTE ON CHRONIC DIASTOLIC (CONGESTIVE) HEART FAILURE   





(8) CKD (chronic kidney disease) stage 3, GFR 30-59 ml/min


Code(s): N18.3 - CHRONIC KIDNEY DISEASE, STAGE 3 (MODERATE)   





(9) HTN (hypertension)


Code(s): I10 - ESSENTIAL (PRIMARY) HYPERTENSION   


Qualifiers: 


   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   





(10) IDDM (insulin dependent diabetes mellitus)


Code(s): E11.9 - TYPE 2 DIABETES MELLITUS WITHOUT COMPLICATIONS; Z79.4 - LONG 

TERM (CURRENT) USE OF INSULIN   





(11) Obesity (BMI 30-39.9)


Code(s): E66.9 - OBESITY, UNSPECIFIED   





Assessment/Plan


discussed  with pt/ RN 


i/v  lasix


Monitor lytes 


xanax for anxiety


thoracocentesis today


daily oob - chair


will  continue to follow.

## 2020-09-21 NOTE — PN
Progress Note (short form)





- Note


Progress Note: 


s:  no cp palps dizzy. sob improving, able to walk more now.


                                        


                                        


                              Current Medications











Generic Name Dose Route Start Last Admin





  Trade Name Freq  PRN Reason Stop Dose Admin


 


Acetaminophen  650 mg  09/18/20 11:46  09/20/20 21:10





  Tylenol -  PO   650 mg





  Q6H PRN   Administration





  PAIN LEVEL 1-5  


 


Albuterol Sulfate  2 puff  09/03/20 00:47  09/09/20 22:16





  Ventolin Hfa Inhaler -  IH   2 puff





  Q4H PRN   Administration





  SHORT OF BREATH/WHEEZING  


 


Alprazolam  0.5 mg  09/21/20 10:30  09/21/20 10:38





  Xanax -  PO   0.5 mg





  Q8H PRN   Administration





  ANXIETY  


 


Amlodipine Besylate  10 mg  09/03/20 10:00  09/21/20 10:34





  Norvasc -  PO   10 mg





  DAILY JAYCEE   Administration


 


Aspirin  81 mg  09/03/20 10:00  09/20/20 10:32





  Ecotrin -  PO   81 mg





  DAILY JAYCEE   Administration


 


Brimonidine Tartrate  1 drop  09/03/20 10:00  09/21/20 10:32





  Alphagan 0.2% -  OU   1 drop





  BID JAYCEE   Administration


 


Carvedilol  6.25 mg  09/11/20 10:00  09/21/20 10:34





  Coreg -  PO   6.25 mg





  BID JAYCEE   Administration


 


Duloxetine HCl  60 mg  09/03/20 10:00  09/21/20 10:34





  Cymbalta -  PO   60 mg





  DAILY JAYCEE   Administration


 


Furosemide  80 mg  09/18/20 06:00  09/21/20 05:20





  Lasix Injection -  IVPUSH   80 mg





  BID@0600,1400 JAYCEE   Administration


 


Guaifenesin  10 ml  09/13/20 01:02 





  Robitussin Dm -  PO  





  Q6H PRN  





  COUGH  


 


Heparin Sodium (Porcine)  5,000 unit  09/16/20 22:00  09/21/20 10:34





  Heparin -  SQ   Not Given





  BID [facility]  


 


Insulin Aspart  1 vial  09/03/20 07:00  09/21/20 06:02





  Novolog Vial Sliding Scale -  SQ   Not Given





  [facility]  





  Protocol  


 


Insulin Detemir  32 units  09/17/20 12:27  09/21/20 06:52





  Levemir Vial  SQ   32 units





  AM JAYCEE   Administration


 


Insulin Detemir  32 units  09/17/20 12:27  09/20/20 21:13





  Levemir Vial  SQ   32 units





  HS JAYCEE   Administration


 


Latanoprost  1 drop  09/03/20 22:00  09/20/20 21:13





  Xalatan 0.005% Eye Drops -  OU   Not Given





  HS JAYCEE  


 


Melatonin  5 mg  09/14/20 21:23  09/18/20 23:16





  Melatonin  PO   5 mg





  HS PRN   Administration





  INSOMNIA  


 


Multivitamins/Minerals/Vitamin C  1 tab  09/03/20 10:00  09/21/20 10:34





  Tab-A-Vit -  PO   1 tab





  DAILY JAYCEE   Administration


 


Nicotine  14 mg  09/05/20 10:00  09/21/20 10:34





  Nicoderm Patch -  TD   14 mg





  DAILY JAYCEE   Administration


 


Nitroglycerin  1 inch  09/12/20 12:00  09/21/20 05:20





  Nitro-Bid 2% Paste -  TD   1 inch





  Q6HPO JAYCEE   Administration


 


Pantoprazole Sodium  40 mg  09/04/20 10:00  09/21/20 10:34





  Protonix -  PO   40 mg





  DAILY JAYCEE   Administration


 


Polyethylene Glycol  17 gm  09/05/20 15:15  09/17/20 09:37





  Miralax (For Daily Use) -  PO   17 grams





  DAILY PRN   Administration





  CONSTIPATION  


 


Rosuvastatin Calcium  10 mg  09/03/20 22:00  09/20/20 21:10





  Crestor -  PO   10 mg





  HS JAYCEE   Administration


 


Timolol Maleate  1 drop  09/03/20 10:00  09/21/20 10:33





  Timoptic 0.5%  OU   1 drop





  BID JAYCEE   Administration


 


Tiotropium Bromide  2 puff  09/03/20 10:00  09/21/20 10:33





  Spiriva Respimat  IH   2 puff





  DAILY JAYCEE   Administration








                                   Vital Signs











 Period  Temp  Pulse  Resp  BP Sys/Thrasher  Pulse Ox


 


 Last 24 Hr  97.7 F-98.3 F  82-97  18-22  122-146/60-72  94-96











Constitutional: Yes: No Distress


Respiratory: Yes: cta bl nl eff


Gastrointestinal: Yes: Soft, Abdomen, Obese


Cardiovascular: Yes: Regular Rate and Rhythm


JVD: No


Heart Sounds: Yes: S1, S2 (rrr)


Edema: trace le edema bl


Neurological: Yes: Alert, Oriented


no jaundice, diaphoresis


not agitated





                                    CBC, BMP





                                 09/18/20 06:20 





                                 09/21/20 05:40 














nsr 64bpm, borderline low voltage, no acute ST changes


Prior Cardiac Procedures: Cardiac Catheterization (non obstx CAD within last 5 

years)


Ejection Fraction %: LVEF > or = 40 %


echo 9/2020: nl lv/rv, no sig valve path





Imaging





- Results


Cat Scan: Report Reviewed


EKG: Image Reviewed





REC:


1. Acute on chronic diastolic CHF (on lasix 40 po qd at home):


-echo here unremarkable (nl LV/RV/valves, no pulm HTN noted)


-Non obstx CAD on cath within last 5 years


- rec MAGNOLIA workup and treatment as outpt if she is amenable


- with lasix 80qd and metolazone she had not lost much volume, changed to lasix 

80 IV BID, now with dyspnea improving, weight loss


- CXR 9/20 with improvement in effusion


- cont lasix 80 mg IV BID


- pulm following - plan for thoracentesis today








2. HTN: 


-cont current meds





3. PNA?: with effusion, drained


-CT pattern more c/w consolidation, though normal temp curve, no infectious 

sx's, pleural fluid transudative (suspect dry cough is chf sx)


-clinically volume overloaded


-Pulm and ID following.





4. Nonobstx CAD:


-cont asa, statin





5. NAY on CKD:


-baseline creat 1.4-1.7 from prior hosp stay


-monitor daily chem7





6. DM: as per PMD





7. COPD/smoking: smoking cessation recommended.

## 2020-09-21 NOTE — PN
Progress Note, Physician


History of Present Illness: 





Pt seen and examined at bedside. SHe still has shortness of breath while laying 

flat. 





- Current Medication List


Current Medications: 


Active Medications





Acetaminophen (Tylenol -)  650 mg PO Q6H PRN


   PRN Reason: PAIN LEVEL 1-5


   Last Admin: 09/20/20 21:10 Dose:  650 mg


   Documented by: 


Albuterol Sulfate (Ventolin Hfa Inhaler -)  2 puff IH Q4H PRN


   PRN Reason: SHORT OF BREATH/WHEEZING


   Last Admin: 09/09/20 22:16 Dose:  2 puff


   Documented by: 


Alprazolam (Xanax -)  0.5 mg PO Q8H PRN


   PRN Reason: ANXIETY


   Last Admin: 09/21/20 10:38 Dose:  0.5 mg


   Documented by: 


Amlodipine Besylate (Norvasc -)  10 mg PO DAILY [facility]


   Last Admin: 09/21/20 10:34 Dose:  10 mg


   Documented by: 


Aspirin (Ecotrin -)  81 mg PO DAILY [facility]


   Last Admin: 09/20/20 10:32 Dose:  81 mg


   Documented by: 


Brimonidine Tartrate (Alphagan 0.2% -)  1 drop OU BID [facility]


   Last Admin: 09/21/20 10:32 Dose:  1 drop


   Documented by: 


Carvedilol (Coreg -)  6.25 mg PO BID [facility]


   Last Admin: 09/21/20 10:34 Dose:  6.25 mg


   Documented by: 


Duloxetine HCl (Cymbalta -)  60 mg PO DAILY [facility]


   Last Admin: 09/21/20 10:34 Dose:  60 mg


   Documented by: 


Furosemide (Lasix Injection -)  80 mg IVPUSH BID@0600,1400 [facility]


   Last Admin: 09/21/20 05:20 Dose:  80 mg


   Documented by: 


Guaifenesin (Robitussin Dm -)  10 ml PO Q6H PRN


   PRN Reason: COUGH


Heparin Sodium (Porcine) (Heparin -)  5,000 unit SQ BID [facility]


   Last Admin: 09/21/20 10:34 Dose:  Not Given


   Documented by: 


Insulin Aspart (Novolog Vial Sliding Scale -)  [facility]; Protocol


   Last Admin: 09/21/20 11:54 Dose:  4 unit


   Documented by: 


Insulin Detemir (Levemir Vial)  32 units SQ AM [facility]


   Last Admin: 09/21/20 06:52 Dose:  32 units


   Documented by: 


Insulin Detemir (Levemir Vial)  32 units SQ HS [facility]


   Last Admin: 09/20/20 21:13 Dose:  32 units


   Documented by: 


Latanoprost (Xalatan 0.005% Eye Drops -)  1 drop OU HS [facility]


   Last Admin: 09/20/20 21:13 Dose:  Not Given


   Documented by: 


Melatonin (Melatonin)  5 mg PO HS PRN


   PRN Reason: INSOMNIA


   Last Admin: 09/18/20 23:16 Dose:  5 mg


   Documented by: 


Multivitamins/Minerals/Vitamin C (Tab-A-Vit -)  1 tab PO DAILY [facility]


   Last Admin: 09/21/20 10:34 Dose:  1 tab


   Documented by: 


Nicotine (Nicoderm Patch -)  14 mg TD DAILY [facility]


   Last Admin: 09/21/20 10:34 Dose:  14 mg


   Documented by: 


Nitroglycerin (Nitro-Bid 2% Paste -)  1 inch TD Q6HPO [facility]


   Last Admin: 09/21/20 13:15 Dose:  1 inch


   Documented by: 


Pantoprazole Sodium (Protonix -)  40 mg PO DAILY [facility]


   Last Admin: 09/21/20 10:34 Dose:  40 mg


   Documented by: 


Polyethylene Glycol (Miralax (For Daily Use) -)  17 gm PO DAILY PRN


   PRN Reason: CONSTIPATION


   Last Admin: 09/17/20 09:37 Dose:  17 grams


   Documented by: 


Rosuvastatin Calcium (Crestor -)  10 mg PO HS [facility]


   Last Admin: 09/20/20 21:10 Dose:  10 mg


   Documented by: 


Timolol Maleate (Timoptic 0.5%)  1 drop OU BID [facility]


   Last Admin: 09/21/20 10:33 Dose:  1 drop


   Documented by: 


Tiotropium Bromide (Spiriva Respimat)  2 puff IH DAILY [facility]


   Last Admin: 09/21/20 10:33 Dose:  2 puff


   Documented by: 











- Objective


Vital Signs: 


                                   Vital Signs











Temperature  98.2 F   09/21/20 09:20


 


Pulse Rate  84   09/21/20 13:10


 


Respiratory Rate  20 09/21/20 13:10


 


Blood Pressure  128/70   09/21/20 13:10


 


O2 Sat by Pulse Oximetry (%)  96   09/21/20 09:20











Constitutional: Yes: Calm


Eyes: Yes: Conjunctiva Clear


HENT: Yes: Atraumatic


Cardiovascular: Yes: S1, S2


Respiratory: Yes: On Nasal O2


Gastrointestinal: Yes: Soft, Abdomen, Obese


Genitourinary: Yes: WNL


Musculoskeletal: Yes: WNL


Edema: Yes


Edema: LLE: 1+, RLE: 1+


Neurological: Yes: Oriented


Psychiatric: Yes: Oriented


Labs: 


                                    CBC, BMP





                                 09/18/20 06:20 





                                 09/21/20 05:40 





                                    INR, PTT











INR  0.94  (0.83-1.09)   09/02/20  16:36    














Problem List





- Problems


(1) COPD (chronic obstructive pulmonary disease)


Code(s): J44.9 - CHRONIC OBSTRUCTIVE PULMONARY DISEASE, UNSPECIFIED   


Qualifiers: 


   COPD type: unspecified COPD   Qualified Code(s): J44.9 - Chronic obstructive 

pulmonary disease, unspecified   





(2) COPD exacerbation


Code(s): J44.1 - CHRONIC OBSTRUCTIVE PULMONARY DISEASE W (ACUTE) EXACERBATION   





(3) CKD (chronic kidney disease) stage 3, GFR 30-59 ml/min


Code(s): N18.3 - CHRONIC KIDNEY DISEASE, STAGE 3 (MODERATE)   





Assessment/Plan


                               Current Medications











Generic Name Dose Route Start Last Admin





  Trade Name Freq  PRN Reason Stop Dose Admin


 


Acetaminophen  650 mg  09/18/20 11:46  09/20/20 21:10





  Tylenol -  PO   650 mg





  Q6H PRN   Administration





  PAIN LEVEL 1-5  


 


Albuterol Sulfate  2 puff  09/03/20 00:47  09/09/20 22:16





  Ventolin Hfa Inhaler -  IH   2 puff





  Q4H PRN   Administration





  SHORT OF BREATH/WHEEZING  


 


Alprazolam  0.5 mg  09/21/20 10:30  09/21/20 10:38





  Xanax -  PO   0.5 mg





  Q8H PRN   Administration





  ANXIETY  


 


Amlodipine Besylate  10 mg  09/03/20 10:00  09/21/20 10:34





  Norvasc -  PO   10 mg





  DAILY JAYCEE   Administration


 


Aspirin  81 mg  09/03/20 10:00  09/20/20 10:32





  Ecotrin -  PO   81 mg





  DAILY JAYCEE   Administration


 


Brimonidine Tartrate  1 drop  09/03/20 10:00  09/21/20 10:32





  Alphagan 0.2% -  OU   1 drop





  BID [facility]E   Administration


 


Carvedilol  6.25 mg  09/11/20 10:00  09/21/20 10:34





  Coreg -  PO   6.25 mg





  BID [facility]E   Administration


 


Duloxetine HCl  60 mg  09/03/20 10:00  09/21/20 10:34





  Cymbalta -  PO   60 mg





  DAILY JAYCEE   Administration


 


Furosemide  80 mg  09/18/20 06:00  09/21/20 05:20





  Lasix Injection -  IVPUSH   80 mg





  BID@0600,1400 [facility]   Administration


 


Guaifenesin  10 ml  09/13/20 01:02 





  Robitussin Dm -  PO  





  Q6H PRN  





  COUGH  


 


Heparin Sodium (Porcine)  5,000 unit  09/16/20 22:00  09/21/20 10:34





  Heparin -  SQ   Not Given





  BID JAYCEE  


 


Insulin Aspart  1 vial  09/03/20 07:00  09/21/20 11:54





  Novolog Vial Sliding Scale -  SQ   4 unit





  [facility]   Administration





  Protocol  


 


Insulin Detemir  32 units  09/17/20 12:27  09/21/20 06:52





  Levemir Vial  SQ   32 units





  AM [facility]   Administration


 


Insulin Detemir  32 units  09/17/20 12:27  09/20/20 21:13





  Levemir Vial  SQ   32 units





  HS JAYCEE   Administration


 


Latanoprost  1 drop  09/03/20 22:00  09/20/20 21:13





  Xalatan 0.005% Eye Drops -  OU   Not Given





  HS JAYCEE  


 


Melatonin  5 mg  09/14/20 21:23  09/18/20 23:16





  Melatonin  PO   5 mg





  HS PRN   Administration





  INSOMNIA  


 


Multivitamins/Minerals/Vitamin C  1 tab  09/03/20 10:00  09/21/20 10:34





  Tab-A-Vit -  PO   1 tab





  DAILY JAYCEE   Administration


 


Nicotine  14 mg  09/05/20 10:00  09/21/20 10:34





  Nicoderm Patch -  TD   14 mg





  DAILY JAYCEE   Administration


 


Nitroglycerin  1 inch  09/12/20 12:00  09/21/20 13:15





  Nitro-Bid 2% Paste -  TD   1 inch





  Q6HPO [facility]   Administration


 


Pantoprazole Sodium  40 mg  09/04/20 10:00  09/21/20 10:34





  Protonix -  PO   40 mg





  DAILY JAYCEE   Administration


 


Polyethylene Glycol  17 gm  09/05/20 15:15  09/17/20 09:37





  Miralax (For Daily Use) -  PO   17 grams





  DAILY PRN   Administration





  CONSTIPATION  


 


Rosuvastatin Calcium  10 mg  09/03/20 22:00  09/20/20 21:10





  Crestor -  PO   10 mg





  HS JAYCEE   Administration


 


Timolol Maleate  1 drop  09/03/20 10:00  09/21/20 10:33





  Timoptic 0.5%  OU   1 drop





  BID [facility]E   Administration


 


Tiotropium Bromide  2 puff  09/03/20 10:00  09/21/20 10:33





  Spiriva Respimat  IH   2 puff





  DAILY JAYCEE   Administration











Impression


1. CKD


2. CHF


3. COPD


4. hld


5. dm


6. cad


7. htn


8. proteinuria


9. mgus





Plan


- cont lasix


- pt for thoracocentesis


- follow ct result


- monitor renal function


- crt will likely rise with higher doses of diuretics


- cardio follow up


- fluid restriction

## 2020-09-21 NOTE — PN
Progress Note (short form)





- Note


Progress Note: 





PULMONARY





Still some dyspnea with exertion but improving. No cough, fevers. For repeat 

thoracentesis today.





                                   Vital Signs











 Period  Temp  Pulse  Resp  BP Sys/Thrasher  Pulse Ox


 


 Last 24 Hr  97.7 F-98.3 F  82-97  18-22  122-146/60-72  94-96








                                 Intake & Output











 09/18/20 09/19/20 09/20/20 09/21/20





 23:59 23:59 23:59 23:59


 


Intake Total 200 600 500 50


 


Balance 200 600 500 50


 


Weight 113.443 kg 111.612 kg 111.13 kg 111.385 kg











Gen:  NAD in chair


Heart: RRR


Lung: decreased breath sounds at the bases


Abd: soft, nontender


Ext: + edema





                                    CBC, BMP





                                 09/18/20 06:20 





                                 09/21/20 05:40 





Active Medications





Acetaminophen (Tylenol -)  650 mg PO Q6H PRN


   PRN Reason: PAIN LEVEL 1-5


   Last Admin: 09/20/20 21:10 Dose:  650 mg


   Documented by: 


Albuterol Sulfate (Ventolin Hfa Inhaler -)  2 puff IH Q4H PRN


   PRN Reason: SHORT OF BREATH/WHEEZING


   Last Admin: 09/09/20 22:16 Dose:  2 puff


   Documented by: 


Alprazolam (Xanax -)  0.5 mg PO Q8H PRN


   PRN Reason: ANXIETY


   Last Admin: 09/21/20 10:38 Dose:  0.5 mg


   Documented by: 


Amlodipine Besylate (Norvasc -)  10 mg PO DAILY [facility]


   Last Admin: 09/21/20 10:34 Dose:  10 mg


   Documented by: 


Aspirin (Ecotrin -)  81 mg PO DAILY [facility]


   Last Admin: 09/20/20 10:32 Dose:  81 mg


   Documented by: 


Brimonidine Tartrate (Alphagan 0.2% -)  1 drop OU BID [facility]


   Last Admin: 09/21/20 10:32 Dose:  1 drop


   Documented by: 


Carvedilol (Coreg -)  6.25 mg PO BID [facility]


   Last Admin: 09/21/20 10:34 Dose:  6.25 mg


   Documented by: 


Duloxetine HCl (Cymbalta -)  60 mg PO DAILY [facility]


   Last Admin: 09/21/20 10:34 Dose:  60 mg


   Documented by: 


Furosemide (Lasix Injection -)  80 mg IVPUSH BID@0600,1400 [facility]


   Last Admin: 09/21/20 05:20 Dose:  80 mg


   Documented by: 


Guaifenesin (Robitussin Dm -)  10 ml PO Q6H PRN


   PRN Reason: COUGH


Heparin Sodium (Porcine) (Heparin -)  5,000 unit SQ BID [facility]


   Last Admin: 09/21/20 10:34 Dose:  Not Given


   Documented by: 


Insulin Aspart (Novolog Vial Sliding Scale -)  1 vial SQ [facility]placed by [facility]; Protocol


   Last Admin: 09/21/20 06:02 Dose:  Not Given


   Documented by: 


Insulin Detemir (Levemir Vial)  32 units SQ AM [facility]


   Last Admin: 09/21/20 06:52 Dose:  32 units


   Documented by: 


Insulin Detemir (Levemir Vial)  32 units SQ HS [facility]


   Last Admin: 09/20/20 21:13 Dose:  32 units


   Documented by: 


Latanoprost (Xalatan 0.005% Eye Drops -)  1 drop OU HS [facility]


   Last Admin: 09/20/20 21:13 Dose:  Not Given


   Documented by: 


Melatonin (Melatonin)  5 mg PO HS PRN


   PRN Reason: INSOMNIA


   Last Admin: 09/18/20 23:16 Dose:  5 mg


   Documented by: 


Multivitamins/Minerals/Vitamin C (Tab-A-Vit -)  1 tab PO DAILY [facility]


   Last Admin: 09/21/20 10:34 Dose:  1 tab


   Documented by: 


Nicotine (Nicoderm Patch -)  14 mg TD DAILY [facility]


   Last Admin: 09/21/20 10:34 Dose:  14 mg


   Documented by: 


Nitroglycerin (Nitro-Bid 2% Paste -)  1 inch TD Q6HPO [facility]


   Last Admin: 09/21/20 05:20 Dose:  1 inch


   Documented by: 


Pantoprazole Sodium (Protonix -)  40 mg PO DAILY [facility]


   Last Admin: 09/21/20 10:34 Dose:  40 mg


   Documented by: 


Polyethylene Glycol (Miralax (For Daily Use) -)  17 gm PO DAILY PRN


   PRN Reason: CONSTIPATION


   Last Admin: 09/17/20 09:37 Dose:  17 grams


   Documented by: 


Rosuvastatin Calcium (Crestor -)  10 mg PO HS [facility]


   Last Admin: 09/20/20 21:10 Dose:  10 mg


   Documented by: 


Timolol Maleate (Timoptic 0.5%)  1 drop OU BID Replaced [facility]


   Last Admin: 09/21/20 10:33 Dose:  1 drop


   Documented by: 


Tiotropium Bromide (Spiriva Respimat)  2 puff IH DAILY [facility]


   Last Admin: 09/21/20 10:33 Dose:  2 puff


   Documented by: 








A/P


Acute on Chronic Diastolic Heart Failure


Right Pleural Effusion


COPD


CKD


HTN


TOBACCO ABUSE





-  for thoracentesis today


-  continue lasix


-  monitor urine output, creatinine


-  daily weights


-  O2 to keep SpO2 >90%


-  inhaled bronchodilators as needed


-  DVT prophylaxis

## 2020-09-22 NOTE — PN
Progress Note (short form)





- Note


Progress Note: 





s/p thoracentesis


has a pig tail catheter placed


drained 3.6 L so far


she was able to sleep flat 


no SOB 


                               Vital Signs - 24 hr











  09/21/20 09/21/20 09/21/20





  18:05 21:00 23:00


 


Temperature 98.2 F 97.9 F 98.0 F


 


Pulse Rate 100 H 89 84


 


Respiratory 20 20 20





Rate   


 


Blood Pressure 140/62 132/61 132/68


 


O2 Sat by Pulse  95 96





Oximetry (%)   














  09/22/20 09/22/20 09/22/20





  02:20 06:42 09:00


 


Temperature 97.9 F 98 F 


 


Pulse Rate 104 H 101 H 


 


Respiratory 20 20 20





Rate   


 


Blood Pressure 153/82 155/88 


 


O2 Sat by Pulse 97 96 96





Oximetry (%)   














  09/22/20 09/22/20





  09:30 10:00


 


Temperature 98.7 F 


 


Pulse Rate 91 H 


 


Respiratory 20 





Rate  


 


Blood Pressure 150/57 L 


 


O2 Sat by Pulse 96 96





Oximetry (%)  








                               Current Medications











Generic Name Dose Route Start Last Admin





  Trade Name Freq  PRN Reason Stop Dose Admin


 


Acetaminophen  650 mg  09/18/20 11:46  09/22/20 02:37





  Tylenol -  PO   650 mg





  Q6H PRN   Administration





  PAIN LEVEL 1-5  


 


Albuterol Sulfate  2 puff  09/03/20 00:47  09/09/20 22:16





  Ventolin Hfa Inhaler -  IH   2 puff





  Q4H PRN   Administration





  SHORT OF BREATH/WHEEZING  


 


Alprazolam  0.5 mg  09/21/20 10:30  09/21/20 10:38





  Xanax -  PO   0.5 mg





  Q8H PRN   Administration





  ANXIETY  


 


Amlodipine Besylate  10 mg  09/03/20 10:00  09/22/20 08:59





  Norvasc -  PO   10 mg





  DAILY JAYCEE   Administration


 


Aspirin  81 mg  09/03/20 10:00  09/22/20 08:59





  Ecotrin -  PO   81 mg





  DAILY JAYCEE   Administration


 


Brimonidine Tartrate  1 drop  09/03/20 10:00  09/22/20 09:03





  Alphagan 0.2% -  OU   1 drp





  BID JAYCEE   Administration


 


Carvedilol  6.25 mg  09/11/20 10:00  09/22/20 08:59





  Coreg -  PO   6.25 mg





  BID JAYCEE   Administration


 


Duloxetine HCl  60 mg  09/03/20 10:00  09/22/20 08:59





  Cymbalta -  PO   60 mg





  DAILY JAYCEE   Administration


 


Furosemide  80 mg  09/18/20 06:00  09/22/20 06:09





  Lasix Injection -  IVPUSH   80 mg





  BID@0600,1400 [facility]   Administration


 


Guaifenesin  10 ml  09/13/20 01:02 





  Robitussin Dm -  PO  





  Q6H PRN  





  COUGH  


 


Heparin Sodium (Porcine)  5,000 unit  09/16/20 22:00  09/22/20 09:00





  Heparin -  SQ   5,000 unit





  BID JAYCEE   Administration


 


Insulin Aspart  1 vial  09/03/20 07:00  09/22/20 12:15





  Novolog Vial Sliding Scale -  SQ   2 unit





  [facility]   Administration





  Protocol  


 


Insulin Detemir  32 units  09/17/20 12:27  09/22/20 06:23





  Levemir Vial  SQ   32 units





  AM JAYCEE   Administration


 


Insulin Detemir  32 units  09/17/20 12:27  09/21/20 21:12





  Levemir Vial  SQ   32 units





  HS JAYCEE   Administration


 


Latanoprost  1 drop  09/03/20 22:00  09/21/20 21:14





  Xalatan 0.005% Eye Drops -  OU   Not Given





  HS JAYCEE  


 


Melatonin  5 mg  09/14/20 21:23  09/18/20 23:16





  Melatonin  PO   5 mg





  HS PRN   Administration





  INSOMNIA  


 


Multivitamins/Minerals/Vitamin C  1 tab  09/03/20 10:00  09/22/20 08:59





  Tab-A-Vit -  PO   1 tab





  DAILY JAYCEE   Administration


 


Nicotine  14 mg  09/05/20 10:00  09/22/20 08:59





  Nicoderm Patch -  TD   14 mg





  DAILY JAYCEE   Administration


 


Nitroglycerin  1 inch  09/12/20 12:00  09/22/20 12:14





  Nitro-Bid 2% Paste -  TD   1 inch





  Q6HPO JAYCEE   Administration


 


Pantoprazole Sodium  40 mg  09/04/20 10:00  09/22/20 08:59





  Protonix -  PO   40 mg





  DAILY JAYCEE   Administration


 


Polyethylene Glycol  17 gm  09/05/20 15:15  09/17/20 09:37





  Miralax (For Daily Use) -  PO   17 grams





  DAILY PRN   Administration





  CONSTIPATION  


 


Rosuvastatin Calcium  10 mg  09/03/20 22:00  09/21/20 21:11





  Crestor -  PO   10 mg





  HS JAYCEE   Administration


 


Timolol Maleate  1 drop  09/03/20 10:00  09/22/20 09:03





  Timoptic 0.5%  OU   1 drop





  BID JAYECE   Administration


 


Tiotropium Bromide  2 puff  09/03/20 10:00  09/22/20 09:02





  Spiriva Respimat  IH   2 puff





  DAILY JAYCEE   Administration








                         Laboratory Results - last 24 hr











  09/21/20 09/21/20 09/22/20





  17:11 21:09 06:20


 


POC Glucometer  251  299  144














  09/22/20





  12:00


 


POC Glucometer  194














                                 Intake & Output











 09/19/20 09/20/20 09/21/20 09/22/20





 23:59 23:59 23:59 23:59


 


Intake Total 600 500 550 


 


Output Total   3600 400


 


Balance 600 500 -3050 -400


 


Weight 246 lb 1 oz 245 lb 245 lb 9 oz 244 lb 5 oz























S1 s2 RRR


Lungs decreased right 


Abd- soft, obese


NT


Edema decreased 








PLAN


Per Cardiology-- Lasix increased to 80 mg iv BID





Monitor renal function-- ordered today 


noted weight loss











OOB


s/p thoracentesis -- 3.6 L drained





increase levemir for better glucose control


Lovenox for DVT prophylaxis








Problem List





- Problems


(1) COPD exacerbation


Code(s): J44.1 - CHRONIC OBSTRUCTIVE PULMONARY DISEASE W (ACUTE) EXACERBATION   





(2) ASHD (arteriosclerotic heart disease)


Code(s): I25.10 - ATHSCL HEART DISEASE OF NATIVE CORONARY ARTERY W/O ANG PCTRS  







(3) Acute hypoxemic respiratory failure


Code(s): J96.01 - ACUTE RESPIRATORY FAILURE WITH HYPOXIA   





(4) Acute on chronic diastolic CHF (congestive heart failure)


Code(s): I50.33 - ACUTE ON CHRONIC DIASTOLIC (CONGESTIVE) HEART FAILURE   





(5) CKD (chronic kidney disease) stage 3, GFR 30-59 ml/min


Code(s): N18.3 - CHRONIC KIDNEY DISEASE, STAGE 3 (MODERATE)   





(6) HTN (hypertension)


Code(s): I10 - ESSENTIAL (PRIMARY) HYPERTENSION   


Qualifiers: 


   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   





(7) IDDM (insulin dependent diabetes mellitus)


Code(s): E11.9 - TYPE 2 DIABETES MELLITUS WITHOUT COMPLICATIONS; Z79.4 - LONG 

TERM (CURRENT) USE OF INSULIN   





(8) Pneumonia


Code(s): J18.9 - PNEUMONIA, UNSPECIFIED ORGANISM   


Qualifiers: 


   Laterality: right   Lung location: lower lobe of lung

## 2020-09-22 NOTE — PN
Progress Note (short form)





- Note


Progress Note: 





PULMONARY





s/p 10Fr pigtail catheter placement. 3.6L drainage so far. She states she does 

feel better but painful with movements or deep inspirations. Able to lay flat.





                                   Vital Signs











 Period  Temp  Pulse  Resp  BP Sys/Thrasher  Pulse Ox


 


 Last 24 Hr  97.9 F-98.2 F    20-20  128-155/61-88  95-97








                                 Intake & Output











 09/19/20 09/20/20 09/21/20 09/22/20





 23:59 23:59 23:59 23:59


 


Intake Total 600 500 550 


 


Output Total   3600 400


 


Balance 600 500 -3050 -400


 


Weight 111.612 kg 111.13 kg 111.385 kg 110.818 kg











Gen:  NAD in chair


Heart: RRR


Lung: decreased breath sounds at the bases


Abd: soft, nontender


Ext: + edema





                                    CBC, BMP





                                 09/18/20 06:20 





                                 09/21/20 05:40 





Active Medications





Acetaminophen (Tylenol -)  650 mg PO Q6H PRN


   PRN Reason: PAIN LEVEL 1-5


   Last Admin: 09/22/20 02:37 Dose:  650 mg


   Documented by: 


Albuterol Sulfate (Ventolin Hfa Inhaler -)  2 puff IH Q4H PRN


   PRN Reason: SHORT OF BREATH/WHEEZING


   Last Admin: 09/09/20 22:16 Dose:  2 puff


   Documented by: 


Alprazolam (Xanax -)  0.5 mg PO Q8H PRN


   PRN Reason: ANXIETY


   Last Admin: 09/21/20 10:38 Dose:  0.5 mg


   Documented by: 


Amlodipine Besylate (Norvasc -)  10 mg PO DAILY [facility]


   Last Admin: 09/22/20 08:59 Dose:  10 mg


   Documented by: 


Aspirin (Ecotrin -)  81 mg PO DAILY [facility]


   Last Admin: 09/22/20 08:59 Dose:  81 mg


   Documented by: 


Brimonidine Tartrate (Alphagan 0.2% -)  1 drop OU BID [facility]


   Last Admin: 09/22/20 09:03 Dose:  1 drp


   Documented by: 


Carvedilol (Coreg -)  6.25 mg PO BID [facility]


   Last Admin: 09/22/20 08:59 Dose:  6.25 mg


   Documented by: 


Duloxetine HCl (Cymbalta -)  60 mg PO DAILY [facility]


   Last Admin: 09/22/20 08:59 Dose:  60 mg


   Documented by: 


Furosemide (Lasix Injection -)  80 mg IVPUSH BID@0600,1400 [facility]


   Last Admin: 09/22/20 06:09 Dose:  80 mg


   Documented by: 


Guaifenesin (Robitussin Dm -)  10 ml PO Q6H PRN


   PRN Reason: COUGH


Heparin Sodium (Porcine) (Heparin -)  5,000 unit SQ BID [facility]


   Last Admin: 09/22/20 09:00 Dose:  5,000 unit


   Documented by: 


Insulin Aspart (Novolog Vial Sliding Scale -)  1 vial SQ ACHS [facility]; Protocol


   Last Admin: 09/22/20 06:24 Dose:  Not Given


   Documented by: 


Insulin Detemir (Levemir Vial)  32 units SQ AM [facility]


   Last Admin: 09/22/20 06:23 Dose:  32 units


   Documented by: 


Insulin Detemir (Levemir Vial)  32 units SQ HS [facility]


   Last Admin: 09/21/20 21:12 Dose:  32 units


   Documented by: 


Latanoprost (Xalatan 0.005% Eye Drops -)  1 drop OU HS [facility]


   Last Admin: 09/21/20 21:14 Dose:  Not Given


   Documented by: 


Melatonin (Melatonin)  5 mg PO HS PRN


   PRN Reason: INSOMNIA


   Last Admin: 09/18/20 23:16 Dose:  5 mg


   Documented by: 


Multivitamins/Minerals/Vitamin C (Tab-A-Vit -)  1 tab PO DAILY [facility]


   Last Admin: 09/22/20 08:59 Dose:  1 tab


   Documented by: 


Nicotine (Nicoderm Patch -)  14 mg TD DAILY [facility]


   Last Admin: 09/22/20 08:59 Dose:  14 mg


   Documented by: 


Nitroglycerin (Nitro-Bid 2% Paste -)  1 inch TD Q6HPO [facility]


   Last Admin: 09/22/20 06:09 Dose:  1 inch


   Documented by: 


Pantoprazole Sodium (Protonix -)  40 mg PO DAILY [facility]


   Last Admin: 09/22/20 08:59 Dose:  40 mg


   Documented by: 


Polyethylene Glycol (Miralax (For Daily Use) -)  17 gm PO DAILY PRN


   PRN Reason: CONSTIPATION


   Last Admin: 09/17/20 09:37 Dose:  17 grams


   Documented by: 


Rosuvastatin Calcium (Crestor -)  10 mg PO HS [facility]


   Last Admin: 09/21/20 21:11 Dose:  10 mg


   Documented by: 


Timolol Maleate (Timoptic 0.5%)  1 drop OU BID [facility]


   Last Admin: 09/22/20 09:03 Dose:  1 drop


   Documented by: 


Tiotropium Bromide (Spiriva Respimat)  2 puff IH DAILY [facility]


   Last Admin: 09/22/20 09:02 Dose:  2 puff


   Documented by: 











A/P


Acute on Chronic Diastolic Heart Failure


Right Pleural Effusion


COPD


CKD


HTN


TOBACCO ABUSE





-  monitor chest tube output


-  incentive spirometry


-  continue lasix


-  monitor urine output, creatinine


-  daily weights


-  O2 to keep SpO2 >90%


-  inhaled bronchodilators as needed


-  DVT prophylaxis

## 2020-09-22 NOTE — PN
Progress Note, Physician


History of Present Illness: 





Pt seen and examined at bedside. She is awake and alert. She had the chest tube 

placed. She feels that her breathing is improved. 





- Current Medication List


Current Medications: 


Active Medications





Acetaminophen (Tylenol -)  650 mg PO Q6H PRN


   PRN Reason: PAIN LEVEL 1-5


   Last Admin: 09/22/20 02:37 Dose:  650 mg


   Documented by: 


Albuterol Sulfate (Ventolin Hfa Inhaler -)  2 puff IH Q4H PRN


   PRN Reason: SHORT OF BREATH/WHEEZING


   Last Admin: 09/09/20 22:16 Dose:  2 puff


   Documented by: 


Alprazolam (Xanax -)  0.5 mg PO Q8H PRN


   PRN Reason: ANXIETY


   Last Admin: 09/21/20 10:38 Dose:  0.5 mg


   Documented by: 


Amlodipine Besylate (Norvasc -)  10 mg PO DAILY [facility]


   Last Admin: 09/22/20 08:59 Dose:  10 mg


   Documented by: 


Aspirin (Ecotrin -)  81 mg PO DAILY [facility]


   Last Admin: 09/22/20 08:59 Dose:  81 mg


   Documented by: 


Brimonidine Tartrate (Alphagan 0.2% -)  1 drop OU BID [facility]


   Last Admin: 09/22/20 09:03 Dose:  1 drp


   Documented by: 


Carvedilol (Coreg -)  6.25 mg PO BID [facility]


   Last Admin: 09/22/20 08:59 Dose:  6.25 mg


   Documented by: 


Duloxetine HCl (Cymbalta -)  60 mg PO DAILY [facility]


   Last Admin: 09/22/20 08:59 Dose:  60 mg


   Documented by: 


Furosemide (Lasix Injection -)  80 mg IVPUSH BID@0600,1400 [facility]


   Last Admin: 09/22/20 14:02 Dose:  80 mg


   Documented by: 


Guaifenesin (Robitussin Dm -)  10 ml PO Q6H PRN


   PRN Reason: COUGH


Heparin Sodium (Porcine) (Heparin -)  5,000 unit SQ BID [facility]


   Last Admin: 09/22/20 09:00 Dose:  5,000 unit


   Documented by: 


Insulin Aspart (Novolog Vial Sliding Scale -)  1 vial SQ ACHS [facility]; Protocol


   Last Admin: 09/22/20 12:15 Dose:  2 unit


   Documented by: 


Insulin Detemir (Levemir Vial)  34 units SQ AM [facility]


Insulin Detemir (Levemir Vial)  34 units SQ HS [facility]


Latanoprost (Xalatan 0.005% Eye Drops -)  1 drop OU HS [facility]


   Last Admin: 09/21/20 21:14 Dose:  Not Given


   Documented by: 


Melatonin (Melatonin)  5 mg PO HS PRN


   PRN Reason: INSOMNIA


   Last Admin: 09/18/20 23:16 Dose:  5 mg


   Documented by: 


Multivitamins/Minerals/Vitamin C (Tab-A-Vit -)  1 tab PO DAILY [facility]


   Last Admin: 09/22/20 08:59 Dose:  1 tab


   Documented by: 


Nicotine (Nicoderm Patch -)  14 mg TD DAILY [facility]


   Last Admin: 09/22/20 08:59 Dose:  14 mg


   Documented by: 


Nitroglycerin (Nitro-Bid 2% Paste -)  1 inch TD Q6HPO [facility]


   Last Admin: 09/22/20 12:14 Dose:  1 inch


   Documented by: 


Pantoprazole Sodium (Protonix -)  40 mg PO DAILY [facility]


   Last Admin: 09/22/20 08:59 Dose:  40 mg


   Documented by: 


Polyethylene Glycol (Miralax (For Daily Use) -)  17 gm PO DAILY PRN


   PRN Reason: CONSTIPATION


   Last Admin: 09/17/20 09:37 Dose:  17 grams


   Documented by: 


Rosuvastatin Calcium (Crestor -)  10 mg PO HS [facility]


   Last Admin: 09/21/20 21:11 Dose:  10 mg


   Documented by: 


Timolol Maleate (Timoptic 0.5%)  1 drop OU BID [facility]


   Last Admin: 09/22/20 09:03 Dose:  1 drop


   Documented by: 


Tiotropium Bromide (Spiriva Respimat)  2 puff IH DAILY [facility]


   Last Admin: 09/22/20 09:02 Dose:  2 puff


   Documented by: 











- Objective


Vital Signs: 


                                   Vital Signs











Temperature  98.7 F   09/22/20 09:30


 


Pulse Rate  91 H  09/22/20 09:30


 


Respiratory Rate  20   09/22/20 09:30


 


Blood Pressure  150/57 L  09/22/20 09:30


 


O2 Sat by Pulse Oximetry (%)  96   09/22/20 10:00











Constitutional: Yes: Calm


Eyes: Yes: Conjunctiva Clear


HENT: Yes: Atraumatic


Neck: Yes: Supple


Cardiovascular: Yes: S1, S2


Respiratory: Yes: Other (chest tube in place)


Gastrointestinal: Yes: Normal Bowel Sounds, Soft


Genitourinary: Yes: WNL


Musculoskeletal: Yes: WNL


Edema: No


Neurological: Yes: Oriented


Psychiatric: Yes: Oriented


Labs: 


                                    CBC, BMP





                                 09/18/20 06:20 





                                    INR, PTT











INR  0.94  (0.83-1.09)   09/02/20  16:36    














Problem List





- Problems


(1) COPD (chronic obstructive pulmonary disease)


Code(s): J44.9 - CHRONIC OBSTRUCTIVE PULMONARY DISEASE, UNSPECIFIED   


Qualifiers: 


   COPD type: unspecified COPD   Qualified Code(s): J44.9 - Chronic obstructive 

pulmonary disease, unspecified   





(2) COPD exacerbation


Code(s): J44.1 - CHRONIC OBSTRUCTIVE PULMONARY DISEASE W (ACUTE) EXACERBATION   





(3) CKD (chronic kidney disease) stage 3, GFR 30-59 ml/min


Code(s): N18.3 - CHRONIC KIDNEY DISEASE, STAGE 3 (MODERATE)   





Assessment/Plan


                               Current Medications











Generic Name Dose Route Start Last Admin





  Trade Name Freq  PRN Reason Stop Dose Admin


 


Acetaminophen  650 mg  09/18/20 11:46  09/22/20 02:37





  Tylenol -  PO   650 mg





  Q6H PRN   Administration





  PAIN LEVEL 1-5  


 


Albuterol Sulfate  2 puff  09/03/20 00:47  09/09/20 22:16





  Ventolin Hfa Inhaler -  IH   2 puff





  Q4H PRN   Administration





  SHORT OF BREATH/WHEEZING  


 


Alprazolam  0.5 mg  09/21/20 10:30  09/21/20 10:38





  Xanax -  PO   0.5 mg





  Q8H PRN   Administration





  ANXIETY  


 


Amlodipine Besylate  10 mg  09/03/20 10:00  09/22/20 08:59





  Norvasc -  PO   10 mg





  DAILY JAYCEE   Administration


 


Aspirin  81 mg  09/03/20 10:00  09/22/20 08:59





  Ecotrin -  PO   81 mg





  DAILY JAYCEE   Administration


 


Brimonidine Tartrate  1 drop  09/03/20 10:00  09/22/20 09:03





  Alphagan 0.2% -  OU   1 drp





  BID JAYCEE   Administration


 


Carvedilol  6.25 mg  09/11/20 10:00  09/22/20 08:59





  Coreg -  PO   6.25 mg





  BID JAYCEE   Administration


 


Duloxetine HCl  60 mg  09/03/20 10:00  09/22/20 08:59





  Cymbalta -  PO   60 mg





  DAILY JAYCEE   Administration


 


Furosemide  80 mg  09/18/20 06:00  09/22/20 14:02





  Lasix Injection -  IVPUSH   80 mg





  BID@0600,1400 JAYCEE   Administration


 


Guaifenesin  10 ml  09/13/20 01:02 





  Robitussin Dm -  PO  





  Q6H PRN  





  COUGH  


 


Heparin Sodium (Porcine)  5,000 unit  09/16/20 22:00  09/22/20 09:00





  Heparin -  SQ   5,000 unit





  BID JAYCEE   Administration


 


Insulin Aspart  1 vial  09/03/20 07:00  09/22/20 12:15





  Novolog Vial Sliding Scale -  SQ   2 unit





  [facility]AYCEE   Administration





  Protocol  


 


Insulin Detemir  34 units  09/22/20 13:13 





  Levemir Vial  SQ  





  AM JAYCEE  


 


Insulin Detemir  34 units  09/22/20 13:13 





  Levemir Vial  SQ  





  HS JAYCEE  


 


Latanoprost  1 drop  09/03/20 22:00  09/21/20 21:14





  Xalatan 0.005% Eye Drops -  OU   Not Given





  HS JAYCEE  


 


Melatonin  5 mg  09/14/20 21:23  09/18/20 23:16





  Melatonin  PO   5 mg





  HS PRN   Administration





  INSOMNIA  


 


Multivitamins/Minerals/Vitamin C  1 tab  09/03/20 10:00  09/22/20 08:59





  Tab-A-Vit -  PO   1 tab





  DAILY JAYCEE   Administration


 


Nicotine  14 mg  09/05/20 10:00  09/22/20 08:59





  Nicoderm Patch -  TD   14 mg





  DAILY JAYCEE   Administration


 


Nitroglycerin  1 inch  09/12/20 12:00  09/22/20 12:14





  Nitro-Bid 2% Paste -  TD   1 inch





  Q6HPO JAYCEE   Administration


 


Pantoprazole Sodium  40 mg  09/04/20 10:00  09/22/20 08:59





  Protonix -  PO   40 mg





  DAILY JAYCEE   Administration


 


Polyethylene Glycol  17 gm  09/05/20 15:15  09/17/20 09:37





  Miralax (For Daily Use) -  PO   17 grams





  DAILY PRN   Administration





  CONSTIPATION  


 


Rosuvastatin Calcium  10 mg  09/03/20 22:00  09/21/20 21:11





  Crestor -  PO   10 mg





  HS JAYCEE   Administration


 


Timolol Maleate  1 drop  09/03/20 10:00  09/22/20 09:03





  Timoptic 0.5%  OU   1 drop





  BID JAYCEE   Administration


 


Tiotropium Bromide  2 puff  09/03/20 10:00  09/22/20 09:02





  Spiriva Respimat  IH   2 puff





  DAILY JAYCEE   Administration











Impression


1. CKD


2. CHF


3. COPD


4. hld


5. dm


6. cad


7. htn


8. proteinuria


9. mgus


10. pleural effusion





Plan


- follow bmp


- monitor renal function


- s/p chest tube, monitor output


- cont lasix


- monitor lytes


- cardio follow up


- fluid restriction

## 2020-09-22 NOTE — PN
Progress Note (short form)





- Note


Progress Note: 


cc: dyspnea





s:  no cp palps dizzy sob.  was able to lie flat to sleep, s/p thoracentesis 

yesterday


                              Current Medications











Generic Name Dose Route Start Last Admin





  Trade Name Freq  PRN Reason Stop Dose Admin


 


Acetaminophen  650 mg  09/18/20 11:46  09/22/20 02:37





  Tylenol -  PO   650 mg





  Q6H PRN   Administration





  PAIN LEVEL 1-5  


 


Albuterol Sulfate  2 puff  09/03/20 00:47  09/09/20 22:16





  Ventolin Hfa Inhaler -  IH   2 puff





  Q4H PRN   Administration





  SHORT OF BREATH/WHEEZING  


 


Alprazolam  0.5 mg  09/21/20 10:30  09/21/20 10:38





  Xanax -  PO   0.5 mg





  Q8H PRN   Administration





  ANXIETY  


 


Amlodipine Besylate  10 mg  09/03/20 10:00  09/22/20 08:59





  Norvasc -  PO   10 mg





  DAILY JAYCEE   Administration


 


Aspirin  81 mg  09/03/20 10:00  09/22/20 08:59





  Ecotrin -  PO   81 mg





  DAILY JAYCEE   Administration


 


Brimonidine Tartrate  1 drop  09/03/20 10:00  09/22/20 09:03





  Alphagan 0.2% -  OU   1 drp





  BID JAYCEE   Administration


 


Carvedilol  6.25 mg  09/11/20 10:00  09/22/20 08:59





  Coreg -  PO   6.25 mg





  BID JAYCEE   Administration


 


Duloxetine HCl  60 mg  09/03/20 10:00  09/22/20 08:59





  Cymbalta -  PO   60 mg





  DAILY JAYCEE   Administration


 


Furosemide  80 mg  09/18/20 06:00  09/22/20 06:09





  Lasix Injection -  IVPUSH   80 mg





  BID@0600,1400 JAYCEE   Administration


 


Guaifenesin  10 ml  09/13/20 01:02 





  Robitussin Dm -  PO  





  Q6H PRN  





  COUGH  


 


Heparin Sodium (Porcine)  5,000 unit  09/16/20 22:00  09/22/20 09:00





  Heparin -  SQ   5,000 unit





  BID JAYCEE   Administration


 


Insulin Aspart  1 vial  09/03/20 07:00  09/22/20 06:24





  Novolog Vial Sliding Scale -  SQ   Not Given





  [facility]  





  Protocol  


 


Insulin Detemir  32 units  09/17/20 12:27  09/22/20 06:23





  Levemir Vial  SQ   32 units





  AM JAYCEE   Administration


 


Insulin Detemir  32 units  09/17/20 12:27  09/21/20 21:12





  Levemir Vial  SQ   32 units





  HS JAYCEE   Administration


 


Latanoprost  1 drop  09/03/20 22:00  09/21/20 21:14





  Xalatan 0.005% Eye Drops -  OU   Not Given





  HS JAYCEE  


 


Melatonin  5 mg  09/14/20 21:23  09/18/20 23:16





  Melatonin  PO   5 mg





  HS PRN   Administration





  INSOMNIA  


 


Multivitamins/Minerals/Vitamin C  1 tab  09/03/20 10:00  09/22/20 08:59





  Tab-A-Vit -  PO   1 tab





  DAILY JAYCEE   Administration


 


Nicotine  14 mg  09/05/20 10:00  09/22/20 08:59





  Nicoderm Patch -  TD   14 mg





  DAILY JAYCEE   Administration


 


Nitroglycerin  1 inch  09/12/20 12:00  09/22/20 06:09





  Nitro-Bid 2% Paste -  TD   1 inch





  Q6HPO JAYCEE   Administration


 


Pantoprazole Sodium  40 mg  09/04/20 10:00  09/22/20 08:59





  Protonix -  PO   40 mg





  DAILY JAYCEE   Administration


 


Polyethylene Glycol  17 gm  09/05/20 15:15  09/17/20 09:37





  Miralax (For Daily Use) -  PO   17 grams





  DAILY PRN   Administration





  CONSTIPATION  


 


Rosuvastatin Calcium  10 mg  09/03/20 22:00  09/21/20 21:11





  Crestor -  PO   10 mg





  HS JAYCEE   Administration


 


Timolol Maleate  1 drop  09/03/20 10:00  09/22/20 09:03





  Timoptic 0.5%  OU   1 drop





  BID JAYCEE   Administration


 


Tiotropium Bromide  2 puff  09/03/20 10:00  09/22/20 09:02





  Spiriva Respimat  IH   2 puff





  DAILY JAYCEE   Administration








                                   Vital Signs











 Period  Temp  Pulse  Resp  BP Sys/Thrasher  Pulse Ox


 


 Last 24 Hr  97.9 F-98.2 F    20-20  128-155/61-88  95-97














Constitutional: Yes: No Distress


Respiratory: Yes: cta bl nl eff


Gastrointestinal: Yes: Soft, Abdomen, Obese


Cardiovascular: Yes: Regular Rate and Rhythm


JVD: No


Heart Sounds: Yes: S1, S2 (rrr)


Edema: trace le edema bl


Neurological: Yes: Alert, Oriented


no jaundice, diaphoresis


not agitated











nsr 64bpm, borderline low voltage, no acute ST changes


Prior Cardiac Procedures: Cardiac Catheterization (non obstx CAD within last 5 

years)


Ejection Fraction %: LVEF > or = 40 %


echo 9/2020: nl lv/rv, no sig valve path





Imaging





- Results


Cat Scan: Report Reviewed


EKG: Image Reviewed





REC:


1. Acute on chronic diastolic CHF (on lasix 40 po qd at home):


-echo here unremarkable (nl LV/RV/valves, no pulm HTN noted)


-Non obstx CAD on cath within last 5 years


- rec MAGNOLIA workup and treatment as outpt if she is amenable


- with lasix 80qd and metolazone she had not lost much volume, changed to lasix 

80 IV BID, with improvement in dyspnea, weight loss


- cont lasix 80 mg IV BID


- pulm following - s/p thoracentesis, chest tube with 3.6 L drained so far





2. HTN: 


-cont current meds








3. Nonobstx CAD:


-cont asa, statin





4. NAY on CKD:


-baseline creat 1.4-1.7 from prior hosp stay


-monitor daily chem7





5. DM: as per PMD





6. COPD/smoking: smoking cessation recommended.

## 2020-09-23 NOTE — PN
Progress Note (short form)





- Note


Progress Note: 





s/p thoracentesis


has a pig tail catheter placed


feels well


                               Vital Signs - 24 hr











  09/23/20 09/23/20 09/23/20





  01:48 09:00 11:00


 


Temperature 98.6 F  


 


Pulse Rate 98 H  


 


Respiratory 20  





Rate   


 


Blood Pressure 138/65  148/61


 


O2 Sat by Pulse 96 96 





Oximetry (%)   














  09/23/20 09/23/20





  14:00 18:13


 


Temperature 98.1 F 98.1 F


 


Pulse Rate 89 82


 


Respiratory 20 20





Rate  


 


Blood Pressure 121/59 L 136/57 L


 


O2 Sat by Pulse 96 95





Oximetry (%)  








                               Current Medications











Generic Name Dose Route Start Last Admin





  Trade Name Freq  PRN Reason Stop Dose Admin


 


Acetaminophen  650 mg  09/18/20 11:46  09/23/20 21:22





  Tylenol -  PO   650 mg





  Q6H PRN   Administration





  PAIN LEVEL 1-5  


 


Albuterol Sulfate  2 puff  09/03/20 00:47  09/09/20 22:16





  Ventolin Hfa Inhaler -  IH   2 puff





  Q4H PRN   Administration





  SHORT OF BREATH/WHEEZING  


 


Alprazolam  0.5 mg  09/21/20 10:30  09/21/20 10:38





  Xanax -  PO   0.5 mg





  Q8H PRN   Administration





  ANXIETY  


 


Amlodipine Besylate  10 mg  09/03/20 10:00  09/23/20 10:01





  Norvasc -  PO   10 mg





  DAILY JAYCEE   Administration


 


Aspirin  81 mg  09/03/20 10:00  09/23/20 10:01





  Ecotrin -  PO   81 mg





  DAILY JAYCEE   Administration


 


Brimonidine Tartrate  1 drop  09/03/20 10:00  09/23/20 21:22





  Alphagan 0.2% -  OU   1 drop





  BID JAYCEE   Administration


 


Carvedilol  6.25 mg  09/11/20 10:00  09/23/20 21:22





  Coreg -  PO   6.25 mg





  BID JAYCEE   Administration


 


Duloxetine HCl  60 mg  09/03/20 10:00  09/23/20 10:01





  Cymbalta -  PO   60 mg





  DAILY JAYCEE   Administration


 


Furosemide  80 mg  09/18/20 06:00  09/23/20 14:07





  Lasix Injection -  IVPUSH   80 mg





  BID@0600,1400 JAYCEE   Administration


 


Guaifenesin  10 ml  09/13/20 01:02 





  Robitussin Dm -  PO  





  Q6H PRN  





  COUGH  


 


Insulin Aspart  1 vial  09/03/20 07:00  09/23/20 21:27





  Novolog Vial Sliding Scale -  SQ   6 unit





  [facility]AYCEE   Administration





  Protocol  


 


Insulin Detemir  34 units  09/22/20 13:13  09/23/20 06:09





  Levemir Vial  SQ   34 units





  AM JAYCEE   Administration


 


Insulin Detemir  34 units  09/22/20 13:13  09/23/20 21:23





  Levemir Vial  SQ   34 units





  HS JAYCEE   Administration


 


Latanoprost  1 drop  09/03/20 22:00  09/23/20 22:30





  Xalatan 0.005% Eye Drops -  OU   Not Given





  HS JAYCEE  


 


Melatonin  5 mg  09/14/20 21:23  09/18/20 23:16





  Melatonin  PO   5 mg





  HS PRN   Administration





  INSOMNIA  


 


Multivitamins/Minerals/Vitamin C  1 tab  09/03/20 10:00  09/23/20 10:06





  Tab-A-Vit -  PO   1 tab





  DAILY JAYCEE   Administration


 


Nicotine  14 mg  09/05/20 10:00  09/23/20 10:03





  Nicoderm Patch -  TD   14 mg





  DAILY JAYCEE   Administration


 


Nitroglycerin  1 inch  09/12/20 12:00  09/23/20 18:19





  Nitro-Bid 2% Paste -  TD   1 inch





  Q6HPO JAYCEE   Administration


 


Pantoprazole Sodium  40 mg  09/04/20 10:00  09/23/20 10:01





  Protonix -  PO   40 mg





  DAILY JAYCEE   Administration


 


Polyethylene Glycol  17 gm  09/05/20 15:15  09/17/20 09:37





  Miralax (For Daily Use) -  PO   17 grams





  DAILY PRN   Administration





  CONSTIPATION  


 


Rosuvastatin Calcium  10 mg  09/03/20 22:00  09/23/20 21:22





  Crestor -  PO   10 mg





  HS JAYCEE   Administration


 


Timolol Maleate  1 drop  09/03/20 10:00  09/23/20 21:27





  Timoptic 0.5%  OU   1 drop





  BID JAYCEE   Administration


 


Tiotropium Bromide  2 puff  09/03/20 10:00  09/23/20 10:04





  Spiriva Respimat  IH   2 puff





  DAILY JAYCEE   Administration








                         Laboratory Results - last 24 hr











  09/23/20 09/23/20 09/23/20





  06:06 10:21 11:45


 


Sodium   139 


 


Potassium   3.9 


 


Chloride   104 


 


Carbon Dioxide   28 


 


Anion Gap   8 


 


BUN   71.1 H 


 


Creatinine   2.1 H 


 


Est GFR (CKD-EPI)AfAm   27.72 


 


Est GFR (CKD-EPI)NonAf   23.92 


 


POC Glucometer  185   248


 


Random Glucose   285 H 


 


Calcium   9.1 














  09/23/20 09/23/20





  16:52 21:26


 


Sodium  


 


Potassium  


 


Chloride  


 


Carbon Dioxide  


 


Anion Gap  


 


BUN  


 


Creatinine  


 


Est GFR (CKD-EPI)AfAm  


 


Est GFR (CKD-EPI)NonAf  


 


POC Glucometer  273  272


 


Random Glucose  


 


Calcium  








                                 Intake & Output











 09/20/20 09/21/20 09/22/20 09/23/20





 23:59 23:59 23:59 23:59


 


Intake Total 500 550 910 415


 


Output Total  3600 1050 653


 


Balance 500 -3050 -140 -238


 


Weight 245 lb 245 lb 9 oz 244 lb 5 oz 244 lb 6 oz




















S1 s2 RRR


Lungs decreased right 


Abd- soft, obese


NT


Edema decreased 








PLAN


Per Cardiology-- Lasix increased to 80 mg iv BID





Monitor renal function-- ordered today 


weight is same








OOB


s/p thoracentesis 





increase levemir for better glucose control


Lovenox for DVT prophylaxis








Problem List





- Problems


(1) COPD exacerbation


Code(s): J44.1 - CHRONIC OBSTRUCTIVE PULMONARY DISEASE W (ACUTE) EXACERBATION   





(2) ASHD (arteriosclerotic heart disease)


Code(s): I25.10 - ATHSCL HEART DISEASE OF NATIVE CORONARY ARTERY W/O ANG PCTRS  







(3) Acute hypoxemic respiratory failure


Code(s): J96.01 - ACUTE RESPIRATORY FAILURE WITH HYPOXIA   





(4) Acute on chronic diastolic CHF (congestive heart failure)


Code(s): I50.33 - ACUTE ON CHRONIC DIASTOLIC (CONGESTIVE) HEART FAILURE   





(5) CKD (chronic kidney disease) stage 3, GFR 30-59 ml/min


Code(s): N18.3 - CHRONIC KIDNEY DISEASE, STAGE 3 (MODERATE)   





(6) HTN (hypertension)


Code(s): I10 - ESSENTIAL (PRIMARY) HYPERTENSION   


Qualifiers: 


   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   





(7) IDDM (insulin dependent diabetes mellitus)


Code(s): E11.9 - TYPE 2 DIABETES MELLITUS WITHOUT COMPLICATIONS; Z79.4 - LONG 

TERM (CURRENT) USE OF INSULIN   





(8) Pneumonia


Code(s): J18.9 - PNEUMONIA, UNSPECIFIED ORGANISM   


Qualifiers: 


   Laterality: right   Lung location: lower lobe of lung

## 2020-09-23 NOTE — PN
Progress Note, Physician


History of Present Illness: 





Pt seen and examined at bedside. She is awake and alert. She feels that her 

breathing is improved. 





- Current Medication List


Current Medications: 


Active Medications





Acetaminophen (Tylenol -)  650 mg PO Q6H PRN


   PRN Reason: PAIN LEVEL 1-5


   Last Admin: 09/23/20 06:25 Dose:  650 mg


   Documented by: 


Albuterol Sulfate (Ventolin Hfa Inhaler -)  2 puff IH Q4H PRN


   PRN Reason: SHORT OF BREATH/WHEEZING


   Last Admin: 09/09/20 22:16 Dose:  2 puff


   Documented by: 


Alprazolam (Xanax -)  0.5 mg PO Q8H PRN


   PRN Reason: ANXIETY


   Last Admin: 09/21/20 10:38 Dose:  0.5 mg


   Documented by: 


Amlodipine Besylate (Norvasc -)  10 mg PO DAILY [facility]


   Last Admin: 09/23/20 10:01 Dose:  10 mg


   Documented by: 


Aspirin (Ecotrin -)  81 mg PO DAILY [facility]


   Last Admin: 09/23/20 10:01 Dose:  81 mg


   Documented by: 


Brimonidine Tartrate (Alphagan 0.2% -)  1 drop OU BID [facility]


   Last Admin: 09/23/20 10:04 Dose:  1 drp


   Documented by: 


Carvedilol (Coreg -)  6.25 mg PO BID [facility]


   Last Admin: 09/23/20 10:01 Dose:  6.25 mg


   Documented by: 


Duloxetine HCl (Cymbalta -)  60 mg PO DAILY [facility]


   Last Admin: 09/23/20 10:01 Dose:  60 mg


   Documented by: 


Furosemide (Lasix Injection -)  80 mg IVPUSH BID@0600,1400 [facility]


   Last Admin: 09/23/20 06:12 Dose:  80 mg


   Documented by: 


Guaifenesin (Robitussin Dm -)  10 ml PO Q6H PRN


   PRN Reason: COUGH


Heparin Sodium (Porcine) (Heparin -)  5,000 unit SQ BID [facility]


   Last Admin: 09/23/20 10:00 Dose:  5,000 unit


   Documented by: 


Insulin Aspart (Novolog Vial Sliding Scale -)  1 vial SQ ACHS [facility]; Protocol


   Last Admin: 09/23/20 11:46 Dose:  4 unit


   Documented by: 


Insulin Detemir (Levemir Vial)  34 units SQ AM [facility]


   Last Admin: 09/23/20 06:09 Dose:  34 units


   Documented by: 


Insulin Detemir (Levemir Vial)  34 units SQ HS [facility]


   Last Admin: 09/22/20 21:21 Dose:  34 units


   Documented by: 


Latanoprost (Xalatan 0.005% Eye Drops -)  1 drop OU HS [facility]


   Last Admin: 09/22/20 21:28 Dose:  Not Given


   Documented by: 


Melatonin (Melatonin)  5 mg PO HS PRN


   PRN Reason: INSOMNIA


   Last Admin: 09/18/20 23:16 Dose:  5 mg


   Documented by: 


Multivitamins/Minerals/Vitamin C (Tab-A-Vit -)  1 tab PO DAILY [facility]


   Last Admin: 09/23/20 10:06 Dose:  1 tab


   Documented by: 


Nicotine (Nicoderm Patch -)  14 mg TD DAILY [facility]


   Last Admin: 09/23/20 10:03 Dose:  14 mg


   Documented by: 


Nitroglycerin (Nitro-Bid 2% Paste -)  1 inch TD Q6HPO [facility]


   Last Admin: 09/23/20 11:46 Dose:  1 inch


   Documented by: 


Pantoprazole Sodium (Protonix -)  40 mg PO DAILY [facility]


   Last Admin: 09/23/20 10:01 Dose:  40 mg


   Documented by: 


Polyethylene Glycol (Miralax (For Daily Use) -)  17 gm PO DAILY PRN


   PRN Reason: CONSTIPATION


   Last Admin: 09/17/20 09:37 Dose:  17 grams


   Documented by: 


Rosuvastatin Calcium (Crestor -)  10 mg PO HS [facility]


   Last Admin: 09/22/20 21:23 Dose:  10 mg


   Documented by: 


Timolol Maleate (Timoptic 0.5%)  1 drop OU BID [facility]


   Last Admin: 09/23/20 10:05 Dose:  1 drop


   Documented by: 


Tiotropium Bromide (Spiriva Respimat)  2 puff IH DAILY [facility]


   Last Admin: 09/23/20 10:04 Dose:  2 puff


   Documented by: 











- Objective


Vital Signs: 


                                   Vital Signs











Temperature  98.6 F   09/23/20 01:48


 


Pulse Rate  98 H  09/23/20 01:48


 


Respiratory Rate  20   09/23/20 01:48


 


Blood Pressure  148/61   09/23/20 11:00


 


O2 Sat by Pulse Oximetry (%)  96   09/23/20 01:48











Constitutional: Yes: Calm


Eyes: Yes: Conjunctiva Clear


HENT: Yes: Atraumatic


Cardiovascular: Yes: S1, S2


Respiratory: Yes: Other (chest tube)


Gastrointestinal: Yes: Soft


Genitourinary: Yes: WNL


Musculoskeletal: Yes: WNL


Edema: Yes


Edema: LLE: 1+, RLE: 1+


Neurological: Yes: Oriented


Psychiatric: Yes: Oriented


Labs: 


                                    CBC, BMP





                                 09/18/20 06:20 





                                 09/23/20 10:21 





                                    INR, PTT











INR  0.94  (0.83-1.09)   09/02/20  16:36    














Problem List





- Problems


(1) COPD (chronic obstructive pulmonary disease)


Code(s): J44.9 - CHRONIC OBSTRUCTIVE PULMONARY DISEASE, UNSPECIFIED   


Qualifiers: 


   COPD type: unspecified COPD   Qualified Code(s): J44.9 - Chronic obstructive 

pulmonary disease, unspecified   





(2) COPD exacerbation


Code(s): J44.1 - CHRONIC OBSTRUCTIVE PULMONARY DISEASE W (ACUTE) EXACERBATION   





(3) CKD (chronic kidney disease) stage 3, GFR 30-59 ml/min


Code(s): N18.3 - CHRONIC KIDNEY DISEASE, STAGE 3 (MODERATE)   





Assessment/Plan


                               Current Medications











Generic Name Dose Route Start Last Admin





  Trade Name Freq  PRN Reason Stop Dose Admin


 


Acetaminophen  650 mg  09/18/20 11:46  09/23/20 06:25





  Tylenol -  PO   650 mg





  Q6H PRN   Administration





  PAIN LEVEL 1-5  


 


Albuterol Sulfate  2 puff  09/03/20 00:47  09/09/20 22:16





  Ventolin Hfa Inhaler -  IH   2 puff





  Q4H PRN   Administration





  SHORT OF BREATH/WHEEZING  


 


Alprazolam  0.5 mg  09/21/20 10:30  09/21/20 10:38





  Xanax -  PO   0.5 mg





  Q8H PRN   Administration





  ANXIETY  


 


Amlodipine Besylate  10 mg  09/03/20 10:00  09/23/20 10:01





  Norvasc -  PO   10 mg





  DAILY JAYCEE   Administration


 


Aspirin  81 mg  09/03/20 10:00  09/23/20 10:01





  Ecotrin -  PO   81 mg





  DAILY JAYCEE   Administration


 


Brimonidine Tartrate  1 drop  09/03/20 10:00  09/23/20 10:04





  Alphagan 0.2% -  OU   1 drp





  BID JAYCEE   Administration


 


Carvedilol  6.25 mg  09/11/20 10:00  09/23/20 10:01





  Coreg -  PO   6.25 mg





  BID JAYCEE   Administration


 


Duloxetine HCl  60 mg  09/03/20 10:00  09/23/20 10:01





  Cymbalta -  PO   60 mg





  DAILY JAYCEE   Administration


 


Furosemide  80 mg  09/18/20 06:00  09/23/20 06:12





  Lasix Injection -  IVPUSH   80 mg





  BID@0600,1400 JAYCEE   Administration


 


Guaifenesin  10 ml  09/13/20 01:02 





  Robitussin Dm -  PO  





  Q6H PRN  





  COUGH  


 


Heparin Sodium (Porcine)  5,000 unit  09/16/20 22:00  09/23/20 10:00





  Heparin -  SQ   5,000 unit





  BID JAYCEE   Administration


 


Insulin Aspart  1 vial  09/03/20 07:00  09/23/20 11:46





  Novolog Vial Sliding Scale -  SQ   4 unit





  [facility] JAYCEE   Administration





  Protocol  


 


Insulin Detemir  34 units  09/22/20 13:13  09/23/20 06:09





  Levemir Vial  SQ   34 units





  AM JAYCEE   Administration


 


Insulin Detemir  34 units  09/22/20 13:13  09/22/20 21:21





  Levemir Vial  SQ   34 units





  HS JAYCEE   Administration


 


Latanoprost  1 drop  09/03/20 22:00  09/22/20 21:28





  Xalatan 0.005% Eye Drops -  OU   Not Given





  HS JAYCEE  


 


Melatonin  5 mg  09/14/20 21:23  09/18/20 23:16





  Melatonin  PO   5 mg





  HS PRN   Administration





  INSOMNIA  


 


Multivitamins/Minerals/Vitamin C  1 tab  09/03/20 10:00  09/23/20 10:06





  Tab-A-Vit -  PO   1 tab





  DAILY JAYCEE   Administration


 


Nicotine  14 mg  09/05/20 10:00  09/23/20 10:03





  Nicoderm Patch -  TD   14 mg





  DAILY JAYCEE   Administration


 


Nitroglycerin  1 inch  09/12/20 12:00  09/23/20 11:46





  Nitro-Bid 2% Paste -  TD   1 inch





  Q6HPO JAYCEE   Administration


 


Pantoprazole Sodium  40 mg  09/04/20 10:00  09/23/20 10:01





  Protonix -  PO   40 mg





  DAILY JAYCEE   Administration


 


Polyethylene Glycol  17 gm  09/05/20 15:15  09/17/20 09:37





  Miralax (For Daily Use) -  PO   17 grams





  DAILY PRN   Administration





  CONSTIPATION  


 


Rosuvastatin Calcium  10 mg  09/03/20 22:00  09/22/20 21:23





  Crestor -  PO   10 mg





  HS JAYCEE   Administration


 


Timolol Maleate  1 drop  09/03/20 10:00  09/23/20 10:05





  Timoptic 0.5%  OU   1 drop





  BID JAYCEE   Administration


 


Tiotropium Bromide  2 puff  09/03/20 10:00  09/23/20 10:04





  Spiriva Respimat  IH   2 puff





  DAILY JAYCEE   Administration











Impression


1. CKD


2. CHF


3. COPD


4. hld


5. dm


6. cad


7. htn


8. proteinuria


9. mgus


10. pleural effusion





Plan


- cont to monitor lytes


- monitor chest tube output


- cont lasix


- monitor crt


- cardio input appreciated


- weight is improving


- fluid restriction

## 2020-09-23 NOTE — PN
Progress Note, Physician


History of Present Illness: 





PULMONARY





ALERT,COMFORTABLE,SOB IMPROVED ,AMBULATION W/O DISTRESS, CHEST TUBE DRAINAGE 

188cc





- Current Medication List


Current Medications: 


Active Medications





Acetaminophen (Tylenol -)  650 mg PO Q6H PRN


   PRN Reason: PAIN LEVEL 1-5


   Last Admin: 09/23/20 06:25 Dose:  650 mg


   Documented by: 


Albuterol Sulfate (Ventolin Hfa Inhaler -)  2 puff IH Q4H PRN


   PRN Reason: SHORT OF BREATH/WHEEZING


   Last Admin: 09/09/20 22:16 Dose:  2 puff


   Documented by: 


Alprazolam (Xanax -)  0.5 mg PO Q8H PRN


   PRN Reason: ANXIETY


   Last Admin: 09/21/20 10:38 Dose:  0.5 mg


   Documented by: 


Amlodipine Besylate (Norvasc -)  10 mg PO DAILY [facility]


   Last Admin: 09/23/20 10:01 Dose:  10 mg


   Documented by: 


Aspirin (Ecotrin -)  81 mg PO DAILY [facility]


   Last Admin: 09/23/20 10:01 Dose:  81 mg


   Documented by: 


Brimonidine Tartrate (Alphagan 0.2% -)  1 drop OU BID [facility]


   Last Admin: 09/23/20 10:04 Dose:  1 drp


   Documented by: 


Carvedilol (Coreg -)  6.25 mg PO BID [facility]


   Last Admin: 09/23/20 10:01 Dose:  6.25 mg


   Documented by: 


Duloxetine HCl (Cymbalta -)  60 mg PO DAILY [facility]


   Last Admin: 09/23/20 10:01 Dose:  60 mg


   Documented by: 


Furosemide (Lasix Injection -)  80 mg IVPUSH BID@0600,1400 [facility]


   Last Admin: 09/23/20 06:12 Dose:  80 mg


   Documented by: 


Guaifenesin (Robitussin Dm -)  10 ml PO Q6H PRN


   PRN Reason: COUGH


Heparin Sodium (Porcine) (Heparin -)  5,000 unit SQ BID [facility]


   Last Admin: 09/23/20 10:00 Dose:  5,000 unit


   Documented by: 


Insulin Aspart (Novolog Vial Sliding Scale -)  [facility]; Protocol


   Last Admin: 09/23/20 06:08 Dose:  2 unit


   Documented by: 


Insulin Detemir (Levemir Vial)  34 units SQ AM [facility]


   Last Admin: 09/23/20 06:09 Dose:  34 units


   Documented by: 


Insulin Detemir (Levemir Vial)  34 units SQ HS [facility]


   Last Admin: 09/22/20 21:21 Dose:  34 units


   Documented by: 


Latanoprost (Xalatan 0.005% Eye Drops -)  1 drop OU HS [facility]


   Last Admin: 09/22/20 21:28 Dose:  Not Given


   Documented by: 


Melatonin (Melatonin)  5 mg PO HS PRN


   PRN Reason: INSOMNIA


   Last Admin: 09/18/20 23:16 Dose:  5 mg


   Documented by: 


Multivitamins/Minerals/Vitamin C (Tab-A-Vit -)  1 tab PO DAILY [facility]


   Last Admin: 09/23/20 10:06 Dose:  1 tab


   Documented by: 


Nicotine (Nicoderm Patch -)  14 mg TD DAILY [facility]


   Last Admin: 09/23/20 10:03 Dose:  14 mg


   Documented by: 


Nitroglycerin (Nitro-Bid 2% Paste -)  1 inch TD Q6HPO [facility]


   Last Admin: 09/23/20 06:17 Dose:  1 inch


   Documented by: 


Pantoprazole Sodium (Protonix -)  40 mg PO DAILY [facility]


   Last Admin: 09/23/20 10:01 Dose:  40 mg


   Documented by: 


Polyethylene Glycol (Miralax (For Daily Use) -)  17 gm PO DAILY PRN


   PRN Reason: CONSTIPATION


   Last Admin: 09/17/20 09:37 Dose:  17 grams


   Documented by: 


Rosuvastatin Calcium (Crestor -)  10 mg PO HS [facility]


   Last Admin: 09/22/20 21:23 Dose:  10 mg


   Documented by: 


Timolol Maleate (Timoptic 0.5%)  1 drop OU BID [facility]


   Last Admin: 09/23/20 10:05 Dose:  1 drop


   Documented by: 


Tiotropium Bromide (Spiriva Respimat)  2 puff IH DAILY [facility]


   Last Admin: 09/23/20 10:04 Dose:  2 puff


   Documented by: 











- Objective


Vital Signs: 


                                   Vital Signs











Temperature  98.6 F   09/23/20 01:48


 


Pulse Rate  98 H  09/23/20 01:48


 


Respiratory Rate  20   09/23/20 01:48


 


Blood Pressure  138/65   09/23/20 01:48


 


O2 Sat by Pulse Oximetry (%)  96   09/23/20 01:48











Constitutional: Yes: Calm, Obese


Eyes: Yes: WNL


HENT: Yes: WNL


Neck: Yes: WNL


Cardiovascular: Yes: Regular Rate and Rhythm, S1, S2


Respiratory: Yes: CTA Bilaterally


Gastrointestinal: Yes: Normal Bowel Sounds, Soft


Extremities: Yes: WNL


Edema: Yes


Labs: 


                                    CBC, BMP





                                 09/18/20 06:20 





                                 09/22/20 13:15 





                                    INR, PTT











INR  0.94  (0.83-1.09)   09/02/20  16:36    














Problem List





- Problems


(1) COPD (chronic obstructive pulmonary disease)


Code(s): J44.9 - CHRONIC OBSTRUCTIVE PULMONARY DISEASE, UNSPECIFIED   


Qualifiers: 


   COPD type: unspecified COPD   Qualified Code(s): J44.9 - Chronic obstructive 

pulmonary disease, unspecified   





(2) Pleural effusion


Code(s): J90 - PLEURAL EFFUSION, NOT ELSEWHERE CLASSIFIED   





(3) Shortness of breath on exertion


Code(s): R06.02 - SHORTNESS OF BREATH   





(4) ASHD (arteriosclerotic heart disease)


Code(s): I25.10 - ATHSCL HEART DISEASE OF NATIVE CORONARY ARTERY W/O ANG PCTRS  







(5) Acute on chronic diastolic CHF (congestive heart failure)


Code(s): I50.33 - ACUTE ON CHRONIC DIASTOLIC (CONGESTIVE) HEART FAILURE   





(6) CKD (chronic kidney disease) stage 3, GFR 30-59 ml/min


Code(s): N18.3 - CHRONIC KIDNEY DISEASE, STAGE 3 (MODERATE)   





Assessment/Plan





A/P


Acute on Chronic Diastolic Heart Failure


Right Pleural Effusion


COPD


CKD


HTN


TOBACCO ABUSE





-  monitor chest tube output


-  incentive spirometry


-  lasix


-  monitor urine output, creatinine


-  daily weights


-  O2 to keep SpO2 >90%


-  inhaled bronchodilators as needed


-  DVT prophylaxis





DR REED

## 2020-09-23 NOTE — PN
Progress Note (short form)





- Note


Progress Note: 


cc: dyspnea





s:  no cp palps dizzy sob. complains of pain at chest tube site


                              Current Medications











Generic Name Dose Route Start Last Admin





  Trade Name Freq  PRN Reason Stop Dose Admin


 


Acetaminophen  650 mg  09/18/20 11:46  09/23/20 06:25





  Tylenol -  PO   650 mg





  Q6H PRN   Administration





  PAIN LEVEL 1-5  


 


Albuterol Sulfate  2 puff  09/03/20 00:47  09/09/20 22:16





  Ventolin Hfa Inhaler -  IH   2 puff





  Q4H PRN   Administration





  SHORT OF BREATH/WHEEZING  


 


Alprazolam  0.5 mg  09/21/20 10:30  09/21/20 10:38





  Xanax -  PO   0.5 mg





  Q8H PRN   Administration





  ANXIETY  


 


Amlodipine Besylate  10 mg  09/03/20 10:00  09/23/20 10:01





  Norvasc -  PO   10 mg





  DAILY JAYCEE   Administration


 


Aspirin  81 mg  09/03/20 10:00  09/23/20 10:01





  Ecotrin -  PO   81 mg





  DAILY JAYCEE   Administration


 


Brimonidine Tartrate  1 drop  09/03/20 10:00  09/23/20 10:04





  Alphagan 0.2% -  OU   1 drp





  BID JAYCEE   Administration


 


Carvedilol  6.25 mg  09/11/20 10:00  09/23/20 10:01





  Coreg -  PO   6.25 mg





  BID JAYCEE   Administration


 


Duloxetine HCl  60 mg  09/03/20 10:00  09/23/20 10:01





  Cymbalta -  PO   60 mg





  DAILY JAYCEE   Administration


 


Furosemide  80 mg  09/18/20 06:00  09/23/20 06:12





  Lasix Injection -  IVPUSH   80 mg





  BID@0600,1400 JAYCEE   Administration


 


Guaifenesin  10 ml  09/13/20 01:02 





  Robitussin Dm -  PO  





  Q6H PRN  





  COUGH  


 


Heparin Sodium (Porcine)  5,000 unit  09/16/20 22:00  09/23/20 10:00





  Heparin -  SQ   5,000 unit





  BID JAYCEE   Administration


 


Insulin Aspart  1 vial  09/03/20 07:00  09/23/20 11:46





  Novolog Vial Sliding Scale -  SQ   4 unit





  [facility] JAYCEE   Administration





  Protocol  


 


Insulin Detemir  34 units  09/22/20 13:13  09/23/20 06:09





  Levemir Vial  SQ   34 units





  AM JAYCEE   Administration


 


Insulin Detemir  34 units  09/22/20 13:13  09/22/20 21:21





  Levemir Vial  SQ   34 units





  HS JAYCEE   Administration


 


Latanoprost  1 drop  09/03/20 22:00  09/22/20 21:28





  Xalatan 0.005% Eye Drops -  OU   Not Given





  HS JAYCEE  


 


Melatonin  5 mg  09/14/20 21:23  09/18/20 23:16





  Melatonin  PO   5 mg





  HS PRN   Administration





  INSOMNIA  


 


Multivitamins/Minerals/Vitamin C  1 tab  09/03/20 10:00  09/23/20 10:06





  Tab-A-Vit -  PO   1 tab





  DAILY JAYCEE   Administration


 


Nicotine  14 mg  09/05/20 10:00  09/23/20 10:03





  Nicoderm Patch -  TD   14 mg





  DAILY JAYCEE   Administration


 


Nitroglycerin  1 inch  09/12/20 12:00  09/23/20 11:46





  Nitro-Bid 2% Paste -  TD   1 inch





  Q6HPO JAYCEE   Administration


 


Pantoprazole Sodium  40 mg  09/04/20 10:00  09/23/20 10:01





  Protonix -  PO   40 mg





  DAILY JAYCEE   Administration


 


Polyethylene Glycol  17 gm  09/05/20 15:15  09/17/20 09:37





  Miralax (For Daily Use) -  PO   17 grams





  DAILY PRN   Administration





  CONSTIPATION  


 


Rosuvastatin Calcium  10 mg  09/03/20 22:00  09/22/20 21:23





  Crestor -  PO   10 mg





  HS JAYCEE   Administration


 


Timolol Maleate  1 drop  09/03/20 10:00  09/23/20 10:05





  Timoptic 0.5%  OU   1 drop





  BID JAYCEE   Administration


 


Tiotropium Bromide  2 puff  09/03/20 10:00  09/23/20 10:04





  Spiriva Respimat  IH   2 puff





  DAILY JAYCEE   Administration








                                   Vital Signs











 Period  Temp  Pulse  Resp  BP Sys/Thrasher  Pulse Ox


 


 Last 24 Hr  98.6 F-98.9 F    20-20  138-148/61-69  95-96

















Constitutional: Yes: No Distress


Respiratory: Yes: cta bl nl eff


Gastrointestinal: Yes: Soft, Abdomen, Obese


Cardiovascular: Yes: Regular Rate and Rhythm


JVD: No


Heart Sounds: Yes: S1, S2 (rrr)


Edema: trace le edema bl


Neurological: Yes: Alert, Oriented


no jaundice, diaphoresis


not agitated











nsr 64bpm, borderline low voltage, no acute ST changes


Prior Cardiac Procedures: Cardiac Catheterization (non obstx CAD within last 5 

years)


Ejection Fraction %: LVEF > or = 40 %


echo 9/2020: nl lv/rv, no sig valve path





Imaging





- Results


Cat Scan: Report Reviewed


EKG: Image Reviewed





REC:


1. Acute on chronic diastolic CHF (on lasix 40 po qd at home):


-echo here unremarkable (nl LV/RV/valves, no pulm HTN noted)


-Non obstx CAD on cath within last 5 years


- rec MAGNOLIA workup and treatment as outpt if she is amenable


- with lasix 80qd and metolazone she had not lost much volume, changed to lasix 

80 IV BID, with improvement in dyspnea, weight loss


- cont lasix 80 mg IV BID. monitor Cr, lytes, daily weights


- pulm following - s/p thoracentesis, chest tube with 3.6 L drained





2. HTN: 


-cont current meds








3. Nonobstx CAD:


-cont asa, statin





4. NAY on CKD:


-baseline creat 1.4-1.7 from prior hosp stay


-monitor daily chem7





5. DM: as per PMD





6. COPD/smoking: smoking cessation recommended.

## 2020-09-24 NOTE — PN
Progress Note (short form)





- Note


Progress Note: 





PULMONARY





Breathing much improved, able to ambulate down [place], sleeps comfortably. 

Creatinine up today. Chest tube with 213mL drainage yesterday.





                                   Vital Signs











 Period  Temp  Pulse  Resp  BP Sys/Thrasher  Pulse Ox


 


 Last 24 Hr  97.8 F-98.3 F  78-93  20-20  121-136/56-68  95-96








                                 Intake & Output











 09/21/20 09/22/20 09/23/20 09/24/20





 23:59 23:59 23:59 23:59


 


Intake Total 550 910 615 10


 


Output Total 3600 1050 663 75


 


Balance -3050 -140 -48 -65


 


Weight 111.385 kg 110.818 kg 110.847 kg 108.499 kg











Gen:  NAD in chair


Heart: RRR


Lung: decreased breath sounds at the bases


Abd: soft, nontender


Ext: no edema





                                    CBC, BMP





                                 09/18/20 06:20 





                                 09/24/20 07:15 





Active Medications





Acetaminophen (Tylenol -)  650 mg PO Q6H PRN


   PRN Reason: PAIN LEVEL 1-5


   Last Admin: 09/23/20 21:22 Dose:  650 mg


   Documented by: 


Albuterol Sulfate (Ventolin Hfa Inhaler -)  2 puff IH Q4H PRN


   PRN Reason: SHORT OF BREATH/WHEEZING


   Last Admin: 09/09/20 22:16 Dose:  2 puff


   Documented by: 


Alprazolam (Xanax -)  0.5 mg PO Q8H PRN


   PRN Reason: ANXIETY


   Last Admin: 09/21/20 10:38 Dose:  0.5 mg


   Documented by: 


Amlodipine Besylate (Norvasc -)  10 mg PO DAILY [facility]


   Last Admin: 09/24/20 10:04 Dose:  10 mg


   Documented by: 


Aspirin (Ecotrin -)  81 mg PO DAILY [facility]


   Last Admin: 09/24/20 10:04 Dose:  81 mg


   Documented by: 


Brimonidine Tartrate (Alphagan 0.2% -)  1 drop OU BID [facility]


   Last Admin: 09/24/20 10:09 Dose:  1 drop


   Documented by: 


Carvedilol (Coreg -)  6.25 mg PO BID [facility]


   Last Admin: 09/24/20 10:07 Dose:  6.25 mg


   Documented by: 


Duloxetine HCl (Cymbalta -)  60 mg PO DAILY [facility]


   Last Admin: 09/24/20 10:03 Dose:  60 mg


   Documented by: 


Furosemide (Lasix Injection -)  80 mg IVPUSH BID@0600,1400 [facility]


   Last Admin: 09/24/20 06:07 Dose:  80 mg


   Documented by: 


Guaifenesin (Robitussin Dm -)  10 ml PO Q6H PRN


   PRN Reason: COUGH


Insulin Aspart (Novolog Vial Sliding Scale -)  1 vial SQ ACHS [facility]; Protocol


   Last Admin: 09/24/20 06:07 Dose:  4 unit


   Documented by: 


Insulin Detemir (Levemir Vial)  34 units SQ AM [facility]


   Last Admin: 09/24/20 06:07 Dose:  34 units


   Documented by: 


Insulin Detemir (Levemir Vial)  34 units SQ HS [facility]


   Last Admin: 09/23/20 21:23 Dose:  34 units


   Documented by: 


Latanoprost (Xalatan 0.005% Eye Drops -)  1 drop OU HS [facility]


   Last Admin: 09/23/20 22:30 Dose:  Not Given


   Documented by: 


Melatonin (Melatonin)  5 mg PO HS PRN


   PRN Reason: INSOMNIA


   Last Admin: 09/18/20 23:16 Dose:  5 mg


   Documented by: 


Multivitamins/Minerals/Vitamin C (Tab-A-Vit -)  1 tab PO DAILY [facility]


   Last Admin: 09/24/20 10:04 Dose:  1 tab


   Documented by: 


Nicotine (Nicoderm Patch -)  14 mg TD DAILY [facility]


   Last Admin: 09/24/20 10:04 Dose:  14 mg


   Documented by: 


Nitroglycerin (Nitro-Bid 2% Paste -)  1 inch TD Q6HPO [facility]


   Last Admin: 09/24/20 06:07 Dose:  1 inch


   Documented by: 


Pantoprazole Sodium (Protonix -)  40 mg PO DAILY [facility]


   Last Admin: 09/24/20 10:04 Dose:  40 mg


   Documented by: 


Polyethylene Glycol (Miralax (For Daily Use) -)  17 gm PO DAILY PRN


   PRN Reason: CONSTIPATION


   Last Admin: 09/17/20 09:37 Dose:  17 grams


   Documented by: 


Rosuvastatin Calcium (Crestor -)  10 mg PO HS [facility]


   Last Admin: 09/23/20 21:22 Dose:  10 mg


   Documented by: 


Timolol Maleate (Timoptic 0.5%)  1 drop OU BID [facility]


   Last Admin: 09/24/20 10:09 Dose:  1 drop


   Documented by: 


Tiotropium Bromide (Spiriva Respimat)  2 puff IH DAILY [facility]


   Last Admin: 09/24/20 10:08 Dose:  2 puff


   Documented by: 











A/P


Acute on Chronic Diastolic Heart Failure


Right Pleural Effusion


COPD


CKD


HTN


TOBACCO ABUSE





-  monitor chest tube output


-  incentive spirometry


-  would hold lasix today


-  monitor urine output, creatinine


-  daily weights


-  O2 to keep SpO2 >90%


-  inhaled bronchodilators as needed


-  DVT prophylaxis

## 2020-09-24 NOTE — PN
Progress Note (short form)





- Note


Progress Note: 


s:  no cp palps dizzy. sob improving, able to walk more now.


                                        


                                        


                                        


                               Current Medications











Generic Name Dose Route Start Last Admin





  Trade Name Roberto  PRN Reason Stop Dose Admin


 


Acetaminophen  650 mg  09/18/20 11:46  09/23/20 21:22





  Tylenol -  PO   650 mg





  Q6H PRN   Administration





  PAIN LEVEL 1-5  


 


Albuterol Sulfate  2 puff  09/03/20 00:47  09/09/20 22:16





  Ventolin Hfa Inhaler -  IH   2 puff





  Q4H PRN   Administration





  SHORT OF BREATH/WHEEZING  


 


Alprazolam  0.5 mg  09/21/20 10:30  09/21/20 10:38





  Xanax -  PO   0.5 mg





  Q8H PRN   Administration





  ANXIETY  


 


Amlodipine Besylate  10 mg  09/03/20 10:00  09/24/20 10:04





  Norvasc -  PO   10 mg





  DAILY JAYCEE   Administration


 


Aspirin  81 mg  09/03/20 10:00  09/24/20 10:04





  Ecotrin -  PO   81 mg





  DAILY JAYCEE   Administration


 


Brimonidine Tartrate  1 drop  09/03/20 10:00  09/24/20 10:09





  Alphagan 0.2% -  OU   1 drop





  BID JAYCEE   Administration


 


Carvedilol  6.25 mg  09/11/20 10:00  09/24/20 10:07





  Coreg -  PO   6.25 mg





  BID JAYCEE   Administration


 


Duloxetine HCl  60 mg  09/03/20 10:00  09/24/20 10:03





  Cymbalta -  PO   60 mg





  DAILY JAYCEE   Administration


 


Guaifenesin  10 ml  09/13/20 01:02 





  Robitussin Dm -  PO  





  Q6H PRN  





  COUGH  


 


Insulin Aspart  1 vial  09/03/20 07:00  09/24/20 11:09





  Novolog Vial Sliding Scale -  SQ   4 unit





  [facility] JAYCEE   Administration





  Protocol  


 


Insulin Detemir  34 units  09/22/20 13:13  09/24/20 06:07





  Levemir Vial  SQ   34 units





  AM JAYCEE   Administration


 


Insulin Detemir  34 units  09/22/20 13:13  09/23/20 21:23





  Levemir Vial  SQ   34 units





  HS JAYCEE   Administration


 


Latanoprost  1 drop  09/03/20 22:00  09/23/20 22:30





  Xalatan 0.005% Eye Drops -  OU   Not Given





  HS JAYCEE  


 


Melatonin  5 mg  09/14/20 21:23  09/18/20 23:16





  Melatonin  PO   5 mg





  HS PRN   Administration





  INSOMNIA  


 


Multivitamins/Minerals/Vitamin C  1 tab  09/03/20 10:00  09/24/20 10:04





  Tab-A-Vit -  PO   1 tab





  DAILY JAYCEE   Administration


 


Nicotine  14 mg  09/05/20 10:00  09/24/20 10:04





  Nicoderm Patch -  TD   14 mg





  DAILY JAYCEE   Administration


 


Nitroglycerin  1 inch  09/12/20 12:00  09/24/20 11:33





  Nitro-Bid 2% Paste -  TD   1 inch





  Q6HPO JAYCEE   Administration


 


Pantoprazole Sodium  40 mg  09/04/20 10:00  09/24/20 10:04





  Protonix -  PO   40 mg





  DAILY JAYCEE   Administration


 


Polyethylene Glycol  17 gm  09/05/20 15:15  09/17/20 09:37





  Miralax (For Daily Use) -  PO   17 grams





  DAILY PRN   Administration





  CONSTIPATION  


 


Rosuvastatin Calcium  10 mg  09/03/20 22:00  09/23/20 21:22





  Crestor -  PO   10 mg





  HS JAYCEE   Administration


 


Timolol Maleate  1 drop  09/03/20 10:00  09/24/20 10:09





  Timoptic 0.5%  OU   1 drop





  BID JAYCEE   Administration


 


Tiotropium Bromide  2 puff  09/03/20 10:00  09/24/20 10:08





  Spiriva Respimat  IH   2 puff





  DAILY JAYCEE   Administration








                                   Vital Signs











 Period  Temp  Pulse  Resp  BP Sys/Thrasher  Pulse Ox


 


 Last 24 Hr  97.8 F-98.3 F  78-93  20-20  121-136/56-68  95-96








Constitutional: Yes: No Distress


Respiratory: Yes: cta bl nl eff


Gastrointestinal: Yes: Soft, Abdomen, Obese


Cardiovascular: Yes: Regular Rate and Rhythm


JVD: No


Heart Sounds: Yes: S1, S2 (rrr)


Edema: no


Neurological: Yes: Alert, Oriented


no jaundice, diaphoresis


not agitated





                                        


                                    CBC, BMP





                                 09/18/20 06:20 





                                 09/24/20 07:15 

















nsr 64bpm, borderline low voltage, no acute ST changes


Prior Cardiac Procedures: Cardiac Catheterization (non obstx CAD within last 5 

years)


Ejection Fraction %: LVEF > or = 40 %


echo 9/2020: nl lv/rv, no sig valve path





Imaging





- Results


Cat Scan: Report Reviewed


EKG: Image Reviewed





REC:


1. Acute on chronic diastolic CHF (on lasix 40 po qd at home):


-echo here unremarkable (nl LV/RV/valves, no pulm HTN noted)


-Non obstx CAD on cath within last 5 years


- rec MAGNOLIA workup and treatment as outpt if she is amenable


- with lasix 80qd and metolazone she had not lost much volume, changed to lasix 

80 IV BID, now with dyspnea improving, weight loss


- CXR 9/20 with improvement in effusion


- has been improving with lasix 80 iv bid and s/p tap with chest tube.  today cr

rising, will hold lasix and monitor cr for now.








2. HTN: 


-cont current meds





3. PNA?: with effusion, drained


-CT pattern more c/w consolidation, though normal temp curve, no infectious 

sx's, pleural fluid transudative (suspect dry cough is chf sx)


-clinically volume overloaded


-Pulm and ID following.





4. Nonobstx CAD:


-cont asa, statin





5. NAY on CKD:


-baseline creat 1.4-1.7 from prior hosp stay


-monitor daily chem7 as above





6. DM: as per PMD





7. COPD/smoking: smoking cessation recommended.

## 2020-09-24 NOTE — PN
Progress Note, Physician


History of Present Illness: 





Pt seen and examined at bedside. She is awake and alert. She feels that her 

breathing is improved. 





- Current Medication List


Current Medications: 


Active Medications





Acetaminophen (Tylenol -)  650 mg PO Q6H PRN


   PRN Reason: PAIN LEVEL 1-5


   Last Admin: 09/23/20 21:22 Dose:  650 mg


   Documented by: 


Albuterol Sulfate (Ventolin Hfa Inhaler -)  2 puff IH Q4H PRN


   PRN Reason: SHORT OF BREATH/WHEEZING


   Last Admin: 09/09/20 22:16 Dose:  2 puff


   Documented by: 


Alprazolam (Xanax -)  0.5 mg PO Q8H PRN


   PRN Reason: ANXIETY


   Last Admin: 09/21/20 10:38 Dose:  0.5 mg


   Documented by: 


Amlodipine Besylate (Norvasc -)  10 mg PO DAILY [facility]


   Last Admin: 09/24/20 10:04 Dose:  10 mg


   Documented by: 


Aspirin (Ecotrin -)  81 mg PO DAILY [facility]


   Last Admin: 09/24/20 10:04 Dose:  81 mg


   Documented by: 


Brimonidine Tartrate (Alphagan 0.2% -)  1 drop OU BID [facility]


   Last Admin: 09/24/20 10:09 Dose:  1 drop


   Documented by: 


Carvedilol (Coreg -)  6.25 mg PO BID [facility]


   Last Admin: 09/24/20 10:07 Dose:  6.25 mg


   Documented by: 


Duloxetine HCl (Cymbalta -)  60 mg PO DAILY [facility]


   Last Admin: 09/24/20 10:03 Dose:  60 mg


   Documented by: 


Guaifenesin (Robitussin Dm -)  10 ml PO Q6H PRN


   PRN Reason: COUGH


Insulin Aspart (Novolog Vial Sliding Scale -)  [facility]; Protocol


   Last Admin: 09/24/20 11:09 Dose:  4 unit


   Documented by: 


Insulin Detemir (Levemir Vial)  34 units SQ AM [facility]


   Last Admin: 09/24/20 06:07 Dose:  34 units


   Documented by: 


Insulin Detemir (Levemir Vial)  34 units SQ HS [facility]


   Last Admin: 09/23/20 21:23 Dose:  34 units


   Documented by: 


Latanoprost (Xalatan 0.005% Eye Drops -)  1 drop OU HS [facility]


   Last Admin: 09/23/20 22:30 Dose:  Not Given


   Documented by: 


Melatonin (Melatonin)  5 mg PO HS PRN


   PRN Reason: INSOMNIA


   Last Admin: 09/18/20 23:16 Dose:  5 mg


   Documented by: 


Multivitamins/Minerals/Vitamin C (Tab-A-Vit -)  1 tab PO DAILY [facility]


   Last Admin: 09/24/20 10:04 Dose:  1 tab


   Documented by: 


Nicotine (Nicoderm Patch -)  14 mg TD DAILY [facility]


   Last Admin: 09/24/20 10:04 Dose:  14 mg


   Documented by: 


Nitroglycerin (Nitro-Bid 2% Paste -)  1 inch TD Q6HPO [facility]


   Last Admin: 09/24/20 11:33 Dose:  1 inch


   Documented by: 


Pantoprazole Sodium (Protonix -)  40 mg PO DAILY [facility]


   Last Admin: 09/24/20 10:04 Dose:  40 mg


   Documented by: 


Polyethylene Glycol (Miralax (For Daily Use) -)  17 gm PO DAILY PRN


   PRN Reason: CONSTIPATION


   Last Admin: 09/17/20 09:37 Dose:  17 grams


   Documented by: 


Rosuvastatin Calcium (Crestor -)  10 mg PO HS [facility]


   Last Admin: 09/23/20 21:22 Dose:  10 mg


   Documented by: 


Timolol Maleate (Timoptic 0.5%)  1 drop OU BID [facility]


   Last Admin: 09/24/20 10:09 Dose:  1 drop


   Documented by: 


Tiotropium Bromide (Spiriva Respimat)  2 puff IH DAILY [facility]


   Last Admin: 09/24/20 10:08 Dose:  2 puff


   Documented by: 











- Objective


Vital Signs: 


                                   Vital Signs











Temperature  97.8 F   09/24/20 14:00


 


Pulse Rate  86   09/24/20 14:00


 


Respiratory Rate  20   09/24/20 14:00


 


Blood Pressure  107/52 L  09/24/20 14:00


 


O2 Sat by Pulse Oximetry (%)  94 L  09/24/20 14:00











Constitutional: Yes: Calm


Eyes: Yes: Conjunctiva Clear


HENT: Yes: Atraumatic


Neck: Yes: Supple


Cardiovascular: Yes: S1, S2


Respiratory: Yes: CTA Bilaterally, Other (chest tube in place)


Gastrointestinal: Yes: Normal Bowel Sounds, Soft


Genitourinary: Yes: WNL


Musculoskeletal: Yes: WNL


Edema: Yes


Edema: LLE: 1+, RLE: 1+


Neurological: Yes: Oriented


Psychiatric: Yes: Oriented


Labs: 


                                    CBC, BMP





                                 09/18/20 06:20 





                                 09/24/20 07:15 





                                    INR, PTT











INR  0.94  (0.83-1.09)   09/02/20  16:36    














Problem List





- Problems


(1) COPD (chronic obstructive pulmonary disease)


Code(s): J44.9 - CHRONIC OBSTRUCTIVE PULMONARY DISEASE, UNSPECIFIED   


Qualifiers: 


   COPD type: unspecified COPD   Qualified Code(s): J44.9 - Chronic obstructive 

pulmonary disease, unspecified   





(2) COPD exacerbation


Code(s): J44.1 - CHRONIC OBSTRUCTIVE PULMONARY DISEASE W (ACUTE) EXACERBATION   





(3) CKD (chronic kidney disease) stage 3, GFR 30-59 ml/min


Code(s): N18.3 - CHRONIC KIDNEY DISEASE, STAGE 3 (MODERATE)   





Assessment/Plan


                               Current Medications











Generic Name Dose Route Start Last Admin





  Trade Name Freq  PRN Reason Stop Dose Admin


 


Acetaminophen  650 mg  09/18/20 11:46  09/23/20 21:22





  Tylenol -  PO   650 mg





  Q6H PRN   Administration





  PAIN LEVEL 1-5  


 


Albuterol Sulfate  2 puff  09/03/20 00:47  09/09/20 22:16





  Ventolin Hfa Inhaler -  IH   2 puff





  Q4H PRN   Administration





  SHORT OF BREATH/WHEEZING  


 


Alprazolam  0.5 mg  09/21/20 10:30  09/21/20 10:38





  Xanax -  PO   0.5 mg





  Q8H PRN   Administration





  ANXIETY  


 


Amlodipine Besylate  10 mg  09/03/20 10:00  09/24/20 10:04





  Norvasc -  PO   10 mg





  DAILY JAYCEE   Administration


 


Aspirin  81 mg  09/03/20 10:00  09/24/20 10:04





  Ecotrin -  PO   81 mg





  DAILY JAYCEE   Administration


 


Brimonidine Tartrate  1 drop  09/03/20 10:00  09/24/20 10:09





  Alphagan 0.2% -  OU   1 drop





  BID JAYCEE   Administration


 


Carvedilol  6.25 mg  09/11/20 10:00  09/24/20 10:07





  Coreg -  PO   6.25 mg





  BID JAYCEE   Administration


 


Duloxetine HCl  60 mg  09/03/20 10:00  09/24/20 10:03





  Cymbalta -  PO   60 mg





  DAILY JAYCEE   Administration


 


Guaifenesin  10 ml  09/13/20 01:02 





  Robitussin Dm -  PO  





  Q6H PRN  





  COUGH  


 


Insulin Aspart  1 vial  09/03/20 07:00  09/24/20 11:09





  Novolog Vial Sliding Scale -  SQ   4 unit





  [facility] JAYCEE   Administration





  Protocol  


 


Insulin Detemir  34 units  09/22/20 13:13  09/24/20 06:07





  Levemir Vial  SQ   34 units





  AM JAYCEE   Administration


 


Insulin Detemir  34 units  09/22/20 13:13  09/23/20 21:23





  Levemir Vial  SQ   34 units





  HS JAYCEE   Administration


 


Latanoprost  1 drop  09/03/20 22:00  09/23/20 22:30





  Xalatan 0.005% Eye Drops -  OU   Not Given





  HS JAYCEE  


 


Melatonin  5 mg  09/14/20 21:23  09/18/20 23:16





  Melatonin  PO   5 mg





  HS PRN   Administration





  INSOMNIA  


 


Multivitamins/Minerals/Vitamin C  1 tab  09/03/20 10:00  09/24/20 10:04





  Tab-A-Vit -  PO   1 tab





  DAILY JAYCEE   Administration


 


Nicotine  14 mg  09/05/20 10:00  09/24/20 10:04





  Nicoderm Patch -  TD   14 mg





  DAILY JAYCEE   Administration


 


Nitroglycerin  1 inch  09/12/20 12:00  09/24/20 11:33





  Nitro-Bid 2% Paste -  TD   1 inch





  Q6HPO JAYCEE   Administration


 


Pantoprazole Sodium  40 mg  09/04/20 10:00  09/24/20 10:04





  Protonix -  PO   40 mg





  DAILY JAYCEE   Administration


 


Polyethylene Glycol  17 gm  09/05/20 15:15  09/17/20 09:37





  Miralax (For Daily Use) -  PO   17 grams





  DAILY PRN   Administration





  CONSTIPATION  


 


Rosuvastatin Calcium  10 mg  09/03/20 22:00  09/23/20 21:22





  Crestor -  PO   10 mg





  HS JAYCEE   Administration


 


Timolol Maleate  1 drop  09/03/20 10:00  09/24/20 10:09





  Timoptic 0.5%  OU   1 drop





  BID JAYCEE   Administration


 


Tiotropium Bromide  2 puff  09/03/20 10:00  09/24/20 10:08





  Spiriva Respimat  IH   2 puff





  DAILY JAYCEE   Administration














Impression


1. CKD


2. CHF


3. COPD


4. hld


5. dm


6. cad


7. htn


8. proteinuria


9. mgus


10. pleural effusion





Plan


- renal function is worse


- hold lasix


- repeat labs in am


- chest tube in place and draining


- cardio input appreciated

## 2020-09-24 NOTE — PN
Progress Note (short form)





- Note


Progress Note: 





s/p thoracentesis


ambulating without SOB 


she has 213ml drained yesterday 





                               Vital Signs - 24 hr











  09/23/20 09/23/20 09/24/20





  18:13 22:00 06:00


 


Temperature 98.1 F 97.8 F 98.3 F


 


Pulse Rate 82 78 93 H


 


Respiratory 20 20 20





Rate   


 


Blood Pressure 136/57 L 132/68 135/56 L


 


O2 Sat by Pulse 95 96 95





Oximetry (%)   














  09/24/20 09/24/20





  09:00 14:00


 


Temperature  97.8 F


 


Pulse Rate  86


 


Respiratory  20





Rate  


 


Blood Pressure  107/52 L


 


O2 Sat by Pulse 98 94 L





Oximetry (%)  








                               Current Medications











Generic Name Dose Route Start Last Admin





  Trade Name Freq  PRN Reason Stop Dose Admin


 


Acetaminophen  650 mg  09/18/20 11:46  09/23/20 21:22





  Tylenol -  PO   650 mg





  Q6H PRN   Administration





  PAIN LEVEL 1-5  


 


Albuterol Sulfate  2 puff  09/03/20 00:47  09/09/20 22:16





  Ventolin Hfa Inhaler -  IH   2 puff





  Q4H PRN   Administration





  SHORT OF BREATH/WHEEZING  


 


Alprazolam  0.5 mg  09/21/20 10:30  09/21/20 10:38





  Xanax -  PO   0.5 mg





  Q8H PRN   Administration





  ANXIETY  


 


Amlodipine Besylate  10 mg  09/03/20 10:00  09/24/20 10:04





  Norvasc -  PO   10 mg





  DAILY JAYCEE   Administration


 


Aspirin  81 mg  09/03/20 10:00  09/24/20 10:04





  Ecotrin -  PO   81 mg





  DAILY JAYCEE   Administration


 


Brimonidine Tartrate  1 drop  09/03/20 10:00  09/24/20 10:09





  Alphagan 0.2% -  OU   1 drop





  BID JAYCEE   Administration


 


Carvedilol  6.25 mg  09/11/20 10:00  09/24/20 10:07





  Coreg -  PO   6.25 mg





  BID JAYCEE   Administration


 


Duloxetine HCl  60 mg  09/03/20 10:00  09/24/20 10:03





  Cymbalta -  PO   60 mg





  DAILY JAYCEE   Administration


 


Guaifenesin  10 ml  09/13/20 01:02 





  Robitussin Dm -  PO  





  Q6H PRN  





  COUGH  


 


Insulin Aspart  1 vial  09/03/20 07:00  09/24/20 16:48





  Novolog Vial Sliding Scale -  SQ   6 unit





  [facility] JAYCEE   Administration





  Protocol  


 


Insulin Detemir  34 units  09/22/20 13:13  09/24/20 06:07





  Levemir Vial  SQ   34 units





  AM JAYCEE   Administration


 


Insulin Detemir  34 units  09/22/20 13:13  09/23/20 21:23





  Levemir Vial  SQ   34 units





  HS JAYCEE   Administration


 


Latanoprost  1 drop  09/03/20 22:00  09/23/20 22:30





  Xalatan 0.005% Eye Drops -  OU   Not Given





  HS JAYCEE  


 


Melatonin  5 mg  09/14/20 21:23  09/18/20 23:16





  Melatonin  PO   5 mg





  HS PRN   Administration





  INSOMNIA  


 


Multivitamins/Minerals/Vitamin C  1 tab  09/03/20 10:00  09/24/20 10:04





  Tab-A-Vit -  PO   1 tab





  DAILY JAYCEE   Administration


 


Nicotine  14 mg  09/05/20 10:00  09/24/20 10:04





  Nicoderm Patch -  TD   14 mg





  DAILY JAYCEE   Administration


 


Nitroglycerin  1 inch  09/12/20 12:00  09/24/20 17:06





  Nitro-Bid 2% Paste -  TD   1 inch





  Q6HPO JAYCEE   Administration


 


Pantoprazole Sodium  40 mg  09/04/20 10:00  09/24/20 10:04





  Protonix -  PO   40 mg





  DAILY JAYCEE   Administration


 


Polyethylene Glycol  17 gm  09/05/20 15:15  09/17/20 09:37





  Miralax (For Daily Use) -  PO   17 grams





  DAILY PRN   Administration





  CONSTIPATION  


 


Rosuvastatin Calcium  10 mg  09/03/20 22:00  09/23/20 21:22





  Crestor -  PO   10 mg





  HS JAYCEE   Administration


 


Timolol Maleate  1 drop  09/03/20 10:00  09/24/20 10:09





  Timoptic 0.5%  OU   1 drop





  BID JAYCEE   Administration


 


Tiotropium Bromide  2 puff  09/03/20 10:00  09/24/20 10:08





  Spiriva Respimat  IH   2 puff





  DAILY JAYCEE   Administration








                                 Intake & Output











 09/21/20 09/22/20 09/23/20 09/24/20





 23:59 23:59 23:59 23:59


 


Intake Total 550 910 615 10


 


Output Total 3600 1050 663 675


 


Balance -3050 -140 -48 -665


 


Weight 245 lb 9 oz 244 lb 5 oz 244 lb 6 oz 239 lb 3.2 oz








                         Laboratory Results - last 24 hr











  09/23/20 09/24/20 09/24/20





  21:26 06:01 07:15


 


Sodium    140


 


Potassium    4.0


 


Chloride    104


 


Carbon Dioxide    28


 


Anion Gap    8


 


BUN    77.8 H


 


Creatinine    2.5 H


 


Est GFR (CKD-EPI)AfAm    22.45


 


Est GFR (CKD-EPI)NonAf    19.37


 


POC Glucometer  272  204 


 


Random Glucose    177 H


 


Calcium    9.5














  09/24/20 09/24/20





  11:05 16:36


 


Sodium  


 


Potassium  


 


Chloride  


 


Carbon Dioxide  


 


Anion Gap  


 


BUN  


 


Creatinine  


 


Est GFR (CKD-EPI)AfAm  


 


Est GFR (CKD-EPI)NonAf  


 


POC Glucometer  205  276


 


Random Glucose  


 


Calcium  














S1 s2 RRR


Lungs decreased right 


Abd- soft, obese


NT


Edema decreased 








PLAN


Lasix on hold


renal function worse


Monitor renal function


weight decreased





check CT Chest - no contrast 





OOB


s/p thoracentesis 





increase levemir for better glucose control


Lovenox for DVT prophylaxis








Problem List





- Problems


(1) COPD exacerbation


Code(s): J44.1 - CHRONIC OBSTRUCTIVE PULMONARY DISEASE W (ACUTE) EXACERBATION   





(2) ASHD (arteriosclerotic heart disease)


Code(s): I25.10 - ATHSCL HEART DISEASE OF NATIVE CORONARY ARTERY W/O ANG PCTRS  







(3) Acute hypoxemic respiratory failure


Code(s): J96.01 - ACUTE RESPIRATORY FAILURE WITH HYPOXIA   





(4) Acute on chronic diastolic CHF (congestive heart failure)


Code(s): I50.33 - ACUTE ON CHRONIC DIASTOLIC (CONGESTIVE) HEART FAILURE   





(5) CKD (chronic kidney disease) stage 3, GFR 30-59 ml/min


Code(s): N18.3 - CHRONIC KIDNEY DISEASE, STAGE 3 (MODERATE)   





(6) HTN (hypertension)


Code(s): I10 - ESSENTIAL (PRIMARY) HYPERTENSION   


Qualifiers: 


   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   





(7) IDDM (insulin dependent diabetes mellitus)


Code(s): E11.9 - TYPE 2 DIABETES MELLITUS WITHOUT COMPLICATIONS; Z79.4 - LONG 

TERM (CURRENT) USE OF INSULIN   





(8) Pneumonia


Code(s): J18.9 - PNEUMONIA, UNSPECIFIED ORGANISM   


Qualifiers: 


   Laterality: right   Lung location: lower lobe of lung

## 2020-09-25 NOTE — PN
Progress Note, Physician


Chief Complaint: 





feeling much better after effusion drained


no cp





- Current Medication List


Current Medications: 


Active Medications





Acetaminophen (Tylenol -)  650 mg PO Q6H PRN


   PRN Reason: PAIN LEVEL 1-5


   Last Admin: 09/24/20 21:42 Dose:  650 mg


   Documented by: 


Albuterol Sulfate (Ventolin Hfa Inhaler -)  2 puff IH Q4H PRN


   PRN Reason: SHORT OF BREATH/WHEEZING


   Last Admin: 09/09/20 22:16 Dose:  2 puff


   Documented by: 


Alprazolam (Xanax -)  0.5 mg PO Q8H PRN


   PRN Reason: ANXIETY


   Last Admin: 09/24/20 21:43 Dose:  0.5 mg


   Documented by: 


Amlodipine Besylate (Norvasc -)  10 mg PO DAILY [facility]


   Last Admin: 09/24/20 10:04 Dose:  10 mg


   Documented by: 


Aspirin (Ecotrin -)  81 mg PO DAILY [facility]


   Last Admin: 09/24/20 10:04 Dose:  81 mg


   Documented by: 


Brimonidine Tartrate (Alphagan 0.2% -)  1 drop OU BID [facility]


   Last Admin: 09/24/20 21:46 Dose:  1 drop


   Documented by: 


Carvedilol (Coreg -)  6.25 mg PO BID [facility]


   Last Admin: 09/24/20 21:43 Dose:  6.25 mg


   Documented by: 


Duloxetine HCl (Cymbalta -)  60 mg PO DAILY [facility]


   Last Admin: 09/24/20 10:03 Dose:  60 mg


   Documented by: 


Guaifenesin (Robitussin Dm -)  10 ml PO Q6H PRN


   PRN Reason: COUGH


Heparin Sodium (Porcine) (Heparin -)  5,000 unit SQ BID [facility]


Insulin Aspart (Novolog Vial Sliding Scale -)  1 vial SQ [facility]; Protocol


   Last Admin: 09/25/20 06:10 Dose:  2 unit


   Documented by: 


Insulin Detemir (Levemir Vial)  34 units SQ AM [facility]


   Last Admin: 09/25/20 06:11 Dose:  34 units


   Documented by: 


Insulin Detemir (Levemir Vial)  34 units SQ HS [facility]


   Last Admin: 09/24/20 21:44 Dose:  34 units


   Documented by: 


Latanoprost (Xalatan 0.005% Eye Drops -)  1 drop OU HS [facility]


   Last Admin: 09/24/20 21:47 Dose:  Not Given


   Documented by: 


Melatonin (Melatonin)  5 mg PO HS PRN


   PRN Reason: INSOMNIA


   Last Admin: 09/18/20 23:16 Dose:  5 mg


   Documented by: 


Multivitamins/Minerals/Vitamin C (Tab-A-Vit -)  1 tab PO DAILY [facility]


   Last Admin: 09/24/20 10:04 Dose:  1 tab


   Documented by: 


Nicotine (Nicoderm Patch -)  14 mg TD DAILY [facility]


   Last Admin: 09/24/20 10:04 Dose:  14 mg


   Documented by: 


Nitroglycerin (Nitro-Bid 2% Paste -)  1 inch TD Q6HPO [facility]


   Last Admin: 09/25/20 06:12 Dose:  1 inch


   Documented by: 


Pantoprazole Sodium (Protonix -)  40 mg PO DAILY [facility]


   Last Admin: 09/24/20 10:04 Dose:  40 mg


   Documented by: 


Polyethylene Glycol (Miralax (For Daily Use) -)  17 gm PO DAILY PRN


   PRN Reason: CONSTIPATION


   Last Admin: 09/17/20 09:37 Dose:  17 grams


   Documented by: 


Rosuvastatin Calcium (Crestor -)  10 mg PO HS [facility]


   Last Admin: 09/24/20 21:43 Dose:  10 mg


   Documented by: 


Timolol Maleate (Timoptic 0.5%)  1 drop OU BID [facility]


   Last Admin: 09/24/20 21:46 Dose:  1 drop


   Documented by: 


Tiotropium Bromide (Spiriva Respimat)  2 puff IH DAILY [facility]


   Last Admin: 09/24/20 10:08 Dose:  2 puff


   Documented by: 











- Objective


Vital Signs: 


                                   Vital Signs











Temperature  98.7 F   09/25/20 06:03


 


Pulse Rate  95 H  09/25/20 06:03


 


Respiratory Rate  20   09/25/20 06:03


 


Blood Pressure  145/65   09/25/20 06:03


 


O2 Sat by Pulse Oximetry (%)  96   09/25/20 06:03











Constitutional: Yes: No Distress


Cardiovascular: Yes: Regular Rate and Rhythm


Respiratory: Yes: CTA Bilaterally (r chest tube)


Gastrointestinal: Yes: Soft, Abdomen, Obese


Edema: No


Neurological: Yes: Alert, Oriented


...Motor Strength: WNL


Labs: 


                                    CBC, BMP





                                 09/25/20 07:30 





                                 09/25/20 07:30 





                                    INR, PTT











INR  0.94  (0.83-1.09)   09/02/20  16:36    








                                Laboratory Tests











  09/09/20 09/10/20 09/11/20





  08:05 06:25 06:43


 


WBC   


 


Hgb   


 


Hct   


 


Plt Count   


 


Sodium    144


 


Potassium    4.7


 


BUN  64.4 H  68.8 H  71.3 H


 


Creatinine  1.6 H  1.8 H  1.9 H














  09/18/20 09/18/20 09/25/20





  06:20 06:20 07:30


 


WBC  10.4 H  


 


Hgb  11.8  


 


Hct  35.1  


 


Plt Count  204  


 


Sodium   142  140


 


Potassium   4.4  3.9


 


BUN   66.0 H  86.5 H


 


Creatinine   1.9 H  2.6 H














  09/25/20





  07:30


 


WBC  7.8


 


Hgb 


 


Hct 


 


Plt Count  204


 


Sodium 


 


Potassium 


 


BUN 


 


Creatinine 














- ....Imaging


EKG: Image Reviewed





Assessment/Plan





REC:


1. Acute on chronic diastolic CHF (on lasix 40 po qd at home):


-echo here unremarkable (nl LV/RV/valves, no pulm HTN noted)


-Non obstx CAD on cath within last 5 years


- rec MAGNOLIA workup and treatment as outpt if she is amenable


- with lasix 80qd and metolazone she had not lost much volume, changed to lasix 

80 IV BID, now with dyspnea improving, weight loss


- has been improving with lasix 80 iv bid and s/p tap with chest tube.  


-LASIX NOW HELD due to rising creat





2. HTN: 


-cont current meds





3. PNA?: with effusion, drained


-CT pattern more c/w consolidation, though normal temp curve, no infectious 

sx's, pleural fluid transudative (suspect dry cough is chf sx)


-clinically volume overloaded


-Pulm and ID following.





4. Nonobstx CAD:


-cont asa, statin





5. NAY on CKD:


-baseline creat 1.4-1.7 from prior hosp stay


-monitor daily chem7 as above, hold lasix





6. DM: as per PMD





7. COPD/smoking: smoking cessation recommended.

## 2020-09-25 NOTE — PN
Progress Note, Physician


History of Present Illness: 


pt seen/ examined


chart reviewed


all f/u noted


feels much better


s/p  thoracocentesis 


ct scan - much better


labs reviewed


lasix on hold 2 to worsening renal function























- Current Medication List


Current Medications: 


Active Medications





Acetaminophen (Tylenol -)  650 mg PO Q6H PRN


   PRN Reason: PAIN LEVEL 1-5


   Last Admin: 09/24/20 21:42 Dose:  650 mg


   Documented by: 


Albuterol Sulfate (Ventolin Hfa Inhaler -)  2 puff IH Q4H PRN


   PRN Reason: SHORT OF BREATH/WHEEZING


   Last Admin: 09/09/20 22:16 Dose:  2 puff


   Documented by: 


Alprazolam (Xanax -)  0.5 mg PO Q8H PRN


   PRN Reason: ANXIETY


   Last Admin: 09/24/20 21:43 Dose:  0.5 mg


   Documented by: 


Amlodipine Besylate (Norvasc -)  10 mg PO DAILY [facility]


   Last Admin: 09/25/20 09:34 Dose:  10 mg


   Documented by: 


Aspirin (Ecotrin -)  81 mg PO DAILY [facility]


   Last Admin: 09/25/20 09:34 Dose:  81 mg


   Documented by: 


Brimonidine Tartrate (Alphagan 0.2% -)  1 drop OU BID [facility]


   Last Admin: 09/25/20 09:37 Dose:  1 drop


   Documented by: 


Carvedilol (Coreg -)  6.25 mg PO BID [facility]


   Last Admin: 09/25/20 09:34 Dose:  6.25 mg


   Documented by: 


Duloxetine HCl (Cymbalta -)  60 mg PO DAILY [facility]


   Last Admin: 09/25/20 09:34 Dose:  60 mg


   Documented by: 


Guaifenesin (Robitussin Dm -)  10 ml PO Q6H PRN


   PRN Reason: COUGH


Heparin Sodium (Porcine) (Heparin -)  5,000 unit SQ BID [facility]


   Last Admin: 09/25/20 09:33 Dose:  5,000 unit


   Documented by: 


Insulin Aspart (Novolog Vial Sliding Scale -)  [facility]ial [facility]; Protocol


   Last Admin: 09/25/20 11:15 Dose:  6 unit


   Documented by: 


Insulin Detemir (Levemir Vial)  34 units SQ AM [facility]


   Last Admin: 09/25/20 06:11 Dose:  34 units


   Documented by: 


Insulin Detemir (Levemir Vial)  34 units SQ HS [facility]


   Last Admin: 09/24/20 21:44 Dose:  34 units


   Documented by: 


Latanoprost (Xalatan 0.005% Eye Drops -)  1 drop OU HS [facility]


   Last Admin: 09/24/20 21:47 Dose:  Not Given


   Documented by: 


Melatonin (Melatonin)  5 mg PO HS PRN


   PRN Reason: INSOMNIA


   Last Admin: 09/18/20 23:16 Dose:  5 mg


   Documented by: 


Multivitamins/Minerals/Vitamin C (Tab-A-Vit -)  1 tab PO DAILY [facility]


   Last Admin: 09/25/20 09:34 Dose:  1 tab


   Documented by: 


Nicotine (Nicoderm Patch -)  14 mg TD DAILY [facility]


   Last Admin: 09/25/20 09:34 Dose:  14 mg


   Documented by: 


Nitroglycerin (Nitro-Bid 2% Paste -)  1 inch TD Q6HPO [facility]


   Last Admin: 09/25/20 11:17 Dose:  1 inch


   Documented by: 


Pantoprazole Sodium (Protonix -)  40 mg PO DAILY [facility]


   Last Admin: 09/25/20 09:34 Dose:  40 mg


   Documented by: 


Polyethylene Glycol (Miralax (For Daily Use) -)  17 gm PO DAILY [facility]N


   PRN Reason: CONSTIPATION


   Last Admin: 09/17/20 09:37 Dose:  17 grams


   Documented by: 


Rosuvastatin Calcium (Crestor -)  10 mg PO HS [facility]


   Last Admin: 09/24/20 21:43 Dose:  10 mg


   Documented by: 


Timolol Maleate (Timoptic 0.5%)  1 drop OU BID [facility]


   Last Admin: 09/25/20 09:37 Dose:  1 drop


   Documented by: 


Tiotropium Bromide (Spiriva Respimat)  2 puff IH DAILY [facility]


   Last Admin: 09/25/20 09:38 Dose:  Not Given


   Documented by: 











- Objective


Vital Signs: 


                                   Vital Signs











Temperature  98.2 F   09/25/20 10:00


 


Pulse Rate  95 H  09/25/20 10:00


 


Respiratory Rate  20   09/25/20 10:00


 


Blood Pressure  146/63   09/25/20 10:00


 


O2 Sat by Pulse Oximetry (%)  102 H  09/25/20 10:00











Constitutional: Yes: No Distress


Neck: Yes: Supple


Cardiovascular: Yes: Regular Rate and Rhythm


Respiratory: Yes: CTA Bilaterally


Gastrointestinal: Yes: Soft


Edema: LLE: 1+, RLE: 1+


Neurological: Yes: Alert


Labs: 


                                    CBC, BMP





                                 09/25/20 07:30 





                                 09/25/20 07:30 





                                    INR, PTT











INR  0.94  (0.83-1.09)   09/02/20  16:36    














Problem List





- Problems


(1) COPD (chronic obstructive pulmonary disease)


Code(s): J44.9 - CHRONIC OBSTRUCTIVE PULMONARY DISEASE, UNSPECIFIED   


Qualifiers: 


   COPD type: unspecified COPD   Qualified Code(s): J44.9 - Chronic obstructive 

pulmonary disease, unspecified   





(2) Encounter for screening laboratory testing for COVID-19 virus


Code(s): Z11.59 - ENCOUNTER FOR SCREENING FOR OTHER VIRAL DISEASES   





(3) Pleural effusion


Code(s): J90 - PLEURAL EFFUSION, NOT ELSEWHERE CLASSIFIED   





(4) Shortness of breath on exertion


Code(s): R06.02 - SHORTNESS OF BREATH   





(5) ASHD (arteriosclerotic heart disease)


Code(s): I25.10 - ATHSCL HEART DISEASE OF NATIVE CORONARY ARTERY W/O ANG PCTRS  







(6) Acute hypoxemic respiratory failure


Code(s): J96.01 - ACUTE RESPIRATORY FAILURE WITH HYPOXIA   





(7) Acute on chronic diastolic CHF (congestive heart failure)


Code(s): I50.33 - ACUTE ON CHRONIC DIASTOLIC (CONGESTIVE) HEART FAILURE   





(8) CKD (chronic kidney disease) stage 3, GFR 30-59 ml/min


Code(s): N18.3 - CHRONIC KIDNEY DISEASE, STAGE 3 (MODERATE)   





(9) HTN (hypertension)


Code(s): I10 - ESSENTIAL (PRIMARY) HYPERTENSION   


Qualifiers: 


   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   





(10) IDDM (insulin dependent diabetes mellitus)


Code(s): E11.9 - TYPE 2 DIABETES MELLITUS WITHOUT COMPLICATIONS; Z79.4 - LONG 

TERM (CURRENT) USE OF INSULIN   





(11) Obesity (BMI 30-39.9)


Code(s): E66.9 - OBESITY, UNSPECIFIED   





Assessment/Plan


discussed  with pt/ RN 


Monitor lytes 


d/w IR


Chest tube clamping today


If no issues then remove on Monday


Monitor lytes


BGM monitoring


PT 


Will follow

## 2020-09-25 NOTE — PN
Progress Note (short form)





- Note


Progress Note: 








PULMONARY





OOB TO CHAIR


FEELS WELL


BREATHING MUCH IMPROVED





MINIMAL DRAINAGE FROM TUBE OVERNIGHT


CT CHEST RESOLUTION OF RIGHT EFFUSION





VSS/AFEBRILE


Gen:  NAD in chair


Heart: RRR


Lung: improved breath sounds right base


Abd: soft, nontender


Ext: no edema





LABS/MEDS/NOTES/IMAGES REVIEWED





A/P


Acute on Chronic Diastolic Heart Failure


Right Pleural Effusion resolved


COPD


CKD


HTN


TOBACCO ABUSE





-  remove chest tube


-  incentive spirometry


-  lasix as per primary team


-  monitor urine output, creatinine


-  daily weights


-  O2 to keep SpO2 >90%


-  inhaled bronchodilators as needed


-  DVT prophylaxis





YANN NOBLE MD

## 2020-09-25 NOTE — PN
Progress Note, Physician


History of Present Illness: 





Pt seen and examined at bedside. She is awake and alert. She feels that her 

breathing is improved. 





- Current Medication List


Current Medications: 


Active Medications





Acetaminophen (Tylenol -)  650 mg PO Q6H PRN


   PRN Reason: PAIN LEVEL 1-5


   Last Admin: 09/24/20 21:42 Dose:  650 mg


   Documented by: 


Albuterol Sulfate (Ventolin Hfa Inhaler -)  2 puff IH Q4H PRN


   PRN Reason: SHORT OF BREATH/WHEEZING


   Last Admin: 09/09/20 22:16 Dose:  2 puff


   Documented by: 


Amlodipine Besylate (Norvasc -)  10 mg PO DAILY [facility]


   Last Admin: 09/25/20 09:34 Dose:  10 mg


   Documented by: 


Aspirin (Ecotrin -)  81 mg PO DAILY [facility]


   Last Admin: 09/25/20 09:34 Dose:  81 mg


   Documented by: 


Brimonidine Tartrate (Alphagan 0.2% -)  1 drop OU BID [facility]


   Last Admin: 09/25/20 09:37 Dose:  1 drop


   Documented by: 


Carvedilol (Coreg -)  6.25 mg PO BID [facility]


   Last Admin: 09/25/20 09:34 Dose:  6.25 mg


   Documented by: 


Duloxetine HCl (Cymbalta -)  60 mg PO DAILY [facility]


   Last Admin: 09/25/20 09:34 Dose:  60 mg


   Documented by: 


Guaifenesin (Robitussin Dm -)  10 ml PO Q6H PRN


   PRN Reason: COUGH


Heparin Sodium (Porcine) (Heparin -)  5,000 unit SQ BID [facility]


   Last Admin: 09/25/20 09:33 Dose:  5,000 unit


   Documented by: 


Insulin Aspart (Novolog Vial Sliding Scale -)  1 vial SQ [facility]; Protocol


   Last Admin: 09/25/20 11:15 Dose:  6 unit


   Documented by: 


Insulin Detemir (Levemir Vial)  34 units SQ AM [facility]


   Last Admin: 09/25/20 06:11 Dose:  34 units


   Documented by: 


Insulin Detemir (Levemir Vial)  34 units SQ HS [facility]


   Last Admin: 09/24/20 21:44 Dose:  34 units


   Documented by: 


Latanoprost (Xalatan 0.005% Eye Drops -)  1 drop OU HS [facility]


   Last Admin: 09/24/20 21:47 Dose:  Not Given


   Documented by: 


Melatonin (Melatonin)  5 mg PO HS PRN


   PRN Reason: INSOMNIA


   Last Admin: 09/18/20 23:16 Dose:  5 mg


   Documented by: 


Multivitamins/Minerals/Vitamin C (Tab-A-Vit -)  1 tab PO DAILY [facility]


   Last Admin: 09/25/20 09:34 Dose:  1 tab


   Documented by: 


Nicotine (Nicoderm Patch -)  14 mg TD DAILY [facility]


   Last Admin: 09/25/20 09:34 Dose:  14 mg


   Documented by: 


Nitroglycerin (Nitro-Bid 2% Paste -)  1 inch TD Q6HPO [facility]


   Last Admin: 09/25/20 11:17 Dose:  1 inch


   Documented by: 


Pantoprazole Sodium (Protonix -)  40 mg PO DAILY [facility]


   Last Admin: 09/25/20 09:34 Dose:  40 mg


   Documented by: 


Polyethylene Glycol (Miralax (For Daily Use) -)  17 gm PO DAILY [facility]


   PRN Reason: CONSTIPATION


   Last Admin: 09/17/20 09:37 Dose:  17 grams


   Documented by: 


Rosuvastatin Calcium (Crestor -)  10 mg PO HS [facility]


   Last Admin: 09/24/20 21:43 Dose:  10 mg


   Documented by: 


Timolol Maleate (Timoptic 0.5%)  1 drop OU BID [facility]


   Last Admin: 09/25/20 09:37 Dose:  1 drop


   Documented by: 


Tiotropium Bromide (Spiriva Respimat)  2 puff IH DAILY [facility]


   Last Admin: 09/25/20 09:38 Dose:  Not Given


   Documented by: 











- Objective


Vital Signs: 


                                   Vital Signs











Temperature  98.2 F   09/25/20 10:00


 


Pulse Rate  95 H  09/25/20 10:00


 


Respiratory Rate  20   09/25/20 10:00


 


Blood Pressure  146/63   09/25/20 10:00


 


O2 Sat by Pulse Oximetry (%)  102 H  09/25/20 10:00











Constitutional: Yes: Calm


Eyes: Yes: Conjunctiva Clear


HENT: Yes: Atraumatic


Neck: Yes: Supple


Cardiovascular: Yes: S1, S2


Respiratory: Yes: CTA Bilaterally


Gastrointestinal: Yes: Normal Bowel Sounds, Soft


Genitourinary: Yes: WNL


Musculoskeletal: Yes: WNL


Edema: Yes


Edema: LLE: 1+, RLE: 1+


Neurological: Yes: Oriented


Psychiatric: Yes: Oriented


Labs: 


                                    CBC, BMP





                                 09/25/20 07:30 





                                 09/25/20 07:30 





                                    INR, PTT











INR  0.94  (0.83-1.09)   09/02/20  16:36    














Problem List





- Problems


(1) COPD (chronic obstructive pulmonary disease)


Code(s): J44.9 - CHRONIC OBSTRUCTIVE PULMONARY DISEASE, UNSPECIFIED   


Qualifiers: 


   COPD type: unspecified COPD   Qualified Code(s): J44.9 - Chronic obstructive 

pulmonary disease, unspecified   





(2) COPD exacerbation


Code(s): J44.1 - CHRONIC OBSTRUCTIVE PULMONARY DISEASE W (ACUTE) EXACERBATION   





(3) CKD (chronic kidney disease) stage 3, GFR 30-59 ml/min


Code(s): N18.3 - CHRONIC KIDNEY DISEASE, STAGE 3 (MODERATE)   





Assessment/Plan


                               Current Medications











Generic Name Dose Route Start Last Admin





  Trade Name Freq  PRN Reason Stop Dose Admin


 


Acetaminophen  650 mg  09/18/20 11:46  09/24/20 21:42





  Tylenol -  PO   650 mg





  Q6H PRN   Administration





  PAIN LEVEL 1-5  


 


Albuterol Sulfate  2 puff  09/03/20 00:47  09/09/20 22:16





  Ventolin Hfa Inhaler -  IH   2 puff





  Q4H PRN   Administration





  SHORT OF BREATH/WHEEZING  


 


Amlodipine Besylate  10 mg  09/03/20 10:00  09/25/20 09:34





  Norvasc -  PO   10 mg





  DAILY JAYCEE   Administration


 


Aspirin  81 mg  09/03/20 10:00  09/25/20 09:34





  Ecotrin -  PO   81 mg





  DAILY JAYCEE   Administration


 


Brimonidine Tartrate  1 drop  09/03/20 10:00  09/25/20 09:37





  Alphagan 0.2% -  OU   1 drop





  BID JAYCEE   Administration


 


Carvedilol  6.25 mg  09/11/20 10:00  09/25/20 09:34





  Coreg -  PO   6.25 mg





  BID JAYCEE   Administration


 


Duloxetine HCl  60 mg  09/03/20 10:00  09/25/20 09:34





  Cymbalta -  PO   60 mg





  DAILY JAYCEE   Administration


 


Guaifenesin  10 ml  09/13/20 01:02 





  Robitussin Dm -  PO  





  Q6H PRN  





  COUGH  


 


Heparin Sodium (Porcine)  5,000 unit  09/25/20 10:00  09/25/20 09:33





  Heparin -  SQ   5,000 unit





  BID JAYCEE   Administration


 


Insulin Aspart  1 vial  09/03/20 07:00  09/25/20 11:15





  Novolog Vial Sliding Scale -  SQ   6 unit





  [facility] JAYCEE   Administration





  Protocol  


 


Insulin Detemir  34 units  09/22/20 13:13  09/25/20 06:11





  Levemir Vial  SQ   34 units





  AM JAYCEE   Administration


 


Insulin Detemir  34 units  09/22/20 13:13  09/24/20 21:44





  Levemir Vial  SQ   34 units





  HS JAYCEE   Administration


 


Latanoprost  1 drop  09/03/20 22:00  09/24/20 21:47





  Xalatan 0.005% Eye Drops -  OU   Not Given





  HS JAYCEE  


 


Melatonin  5 mg  09/14/20 21:23  09/18/20 23:16





  Melatonin  PO   5 mg





  HS PRN   Administration





  INSOMNIA  


 


Multivitamins/Minerals/Vitamin C  1 tab  09/03/20 10:00  09/25/20 09:34





  Tab-A-Vit -  PO   1 tab





  DAILY JAYCEE   Administration


 


Nicotine  14 mg  09/05/20 10:00  09/25/20 09:34





  Nicoderm Patch -  TD   14 mg





  DAILY JAYCEE   Administration


 


Nitroglycerin  1 inch  09/12/20 12:00  09/25/20 11:17





  Nitro-Bid 2% Paste -  TD   1 inch





  Q6HPO JAYCEE   Administration


 


Pantoprazole Sodium  40 mg  09/04/20 10:00  09/25/20 09:34





  Protonix -  PO   40 mg





  DAILY JAYCEE   Administration


 


Polyethylene Glycol  17 gm  09/05/20 15:15  09/17/20 09:37





  Miralax (For Daily Use) -  PO   17 grams





  DAILY PRN   Administration





  CONSTIPATION  


 


Rosuvastatin Calcium  10 mg  09/03/20 22:00  09/24/20 21:43





  Crestor -  PO   10 mg





  HS JAYCEE   Administration


 


Timolol Maleate  1 drop  09/03/20 10:00  09/25/20 09:37





  Timoptic 0.5%  OU   1 drop





  BID JAYCEE   Administration


 


Tiotropium Bromide  2 puff  09/03/20 10:00  09/25/20 09:38





  Spiriva Respimat  IH   Not Given





  DAILY JAYCEE  














Impression


1. CKD


2. CHF


3. COPD


4. hld


5. dm


6. cad


7. htn


8. proteinuria


9. mgus


10. pleural effusion





Plan


- cont to monitor renal function


- crt is worsening


- pt has been off of lasix


- cont to hold diuretics


- repeat labs in am


- will need to restart diuretics before discharge, will wait


for renal recovery

## 2020-09-26 NOTE — PN
Progress Note (short form)





- Note


Progress Note: 





RENAL


awake and alert


feeling better 


breathing comfortably


                                Last Vital Signs











Temp Pulse Resp BP Pulse Ox


 


 98.5 F   95 H  18   155/73   94 L


 


 09/26/20 06:19  09/26/20 06:19  09/26/20 06:19  09/26/20 06:19  09/26/20 06:19








lungs slightly reduced breath sounds at right base


cvs s1s2 rr


abd soft


ext +edema trace


neuro a+ox3





                                    CBC, BMP





                                 09/25/20 07:30 





                                 09/25/20 07:30 





                               Current Medications











Generic Name Dose Route Start Last Admin





  Trade Name Freq  PRN Reason Stop Dose Admin


 


Acetaminophen  650 mg  09/18/20 11:46  09/25/20 22:25





  Tylenol -  PO   650 mg





  Q6H PRN   Administration





  PAIN LEVEL 1-5  


 


Albuterol Sulfate  2 puff  09/03/20 00:47  09/09/20 22:16





  Ventolin Hfa Inhaler -  IH   2 puff





  Q4H PRN   Administration





  SHORT OF BREATH/WHEEZING  


 


Amlodipine Besylate  10 mg  09/03/20 10:00  09/26/20 10:33





  Norvasc -  PO   10 mg





  DAILY JAYCEE   Administration


 


Aspirin  81 mg  09/03/20 10:00  09/26/20 10:33





  Ecotrin -  PO   81 mg





  DAILY JAYCEE   Administration


 


Brimonidine Tartrate  1 drop  09/03/20 10:00  09/26/20 10:36





  Alphagan 0.2% -  OU   1 drop





  BID JAYCEE   Administration


 


Carvedilol  6.25 mg  09/11/20 10:00  09/26/20 10:33





  Coreg -  PO   6.25 mg





  BID JAYCEE   Administration


 


Duloxetine HCl  60 mg  09/03/20 10:00  09/26/20 10:33





  Cymbalta -  PO   60 mg





  DAILY JAYCEE   Administration


 


Guaifenesin  10 ml  09/13/20 01:02 





  Robitussin Dm -  PO  





  Q6H PRN  





  COUGH  


 


Heparin Sodium (Porcine)  5,000 unit  09/25/20 10:00  09/26/20 10:34





  Heparin -  SQ   5,000 unit





  BID JAYCEE   Administration


 


Insulin Aspart  1 vial  09/03/20 07:00  09/26/20 06:13





  Novolog Vial Sliding Scale -  SQ   Not Given





  [facility]  





  Protocol  


 


Insulin Detemir  36 units  09/27/20 07:00 





  Levemir Vial  SQ  





  AM [facility]  


 


Insulin Detemir  36 units  09/26/20 22:00 





  Levemir Vial  SQ  





  HS JAYCEE  


 


Latanoprost  1 drop  09/03/20 22:00  09/25/20 22:29





  Xalatan 0.005% Eye Drops -  OU   Not Given





  HS JAYCEE  


 


Melatonin  5 mg  09/14/20 21:23  09/18/20 23:16





  Melatonin  PO   5 mg





  HS PRN   Administration





  INSOMNIA  


 


Multivitamins/Minerals/Vitamin C  1 tab  09/03/20 10:00  09/26/20 10:33





  Tab-A-Vit -  PO   1 tab





  DAILY JAYCEE   Administration


 


Nicotine  14 mg  09/05/20 10:00  09/26/20 10:34





  Nicoderm Patch -  TD   14 mg





  DAILY JAYCEE   Administration


 


Nitroglycerin  1 inch  09/12/20 12:00  09/26/20 06:14





  Nitro-Bid 2% Paste -  TD   1 inch





  Q6HPO JAYCEE   Administration


 


Pantoprazole Sodium  40 mg  09/04/20 10:00  09/26/20 10:33





  Protonix -  PO   40 mg





  DAILY JAYCEE   Administration


 


Polyethylene Glycol  17 gm  09/05/20 15:15  09/17/20 09:37





  Miralax (For Daily Use) -  PO   17 grams





  DAILY PRN   Administration





  CONSTIPATION  


 


Rosuvastatin Calcium  10 mg  09/03/20 22:00  09/25/20 22:25





  Crestor -  PO   10 mg





  HS JAYCEE   Administration


 


Timolol Maleate  1 drop  09/03/20 10:00  09/26/20 10:36





  Timoptic 0.5%  OU   1 drop





  BID JAYCEE   Administration


 


Tiotropium Bromide  2 puff  09/03/20 10:00  09/26/20 10:36





  Spiriva Respimat  IH   2 puff





  DAILY JAYCEE   Administration








Impression


1. CKD probably from dm


2. CHF


3. COPD


4. hld


5. dm


6. cad


7. htn


8. proteinuria


9. mgus


10. pleural effusion, s/p thoracentesis





Plan


- cont to monitor renal function off lasix


-await todays labs


-check urine sodium and creat





MV

## 2020-09-26 NOTE — PN
Progress Note (short form)





- Note


Progress Note: 





s/p thoracentesis


denies sob


chest tube is clamped


                               Vital Signs - 24 hr











  09/25/20 09/25/20 09/25/20





  13:38 16:59 21:00


 


Temperature 98.9 F 98.5 F 


 


Pulse Rate 90 102 H 


 


Respiratory 20 20 





Rate   


 


Blood Pressure 125/55 L 144/67 


 


O2 Sat by Pulse 100  95





Oximetry (%)   














  09/25/20 09/25/20 09/26/20





  22:00 23:00 06:19


 


Temperature 98.4 F  98.5 F


 


Pulse Rate 93 H 91 H 95 H


 


Respiratory 18 18 18





Rate   


 


Blood Pressure 161/60 123/57 L 155/73


 


O2 Sat by Pulse 95  94 L





Oximetry (%)   








                               Current Medications











Generic Name Dose Route Start Last Admin





  Trade Name Freq  PRN Reason Stop Dose Admin


 


Acetaminophen  650 mg  09/18/20 11:46  09/25/20 22:25





  Tylenol -  PO   650 mg





  Q6H PRN   Administration





  PAIN LEVEL 1-5  


 


Albuterol Sulfate  2 puff  09/03/20 00:47  09/09/20 22:16





  Ventolin Hfa Inhaler -  IH   2 puff





  Q4H PRN   Administration





  SHORT OF BREATH/WHEEZING  


 


Amlodipine Besylate  10 mg  09/03/20 10:00  09/26/20 10:33





  Norvasc -  PO   10 mg





  DAILY JAYCEE   Administration


 


Aspirin  81 mg  09/03/20 10:00  09/26/20 10:33





  Ecotrin -  PO   81 mg





  DAILY JAYCEE   Administration


 


Brimonidine Tartrate  1 drop  09/03/20 10:00  09/26/20 10:36





  Alphagan 0.2% -  OU   1 drop





  BID JAYCEE   Administration


 


Carvedilol  6.25 mg  09/11/20 10:00  09/26/20 10:33





  Coreg -  PO   6.25 mg





  BID JAYCEE   Administration


 


Duloxetine HCl  60 mg  09/03/20 10:00  09/26/20 10:33





  Cymbalta -  PO   60 mg





  DAILY JAYCEE   Administration


 


Guaifenesin  10 ml  09/13/20 01:02 





  Robitussin Dm -  PO  





  Q6H PRN  





  COUGH  


 


Heparin Sodium (Porcine)  5,000 unit  09/25/20 10:00  09/26/20 10:34





  Heparin -  SQ   5,000 unit





  BID JAYCEE   Administration


 


Insulin Aspart  1 vial  09/03/20 07:00  09/26/20 06:13





  Novolog Vial Sliding Scale -  SQ   Not Given





  [facility]  





  Protocol  


 


Insulin Detemir  34 units  09/22/20 13:13  09/26/20 06:14





  Levemir Vial  SQ   34 units





  AM JAYCEE   Administration


 


Insulin Detemir  34 units  09/22/20 13:13  09/25/20 22:27





  Levemir Vial  SQ   34 units





  HS JAYCEE   Administration


 


Latanoprost  1 drop  09/03/20 22:00  09/25/20 22:29





  Xalatan 0.005% Eye Drops -  OU   Not Given





  HS JAYCEE  


 


Melatonin  5 mg  09/14/20 21:23  09/18/20 23:16





  Melatonin  PO   5 mg





  HS PRN   Administration





  INSOMNIA  


 


Multivitamins/Minerals/Vitamin C  1 tab  09/03/20 10:00  09/26/20 10:33





  Tab-A-Vit -  PO   1 tab





  DAILY JAYCEE   Administration


 


Nicotine  14 mg  09/05/20 10:00  09/26/20 10:34





  Nicoderm Patch -  TD   14 mg





  DAILY JAYCEE   Administration


 


Nitroglycerin  1 inch  09/12/20 12:00  09/26/20 06:14





  Nitro-Bid 2% Paste -  TD   1 inch





  Q6HPO JAYCEE   Administration


 


Pantoprazole Sodium  40 mg  09/04/20 10:00  09/26/20 10:33





  Protonix -  PO   40 mg





  DAILY JAYCEE   Administration


 


Polyethylene Glycol  17 gm  09/05/20 15:15  09/17/20 09:37





  Miralax (For Daily Use) -  PO   17 grams





  DAILY PRN   Administration





  CONSTIPATION  


 


Rosuvastatin Calcium  10 mg  09/03/20 22:00  09/25/20 22:25





  Crestor -  PO   10 mg





  HS JAYCEE   Administration


 


Timolol Maleate  1 drop  09/03/20 10:00  09/26/20 10:36





  Timoptic 0.5%  OU   1 drop





  BID JAYCEE   Administration


 


Tiotropium Bromide  2 puff  09/03/20 10:00  09/26/20 10:36





  Spiriva Respimat  IH   2 puff





  DAILY JAYCEE   Administration








                         Laboratory Results - last 24 hr











  09/25/20 09/25/20 09/26/20





  16:16 22:25 06:10


 


POC Glucometer  271  303  134














                                 Intake & Output











 09/23/20 09/24/20 09/25/20 09/26/20





 23:59 23:59 23:59 23:59


 


Intake Total 615 420  


 


Output Total 663 1095 130 


 


Balance -48 -675 -130 


 


Weight 244 lb 6 oz 239 lb 3.2 oz 238 lb 11.2 oz 240 lb 9 oz











S1 s2 RRR


Lungs decreased right 


Abd- soft, obese


NT


Edema decreased 








PLAN


Lasix on hold


check BMP today 





weight increased  +2lbs





check CT Chest - no contrast --->noted , no nodules, masses, decreased pleural 

effusion 





OOB


repeat CXR on Monday 





increase levemir for better glucose control


Heparin  for DVT prophylaxis








Problem List





- Problems


(1) COPD exacerbation


Code(s): J44.1 - CHRONIC OBSTRUCTIVE PULMONARY DISEASE W (ACUTE) EXACERBATION   





(2) ASHD (arteriosclerotic heart disease)


Code(s): I25.10 - ATHSCL HEART DISEASE OF NATIVE CORONARY ARTERY W/O ANG PCTRS  







(3) Acute hypoxemic respiratory failure


Code(s): J96.01 - ACUTE RESPIRATORY FAILURE WITH HYPOXIA   





(4) Acute on chronic diastolic CHF (congestive heart failure)


Code(s): I50.33 - ACUTE ON CHRONIC DIASTOLIC (CONGESTIVE) HEART FAILURE   





(5) CKD (chronic kidney disease) stage 3, GFR 30-59 ml/min


Code(s): N18.3 - CHRONIC KIDNEY DISEASE, STAGE 3 (MODERATE)   





(6) HTN (hypertension)


Code(s): I10 - ESSENTIAL (PRIMARY) HYPERTENSION   


Qualifiers: 


   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   





(7) IDDM (insulin dependent diabetes mellitus)


Code(s): E11.9 - TYPE 2 DIABETES MELLITUS WITHOUT COMPLICATIONS; Z79.4 - LONG 

TERM (CURRENT) USE OF INSULIN   





(8) Pneumonia


Code(s): J18.9 - PNEUMONIA, UNSPECIFIED ORGANISM   


Qualifiers: 


   Laterality: right   Lung location: lower lobe of lung

## 2020-09-26 NOTE — PN
Progress Note, Physician


History of Present Illness: 





PULMONARY





ALERT,COMFORTABLE,-SOB,AMBULATING WELL . WT 240LBS +2





- Current Medication List


Current Medications: 


Active Medications





Acetaminophen (Tylenol -)  650 mg PO Q6H PRN


   PRN Reason: PAIN LEVEL 1-5


   Last Admin: 09/25/20 22:25 Dose:  650 mg


   Documented by: 


Albuterol Sulfate (Ventolin Hfa Inhaler -)  2 puff IH Q4H PRN


   PRN Reason: SHORT OF BREATH/WHEEZING


   Last Admin: 09/09/20 22:16 Dose:  2 puff


   Documented by: 


Amlodipine Besylate (Norvasc -)  10 mg PO DAILY [facility]


   Last Admin: 09/25/20 09:34 Dose:  10 mg


   Documented by: 


Aspirin (Ecotrin -)  81 mg PO DAILY [facility]


   Last Admin: 09/25/20 09:34 Dose:  81 mg


   Documented by: 


Brimonidine Tartrate (Alphagan 0.2% -)  1 drop OU BID [facility]


   Last Admin: 09/25/20 22:28 Dose:  1 drop


   Documented by: 


Carvedilol (Coreg -)  6.25 mg PO BID [facility]


   Last Admin: 09/25/20 22:25 Dose:  6.25 mg


   Documented by: 


Duloxetine HCl (Cymbalta -)  60 mg PO DAILY [facility]


   Last Admin: 09/25/20 09:34 Dose:  60 mg


   Documented by: 


Guaifenesin (Robitussin Dm -)  10 ml PO Q6H PRN


   PRN Reason: COUGH


Heparin Sodium (Porcine) (Heparin -)  5,000 unit SQ BID [facility]


   Last Admin: 09/25/20 22:32 Dose:  5,000 unit


   Documented by: 


Insulin Aspart (Novolog Vial Sliding Scale -)  1 vial SQ [facility]; Protocol


   Last Admin: 09/26/20 06:13 Dose:  Not Given


   Documented by: 


Insulin Detemir (Levemir Vial)  34 units SQ AM [facility]


   Last Admin: 09/26/20 06:14 Dose:  34 units


   Documented by: 


Insulin Detemir (Levemir Vial)  34 units SQ HS [facility]


   Last Admin: 09/25/20 22:27 Dose:  34 units


   Documented by: 


Latanoprost (Xalatan 0.005% Eye Drops -)  1 drop OU HS [facility]


   Last Admin: 09/25/20 22:29 Dose:  Not Given


   Documented by: 


Melatonin (Melatonin)  5 mg PO HS PRN


   PRN Reason: INSOMNIA


   Last Admin: 09/18/20 23:16 Dose:  5 mg


   Documented by: 


Multivitamins/Minerals/Vitamin C (Tab-A-Vit -)  1 tab PO DAILY [facility]


   Last Admin: 09/25/20 09:34 Dose:  1 tab


   Documented by: 


Nicotine (Nicoderm Patch -)  14 mg TD DAILY [facility]


   Last Admin: 09/25/20 09:34 Dose:  14 mg


   Documented by: 


Nitroglycerin (Nitro-Bid 2% Paste -)  1 inch TD Q6HPO [facility]


   Last Admin: 09/26/20 06:14 Dose:  1 inch


   Documented by: 


Pantoprazole Sodium (Protonix -)  40 mg PO DAILY [facility]


   Last Admin: 09/25/20 09:34 Dose:  40 mg


   Documented by: 


Polyethylene Glycol (Miralax (For Daily Use) -)  17 gm PO DAILY PRN


   PRN Reason: CONSTIPATION


   Last Admin: 09/17/20 09:37 Dose:  17 grams


   Documented by: 


Rosuvastatin Calcium (Crestor -)  10 mg PO HS [facility]


   Last Admin: 09/25/20 22:25 Dose:  10 mg


   Documented by: 


Timolol Maleate (Timoptic 0.5%)  1 drop OU BID [facility]


   Last Admin: 09/25/20 22:29 Dose:  1 drop


   Documented by: 


Tiotropium Bromide (Spiriva Respimat)  2 puff IH DAILY [facility]


   Last Admin: 09/25/20 09:38 Dose:  Not Given


   Documented by: 











- Objective


Vital Signs: 


                                   Vital Signs











Temperature  98.5 F   09/26/20 06:19


 


Pulse Rate  95 H  09/26/20 06:19


 


Respiratory Rate  18   09/26/20 06:19


 


Blood Pressure  155/73   09/26/20 06:19


 


O2 Sat by Pulse Oximetry (%)  94 L  09/26/20 06:19











Constitutional: Yes: Calm, Obese


Eyes: Yes: WNL, Other


Neck: Yes: WNL


Cardiovascular: Yes: Regular Rate and Rhythm, S1, S2


Respiratory: Yes: Diminished


Gastrointestinal: Yes: Normal Bowel Sounds, Soft


Musculoskeletal: Yes: WNL


Extremities: Yes: WNL


Edema: Yes


Labs: 


                                    CBC, BMP





                                 09/25/20 07:30 





                                 09/25/20 07:30 





                                    INR, PTT











INR  0.94  (0.83-1.09)   09/02/20  16:36    














- ....Imaging


Cat Scan: Image Reviewed (RESOLVED R PLEURAL EFFUSION)





Problem List





- Problems


(1) COPD (chronic obstructive pulmonary disease)


Code(s): J44.9 - CHRONIC OBSTRUCTIVE PULMONARY DISEASE, UNSPECIFIED   


Qualifiers: 


   COPD type: unspecified COPD   Qualified Code(s): J44.9 - Chronic obstructive 

pulmonary disease, unspecified   





(2) Pleural effusion


Code(s): J90 - PLEURAL EFFUSION, NOT ELSEWHERE CLASSIFIED   





(3) Shortness of breath on exertion


Code(s): R06.02 - SHORTNESS OF BREATH   





(4) ASHD (arteriosclerotic heart disease)


Code(s): I25.10 - ATHSCL HEART DISEASE OF NATIVE CORONARY ARTERY W/O ANG PCTRS  







(5) Acute on chronic diastolic CHF (congestive heart failure)


Code(s): I50.33 - ACUTE ON CHRONIC DIASTOLIC (CONGESTIVE) HEART FAILURE   





(6) CKD (chronic kidney disease) stage 3, GFR 30-59 ml/min


Code(s): N18.3 - CHRONIC KIDNEY DISEASE, STAGE 3 (MODERATE)   





Assessment/Plan





A/P


Acute on Chronic Diastolic Heart Failure


Right Pleural Effusion resolved


COPD


ACUTE IN ON CKD


HTN


TOBACCO ABUSE





-  incentive spirometry


-  lasix prn


-  monitor urine output, creatinine


-  daily weights


-  O2 to keep SpO2 >90%


-  inhaled bronchodilators as needed


-  DVT prophylaxis





DR REED

## 2020-09-27 NOTE — PN
Progress Note (short form)





- Note


Progress Note: 





RENAL


awake, somnolent


didnt sleep well becuse she had too much in her mind


breathing well


                                Last Vital Signs











Temp Pulse Resp BP Pulse Ox


 


 98.4 F   104 H  20   131/80   100 


 


 09/26/20 22:00  09/26/20 22:00  09/26/20 22:00  09/26/20 22:00  09/26/20 22:00

















lungs slightly reduced breath sounds at right base


cvs s1s2 rr


abd soft


ext +edema trace


neuro a+ox3





                                    CBC, BMP





                                 09/25/20 07:30 





                                 09/27/20 07:40 











                               Current Medications











Generic Name Dose Route Start Last Admin





  Trade Name Freq  PRN Reason Stop Dose Admin


 


Acetaminophen  650 mg  09/18/20 11:46  09/25/20 22:25





  Tylenol -  PO   650 mg





  Q6H PRN   Administration





  PAIN LEVEL 1-5  


 


Albuterol Sulfate  2 puff  09/03/20 00:47  09/09/20 22:16





  Ventolin Hfa Inhaler -  IH   2 puff





  Q4H PRN   Administration





  SHORT OF BREATH/WHEEZING  


 


Amlodipine Besylate  10 mg  09/03/20 10:00  09/26/20 10:33





  Norvasc -  PO   10 mg





  DAILY JAYCEE   Administration


 


Aspirin  81 mg  09/03/20 10:00  09/26/20 10:33





  Ecotrin -  PO   81 mg





  DAILY JAYCEE   Administration


 


Brimonidine Tartrate  1 drop  09/03/20 10:00  09/26/20 22:53





  Alphagan 0.2% -  OU   1 drop





  BID JAYCEE   Administration


 


Carvedilol  6.25 mg  09/11/20 10:00  09/26/20 22:54





  Coreg -  PO   6.25 mg





  BID JAYCEE   Administration


 


Duloxetine HCl  60 mg  09/03/20 10:00  09/26/20 10:33





  Cymbalta -  PO   60 mg





  DAILY JAYCEE   Administration


 


Guaifenesin  10 ml  09/13/20 01:02 





  Robitussin Dm -  PO  





  Q6H PRN  





  COUGH  


 


Heparin Sodium (Porcine)  5,000 unit  09/25/20 10:00  09/26/20 22:55





  Heparin -  SQ   5,000 unit





  BID JAYCEE   Administration


 


Insulin Aspart  1 vial  09/03/20 07:00  09/27/20 06:44





  Novolog Vial Sliding Scale -  SQ   Not Given





  [facility]  





  Protocol  


 


Insulin Detemir  36 units  09/27/20 07:00  09/27/20 06:43





  Levemir Vial  SQ   36 unit





  AM JAYCEE   Administration


 


Insulin Detemir  36 units  09/26/20 22:00  09/26/20 22:58





  Levemir Vial  SQ   36 unit





  HS JAYCEE   Administration


 


Latanoprost  1 drop  09/03/20 22:00  09/26/20 23:00





  Xalatan 0.005% Eye Drops -  OU   Not Given





  HS JAYCEE  


 


Melatonin  5 mg  09/14/20 21:23  09/18/20 23:16





  Melatonin  PO   5 mg





  HS PRN   Administration





  INSOMNIA  


 


Multivitamins/Minerals/Vitamin C  1 tab  09/03/20 10:00  09/26/20 10:33





  Tab-A-Vit -  PO   1 tab





  DAILY JAYCEE   Administration


 


Nicotine  14 mg  09/05/20 10:00  09/26/20 10:34





  Nicoderm Patch -  TD   14 mg





  DAILY JAYCEE   Administration


 


Nitroglycerin  1 inch  09/12/20 12:00  09/27/20 06:43





  Nitro-Bid 2% Paste -  TD   1 inch





  Q6HPO JAYCEE   Administration


 


Pantoprazole Sodium  40 mg  09/04/20 10:00  09/26/20 10:33





  Protonix -  PO   40 mg





  DAILY JAYCEE   Administration


 


Polyethylene Glycol  17 gm  09/05/20 15:15  09/17/20 09:37





  Miralax (For Daily Use) -  PO   17 grams





  DAILY PRN   Administration





  CONSTIPATION  


 


Rosuvastatin Calcium  10 mg  09/03/20 22:00  09/26/20 22:54





  Crestor -  PO   10 mg





  HS JAYCEE   Administration


 


Timolol Maleate  1 drop  09/03/20 10:00  09/26/20 22:53





  Timoptic 0.5%  OU   1 drop





  BID JAYCEE   Administration


 


Tiotropium Bromide  2 puff  09/03/20 10:00  09/26/20 10:36





  Spiriva Respimat  IH   2 puff





  DAILY JAYCEE   Administration











Impression


1. CKD probably from dm


2. CHF


3. COPD


4. hld


5. dm


6. cad


7. htn


8. proteinuria


9. mgus


10. pleural effusion, s/p thoracentesis





Plan


- cont to monitor renal function off lasix


-await todays labs


-urine sodium is lowish c/w volume depleted state although uo was over 1000 





MV

## 2020-09-27 NOTE — PN
Progress Note, Physician


Chief Complaint: 





walked yesterday to [facility], no sig YEH


Denies CP, palps.


No dizziness


History of Present Illness: 





Chronic diastolic CHF


Non obstx CAD


CKD


Persistent pleural effusion, refractory to diuretics s/p drainage x 2








+Smoker w/ chronic COPD





- Current Medication List


Current Medications: 


Active Medications





Acetaminophen (Tylenol -)  650 mg PO Q6H PRN


   PRN Reason: PAIN LEVEL 1-5


   Last Admin: 09/25/20 22:25 Dose:  650 mg


   Documented by: 


Albuterol Sulfate (Ventolin Hfa Inhaler -)  2 puff IH Q4H PRN


   PRN Reason: SHORT OF BREATH/WHEEZING


   Last Admin: 09/09/20 22:16 Dose:  2 puff


   Documented by: 


Amlodipine Besylate (Norvasc -)  10 mg PO DAILY [facility]


   Last Admin: 09/27/20 11:02 Dose:  10 mg


   Documented by: 


Aspirin (Ecotrin -)  81 mg PO DAILY [facility]


   Last Admin: 09/27/20 11:02 Dose:  81 mg


   Documented by: 


Brimonidine Tartrate (Alphagan 0.2% -)  1 drop OU BID [facility]


   Last Admin: 09/27/20 11:01 Dose:  1 drop


   Documented by: 


Carvedilol (Coreg -)  6.25 mg PO BID [facility]


   Last Admin: 09/27/20 11:03 Dose:  6.25 mg


   Documented by: 


Duloxetine HCl (Cymbalta -)  60 mg PO DAILY [facility]


   Last Admin: 09/27/20 11:02 Dose:  60 mg


   Documented by: 


Guaifenesin (Robitussin Dm -)  10 ml PO Q6H PRN


   PRN Reason: COUGH


Heparin Sodium (Porcine) (Heparin -)  5,000 unit SQ BID [facility]


   Last Admin: 09/27/20 11:03 Dose:  5,000 unit


   Documented by: 


Insulin Aspart (Novolog Vial Sliding Scale -)  [facility]; [facility]


   Last Admin: 09/27/20 06:44 Dose:  Not Given


   Documented by: 


Insulin Detemir (Levemir Vial)  36 units SQ AM [facility]


   Last Admin: 09/27/20 06:43 Dose:  36 unit


   Documented by: 


Insulin Detemir (Levemir Vial)  36 units SQ HS [facility]


   Last Admin: 09/26/20 22:58 Dose:  36 unit


   Documented by: 


Latanoprost (Xalatan 0.005% Eye Drops -)  1 drop OU HS [facility]


   Last Admin: 09/26/20 23:00 Dose:  Not Given


   Documented by: 


Melatonin (Melatonin)  5 mg PO HS PRN


   PRN Reason: INSOMNIA


   Last Admin: 09/18/20 23:16 Dose:  5 mg


   Documented by: 


Multivitamins/Minerals/Vitamin C (Tab-A-Vit -)  1 tab PO DAILY [facility]


   Last Admin: 09/27/20 11:03 Dose:  1 tab


   Documented by: 


Nicotine (Nicoderm Patch -)  14 mg TD DAILY [facility]


   Last Admin: 09/27/20 11:03 Dose:  14 mg


   Documented by: 


Nitroglycerin (Nitro-Bid 2% Paste -)  1 inch TD Q6HPO [facility]


   Last Admin: 09/27/20 06:43 Dose:  1 inch


   Documented by: 


Pantoprazole Sodium (Protonix -)  40 mg PO DAILY [facility]


   Last Admin: 09/27/20 11:02 Dose:  40 mg


   Documented by: 


Polyethylene Glycol (Miralax (For Daily Use) -)  17 gm PO DAILY PRN


   PRN Reason: CONSTIPATION


   Last Admin: 09/17/20 09:37 Dose:  17 grams


   Documented by: 


Rosuvastatin Calcium (Crestor -)  10 mg PO HS [facility]


   Last Admin: 09/26/20 22:54 Dose:  10 mg


   Documented by: 


Timolol Maleate (Timoptic 0.5%)  1 drop OU BID [facility]


   Last Admin: 09/27/20 11:01 Dose:  1 drop


   Documented by: 


Tiotropium Bromide (Spiriva Respimat)  2 puff IH DAILY [facility]


   Last Admin: 09/27/20 11:02 Dose:  2 puff


   Documented by: 











- Objective


Vital Signs: 


                                   Vital Signs











Temperature  98.4 F   09/26/20 22:00


 


Pulse Rate  104 H  09/26/20 22:00


 


Respiratory Rate  20   09/26/20 22:00


 


Blood Pressure  131/80   09/26/20 22:00


 


O2 Sat by Pulse Oximetry (%)  100   09/26/20 22:00








Room air


Constitutional: Yes: No Distress, Calm


Eyes: Yes: Conjunctiva Clear, EOM Intact


HENT: Yes: Atraumatic, Normocephalic


Neck: Yes: Supple, Trachea Midline


Cardiovascular: Yes: Regular Rate and Rhythm


Respiratory: Yes: CTA Bilaterally (improved air movement bases, no wheezing)


Gastrointestinal: Yes: Soft (NT), Abdomen, Obese


Edema: No


Peripheral Pulses WNL: Yes


Integumentary: Yes: WNL


Neurological: Yes: Alert, Oriented


Psychiatric: Yes: WNL


Labs: 


                                    CBC, BMP





                                 09/25/20 07:30 





                                 09/27/20 07:40 





                                    INR, PTT











INR  0.94  (0.83-1.09)   09/02/20  16:36    








                                Laboratory Tests











  09/25/20 09/27/20





  07:30 07:40


 


WBC  7.8 


 


Hgb  12.3 


 


Plt Count  204 


 


Sodium   140


 


Potassium   3.9


 


Creatinine   2.1 H


 


Calcium   9.5














Assessment/Plan





REC:


1. Acute on chronic diastolic CHF (on lasix 40 po qd at home):


-echo here unremarkable (nl LV/RV/valves, no pulm HTN noted)


-Non obstx CAD on cath within last 5 years


- rec MAGNOLIA workup and treatment as outpt if she is amenable


- with lasix 80qd and metolazone she had not lost much volume, changed to lasix 

80 IV BID during admission with little improvement in sx, recurrent pleural 

effusion and worsened renal fx


- s/p tap with chest tube drainage of effusion


-LASIX NOW HELD due to rising creat which is improving





2. HTN: 


-cont current meds





3. PNA?: with effusion, drained


-CT pattern more c/w consolidation, though normal temp curve, no infectious 

sx's, pleural fluid transudative 


-effusion recurred and did not improve substantially with aggressive diuresis 


-Pulm and ID following.





4. Nonobstx CAD:


-cont asa, statin





5. NAY on CKD: improving as we hold Lasix


-baseline creat 1.4-1.7 from prior hosp stay


-monitor daily chem7 as above


-Renal following





6. DM: as per PMD





7. COPD/smoking: smoking cessation recommended.

## 2020-09-27 NOTE — PN
Progress Note (short form)





- Note


Progress Note: 





s/p thoracentesis


denies sob


chest tube is clamped


pt has no SOB


                               Vital Signs - 24 hr











  09/26/20 09/26/20 09/26/20





  16:54 21:00 22:00


 


Temperature 98.4 F  98.4 F


 


Pulse Rate 98 H  104 H


 


Respiratory 20  20





Rate   


 


Blood Pressure 120/74  131/80


 


O2 Sat by Pulse 94 L 100 100





Oximetry (%)   








                               Current Medications











Generic Name Dose Route Start Last Admin





  Trade Name Freq  PRN Reason Stop Dose Admin


 


Acetaminophen  650 mg  09/18/20 11:46  09/25/20 22:25





  Tylenol -  PO   650 mg





  Q6H PRN   Administration





  PAIN LEVEL 1-5  


 


Albuterol Sulfate  2 puff  09/03/20 00:47  09/09/20 22:16





  Ventolin Hfa Inhaler -  IH   2 puff





  Q4H PRN   Administration





  SHORT OF BREATH/WHEEZING  


 


Amlodipine Besylate  10 mg  09/03/20 10:00  09/27/20 11:02





  Norvasc -  PO   10 mg





  DAILY JAYCEE   Administration


 


Aspirin  81 mg  09/03/20 10:00  09/27/20 11:02





  Ecotrin -  PO   81 mg





  DAILY JAYCEE   Administration


 


Brimonidine Tartrate  1 drop  09/03/20 10:00  09/27/20 11:01





  Alphagan 0.2% -  OU   1 drop





  BID JAYCEE   Administration


 


Carvedilol  6.25 mg  09/11/20 10:00  09/27/20 11:03





  Coreg -  PO   6.25 mg





  BID JAYCEE   Administration


 


Duloxetine HCl  60 mg  09/03/20 10:00  09/27/20 11:02





  Cymbalta -  PO   60 mg





  DAILY JAYCEE   Administration


 


Guaifenesin  10 ml  09/13/20 01:02 





  Robitussin Dm -  PO  





  Q6H PRN  





  COUGH  


 


Heparin Sodium (Porcine)  5,000 unit  09/25/20 10:00  09/27/20 11:03





  Heparin -  SQ   5,000 unit





  BID JAYCEE   Administration


 


Insulin Aspart  1 vial  09/03/20 07:00  09/27/20 13:36





  Novolog Vial Sliding Scale -  SQ   2 unit





  [facility]HS JAYCEE   Administration





  Protocol  


 


Insulin Detemir  36 units  09/27/20 07:00  09/27/20 06:43





  Levemir Vial  SQ   36 unit





  AM JAYCEE   Administration


 


Insulin Detemir  36 units  09/26/20 22:00  09/26/20 22:58





  Levemir Vial  SQ   36 unit





  HS JAYCEE   Administration


 


Latanoprost  1 drop  09/03/20 22:00  09/26/20 23:00





  Xalatan 0.005% Eye Drops -  OU   Not Given





  HS JAYCEE  


 


Melatonin  5 mg  09/14/20 21:23  09/18/20 23:16





  Melatonin  PO   5 mg





  HS PRN   Administration





  INSOMNIA  


 


Multivitamins/Minerals/Vitamin C  1 tab  09/03/20 10:00  09/27/20 11:03





  Tab-A-Vit -  PO   1 tab





  DAILY JAYCEE   Administration


 


Nicotine  14 mg  09/05/20 10:00  09/27/20 11:03





  Nicoderm Patch -  TD   14 mg





  DAILY JAYCEE   Administration


 


Nitroglycerin  1 inch  09/12/20 12:00  09/27/20 13:37





  Nitro-Bid 2% Paste -  TD   1 inch





  Q6HPO JAYCEE   Administration


 


Pantoprazole Sodium  40 mg  09/04/20 10:00  09/27/20 11:02





  Protonix -  PO   40 mg





  DAILY JAYCEE   Administration


 


Polyethylene Glycol  17 gm  09/05/20 15:15  09/17/20 09:37





  Miralax (For Daily Use) -  PO   17 grams





  DAILY PRN   Administration





  CONSTIPATION  


 


Rosuvastatin Calcium  10 mg  09/03/20 22:00  09/26/20 22:54





  Crestor -  PO   10 mg





  HS JAYCEE   Administration


 


Timolol Maleate  1 drop  09/03/20 10:00  09/27/20 11:01





  Timoptic 0.5%  OU   1 drop





  BID JAYCEE   Administration


 


Tiotropium Bromide  2 puff  09/03/20 10:00  09/27/20 11:02





  Spiriva Respimat  IH   2 puff





  DAILY JAYCEE   Administration








                         Laboratory Results - last 24 hr











  09/26/20 09/26/20 09/26/20





  17:21 18:00 22:50


 


Sodium   


 


Potassium   


 


Chloride   


 


Carbon Dioxide   


 


Anion Gap   


 


BUN   


 


Creatinine   


 


Est GFR (CKD-EPI)AfAm   


 


Est GFR (CKD-EPI)NonAf   


 


POC Glucometer  174   219


 


Random Glucose   


 


Calcium   


 


Ur Random Sodium   24 L 














  09/27/20 09/27/20 09/27/20





  06:39 07:40 12:15


 


Sodium   140 


 


Potassium   3.9 


 


Chloride   106 


 


Carbon Dioxide   26 


 


Anion Gap   8 


 


BUN   86.0 H 


 


Creatinine   2.1 H 


 


Est GFR (CKD-EPI)AfAm   27.72 


 


Est GFR (CKD-EPI)NonAf   23.92 


 


POC Glucometer  124   186


 


Random Glucose   124 H 


 


Calcium   9.5 


 


Ur Random Sodium   











                                        





S1 s2 RRR


Lungs decreased right 


Abd- soft, obese


NT


Edema decreased 








PLAN


Lasix on hold


creatinine better





weight increased  +1lbs





checked CT Chest - no contrast --->noted , no nodules, masses, decreased pleural

effusion 





OOB


repeat CXR on Monday 





increase levemir for better glucose control


Heparin  for DVT prophylaxis








Problem List





- Problems


(1) COPD exacerbation


Code(s): J44.1 - CHRONIC OBSTRUCTIVE PULMONARY DISEASE W (ACUTE) EXACERBATION   





(2) ASHD (arteriosclerotic heart disease)


Code(s): I25.10 - ATHSCL HEART DISEASE OF NATIVE CORONARY ARTERY W/O ANG PCTRS  







(3) Acute hypoxemic respiratory failure


Code(s): J96.01 - ACUTE RESPIRATORY FAILURE WITH HYPOXIA   





(4) Acute on chronic diastolic CHF (congestive heart failure)


Code(s): I50.33 - ACUTE ON CHRONIC DIASTOLIC (CONGESTIVE) HEART FAILURE   





(5) CKD (chronic kidney disease) stage 3, GFR 30-59 ml/min


Code(s): N18.3 - CHRONIC KIDNEY DISEASE, STAGE 3 (MODERATE)   





(6) HTN (hypertension)


Code(s): I10 - ESSENTIAL (PRIMARY) HYPERTENSION   


Qualifiers: 


   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   





(7) IDDM (insulin dependent diabetes mellitus)


Code(s): E11.9 - TYPE 2 DIABETES MELLITUS WITHOUT COMPLICATIONS; Z79.4 - LONG 

TERM (CURRENT) USE OF INSULIN   





(8) Pneumonia


Code(s): J18.9 - PNEUMONIA, UNSPECIFIED ORGANISM   


Qualifiers: 


   Laterality: right   Lung location: lower lobe of lung

## 2020-09-27 NOTE — PN
Progress Note (short form)





- Note


Progress Note: 





PULMONARY





Breathing remains improved. Chest tube clamped. Creatinine improving.





                                   Vital Signs











 Period  Temp  Pulse  Resp  BP Sys/Thrasher  Pulse Ox


 


 Last 24 Hr  98.4 F-98.5 F    18-20  120-131/59-80  








                                 Intake & Output











 09/24/20 09/25/20 09/26/20 09/27/20





 23:59 23:59 23:59 23:59


 


Intake Total 420  1080 


 


Output Total 1095 130  


 


Balance -675 -130 1080 


 


Weight 108.499 kg 108.272 kg 109.117 kg 109.316 kg











Gen:  NAD in chair


Heart: RRR


Lung: decreased breath sounds at the bases


Abd: soft, nontender


Ext: no edema





                                    CBC, BMP





                                 09/25/20 07:30 





                                 09/27/20 07:40 





Active Medications





Acetaminophen (Tylenol -)  650 mg PO Q6H PRN


   PRN Reason: PAIN LEVEL 1-5


   Last Admin: 09/25/20 22:25 Dose:  650 mg


   Documented by: 


Albuterol Sulfate (Ventolin Hfa Inhaler -)  2 puff IH Q4H PRN


   PRN Reason: SHORT OF BREATH/WHEEZING


   Last Admin: 09/09/20 22:16 Dose:  2 puff


   Documented by: 


Amlodipine Besylate (Norvasc -)  10 mg PO DAILY [facility]


   Last Admin: 09/27/20 11:02 Dose:  10 mg


   Documented by: 


Aspirin (Ecotrin -)  81 mg PO DAILY [facility]


   Last Admin: 09/27/20 11:02 Dose:  81 mg


   Documented by: 


Brimonidine Tartrate (Alphagan 0.2% -)  1 drop OU BID [facility]


   Last Admin: 09/27/20 11:01 Dose:  1 drop


   Documented by: 


Carvedilol (Coreg -)  6.25 mg PO BID [facility]


   Last Admin: 09/27/20 11:03 Dose:  6.25 mg


   Documented by: 


Duloxetine HCl (Cymbalta -)  60 mg PO DAILY [facility]


   Last Admin: 09/27/20 11:02 Dose:  60 mg


   Documented by: 


Guaifenesin (Robitussin Dm -)  10 ml PO Q6H PRN


   PRN Reason: COUGH


Heparin Sodium (Porcine) (Heparin -)  5,000 unit SQ BID [facility]


   Last Admin: 09/27/20 11:03 Dose:  5,000 unit


   Documented by: 


Insulin Aspart (Novolog Vial Sliding Scale -)  1 vial SQ [facility]S [facility]; Protocol


   Last Admin: 09/27/20 06:44 Dose:  Not Given


   Documented by: 


Insulin Detemir (Levemir Vial)  36 units SQ AM [facility]


   Last Admin: 09/27/20 06:43 Dose:  36 unit


   Documented by: 


Insulin Detemir (Levemir Vial)  36 units SQ HS [facility]


   Last Admin: 09/26/20 22:58 Dose:  36 unit


   Documented by: 


Latanoprost (Xalatan 0.005% Eye Drops -)  1 drop OU HS [facility]


   Last Admin: 09/26/20 23:00 Dose:  Not Given


   Documented by: 


Melatonin (Melatonin)  5 mg PO HS PRN


   PRN Reason: INSOMNIA


   Last Admin: 09/18/20 23:16 Dose:  5 mg


   Documented by: 


Multivitamins/Minerals/Vitamin C (Tab-A-Vit -)  1 tab PO DAILY [facility]


   Last Admin: 09/27/20 11:03 Dose:  1 tab


   Documented by: 


Nicotine (Nicoderm Patch -)  14 mg TD DAILY [facility]


   Last Admin: 09/27/20 11:03 Dose:  14 mg


   Documented by: 


Nitroglycerin (Nitro-Bid 2% Paste -)  1 inch TD Q6HPO [facility]


   Last Admin: 09/27/20 06:43 Dose:  1 inch


   Documented by: 


Pantoprazole Sodium (Protonix -)  40 mg PO DAILY [facility]


   Last Admin: 09/27/20 11:02 Dose:  40 mg


   Documented by: 


Polyethylene Glycol (Miralax (For Daily Use) -)  17 gm PO DAILY [facility]N


   PRN Reason: CONSTIPATION


   Last Admin: 09/17/20 09:37 Dose:  17 grams


   Documented by: 


Rosuvastatin Calcium (Crestor -)  10 mg PO HS [facility]


   Last Admin: 09/26/20 22:54 Dose:  10 mg


   Documented by: 


Timolol Maleate (Timoptic 0.5%)  1 drop OU BID [facility]


   Last Admin: 09/27/20 11:01 Dose:  1 drop


   Documented by: 


Tiotropium Bromide (Spiriva Respimat)  2 puff IH DAILY [facility]


   Last Admin: 09/27/20 11:02 Dose:  2 puff


   Documented by: 











A/P


Acute on Chronic Diastolic Heart Failure


Right Pleural Effusion


COPD


CKD


HTN


TOBACCO ABUSE





-  CXR in AM


-  incentive spirometry


-  holding lasix


-  monitor urine output, creatinine


-  daily weights


-  O2 to keep SpO2 >90%


-  inhaled bronchodilators as needed


-  DVT prophylaxis

## 2020-09-28 NOTE — PN
Progress Note (short form)





- Note


Progress Note: 


s:  no cp palps dizzy. sob resolved, feels at baseline now, walking w/o mayo.


                                        


                                        


                              Current Medications











Generic Name Dose Route Start Last Admin





  Trade Name Freq  PRN Reason Stop Dose Admin


 


Acetaminophen  650 mg  09/18/20 11:46  09/27/20 21:27





  Tylenol -  PO   650 mg





  Q6H PRN   Administration





  PAIN LEVEL 1-5  


 


Albuterol Sulfate  2 puff  09/03/20 00:47  09/09/20 22:16





  Ventolin Hfa Inhaler -  IH   2 puff





  Q4H PRN   Administration





  SHORT OF BREATH/WHEEZING  


 


Alprazolam  0.5 mg  09/27/20 15:48  09/27/20 21:28





  Xanax -  PO   0.5 mg





  Q12H PRN   Administration





  ANXIETY  


 


Amlodipine Besylate  10 mg  09/03/20 10:00  09/28/20 09:02





  Norvasc -  PO   10 mg





  DAILY JAYCEE   Administration


 


Aspirin  81 mg  09/03/20 10:00  09/28/20 09:02





  Ecotrin -  PO   81 mg





  DAILY JAYCEE   Administration


 


Brimonidine Tartrate  1 drop  09/03/20 10:00  09/28/20 09:04





  Alphagan 0.2% -  OU   1 drop





  BID JAYCEE   Administration


 


Carvedilol  6.25 mg  09/11/20 10:00  09/28/20 09:02





  Coreg -  PO   6.25 mg





  BID JAYCEE   Administration


 


Duloxetine HCl  60 mg  09/03/20 10:00  09/28/20 09:02





  Cymbalta -  PO   60 mg





  DAILY JAYCEE   Administration


 


Guaifenesin  10 ml  09/13/20 01:02 





  Robitussin Dm -  PO  





  Q6H PRN  





  COUGH  


 


Heparin Sodium (Porcine)  5,000 unit  09/25/20 10:00  09/28/20 09:02





  Heparin -  SQ   5,000 unit





  BID JAYCEE   Administration


 


Insulin Aspart  1 vial  09/03/20 07:00  09/28/20 10:57





  Novolog Vial Sliding Scale -  SQ   6 unit





  [facility] JAYCEE   Administration





  Protocol  


 


Insulin Detemir  36 units  09/27/20 07:00  09/28/20 07:47





  Levemir Vial  SQ   36 unit





  AM JAYCEE   Administration


 


Insulin Detemir  36 units  09/26/20 22:00  09/27/20 21:29





  Levemir Vial  SQ   36 unit





  HS JAYCEE   Administration


 


Latanoprost  1 drop  09/03/20 22:00  09/27/20 21:20





  Xalatan 0.005% Eye Drops -  OU   Not Given





  HS JAYCEE  


 


Melatonin  5 mg  09/14/20 21:23  09/18/20 23:16





  Melatonin  PO   5 mg





  HS PRN   Administration





  INSOMNIA  


 


Multivitamins/Minerals/Vitamin C  1 tab  09/03/20 10:00  09/28/20 09:02





  Tab-A-Vit -  PO   1 tab





  DAILY JAYCEE   Administration


 


Nicotine  14 mg  09/05/20 10:00  09/28/20 09:02





  Nicoderm Patch -  TD   14 mg





  DAILY JAYCEE   Administration


 


Nitroglycerin  1 inch  09/12/20 12:00  09/28/20 11:05





  Nitro-Bid 2% Paste -  TD   1 inch





  Q6HPO JAYCEE   Administration


 


Pantoprazole Sodium  40 mg  09/04/20 10:00  09/28/20 09:02





  Protonix -  PO   40 mg





  DAILY JAYCEE   Administration


 


Polyethylene Glycol  17 gm  09/05/20 15:15  09/17/20 09:37





  Miralax (For Daily Use) -  PO   17 grams





  DAILY PRN   Administration





  CONSTIPATION  


 


Rosuvastatin Calcium  10 mg  09/03/20 22:00  09/27/20 21:26





  Crestor -  PO   10 mg





  HS JAYCEE   Administration


 


Timolol Maleate  1 drop  09/03/20 10:00  09/28/20 09:05





  Timoptic 0.5%  OU   1 drop





  BID JAYCEE   Administration


 


Tiotropium Bromide  2 puff  09/03/20 10:00  09/28/20 09:05





  Spiriva Respimat  IH   2 puff





  DAILY JAYCEE   Administration








                                   Vital Signs











 Period  Temp  Pulse  Resp  BP Sys/Thrasher  Pulse Ox


 


 Last 24 Hr  97.5 F-98.4 F  86-97  20-20  134-143/66-79  96-98











Constitutional: Yes: No Distress


Respiratory: Yes: cta bl nl eff


Gastrointestinal: Yes: Soft, Abdomen, Obese


Cardiovascular: Yes: Regular Rate and Rhythm


JVD: No


Heart Sounds: Yes: S1, S2 (rrr)


Edema: no


Neurological: Yes: Alert, Oriented


no jaundice, diaphoresis


not agitated





                                        


                                    CBC, BMP





                                 09/25/20 07:30 





                                 09/28/20 07:36 














nsr 64bpm, borderline low voltage, no acute ST changes


Prior Cardiac Procedures: Cardiac Catheterization (non obstx CAD within last 5 

years)


Ejection Fraction %: LVEF > or = 40 %


echo 9/2020: nl lv/rv, no sig valve path





Imaging





- Results


Cat Scan: Report Reviewed


EKG: Image Reviewed








REC:


1. Acute on chronic diastolic CHF (on lasix 40 po qd at home):


-echo here unremarkable (nl LV/RV/valves, no pulm HTN noted)


-Non obstx CAD on cath within last 5 years


- rec MAGNOLIA workup and treatment as outpt if she is amenable


- with lasix 80qd and metolazone she had not lost much volume, changed to lasix 

80 IV BID during admission with little improvement in sx, recurrent pleural 

effusion and worsened renal fx


- s/p tap with chest tube drainage of effusion


-LASIX NOW HELD due to rising creat which is improving.  Resume lasix at 40 po 

bid when cr near baseline.





2. HTN: 


-cont current meds





3. PNA?: with effusion, drained


-CT pattern more c/w consolidation, though normal temp curve, no infectious 

sx's, pleural fluid transudative 


-effusion recurred and did not improve substantially with aggressive diuresis 


-Pulm and ID following.





4. Nonobstx CAD:


-cont asa, statin





5. NAY on CKD: improving as we hold Lasix


-baseline creat 1.4-1.7 from prior hosp stay


-monitor daily chem7 as above


-Renal following





6. DM: as per PMD





7. COPD/smoking: smoking cessation recommended.








cardiac wise stable for dc

## 2020-09-28 NOTE — PN
Progress Note, Physician


History of Present Illness: 





Pt seen and examined at bedside. She is awake and alert. She denies shortness of

breath. 





- Current Medication List


Current Medications: 


Active Medications





Acetaminophen (Tylenol -)  650 mg PO Q6H PRN


   PRN Reason: PAIN LEVEL 1-5


   Last Admin: 09/27/20 21:27 Dose:  650 mg


   Documented by: 


Albuterol Sulfate (Ventolin Hfa Inhaler -)  2 puff IH Q4H PRN


   PRN Reason: SHORT OF BREATH/WHEEZING


   Last Admin: 09/09/20 22:16 Dose:  2 puff


   Documented by: 


Alprazolam (Xanax -)  0.5 mg PO Q12H PRN


   PRN Reason: ANXIETY


   Last Admin: 09/27/20 21:28 Dose:  0.5 mg


   Documented by: 


Amlodipine Besylate (Norvasc -)  10 mg PO DAILY [facility]


   Last Admin: 09/28/20 09:02 Dose:  10 mg


   Documented by: 


Aspirin (Ecotrin -)  81 mg PO DAILY [facility]


   Last Admin: 09/28/20 09:02 Dose:  81 mg


   Documented by: 


Brimonidine Tartrate (Alphagan 0.2% -)  1 drop OU BID [facility]


   Last Admin: 09/28/20 09:04 Dose:  1 drop


   Documented by: 


Carvedilol (Coreg -)  6.25 mg PO BID [facility]


   Last Admin: 09/28/20 09:02 Dose:  6.25 mg


   Documented by: 


Duloxetine HCl (Cymbalta -)  60 mg PO DAILY [facility]


   Last Admin: 09/28/20 09:02 Dose:  60 mg


   Documented by: 


Guaifenesin (Robitussin Dm -)  10 ml PO Q6H PRN


   PRN Reason: COUGH


Heparin Sodium (Porcine) (Heparin -)  5,000 unit SQ BID [facility]


   Last Admin: 09/28/20 09:02 Dose:  5,000 unit


   Documented by: 


Insulin Aspart (Novolog Vial Sliding Scale -)  [facility]; Protocol


   Last Admin: 09/28/20 10:57 Dose:  6 unit


   Documented by: 


Insulin Detemir (Levemir Vial)  36 units SQ AM [facility]


   Last Admin: 09/28/20 07:47 Dose:  36 unit


   Documented by: 


Insulin Detemir (Levemir Vial)  36 units SQ HS [facility]


   Last Admin: 09/27/20 21:29 Dose:  36 unit


   Documented by: 


Latanoprost (Xalatan 0.005% Eye Drops -)  1 drop OU HS [facility]


   Last Admin: 09/27/20 21:20 Dose:  Not Given


   Documented by: 


Melatonin (Melatonin)  5 mg PO HS PRN


   PRN Reason: INSOMNIA


   Last Admin: 09/18/20 23:16 Dose:  5 mg


   Documented by: 


Multivitamins/Minerals/Vitamin C (Tab-A-Vit -)  1 tab PO DAILY [facility]


   Last Admin: 09/28/20 09:02 Dose:  1 tab


   Documented by: 


Nicotine (Nicoderm Patch -)  14 mg TD DAILY [facility]


   Last Admin: 09/28/20 09:02 Dose:  14 mg


   Documented by: 


Nitroglycerin (Nitro-Bid 2% Paste -)  1 inch TD Q6HPO [facility]


   Last Admin: 09/28/20 11:05 Dose:  1 inch


   Documented by: 


Pantoprazole Sodium (Protonix -)  40 mg PO DAILY [facility]


   Last Admin: 09/28/20 09:02 Dose:  40 mg


   Documented by: 


Polyethylene Glycol (Miralax (For Daily Use) -)  17 gm PO DAILY PRN


   PRN Reason: CONSTIPATION


   Last Admin: 09/17/20 09:37 Dose:  17 grams


   Documented by: 


Rosuvastatin Calcium (Crestor -)  10 mg PO HS [facility]


   Last Admin: 09/27/20 21:26 Dose:  10 mg


   Documented by: 


Timolol Maleate (Timoptic 0.5%)  1 drop OU BID [facility]


   Last Admin: 09/28/20 09:05 Dose:  1 drop


   Documented by: 


Tiotropium Bromide (Spiriva Respimat)  2 puff IH DAILY [facility]


   Last Admin: 09/28/20 09:05 Dose:  2 puff


   Documented by: 











- Objective


Vital Signs: 


                                   Vital Signs











Temperature  98.2 F   09/28/20 14:00


 


Pulse Rate  99 H  09/28/20 14:00


 


Respiratory Rate  20   09/28/20 14:00


 


Blood Pressure  140/67   09/28/20 14:00


 


O2 Sat by Pulse Oximetry (%)  96   09/28/20 14:00











Constitutional: Yes: Calm


Eyes: Yes: Conjunctiva Clear


HENT: Yes: Atraumatic


Neck: Yes: Supple


Cardiovascular: Yes: S1, S2


Respiratory: Yes: CTA Bilaterally


Gastrointestinal: Yes: Soft, Abdomen, Obese


Genitourinary: Yes: WNL


Musculoskeletal: Yes: WNL


Edema: Yes


Edema: LLE: 1+, RLE: 1+


Neurological: Yes: Oriented


Psychiatric: Yes: Oriented


Labs: 


                                    CBC, BMP





                                 09/25/20 07:30 





                                 09/28/20 07:36 





                                    INR, PTT











INR  0.94  (0.83-1.09)   09/02/20  16:36    














Problem List





- Problems


(1) COPD (chronic obstructive pulmonary disease)


Code(s): J44.9 - CHRONIC OBSTRUCTIVE PULMONARY DISEASE, UNSPECIFIED   


Qualifiers: 


   COPD type: unspecified COPD   Qualified Code(s): J44.9 - Chronic obstructive 

pulmonary disease, unspecified   





(2) COPD exacerbation


Code(s): J44.1 - CHRONIC OBSTRUCTIVE PULMONARY DISEASE W (ACUTE) EXACERBATION   





(3) CKD (chronic kidney disease) stage 3, GFR 30-59 ml/min


Code(s): N18.3 - CHRONIC KIDNEY DISEASE, STAGE 3 (MODERATE)   





Assessment/Plan


                               Current Medications











Generic Name Dose Route Start Last Admin





  Trade Name Freq  PRN Reason Stop Dose Admin


 


Acetaminophen  650 mg  09/18/20 11:46  09/27/20 21:27





  Tylenol -  PO   650 mg





  Q6H PRN   Administration





  PAIN LEVEL 1-5  


 


Albuterol Sulfate  2 puff  09/03/20 00:47  09/09/20 22:16





  Ventolin Hfa Inhaler -  IH   2 puff





  Q4H PRN   Administration





  SHORT OF BREATH/WHEEZING  


 


Alprazolam  0.5 mg  09/27/20 15:48  09/27/20 21:28





  Xanax -  PO   0.5 mg





  Q12H PRN   Administration





  ANXIETY  


 


Amlodipine Besylate  10 mg  09/03/20 10:00  09/28/20 09:02





  Norvasc -  PO   10 mg





  DAILY JAYCEE   Administration


 


Aspirin  81 mg  09/03/20 10:00  09/28/20 09:02





  Ecotrin -  PO   81 mg





  DAILY JAYCEE   Administration


 


Brimonidine Tartrate  1 drop  09/03/20 10:00  09/28/20 09:04





  Alphagan 0.2% -  OU   1 drop





  BID JAYCEE   Administration


 


Carvedilol  6.25 mg  09/11/20 10:00  09/28/20 09:02





  Coreg -  PO   6.25 mg





  BID JAYCEE   Administration


 


Duloxetine HCl  60 mg  09/03/20 10:00  09/28/20 09:02





  Cymbalta -  PO   60 mg





  DAILY JAYCEE   Administration


 


Guaifenesin  10 ml  09/13/20 01:02 





  Robitussin Dm -  PO  





  Q6H PRN  





  COUGH  


 


Heparin Sodium (Porcine)  5,000 unit  09/25/20 10:00  09/28/20 09:02





  Heparin -  SQ   5,000 unit





  BID JAYCEE   Administration


 


Insulin Aspart  1 vial  09/03/20 07:00  09/28/20 10:57





  Novolog Vial Sliding Scale -  SQ   6 unit





  [facility] JAYCEE   Administration





  Protocol  


 


Insulin Detemir  36 units  09/27/20 07:00  09/28/20 07:47





  Levemir Vial  SQ   36 unit





  AM JAYCEE   Administration


 


Insulin Detemir  36 units  09/26/20 22:00  09/27/20 21:29





  Levemir Vial  SQ   36 unit





  HS JYACEE   Administration


 


Latanoprost  1 drop  09/03/20 22:00  09/27/20 21:20





  Xalatan 0.005% Eye Drops -  OU   Not Given





  HS JAYCEE  


 


Melatonin  5 mg  09/14/20 21:23  09/18/20 23:16





  Melatonin  PO   5 mg





  HS PRN   Administration





  INSOMNIA  


 


Multivitamins/Minerals/Vitamin C  1 tab  09/03/20 10:00  09/28/20 09:02





  Tab-A-Vit -  PO   1 tab





  DAILY JAYCEE   Administration


 


Nicotine  14 mg  09/05/20 10:00  09/28/20 09:02





  Nicoderm Patch -  TD   14 mg





  DAILY JAYCEE   Administration


 


Nitroglycerin  1 inch  09/12/20 12:00  09/28/20 11:05





  Nitro-Bid 2% Paste -  TD   1 inch





  Q6HPO JAYCEE   Administration


 


Pantoprazole Sodium  40 mg  09/04/20 10:00  09/28/20 09:02





  Protonix -  PO   40 mg





  DAILY JAYCEE   Administration


 


Polyethylene Glycol  17 gm  09/05/20 15:15  09/17/20 09:37





  Miralax (For Daily Use) -  PO   17 grams





  DAILY PRN   Administration





  CONSTIPATION  


 


Rosuvastatin Calcium  10 mg  09/03/20 22:00  09/27/20 21:26





  Crestor -  PO   10 mg





  HS JAYCEE   Administration


 


Timolol Maleate  1 drop  09/03/20 10:00  09/28/20 09:05





  Timoptic 0.5%  OU   1 drop





  BID JAYCEE   Administration


 


Tiotropium Bromide  2 puff  09/03/20 10:00  09/28/20 09:05





  Spiriva Respimat  IH   2 puff





  DAILY JAYCEE   Administration














Impression


1. CKD


2. CHF


3. COPD


4. hld


5. dm


6. cad


7. htn


8. proteinuria


9. mgus


10. pleural effusion





Plan


- lasix has been on hold


- can resume lasix at 40 mg daily in next one to 2 days


- pt will have bloodwork later this week and a cxr with pmd


- I can see her in the office next week on Wednesday and I will check her


renal function again


- pt instructed to weight herself every day and create a log


- will need to monitor renal function closely on diuretics


- pt still with lower ext edema

## 2020-09-28 NOTE — DS
Physical Examination


Vital Signs: 


                                   Vital Signs











Temperature  98.2 F   09/28/20 14:00


 


Pulse Rate  99 H  09/28/20 14:00


 


Respiratory Rate  20   09/28/20 14:00


 


Blood Pressure  140/67   09/28/20 14:00


 


O2 Sat by Pulse Oximetry (%)  96   09/28/20 14:00











Findings/Remarks: 


Patient seen and examined.


Chart is reviewed.


Sitting in chair.


chart is reviewed


No complaints


About 400 cc of fluid--- taken out  today  by Dr. Crook  in my presence---Right 

lung


Catheter also removed














Constitutional: Yes: No Distress, Calm


Neck: Yes: Supple


Cardiovascular: Yes: Regular Rate and Rhythm


Respiratory: Yes: Diminished


Gastrointestinal: Yes: Soft


Edema: LLE: 1+, RLE: 1+


Neurological: Yes: Alert


Labs: 


                                    CBC, BMP





                                 09/25/20 07:30 





                                 09/28/20 07:36 











Discharge Summary


Problems reviewed: Yes


Reason For Visit: CHRONIC OBSTRUCTIVE PULMONARY DISEASE,HYPERKALEMIA


Current Active Problems





COPD (chronic obstructive pulmonary disease) (Acute)


COPD exacerbation (Acute)


Encounter for screening laboratory testing for COVID-19 virus (Acute)


Leukocytosis (Acute)


Pleural effusion (Acute)


SOB (shortness of breath) (Acute)


Shortness of breath on exertion (Acute)








Hospital Course: 


Admitted for   shortness of breath


 and CHF exacerbation


Pleural effusion 


 treated with Diuretics/thoracocentesis


Pulmonary followed


Diuretics held now--- due to--- worsening of renal function


Clinically stable--for discharge


Blood sugar also under control


 chronic anxiety issues--- Requesting refill


Will discharge home today--- With close monitoring in office


Restart diuretics--- from tomorrow


Follow-up  chest x-ray in couple of weeks


Patient advised to follow-up in office--- next week


 also advised to follow-up with pulmonary as advised


Patient in agreement


 medications reconciled


Prescribed as needed


Patient in agreement with all above.


Discussed with nursing staff as well as  also





Condition: Guarded





- Instructions


Referrals: 


Yelitza Savage MD [Primary Care Provider] - 


Disposition: HOME





- Home Medications


Comprehensive Discharge Medication List: 


Ambulatory Orders





Rosuvastatin Calcium [Crestor] 10 mg PO HS 10/14/16 


Amlodipine Besylate [Norvasc -] 10 mg PO DAILY #30 tablet 10/18/16 


Bimatoprost [Lumigan] 1 drop OU DAILY 12/05/17 


Brimonidine Tartrate/Timolol [Combigan 0.2%-0.5% Eye Drops] 5 5ml OU BID 

12/05/17 


Aspirin [Ecotrin] 81 mg PO DAILY 05/03/18 


Furosemide [Lasix -] 40 mg PO DAILY #30 tablet 05/15/18 


Multivitamin [Multiple Vitamins] 1 each PO DAILY 11/15/19 


Umeclidinium Bromide [Incruse Ellipta] 62.5 mcg IH DAILY 11/15/19 


Duloxetine HCl [Cymbalta] 60 mg PO DAILY 07/16/20 


Albuterol Sulfate Inhaler - [Ventolin HFA Inhaler -] 2 inh PO PRN PRN 09/03/20 


Acetaminophen [Tylenol .Regular Strength -] 650 mg PO Q6H PRN  tablet 09/28/20 


Albuterol Sulfate Inhaler - [Ventolin HFA Inhaler -] 2 puff IH Q4H PRN  inhaler 

09/28/20 


Alprazolam [Xanax] 0.5 mg PO Q12H PRN  tablet 09/28/20 


Brimonidine Tartrate [Alphagan 0.2% -] 1 drop OU BID  drops 09/28/20 


Carvedilol [Coreg -] 6.25 mg PO BID 30 Days #60 tablet 09/28/20 


Insulin (Levemir) [Levemir Vial] 36 units SQ AM  units 09/28/20 


Insulin (Levemir) [Levemir Vial] 36 units SQ HS  units 09/28/20 


Melatonin 5 mg PO HS PRN  tab 09/28/20 


Multivitamins [Multivit ([facility] Form[facility])] 1 tab PO DAILY #30 tab 09/28/20 


Nicotine Patch [Nicoderm Patch -] 14 mg TD DAILY  patch 09/28/20 


Pantoprazole Sodium [Protonix -] 40 mg PO DAILY  tablet.ec 09/28/20 


Polyethylene Glycol 3350 [Miralax 119 gm Btl -] 17 gm PO DAILY PRN  bottle 

09/28/20 


Timolol 0.5% [Timoptic 0.5%] 1 drop OU BID  drops 09/28/20 


Tiotropium Bromide [Spiriva Respimat] 2 puff IH DAILY  inhaler 09/28/20

## 2020-11-29 ENCOUNTER — HOSPITAL ENCOUNTER (INPATIENT)
Dept: HOSPITAL 74 - JER | Age: 65
LOS: 9 days | Discharge: HOME HEALTH | DRG: 291 | End: 2020-12-08
Admitting: INTERNAL MEDICINE
Payer: COMMERCIAL

## 2020-11-29 VITALS — BODY MASS INDEX: 42.7 KG/M2

## 2020-11-29 DIAGNOSIS — N18.30: ICD-10-CM

## 2020-11-29 DIAGNOSIS — E87.5: ICD-10-CM

## 2020-11-29 DIAGNOSIS — J96.21: ICD-10-CM

## 2020-11-29 DIAGNOSIS — I13.0: Primary | ICD-10-CM

## 2020-11-29 DIAGNOSIS — G47.33: ICD-10-CM

## 2020-11-29 DIAGNOSIS — J44.1: ICD-10-CM

## 2020-11-29 DIAGNOSIS — E66.9: ICD-10-CM

## 2020-11-29 DIAGNOSIS — N17.9: ICD-10-CM

## 2020-11-29 DIAGNOSIS — E11.9: ICD-10-CM

## 2020-11-29 DIAGNOSIS — F41.9: ICD-10-CM

## 2020-11-29 DIAGNOSIS — I25.10: ICD-10-CM

## 2020-11-29 DIAGNOSIS — J18.9: ICD-10-CM

## 2020-11-29 DIAGNOSIS — I50.33: ICD-10-CM

## 2020-11-29 DIAGNOSIS — J90: ICD-10-CM

## 2020-11-29 DIAGNOSIS — F17.210: ICD-10-CM

## 2020-11-29 DIAGNOSIS — N18.9: ICD-10-CM

## 2020-11-29 LAB
ALBUMIN SERPL-MCNC: 2.9 G/DL (ref 3.4–5)
ALP SERPL-CCNC: 360 U/L (ref 45–117)
ALT SERPL-CCNC: 59 U/L (ref 13–61)
ANION GAP SERPL CALC-SCNC: 8 MMOL/L (ref 8–16)
AST SERPL-CCNC: 59 U/L (ref 15–37)
BASOPHILS # BLD: 0.5 % (ref 0–2)
BILIRUB SERPL-MCNC: 0.3 MG/DL (ref 0.2–1)
BNP SERPL-MCNC: 330.3 PG/ML (ref 5–125)
BUN SERPL-MCNC: 44.4 MG/DL (ref 7–18)
CALCIUM SERPL-MCNC: 9.8 MG/DL (ref 8.5–10.1)
CHLORIDE SERPL-SCNC: 109 MMOL/L (ref 98–107)
CO2 SERPL-SCNC: 22 MMOL/L (ref 21–32)
CREAT SERPL-MCNC: 1.8 MG/DL (ref 0.55–1.3)
DEPRECATED RDW RBC AUTO: 14.9 % (ref 11.6–15.6)
EOSINOPHIL # BLD: 0 % (ref 0–4.5)
GLUCOSE SERPL-MCNC: 194 MG/DL (ref 74–106)
HCT VFR BLD CALC: 38 % (ref 32.4–45.2)
HGB BLD-MCNC: 12.7 GM/DL (ref 10.7–15.3)
LYMPHOCYTES # BLD: 7.8 % (ref 8–40)
MCH RBC QN AUTO: 31.8 PG (ref 25.7–33.7)
MCHC RBC AUTO-ENTMCNC: 33.5 G/DL (ref 32–36)
MCV RBC: 94.8 FL (ref 80–96)
MONOCYTES # BLD AUTO: 1 % (ref 3.8–10.2)
NEUTROPHILS # BLD: 90.7 % (ref 42.8–82.8)
PLATELET # BLD AUTO: 287 K/MM3 (ref 134–434)
PMV BLD: 10.1 FL (ref 7.5–11.1)
PROT SERPL-MCNC: 6.8 G/DL (ref 6.4–8.2)
RBC # BLD AUTO: 4.01 M/MM3 (ref 3.6–5.2)
SODIUM SERPL-SCNC: 139 MMOL/L (ref 136–145)
WBC # BLD AUTO: 10.3 K/MM3 (ref 4–10)

## 2020-11-29 PROCEDURE — U0003 INFECTIOUS AGENT DETECTION BY NUCLEIC ACID (DNA OR RNA); SEVERE ACUTE RESPIRATORY SYNDROME CORONAVIRUS 2 (SARS-COV-2) (CORONAVIRUS DISEASE [COVID-19]), AMPLIFIED PROBE TECHNIQUE, MAKING USE OF HIGH THROUGHPUT TECHNOLOGIES AS DESCRIBED BY CMS-2020-01-R: HCPCS

## 2020-11-29 PROCEDURE — C1769 GUIDE WIRE: HCPCS

## 2020-11-29 PROCEDURE — C1894 INTRO/SHEATH, NON-LASER: HCPCS

## 2020-11-29 PROCEDURE — C9803 HOPD COVID-19 SPEC COLLECT: HCPCS

## 2020-11-29 PROCEDURE — C1729 CATH, DRAINAGE: HCPCS

## 2020-11-30 LAB
ALBUMIN SERPL-MCNC: 3 G/DL (ref 3.4–5)
ALP SERPL-CCNC: 345 U/L (ref 45–117)
ALT SERPL-CCNC: 57 U/L (ref 13–61)
ANION GAP SERPL CALC-SCNC: 9 MMOL/L (ref 8–16)
AST SERPL-CCNC: 49 U/L (ref 15–37)
BILIRUB SERPL-MCNC: 0.5 MG/DL (ref 0.2–1)
BUN SERPL-MCNC: 47 MG/DL (ref 7–18)
CALCIUM SERPL-MCNC: 9.6 MG/DL (ref 8.5–10.1)
CHLORIDE SERPL-SCNC: 106 MMOL/L (ref 98–107)
CO2 SERPL-SCNC: 23 MMOL/L (ref 21–32)
CREAT SERPL-MCNC: 2 MG/DL (ref 0.55–1.3)
DEPRECATED RDW RBC AUTO: 15.5 % (ref 11.6–15.6)
GLUCOSE SERPL-MCNC: 369 MG/DL (ref 74–106)
HCT VFR BLD CALC: 38.9 % (ref 32.4–45.2)
HGB BLD-MCNC: 12.5 GM/DL (ref 10.7–15.3)
MAGNESIUM SERPL-MCNC: 2 MG/DL (ref 1.8–2.4)
MCH RBC QN AUTO: 30.8 PG (ref 25.7–33.7)
MCHC RBC AUTO-ENTMCNC: 32.3 G/DL (ref 32–36)
MCV RBC: 95.6 FL (ref 80–96)
PHOSPHATE SERPL-MCNC: 4.3 MG/DL (ref 2.5–4.9)
PLATELET # BLD AUTO: 281 K/MM3 (ref 134–434)
PMV BLD: 10 FL (ref 7.5–11.1)
PROT SERPL-MCNC: 6.8 G/DL (ref 6.4–8.2)
RBC # BLD AUTO: 4.07 M/MM3 (ref 3.6–5.2)
SODIUM SERPL-SCNC: 138 MMOL/L (ref 136–145)
WBC # BLD AUTO: 11.9 K/MM3 (ref 4–10)

## 2020-11-30 RX ADMIN — FUROSEMIDE SCH MG: 10 INJECTION, SOLUTION INTRAVENOUS at 16:11

## 2020-11-30 RX ADMIN — METHYLPREDNISOLONE SODIUM SUCCINATE SCH MG: 40 INJECTION, POWDER, FOR SOLUTION INTRAMUSCULAR; INTRAVENOUS at 06:09

## 2020-11-30 RX ADMIN — INSULIN DETEMIR SCH UNITS: 100 INJECTION, SOLUTION SUBCUTANEOUS at 22:30

## 2020-11-30 RX ADMIN — METHYLPREDNISOLONE SODIUM SUCCINATE SCH MG: 40 INJECTION, POWDER, FOR SOLUTION INTRAMUSCULAR; INTRAVENOUS at 16:12

## 2020-11-30 RX ADMIN — IPRATROPIUM BROMIDE AND ALBUTEROL SULFATE SCH AMP: .5; 3 SOLUTION RESPIRATORY (INHALATION) at 19:42

## 2020-11-30 RX ADMIN — NICOTINE SCH MG: 14 PATCH, EXTENDED RELEASE TRANSDERMAL at 10:20

## 2020-11-30 RX ADMIN — HEPARIN SODIUM SCH UNIT: 5000 INJECTION, SOLUTION INTRAVENOUS; SUBCUTANEOUS at 06:09

## 2020-11-30 RX ADMIN — TIMOLOL MALEATE SCH DROP: 5 SOLUTION OPHTHALMIC at 22:28

## 2020-11-30 RX ADMIN — ROSUVASTATIN CALCIUM SCH MG: 10 TABLET, FILM COATED ORAL at 22:28

## 2020-11-30 RX ADMIN — INSULIN ASPART SCH UNITS: 100 INJECTION, SOLUTION INTRAVENOUS; SUBCUTANEOUS at 22:29

## 2020-11-30 RX ADMIN — HEPARIN SODIUM SCH UNIT: 5000 INJECTION, SOLUTION INTRAVENOUS; SUBCUTANEOUS at 16:11

## 2020-11-30 RX ADMIN — METHYLPREDNISOLONE SODIUM SUCCINATE SCH MG: 40 INJECTION, POWDER, FOR SOLUTION INTRAMUSCULAR; INTRAVENOUS at 22:26

## 2020-11-30 RX ADMIN — BRIMONIDINE TARTRATE SCH DROP: 2 SOLUTION/ DROPS OPHTHALMIC at 22:32

## 2020-11-30 RX ADMIN — CARVEDILOL SCH MG: 6.25 TABLET, FILM COATED ORAL at 22:28

## 2020-11-30 RX ADMIN — AMLODIPINE BESYLATE SCH MG: 10 TABLET ORAL at 10:20

## 2020-11-30 RX ADMIN — CARVEDILOL SCH MG: 6.25 TABLET, FILM COATED ORAL at 10:25

## 2020-11-30 RX ADMIN — PANTOPRAZOLE SODIUM SCH MG: 40 TABLET, DELAYED RELEASE ORAL at 10:25

## 2020-11-30 RX ADMIN — INSULIN ASPART SCH UNITS: 100 INJECTION, SOLUTION INTRAVENOUS; SUBCUTANEOUS at 11:29

## 2020-11-30 RX ADMIN — METHYLPREDNISOLONE SODIUM SUCCINATE SCH MG: 40 INJECTION, POWDER, FOR SOLUTION INTRAMUSCULAR; INTRAVENOUS at 08:01

## 2020-11-30 RX ADMIN — BRIMONIDINE TARTRATE SCH DROP: 2 SOLUTION/ DROPS OPHTHALMIC at 10:25

## 2020-11-30 RX ADMIN — TIMOLOL MALEATE SCH DROP: 5 SOLUTION OPHTHALMIC at 10:25

## 2020-11-30 RX ADMIN — HEPARIN SODIUM SCH UNIT: 5000 INJECTION, SOLUTION INTRAVENOUS; SUBCUTANEOUS at 22:27

## 2020-11-30 RX ADMIN — MULTIVITAMIN TABLET SCH TAB: TABLET at 10:25

## 2020-11-30 RX ADMIN — INSULIN ASPART SCH UNITS: 100 INJECTION, SOLUTION INTRAVENOUS; SUBCUTANEOUS at 16:30

## 2020-11-30 RX ADMIN — TIOTROPIUM BROMIDE INHALATION SPRAY SCH PUFF: 3.12 SPRAY, METERED RESPIRATORY (INHALATION) at 10:26

## 2020-12-01 LAB
ANION GAP SERPL CALC-SCNC: 5 MMOL/L (ref 8–16)
BUN SERPL-MCNC: 62.5 MG/DL (ref 7–18)
CALCIUM SERPL-MCNC: 9 MG/DL (ref 8.5–10.1)
CHLORIDE SERPL-SCNC: 109 MMOL/L (ref 98–107)
CO2 SERPL-SCNC: 24 MMOL/L (ref 21–32)
CREAT SERPL-MCNC: 2.1 MG/DL (ref 0.55–1.3)
GLUCOSE SERPL-MCNC: 310 MG/DL (ref 74–106)
MONOCYTES NFR PLR MANUAL: 68 %
NUC CELL # FLD: 356 /MM3
SODIUM SERPL-SCNC: 138 MMOL/L (ref 136–145)

## 2020-12-01 PROCEDURE — 0W993ZX DRAINAGE OF RIGHT PLEURAL CAVITY, PERCUTANEOUS APPROACH, DIAGNOSTIC: ICD-10-PCS | Performed by: INTERNAL MEDICINE

## 2020-12-01 RX ADMIN — NICOTINE SCH MG: 14 PATCH, EXTENDED RELEASE TRANSDERMAL at 10:34

## 2020-12-01 RX ADMIN — METHYLPREDNISOLONE SODIUM SUCCINATE SCH MG: 40 INJECTION, POWDER, FOR SOLUTION INTRAMUSCULAR; INTRAVENOUS at 21:57

## 2020-12-01 RX ADMIN — INSULIN DETEMIR SCH UNITS: 100 INJECTION, SOLUTION SUBCUTANEOUS at 22:10

## 2020-12-01 RX ADMIN — TIMOLOL MALEATE SCH DROP: 5 SOLUTION OPHTHALMIC at 10:35

## 2020-12-01 RX ADMIN — IPRATROPIUM BROMIDE AND ALBUTEROL SULFATE SCH: .5; 3 SOLUTION RESPIRATORY (INHALATION) at 16:57

## 2020-12-01 RX ADMIN — TIOTROPIUM BROMIDE INHALATION SPRAY SCH PUFF: 3.12 SPRAY, METERED RESPIRATORY (INHALATION) at 10:35

## 2020-12-01 RX ADMIN — INSULIN ASPART SCH UNITS: 100 INJECTION, SOLUTION INTRAVENOUS; SUBCUTANEOUS at 22:09

## 2020-12-01 RX ADMIN — IPRATROPIUM BROMIDE AND ALBUTEROL SULFATE SCH AMP: .5; 3 SOLUTION RESPIRATORY (INHALATION) at 08:00

## 2020-12-01 RX ADMIN — ROSUVASTATIN CALCIUM SCH MG: 10 TABLET, FILM COATED ORAL at 21:58

## 2020-12-01 RX ADMIN — PANTOPRAZOLE SODIUM SCH MG: 40 TABLET, DELAYED RELEASE ORAL at 10:34

## 2020-12-01 RX ADMIN — BRIMONIDINE TARTRATE SCH DROP: 2 SOLUTION/ DROPS OPHTHALMIC at 21:58

## 2020-12-01 RX ADMIN — METHYLPREDNISOLONE SODIUM SUCCINATE SCH MG: 40 INJECTION, POWDER, FOR SOLUTION INTRAMUSCULAR; INTRAVENOUS at 10:33

## 2020-12-01 RX ADMIN — IPRATROPIUM BROMIDE AND ALBUTEROL SULFATE SCH AMP: .5; 3 SOLUTION RESPIRATORY (INHALATION) at 12:00

## 2020-12-01 RX ADMIN — IPRATROPIUM BROMIDE AND ALBUTEROL SULFATE SCH AMP: .5; 3 SOLUTION RESPIRATORY (INHALATION) at 20:30

## 2020-12-01 RX ADMIN — BRIMONIDINE TARTRATE SCH DROP: 2 SOLUTION/ DROPS OPHTHALMIC at 10:34

## 2020-12-01 RX ADMIN — MULTIVITAMIN TABLET SCH TAB: TABLET at 10:34

## 2020-12-01 RX ADMIN — METHYLPREDNISOLONE SODIUM SUCCINATE SCH MG: 40 INJECTION, POWDER, FOR SOLUTION INTRAMUSCULAR; INTRAVENOUS at 03:03

## 2020-12-01 RX ADMIN — INSULIN ASPART SCH UNITS: 100 INJECTION, SOLUTION INTRAVENOUS; SUBCUTANEOUS at 06:35

## 2020-12-01 RX ADMIN — AMLODIPINE BESYLATE SCH MG: 10 TABLET ORAL at 10:34

## 2020-12-01 RX ADMIN — INSULIN ASPART SCH UNITS: 100 INJECTION, SOLUTION INTRAVENOUS; SUBCUTANEOUS at 10:59

## 2020-12-01 RX ADMIN — INSULIN ASPART SCH UNITS: 100 INJECTION, SOLUTION INTRAVENOUS; SUBCUTANEOUS at 15:34

## 2020-12-01 RX ADMIN — FUROSEMIDE SCH MG: 10 INJECTION, SOLUTION INTRAVENOUS at 10:34

## 2020-12-01 RX ADMIN — TIMOLOL MALEATE SCH DROP: 5 SOLUTION OPHTHALMIC at 21:57

## 2020-12-01 RX ADMIN — CARVEDILOL SCH MG: 6.25 TABLET, FILM COATED ORAL at 21:58

## 2020-12-01 RX ADMIN — CARVEDILOL SCH MG: 6.25 TABLET, FILM COATED ORAL at 10:34

## 2020-12-01 RX ADMIN — METHYLPREDNISOLONE SODIUM SUCCINATE SCH MG: 40 INJECTION, POWDER, FOR SOLUTION INTRAMUSCULAR; INTRAVENOUS at 15:07

## 2020-12-02 LAB
ALBUMIN SERPL-MCNC: 2.7 G/DL (ref 3.4–5)
ALP SERPL-CCNC: 324 U/L (ref 45–117)
ALT SERPL-CCNC: 57 U/L (ref 13–61)
ANION GAP SERPL CALC-SCNC: 9 MMOL/L (ref 8–16)
ANION GAP SERPL CALC-SCNC: 9 MMOL/L (ref 8–16)
AST SERPL-CCNC: 41 U/L (ref 15–37)
BILIRUB SERPL-MCNC: 0.5 MG/DL (ref 0.2–1)
BUN SERPL-MCNC: 72 MG/DL (ref 7–18)
BUN SERPL-MCNC: 79.6 MG/DL (ref 7–18)
CALCIUM SERPL-MCNC: 9 MG/DL (ref 8.5–10.1)
CALCIUM SERPL-MCNC: 9.1 MG/DL (ref 8.5–10.1)
CHLORIDE SERPL-SCNC: 102 MMOL/L (ref 98–107)
CHLORIDE SERPL-SCNC: 106 MMOL/L (ref 98–107)
CO2 SERPL-SCNC: 22 MMOL/L (ref 21–32)
CO2 SERPL-SCNC: 23 MMOL/L (ref 21–32)
CREAT SERPL-MCNC: 2.3 MG/DL (ref 0.55–1.3)
CREAT SERPL-MCNC: 2.9 MG/DL (ref 0.55–1.3)
GLUCOSE SERPL-MCNC: 333 MG/DL (ref 74–106)
GLUCOSE SERPL-MCNC: 394 MG/DL (ref 74–106)
PROT SERPL-MCNC: 6.3 G/DL (ref 6.4–8.2)
SODIUM SERPL-SCNC: 134 MMOL/L (ref 136–145)
SODIUM SERPL-SCNC: 137 MMOL/L (ref 136–145)

## 2020-12-02 RX ADMIN — BRIMONIDINE TARTRATE SCH DROP: 2 SOLUTION/ DROPS OPHTHALMIC at 09:52

## 2020-12-02 RX ADMIN — INSULIN ASPART SCH UNITS: 100 INJECTION, SOLUTION INTRAVENOUS; SUBCUTANEOUS at 16:24

## 2020-12-02 RX ADMIN — AMLODIPINE BESYLATE SCH MG: 10 TABLET ORAL at 09:39

## 2020-12-02 RX ADMIN — NICOTINE SCH MG: 14 PATCH, EXTENDED RELEASE TRANSDERMAL at 09:39

## 2020-12-02 RX ADMIN — HEPARIN SODIUM SCH UNIT: 5000 INJECTION, SOLUTION INTRAVENOUS; SUBCUTANEOUS at 21:44

## 2020-12-02 RX ADMIN — TIMOLOL MALEATE SCH DROP: 5 SOLUTION OPHTHALMIC at 09:52

## 2020-12-02 RX ADMIN — CARVEDILOL SCH MG: 6.25 TABLET, FILM COATED ORAL at 21:45

## 2020-12-02 RX ADMIN — HEPARIN SODIUM SCH UNIT: 5000 INJECTION, SOLUTION INTRAVENOUS; SUBCUTANEOUS at 14:01

## 2020-12-02 RX ADMIN — IPRATROPIUM BROMIDE AND ALBUTEROL SULFATE SCH AMP: .5; 3 SOLUTION RESPIRATORY (INHALATION) at 07:45

## 2020-12-02 RX ADMIN — METHYLPREDNISOLONE SODIUM SUCCINATE SCH MG: 40 INJECTION, POWDER, FOR SOLUTION INTRAMUSCULAR; INTRAVENOUS at 02:56

## 2020-12-02 RX ADMIN — IPRATROPIUM BROMIDE AND ALBUTEROL SULFATE SCH AMP: .5; 3 SOLUTION RESPIRATORY (INHALATION) at 16:59

## 2020-12-02 RX ADMIN — METHYLPREDNISOLONE SODIUM SUCCINATE SCH MG: 40 INJECTION, POWDER, FOR SOLUTION INTRAMUSCULAR; INTRAVENOUS at 15:01

## 2020-12-02 RX ADMIN — METHYLPREDNISOLONE SODIUM SUCCINATE SCH MG: 40 INJECTION, POWDER, FOR SOLUTION INTRAMUSCULAR; INTRAVENOUS at 09:39

## 2020-12-02 RX ADMIN — TIMOLOL MALEATE SCH DROP: 5 SOLUTION OPHTHALMIC at 21:48

## 2020-12-02 RX ADMIN — INSULIN DETEMIR SCH UNITS: 100 INJECTION, SOLUTION SUBCUTANEOUS at 21:53

## 2020-12-02 RX ADMIN — INSULIN ASPART SCH UNITS: 100 INJECTION, SOLUTION INTRAVENOUS; SUBCUTANEOUS at 12:14

## 2020-12-02 RX ADMIN — PANTOPRAZOLE SODIUM SCH MG: 40 TABLET, DELAYED RELEASE ORAL at 09:39

## 2020-12-02 RX ADMIN — IPRATROPIUM BROMIDE AND ALBUTEROL SULFATE SCH AMP: .5; 3 SOLUTION RESPIRATORY (INHALATION) at 11:31

## 2020-12-02 RX ADMIN — IPRATROPIUM BROMIDE AND ALBUTEROL SULFATE SCH AMP: .5; 3 SOLUTION RESPIRATORY (INHALATION) at 20:49

## 2020-12-02 RX ADMIN — TIOTROPIUM BROMIDE INHALATION SPRAY SCH PUFF: 3.12 SPRAY, METERED RESPIRATORY (INHALATION) at 09:51

## 2020-12-02 RX ADMIN — INSULIN ASPART SCH UNITS: 100 INJECTION, SOLUTION INTRAVENOUS; SUBCUTANEOUS at 21:52

## 2020-12-02 RX ADMIN — ROSUVASTATIN CALCIUM SCH MG: 10 TABLET, FILM COATED ORAL at 21:45

## 2020-12-02 RX ADMIN — INSULIN ASPART SCH UNITS: 100 INJECTION, SOLUTION INTRAVENOUS; SUBCUTANEOUS at 06:37

## 2020-12-02 RX ADMIN — MULTIVITAMIN TABLET SCH TAB: TABLET at 09:39

## 2020-12-02 RX ADMIN — CARVEDILOL SCH MG: 6.25 TABLET, FILM COATED ORAL at 09:39

## 2020-12-02 RX ADMIN — BRIMONIDINE TARTRATE SCH DROP: 2 SOLUTION/ DROPS OPHTHALMIC at 21:48

## 2020-12-03 LAB
ALBUMIN SERPL-MCNC: 2.6 G/DL (ref 3.4–5)
ALP SERPL-CCNC: 309 U/L (ref 45–117)
ALT SERPL-CCNC: 68 U/L (ref 13–61)
ANION GAP SERPL CALC-SCNC: 7 MMOL/L (ref 8–16)
ANION GAP SERPL CALC-SCNC: 8 MMOL/L (ref 8–16)
AST SERPL-CCNC: 46 U/L (ref 15–37)
BASOPHILS # BLD: 0.1 % (ref 0–2)
BILIRUB SERPL-MCNC: 0.2 MG/DL (ref 0.2–1)
BUN SERPL-MCNC: 88.5 MG/DL (ref 7–18)
BUN SERPL-MCNC: 95.5 MG/DL (ref 7–18)
CALCIUM SERPL-MCNC: 8.6 MG/DL (ref 8.5–10.1)
CALCIUM SERPL-MCNC: 8.7 MG/DL (ref 8.5–10.1)
CHLORIDE SERPL-SCNC: 104 MMOL/L (ref 98–107)
CHLORIDE SERPL-SCNC: 105 MMOL/L (ref 98–107)
CO2 SERPL-SCNC: 22 MMOL/L (ref 21–32)
CO2 SERPL-SCNC: 24 MMOL/L (ref 21–32)
CREAT SERPL-MCNC: 2.7 MG/DL (ref 0.55–1.3)
CREAT SERPL-MCNC: 2.8 MG/DL (ref 0.55–1.3)
DEPRECATED RDW RBC AUTO: 14.9 % (ref 11.6–15.6)
EOSINOPHIL # BLD: 0.1 % (ref 0–4.5)
GLUCOSE SERPL-MCNC: 280 MG/DL (ref 74–106)
GLUCOSE SERPL-MCNC: 381 MG/DL (ref 74–106)
HCT VFR BLD CALC: 39.9 % (ref 32.4–45.2)
HGB BLD-MCNC: 12.8 GM/DL (ref 10.7–15.3)
LYMPHOCYTES # BLD: 7.6 % (ref 8–40)
MCH RBC QN AUTO: 30.8 PG (ref 25.7–33.7)
MCHC RBC AUTO-ENTMCNC: 32.2 G/DL (ref 32–36)
MCV RBC: 95.5 FL (ref 80–96)
MONOCYTES # BLD AUTO: 11 % (ref 3.8–10.2)
NEUTROPHILS # BLD: 81.2 % (ref 42.8–82.8)
PLATELET # BLD AUTO: 267 K/MM3 (ref 134–434)
PMV BLD: 10.2 FL (ref 7.5–11.1)
PROT SERPL-MCNC: 6 G/DL (ref 6.4–8.2)
RBC # BLD AUTO: 4.17 M/MM3 (ref 3.6–5.2)
SODIUM SERPL-SCNC: 135 MMOL/L (ref 136–145)
SODIUM SERPL-SCNC: 136 MMOL/L (ref 136–145)
WBC # BLD AUTO: 15.9 K/MM3 (ref 4–10)

## 2020-12-03 RX ADMIN — CARVEDILOL SCH MG: 6.25 TABLET, FILM COATED ORAL at 09:21

## 2020-12-03 RX ADMIN — INSULIN ASPART SCH UNITS: 100 INJECTION, SOLUTION INTRAVENOUS; SUBCUTANEOUS at 22:08

## 2020-12-03 RX ADMIN — IPRATROPIUM BROMIDE AND ALBUTEROL SULFATE SCH AMP: .5; 3 SOLUTION RESPIRATORY (INHALATION) at 07:43

## 2020-12-03 RX ADMIN — IPRATROPIUM BROMIDE AND ALBUTEROL SULFATE SCH AMP: .5; 3 SOLUTION RESPIRATORY (INHALATION) at 15:20

## 2020-12-03 RX ADMIN — INSULIN DETEMIR SCH UNITS: 100 INJECTION, SOLUTION SUBCUTANEOUS at 22:06

## 2020-12-03 RX ADMIN — TIMOLOL MALEATE SCH DROP: 5 SOLUTION OPHTHALMIC at 21:57

## 2020-12-03 RX ADMIN — HEPARIN SODIUM SCH UNIT: 5000 INJECTION, SOLUTION INTRAVENOUS; SUBCUTANEOUS at 21:58

## 2020-12-03 RX ADMIN — IPRATROPIUM BROMIDE AND ALBUTEROL SULFATE SCH AMP: .5; 3 SOLUTION RESPIRATORY (INHALATION) at 11:49

## 2020-12-03 RX ADMIN — TIOTROPIUM BROMIDE INHALATION SPRAY SCH PUFF: 3.12 SPRAY, METERED RESPIRATORY (INHALATION) at 09:21

## 2020-12-03 RX ADMIN — BRIMONIDINE TARTRATE SCH DROP: 2 SOLUTION/ DROPS OPHTHALMIC at 09:22

## 2020-12-03 RX ADMIN — NICOTINE SCH MG: 14 PATCH, EXTENDED RELEASE TRANSDERMAL at 09:20

## 2020-12-03 RX ADMIN — ROSUVASTATIN CALCIUM SCH MG: 10 TABLET, FILM COATED ORAL at 21:58

## 2020-12-03 RX ADMIN — MULTIVITAMIN TABLET SCH TAB: TABLET at 09:21

## 2020-12-03 RX ADMIN — HEPARIN SODIUM SCH UNIT: 5000 INJECTION, SOLUTION INTRAVENOUS; SUBCUTANEOUS at 05:58

## 2020-12-03 RX ADMIN — INSULIN ASPART SCH UNITS: 100 INJECTION, SOLUTION INTRAVENOUS; SUBCUTANEOUS at 11:38

## 2020-12-03 RX ADMIN — IPRATROPIUM BROMIDE AND ALBUTEROL SULFATE SCH AMP: .5; 3 SOLUTION RESPIRATORY (INHALATION) at 20:30

## 2020-12-03 RX ADMIN — CARVEDILOL SCH MG: 6.25 TABLET, FILM COATED ORAL at 21:58

## 2020-12-03 RX ADMIN — INSULIN ASPART SCH UNITS: 100 INJECTION, SOLUTION INTRAVENOUS; SUBCUTANEOUS at 06:03

## 2020-12-03 RX ADMIN — AMLODIPINE BESYLATE SCH MG: 10 TABLET ORAL at 09:21

## 2020-12-03 RX ADMIN — INSULIN ASPART SCH UNITS: 100 INJECTION, SOLUTION INTRAVENOUS; SUBCUTANEOUS at 17:31

## 2020-12-03 RX ADMIN — TIMOLOL MALEATE SCH DROP: 5 SOLUTION OPHTHALMIC at 09:22

## 2020-12-03 RX ADMIN — PANTOPRAZOLE SODIUM SCH MG: 40 TABLET, DELAYED RELEASE ORAL at 09:21

## 2020-12-03 RX ADMIN — BRIMONIDINE TARTRATE SCH DROP: 2 SOLUTION/ DROPS OPHTHALMIC at 21:57

## 2020-12-03 RX ADMIN — HEPARIN SODIUM SCH UNIT: 5000 INJECTION, SOLUTION INTRAVENOUS; SUBCUTANEOUS at 17:31

## 2020-12-04 LAB
ALBUMIN SERPL-MCNC: 2.7 G/DL (ref 3.4–5)
ALP SERPL-CCNC: 300 U/L (ref 45–117)
ALT SERPL-CCNC: 100 U/L (ref 13–61)
ANION GAP SERPL CALC-SCNC: 10 MMOL/L (ref 8–16)
ANION GAP SERPL CALC-SCNC: 11 MMOL/L (ref 8–16)
AST SERPL-CCNC: 75 U/L (ref 15–37)
BILIRUB SERPL-MCNC: 0.3 MG/DL (ref 0.2–1)
BUN SERPL-MCNC: 101 MG/DL (ref 7–18)
BUN SERPL-MCNC: 110.4 MG/DL (ref 7–18)
CALCIUM SERPL-MCNC: 9 MG/DL (ref 8.5–10.1)
CALCIUM SERPL-MCNC: 9 MG/DL (ref 8.5–10.1)
CHLORIDE SERPL-SCNC: 104 MMOL/L (ref 98–107)
CHLORIDE SERPL-SCNC: 104 MMOL/L (ref 98–107)
CO2 SERPL-SCNC: 20 MMOL/L (ref 21–32)
CO2 SERPL-SCNC: 21 MMOL/L (ref 21–32)
CREAT SERPL-MCNC: 2.8 MG/DL (ref 0.55–1.3)
CREAT SERPL-MCNC: 2.8 MG/DL (ref 0.55–1.3)
GLUCOSE SERPL-MCNC: 150 MG/DL (ref 74–106)
GLUCOSE SERPL-MCNC: 219 MG/DL (ref 74–106)
PROT SERPL-MCNC: 6.4 G/DL (ref 6.4–8.2)
SODIUM SERPL-SCNC: 135 MMOL/L (ref 136–145)
SODIUM SERPL-SCNC: 136 MMOL/L (ref 136–145)

## 2020-12-04 RX ADMIN — BRIMONIDINE TARTRATE SCH DROP: 2 SOLUTION/ DROPS OPHTHALMIC at 11:08

## 2020-12-04 RX ADMIN — BRIMONIDINE TARTRATE SCH DROP: 2 SOLUTION/ DROPS OPHTHALMIC at 22:37

## 2020-12-04 RX ADMIN — ROSUVASTATIN CALCIUM SCH MG: 10 TABLET, FILM COATED ORAL at 22:37

## 2020-12-04 RX ADMIN — IPRATROPIUM BROMIDE AND ALBUTEROL SULFATE SCH AMP: .5; 3 SOLUTION RESPIRATORY (INHALATION) at 13:10

## 2020-12-04 RX ADMIN — HEPARIN SODIUM SCH UNIT: 5000 INJECTION, SOLUTION INTRAVENOUS; SUBCUTANEOUS at 22:38

## 2020-12-04 RX ADMIN — TIOTROPIUM BROMIDE INHALATION SPRAY SCH PUFF: 3.12 SPRAY, METERED RESPIRATORY (INHALATION) at 11:08

## 2020-12-04 RX ADMIN — POLYETHYLENE GLYCOL 3350 SCH GM: 17 POWDER, FOR SOLUTION ORAL at 13:36

## 2020-12-04 RX ADMIN — INSULIN ASPART SCH UNITS: 100 INJECTION, SOLUTION INTRAVENOUS; SUBCUTANEOUS at 22:40

## 2020-12-04 RX ADMIN — CARVEDILOL SCH MG: 6.25 TABLET, FILM COATED ORAL at 22:37

## 2020-12-04 RX ADMIN — IPRATROPIUM BROMIDE AND ALBUTEROL SULFATE SCH AMP: .5; 3 SOLUTION RESPIRATORY (INHALATION) at 20:41

## 2020-12-04 RX ADMIN — INSULIN ASPART SCH UNITS: 100 INJECTION, SOLUTION INTRAVENOUS; SUBCUTANEOUS at 13:33

## 2020-12-04 RX ADMIN — NICOTINE SCH MG: 14 PATCH, EXTENDED RELEASE TRANSDERMAL at 11:16

## 2020-12-04 RX ADMIN — AMLODIPINE BESYLATE SCH MG: 10 TABLET ORAL at 11:08

## 2020-12-04 RX ADMIN — IPRATROPIUM BROMIDE AND ALBUTEROL SULFATE SCH AMP: .5; 3 SOLUTION RESPIRATORY (INHALATION) at 09:20

## 2020-12-04 RX ADMIN — HEPARIN SODIUM SCH UNIT: 5000 INJECTION, SOLUTION INTRAVENOUS; SUBCUTANEOUS at 17:29

## 2020-12-04 RX ADMIN — TIMOLOL MALEATE SCH DROP: 5 SOLUTION OPHTHALMIC at 11:08

## 2020-12-04 RX ADMIN — PANTOPRAZOLE SODIUM SCH MG: 40 TABLET, DELAYED RELEASE ORAL at 11:08

## 2020-12-04 RX ADMIN — IPRATROPIUM BROMIDE AND ALBUTEROL SULFATE SCH: .5; 3 SOLUTION RESPIRATORY (INHALATION) at 16:43

## 2020-12-04 RX ADMIN — CARVEDILOL SCH MG: 6.25 TABLET, FILM COATED ORAL at 11:08

## 2020-12-04 RX ADMIN — HEPARIN SODIUM SCH UNIT: 5000 INJECTION, SOLUTION INTRAVENOUS; SUBCUTANEOUS at 06:15

## 2020-12-04 RX ADMIN — INSULIN ASPART SCH UNITS: 100 INJECTION, SOLUTION INTRAVENOUS; SUBCUTANEOUS at 06:30

## 2020-12-04 RX ADMIN — INSULIN ASPART SCH UNITS: 100 INJECTION, SOLUTION INTRAVENOUS; SUBCUTANEOUS at 17:33

## 2020-12-04 RX ADMIN — MULTIVITAMIN TABLET SCH TAB: TABLET at 11:08

## 2020-12-04 RX ADMIN — TIMOLOL MALEATE SCH DROP: 5 SOLUTION OPHTHALMIC at 22:40

## 2020-12-04 RX ADMIN — INSULIN DETEMIR SCH UNITS: 100 INJECTION, SOLUTION SUBCUTANEOUS at 22:40

## 2020-12-05 LAB
ALBUMIN SERPL-MCNC: 2.6 G/DL (ref 3.4–5)
ALP SERPL-CCNC: 278 U/L (ref 45–117)
ALT SERPL-CCNC: 104 U/L (ref 13–61)
ANION GAP SERPL CALC-SCNC: 10 MMOL/L (ref 8–16)
ANION GAP SERPL CALC-SCNC: 10 MMOL/L (ref 8–16)
AST SERPL-CCNC: 89 U/L (ref 15–37)
BILIRUB SERPL-MCNC: 0.5 MG/DL (ref 0.2–1)
BUN SERPL-MCNC: 110.5 MG/DL (ref 7–18)
BUN SERPL-MCNC: 114.5 MG/DL (ref 7–18)
CALCIUM SERPL-MCNC: 8.7 MG/DL (ref 8.5–10.1)
CALCIUM SERPL-MCNC: 9.3 MG/DL (ref 8.5–10.1)
CHLORIDE SERPL-SCNC: 106 MMOL/L (ref 98–107)
CHLORIDE SERPL-SCNC: 106 MMOL/L (ref 98–107)
CO2 SERPL-SCNC: 20 MMOL/L (ref 21–32)
CO2 SERPL-SCNC: 21 MMOL/L (ref 21–32)
CREAT SERPL-MCNC: 2.6 MG/DL (ref 0.55–1.3)
CREAT SERPL-MCNC: 2.8 MG/DL (ref 0.55–1.3)
GLUCOSE SERPL-MCNC: 190 MG/DL (ref 74–106)
GLUCOSE SERPL-MCNC: 86 MG/DL (ref 74–106)
PROT SERPL-MCNC: 5.9 G/DL (ref 6.4–8.2)
SODIUM SERPL-SCNC: 136 MMOL/L (ref 136–145)
SODIUM SERPL-SCNC: 138 MMOL/L (ref 136–145)

## 2020-12-05 RX ADMIN — IPRATROPIUM BROMIDE AND ALBUTEROL SULFATE SCH AMP: .5; 3 SOLUTION RESPIRATORY (INHALATION) at 09:30

## 2020-12-05 RX ADMIN — CARVEDILOL SCH MG: 6.25 TABLET, FILM COATED ORAL at 21:47

## 2020-12-05 RX ADMIN — TIMOLOL MALEATE SCH DROP: 5 SOLUTION OPHTHALMIC at 21:49

## 2020-12-05 RX ADMIN — SODIUM ZIRCONIUM CYCLOSILICATE SCH: 5 POWDER, FOR SUSPENSION ORAL at 15:30

## 2020-12-05 RX ADMIN — MULTIVITAMIN TABLET SCH TAB: TABLET at 09:30

## 2020-12-05 RX ADMIN — TIOTROPIUM BROMIDE INHALATION SPRAY SCH PUFF: 3.12 SPRAY, METERED RESPIRATORY (INHALATION) at 09:36

## 2020-12-05 RX ADMIN — INSULIN ASPART SCH UNITS: 100 INJECTION, SOLUTION INTRAVENOUS; SUBCUTANEOUS at 21:47

## 2020-12-05 RX ADMIN — SODIUM ZIRCONIUM CYCLOSILICATE SCH: 5 POWDER, FOR SUSPENSION ORAL at 16:55

## 2020-12-05 RX ADMIN — ALBUTEROL SULFATE PRN AMP: 2.5 SOLUTION RESPIRATORY (INHALATION) at 22:15

## 2020-12-05 RX ADMIN — HEPARIN SODIUM SCH UNIT: 5000 INJECTION, SOLUTION INTRAVENOUS; SUBCUTANEOUS at 06:38

## 2020-12-05 RX ADMIN — SODIUM ZIRCONIUM CYCLOSILICATE SCH GM: 10 POWDER, FOR SUSPENSION ORAL at 17:30

## 2020-12-05 RX ADMIN — INSULIN DETEMIR SCH UNITS: 100 INJECTION, SOLUTION SUBCUTANEOUS at 21:47

## 2020-12-05 RX ADMIN — HEPARIN SODIUM SCH UNIT: 5000 INJECTION, SOLUTION INTRAVENOUS; SUBCUTANEOUS at 13:25

## 2020-12-05 RX ADMIN — ROSUVASTATIN CALCIUM SCH MG: 10 TABLET, FILM COATED ORAL at 21:47

## 2020-12-05 RX ADMIN — CARVEDILOL SCH MG: 6.25 TABLET, FILM COATED ORAL at 09:30

## 2020-12-05 RX ADMIN — BRIMONIDINE TARTRATE SCH DROP: 2 SOLUTION/ DROPS OPHTHALMIC at 21:48

## 2020-12-05 RX ADMIN — IPRATROPIUM BROMIDE AND ALBUTEROL SULFATE SCH: .5; 3 SOLUTION RESPIRATORY (INHALATION) at 13:00

## 2020-12-05 RX ADMIN — Medication SCH MG: at 15:58

## 2020-12-05 RX ADMIN — INSULIN ASPART SCH UNITS: 100 INJECTION, SOLUTION INTRAVENOUS; SUBCUTANEOUS at 11:52

## 2020-12-05 RX ADMIN — POLYETHYLENE GLYCOL 3350 SCH: 17 POWDER, FOR SOLUTION ORAL at 09:31

## 2020-12-05 RX ADMIN — TIMOLOL MALEATE SCH DROP: 5 SOLUTION OPHTHALMIC at 09:36

## 2020-12-05 RX ADMIN — BRIMONIDINE TARTRATE SCH DROP: 2 SOLUTION/ DROPS OPHTHALMIC at 09:36

## 2020-12-05 RX ADMIN — HEPARIN SODIUM SCH UNIT: 5000 INJECTION, SOLUTION INTRAVENOUS; SUBCUTANEOUS at 21:47

## 2020-12-05 RX ADMIN — INSULIN ASPART SCH: 100 INJECTION, SOLUTION INTRAVENOUS; SUBCUTANEOUS at 06:37

## 2020-12-05 RX ADMIN — PANTOPRAZOLE SODIUM SCH MG: 40 TABLET, DELAYED RELEASE ORAL at 09:30

## 2020-12-05 RX ADMIN — AMLODIPINE BESYLATE SCH MG: 10 TABLET ORAL at 09:30

## 2020-12-05 RX ADMIN — INSULIN ASPART SCH UNITS: 100 INJECTION, SOLUTION INTRAVENOUS; SUBCUTANEOUS at 16:54

## 2020-12-05 RX ADMIN — IPRATROPIUM BROMIDE AND ALBUTEROL SULFATE SCH AMP: .5; 3 SOLUTION RESPIRATORY (INHALATION) at 17:00

## 2020-12-05 RX ADMIN — NICOTINE SCH MG: 14 PATCH, EXTENDED RELEASE TRANSDERMAL at 09:29

## 2020-12-06 LAB
ALBUMIN SERPL-MCNC: 2.7 G/DL (ref 3.4–5)
ALP SERPL-CCNC: 263 U/L (ref 45–117)
ALT SERPL-CCNC: 88 U/L (ref 13–61)
ANION GAP SERPL CALC-SCNC: 10 MMOL/L (ref 8–16)
ANION GAP SERPL CALC-SCNC: 9 MMOL/L (ref 8–16)
AST SERPL-CCNC: 66 U/L (ref 15–37)
BILIRUB SERPL-MCNC: 0.5 MG/DL (ref 0.2–1)
BUN SERPL-MCNC: 100.7 MG/DL (ref 7–18)
BUN SERPL-MCNC: 102.7 MG/DL (ref 7–18)
CALCIUM SERPL-MCNC: 8.8 MG/DL (ref 8.5–10.1)
CALCIUM SERPL-MCNC: 9.3 MG/DL (ref 8.5–10.1)
CHLORIDE SERPL-SCNC: 108 MMOL/L (ref 98–107)
CHLORIDE SERPL-SCNC: 109 MMOL/L (ref 98–107)
CO2 SERPL-SCNC: 20 MMOL/L (ref 21–32)
CO2 SERPL-SCNC: 22 MMOL/L (ref 21–32)
CREAT SERPL-MCNC: 2.3 MG/DL (ref 0.55–1.3)
CREAT SERPL-MCNC: 2.4 MG/DL (ref 0.55–1.3)
GLUCOSE SERPL-MCNC: 238 MG/DL (ref 74–106)
GLUCOSE SERPL-MCNC: 97 MG/DL (ref 74–106)
PROT SERPL-MCNC: 6.2 G/DL (ref 6.4–8.2)
SODIUM SERPL-SCNC: 139 MMOL/L (ref 136–145)
SODIUM SERPL-SCNC: 139 MMOL/L (ref 136–145)

## 2020-12-06 RX ADMIN — INSULIN DETEMIR SCH UNITS: 100 INJECTION, SOLUTION SUBCUTANEOUS at 21:35

## 2020-12-06 RX ADMIN — HEPARIN SODIUM SCH UNIT: 5000 INJECTION, SOLUTION INTRAVENOUS; SUBCUTANEOUS at 06:39

## 2020-12-06 RX ADMIN — PANTOPRAZOLE SODIUM SCH MG: 40 TABLET, DELAYED RELEASE ORAL at 10:12

## 2020-12-06 RX ADMIN — HEPARIN SODIUM SCH UNIT: 5000 INJECTION, SOLUTION INTRAVENOUS; SUBCUTANEOUS at 21:31

## 2020-12-06 RX ADMIN — ALBUTEROL SULFATE PRN AMP: 2.5 SOLUTION RESPIRATORY (INHALATION) at 09:50

## 2020-12-06 RX ADMIN — INSULIN ASPART SCH: 100 INJECTION, SOLUTION INTRAVENOUS; SUBCUTANEOUS at 06:39

## 2020-12-06 RX ADMIN — BRIMONIDINE TARTRATE SCH DROP: 2 SOLUTION/ DROPS OPHTHALMIC at 10:12

## 2020-12-06 RX ADMIN — Medication SCH MG: at 10:11

## 2020-12-06 RX ADMIN — ALBUTEROL SULFATE PRN AMP: 2.5 SOLUTION RESPIRATORY (INHALATION) at 05:06

## 2020-12-06 RX ADMIN — BRIMONIDINE TARTRATE SCH: 2 SOLUTION/ DROPS OPHTHALMIC at 21:35

## 2020-12-06 RX ADMIN — TIMOLOL MALEATE SCH: 5 SOLUTION OPHTHALMIC at 21:36

## 2020-12-06 RX ADMIN — SODIUM ZIRCONIUM CYCLOSILICATE SCH GM: 10 POWDER, FOR SUSPENSION ORAL at 10:12

## 2020-12-06 RX ADMIN — INSULIN ASPART SCH UNITS: 100 INJECTION, SOLUTION INTRAVENOUS; SUBCUTANEOUS at 21:35

## 2020-12-06 RX ADMIN — MULTIVITAMIN TABLET SCH TAB: TABLET at 10:12

## 2020-12-06 RX ADMIN — HEPARIN SODIUM SCH UNIT: 5000 INJECTION, SOLUTION INTRAVENOUS; SUBCUTANEOUS at 17:50

## 2020-12-06 RX ADMIN — ALBUTEROL SULFATE PRN AMP: 2.5 SOLUTION RESPIRATORY (INHALATION) at 20:31

## 2020-12-06 RX ADMIN — POLYETHYLENE GLYCOL 3350 SCH: 17 POWDER, FOR SOLUTION ORAL at 10:13

## 2020-12-06 RX ADMIN — POLYETHYLENE GLYCOL 3350 SCH GM: 17 POWDER, FOR SOLUTION ORAL at 17:49

## 2020-12-06 RX ADMIN — CARVEDILOL SCH MG: 6.25 TABLET, FILM COATED ORAL at 21:32

## 2020-12-06 RX ADMIN — INSULIN ASPART SCH UNITS: 100 INJECTION, SOLUTION INTRAVENOUS; SUBCUTANEOUS at 17:50

## 2020-12-06 RX ADMIN — ROSUVASTATIN CALCIUM SCH MG: 10 TABLET, FILM COATED ORAL at 21:32

## 2020-12-06 RX ADMIN — TIMOLOL MALEATE SCH DROP: 5 SOLUTION OPHTHALMIC at 10:12

## 2020-12-06 RX ADMIN — TIOTROPIUM BROMIDE INHALATION SPRAY SCH PUFF: 3.12 SPRAY, METERED RESPIRATORY (INHALATION) at 10:12

## 2020-12-06 RX ADMIN — NICOTINE SCH MG: 14 PATCH, EXTENDED RELEASE TRANSDERMAL at 10:11

## 2020-12-06 RX ADMIN — CARVEDILOL SCH MG: 6.25 TABLET, FILM COATED ORAL at 10:11

## 2020-12-06 RX ADMIN — INSULIN ASPART SCH UNITS: 100 INJECTION, SOLUTION INTRAVENOUS; SUBCUTANEOUS at 12:17

## 2020-12-06 RX ADMIN — AMLODIPINE BESYLATE SCH MG: 10 TABLET ORAL at 10:11

## 2020-12-07 LAB
ALBUMIN SERPL-MCNC: 2.4 G/DL (ref 3.4–5)
ALP SERPL-CCNC: 270 U/L (ref 45–117)
ALT SERPL-CCNC: 85 U/L (ref 13–61)
ANION GAP SERPL CALC-SCNC: 7 MMOL/L (ref 8–16)
ANION GAP SERPL CALC-SCNC: 8 MMOL/L (ref 8–16)
AST SERPL-CCNC: 68 U/L (ref 15–37)
BILIRUB SERPL-MCNC: 0.3 MG/DL (ref 0.2–1)
BUN SERPL-MCNC: 85.9 MG/DL (ref 7–18)
BUN SERPL-MCNC: 88.7 MG/DL (ref 7–18)
CALCIUM SERPL-MCNC: 8.6 MG/DL (ref 8.5–10.1)
CALCIUM SERPL-MCNC: 8.9 MG/DL (ref 8.5–10.1)
CHLORIDE SERPL-SCNC: 110 MMOL/L (ref 98–107)
CHLORIDE SERPL-SCNC: 113 MMOL/L (ref 98–107)
CO2 SERPL-SCNC: 23 MMOL/L (ref 21–32)
CO2 SERPL-SCNC: 26 MMOL/L (ref 21–32)
CREAT SERPL-MCNC: 2 MG/DL (ref 0.55–1.3)
CREAT SERPL-MCNC: 2.1 MG/DL (ref 0.55–1.3)
GLUCOSE SERPL-MCNC: 247 MG/DL (ref 74–106)
GLUCOSE SERPL-MCNC: 261 MG/DL (ref 74–106)
INR BLD: 0.94 (ref 0.83–1.09)
PROT SERPL-MCNC: 5.7 G/DL (ref 6.4–8.2)
PT PNL PPP: 11.6 SEC (ref 9.7–13)
SODIUM SERPL-SCNC: 142 MMOL/L (ref 136–145)
SODIUM SERPL-SCNC: 144 MMOL/L (ref 136–145)

## 2020-12-07 PROCEDURE — 0JH63XZ INSERTION OF TUNNELED VASCULAR ACCESS DEVICE INTO CHEST SUBCUTANEOUS TISSUE AND FASCIA, PERCUTANEOUS APPROACH: ICD-10-PCS | Performed by: RADIOLOGY

## 2020-12-07 RX ADMIN — Medication SCH MG: at 09:25

## 2020-12-07 RX ADMIN — BRIMONIDINE TARTRATE SCH DROP: 2 SOLUTION/ DROPS OPHTHALMIC at 09:28

## 2020-12-07 RX ADMIN — CARVEDILOL SCH MG: 6.25 TABLET, FILM COATED ORAL at 09:25

## 2020-12-07 RX ADMIN — TIMOLOL MALEATE SCH DROP: 5 SOLUTION OPHTHALMIC at 09:29

## 2020-12-07 RX ADMIN — BRIMONIDINE TARTRATE SCH DROP: 2 SOLUTION/ DROPS OPHTHALMIC at 22:57

## 2020-12-07 RX ADMIN — TIOTROPIUM BROMIDE INHALATION SPRAY SCH PUFF: 3.12 SPRAY, METERED RESPIRATORY (INHALATION) at 09:26

## 2020-12-07 RX ADMIN — AMLODIPINE BESYLATE SCH MG: 10 TABLET ORAL at 09:25

## 2020-12-07 RX ADMIN — ALBUTEROL SULFATE PRN AMP: 2.5 SOLUTION RESPIRATORY (INHALATION) at 06:58

## 2020-12-07 RX ADMIN — ROSUVASTATIN CALCIUM SCH MG: 10 TABLET, FILM COATED ORAL at 22:57

## 2020-12-07 RX ADMIN — CARVEDILOL SCH MG: 6.25 TABLET, FILM COATED ORAL at 22:57

## 2020-12-07 RX ADMIN — INSULIN ASPART SCH UNITS: 100 INJECTION, SOLUTION INTRAVENOUS; SUBCUTANEOUS at 12:30

## 2020-12-07 RX ADMIN — POLYETHYLENE GLYCOL 3350 SCH GM: 17 POWDER, FOR SOLUTION ORAL at 09:28

## 2020-12-07 RX ADMIN — INSULIN ASPART SCH: 100 INJECTION, SOLUTION INTRAVENOUS; SUBCUTANEOUS at 06:53

## 2020-12-07 RX ADMIN — HEPARIN SODIUM SCH: 5000 INJECTION, SOLUTION INTRAVENOUS; SUBCUTANEOUS at 06:59

## 2020-12-07 RX ADMIN — INSULIN ASPART SCH UNITS: 100 INJECTION, SOLUTION INTRAVENOUS; SUBCUTANEOUS at 22:58

## 2020-12-07 RX ADMIN — TIMOLOL MALEATE SCH DROP: 5 SOLUTION OPHTHALMIC at 22:58

## 2020-12-07 RX ADMIN — MULTIVITAMIN TABLET SCH TAB: TABLET at 09:25

## 2020-12-07 RX ADMIN — HEPARIN SODIUM SCH UNIT: 5000 INJECTION, SOLUTION INTRAVENOUS; SUBCUTANEOUS at 22:57

## 2020-12-07 RX ADMIN — HEPARIN SODIUM SCH: 5000 INJECTION, SOLUTION INTRAVENOUS; SUBCUTANEOUS at 14:48

## 2020-12-07 RX ADMIN — NICOTINE SCH MG: 14 PATCH, EXTENDED RELEASE TRANSDERMAL at 09:25

## 2020-12-07 RX ADMIN — INSULIN ASPART SCH: 100 INJECTION, SOLUTION INTRAVENOUS; SUBCUTANEOUS at 20:49

## 2020-12-07 RX ADMIN — PANTOPRAZOLE SODIUM SCH MG: 40 TABLET, DELAYED RELEASE ORAL at 09:25

## 2020-12-07 RX ADMIN — INSULIN DETEMIR SCH UNITS: 100 INJECTION, SOLUTION SUBCUTANEOUS at 22:57

## 2020-12-08 VITALS — HEART RATE: 80 BPM | SYSTOLIC BLOOD PRESSURE: 149 MMHG | DIASTOLIC BLOOD PRESSURE: 61 MMHG | TEMPERATURE: 98.1 F

## 2020-12-08 LAB
ALBUMIN SERPL-MCNC: 2.2 G/DL (ref 3.4–5)
ALP SERPL-CCNC: 233 U/L (ref 45–117)
ALT SERPL-CCNC: 63 U/L (ref 13–61)
ANION GAP SERPL CALC-SCNC: 6 MMOL/L (ref 8–16)
ANION GAP SERPL CALC-SCNC: 6 MMOL/L (ref 8–16)
AST SERPL-CCNC: 42 U/L (ref 15–37)
BILIRUB SERPL-MCNC: 0.3 MG/DL (ref 0.2–1)
BUN SERPL-MCNC: 78.1 MG/DL (ref 7–18)
BUN SERPL-MCNC: 82.8 MG/DL (ref 7–18)
CALCIUM SERPL-MCNC: 8.5 MG/DL (ref 8.5–10.1)
CALCIUM SERPL-MCNC: 8.7 MG/DL (ref 8.5–10.1)
CHLORIDE SERPL-SCNC: 111 MMOL/L (ref 98–107)
CHLORIDE SERPL-SCNC: 112 MMOL/L (ref 98–107)
CO2 SERPL-SCNC: 25 MMOL/L (ref 21–32)
CO2 SERPL-SCNC: 26 MMOL/L (ref 21–32)
CREAT SERPL-MCNC: 1.9 MG/DL (ref 0.55–1.3)
CREAT SERPL-MCNC: 2 MG/DL (ref 0.55–1.3)
GLUCOSE SERPL-MCNC: 236 MG/DL (ref 74–106)
GLUCOSE SERPL-MCNC: 240 MG/DL (ref 74–106)
PROT SERPL-MCNC: 5.2 G/DL (ref 6.4–8.2)
SODIUM SERPL-SCNC: 143 MMOL/L (ref 136–145)
SODIUM SERPL-SCNC: 143 MMOL/L (ref 136–145)

## 2020-12-08 RX ADMIN — AMLODIPINE BESYLATE SCH MG: 10 TABLET ORAL at 09:41

## 2020-12-08 RX ADMIN — NICOTINE SCH MG: 14 PATCH, EXTENDED RELEASE TRANSDERMAL at 09:44

## 2020-12-08 RX ADMIN — PANTOPRAZOLE SODIUM SCH MG: 40 TABLET, DELAYED RELEASE ORAL at 09:41

## 2020-12-08 RX ADMIN — Medication SCH MG: at 09:41

## 2020-12-08 RX ADMIN — MULTIVITAMIN TABLET SCH TAB: TABLET at 09:41

## 2020-12-08 RX ADMIN — POLYETHYLENE GLYCOL 3350 SCH GM: 17 POWDER, FOR SOLUTION ORAL at 09:44

## 2020-12-08 RX ADMIN — HEPARIN SODIUM SCH UNIT: 5000 INJECTION, SOLUTION INTRAVENOUS; SUBCUTANEOUS at 14:37

## 2020-12-08 RX ADMIN — CARVEDILOL SCH MG: 6.25 TABLET, FILM COATED ORAL at 09:41

## 2020-12-08 RX ADMIN — BRIMONIDINE TARTRATE SCH DROP: 2 SOLUTION/ DROPS OPHTHALMIC at 09:48

## 2020-12-08 RX ADMIN — TIMOLOL MALEATE SCH DROP: 5 SOLUTION OPHTHALMIC at 09:48

## 2020-12-08 RX ADMIN — HEPARIN SODIUM SCH UNIT: 5000 INJECTION, SOLUTION INTRAVENOUS; SUBCUTANEOUS at 06:20

## 2020-12-08 RX ADMIN — TIOTROPIUM BROMIDE INHALATION SPRAY SCH PUFF: 3.12 SPRAY, METERED RESPIRATORY (INHALATION) at 09:48

## 2020-12-08 RX ADMIN — INSULIN ASPART SCH UNITS: 100 INJECTION, SOLUTION INTRAVENOUS; SUBCUTANEOUS at 11:45

## 2020-12-08 RX ADMIN — INSULIN ASPART SCH: 100 INJECTION, SOLUTION INTRAVENOUS; SUBCUTANEOUS at 06:20

## 2020-12-09 LAB — ALBUMIN MFR FLD ELPH: 0.3 G/DL
